# Patient Record
Sex: FEMALE | Race: WHITE | NOT HISPANIC OR LATINO | Employment: OTHER | URBAN - NONMETROPOLITAN AREA
[De-identification: names, ages, dates, MRNs, and addresses within clinical notes are randomized per-mention and may not be internally consistent; named-entity substitution may affect disease eponyms.]

---

## 2017-01-12 ENCOUNTER — OFFICE VISIT (OUTPATIENT)
Dept: FAMILY MEDICINE CLINIC | Facility: CLINIC | Age: 67
End: 2017-01-12

## 2017-01-12 VITALS
HEIGHT: 64 IN | WEIGHT: 184 LBS | BODY MASS INDEX: 31.41 KG/M2 | SYSTOLIC BLOOD PRESSURE: 110 MMHG | DIASTOLIC BLOOD PRESSURE: 80 MMHG

## 2017-01-12 DIAGNOSIS — R53.83 MALAISE AND FATIGUE: ICD-10-CM

## 2017-01-12 DIAGNOSIS — R53.81 MALAISE AND FATIGUE: ICD-10-CM

## 2017-01-12 DIAGNOSIS — R74.8 ELEVATED LIVER ENZYMES: Primary | ICD-10-CM

## 2017-01-12 PROCEDURE — 99213 OFFICE O/P EST LOW 20 MIN: CPT | Performed by: NURSE PRACTITIONER

## 2017-01-12 NOTE — MR AVS SNAPSHOT
Kelly Cardona   1/12/2017 1:45 PM   Office Visit    Dept Phone:  [phone]   Encounter #:  [id number]    Provider:  PABLO Bernstein   Department:  [facility]IC[facility]                Your Full Care Plan              Your Updated Medication List          This list is accurate as of: 1/12/17  2:26 PM.  Always use your most recent med list.                acetaminophen 325 MG suppository   Commonly known as:  TYLENOL   Insert 1 suppository into the rectum Every 4 (Four) Hours As Needed for mild pain (1-3).       CALCIUM 600+D3 600-400 MG-UNIT tablet   Generic drug:  Calcium Carb-Cholecalciferol       CENTRUM SILVER PO       cetirizine 10 MG tablet   Commonly known as:  zyrTEC       CRANBERRY EXTRACT PO       EPIPEN 2-ALBERT 0.3 MG/0.3ML solution auto-injector injection   Generic drug:  EPINEPHrine       estradiol 2 MG vaginal ring   Commonly known as:  ESTRING   Insert 2 mg into the vagina every 3 (three) months. follow package directions       Flax Seed Oil 1000 MG capsule       fluticasone 50 MCG/ACT nasal spray   Commonly known as:  FLONASE   2 sprays into each nostril Daily. Administer 2 sprays in each nostril for each dose.       gabapentin 300 MG capsule   Commonly known as:  NEURONTIN       lubiprostone 24 MCG capsule   Commonly known as:  AMITIZA   Take 1 capsule by mouth 2 (Two) Times a Day.       meclizine 25 MG tablet   Commonly known as:  ANTIVERT       metroNIDAZOLE 1 % gel   Commonly known as:  METROGEL       MUCUS RELIEF 400 MG tablet   Generic drug:  guaiFENesin       PROBIOTIC FORMULA PO       * promethazine 25 MG suppository   Commonly known as:  PHENERGAN   Insert 1 suppository into the rectum Every 6 (Six) Hours As Needed for nausea or vomiting.       * promethazine 25 MG tablet   Commonly known as:  PHENERGAN   Take 1 tablet by mouth Every 6 (Six) Hours As Needed for nausea or vomiting (try pill before suppository).       temazepam 30 MG  capsule   Commonly known as:  RESTORIL   Take 1 capsule by mouth At Night As Needed for sleep.       triamterene-hydrochlorothiazide 37.5-25 MG per tablet   Commonly known as:  MAXZIDE-25       vitamin C 500 MG tablet   Commonly known as:  ASCORBIC ACID       * Notice:  This list has 2 medication(s) that are the same as other medications prescribed for you. Read the directions carefully, and ask your doctor or other care provider to review them with you.            We Performed the Following     CBC Auto Differential     Comprehensive Metabolic Panel       You Were Diagnosed With        Codes Comments    Elevated liver enzymes    -  Primary ICD-10-CM: R74.8  ICD-9-CM: 790.5     Malaise and fatigue     ICD-10-CM: R53.81, R53.83  ICD-9-CM: 780.79       Instructions     None    Patient Instructions History      Upcoming Appointments     Visit Type Date Time Department    OFFICE VISIT 1/12/2017  1:45 PM [facility] MED [facility] 4TH    OFFICE VISIT 6/21/2017  8:15 AM [facility]OLOG[facility]      MyChart Signup     Our records indicate that you have [facility]. If you would like to sign up for [email or web address], please email [facility] Center[email or web address] or call [phone] to obtain an activation code.             Other Info from Your Visit           Your Appointments     Jun 21, 2017  8:15 AM CDT   Office Visit with Kirit Murillo MD   [facility] (--)    [address] [place] [address] Dr  [facility] [address] [place] [id number] [phone]           Arrive 15 minutes prior to appointment.              Allergies     Augmentin [Amoxicillin-pot Clavulanate]  Anaphylaxis    Ceclor [Cefaclor]  Anaphylaxis    Ibuprofen  Anaphylaxis    Nsaids  Anaphylaxis    Other  Anaphylaxis    Cats  E.E.S. 200  Lead   Mold  [place] Pastrani - Anaphylaxis    Penicillins  Anaphylaxis    Aspirin      Biaxin [Clarithromycin]      Ciprofloxacin      *cipro    Demerol [Meperidine]      Iodine      And iodide  "containing products    Macrobid [Nitrofurantoin Monohyd Macro]      Nickel      Requip [Ropinirole Hcl]      Septra [Sulfamethoxazole-trimethoprim]      Statins  Other (See Comments)    Statins-Hmg-Coa Reductase inhibitors  *muscle enzyme increase with myalgias    Niaspan [Niacin Er]  Rash      Reason for Visit     Follow-up E/R  from Dec for eleva functions      Vital Signs     Blood Pressure Height Weight Body Mass Index Smoking Status       110/80 (BP Location: Left arm, Patient Position: Sitting, Cuff Size: Adult) 64\" (162.6 cm) 184 lb (83.5 kg) 31.58 kg/m2 Former Smoker       Problems and Diagnoses Noted     Elevated liver enzymes    Malaise and fatigue        "

## 2017-01-12 NOTE — PROGRESS NOTES
"  Chief Complaint   Patient presents with   • Follow-up     E/R  from Dec for eleva functions     Subjective   Kelly Cardona is a 66 y.o. female.     Fatigue   This is a recurrent problem. The current episode started more than 1 month ago. The problem occurs constantly. The problem has been gradually worsening. Associated symptoms include arthralgias, congestion, coughing, fatigue, joint swelling, myalgias and neck pain. Pertinent negatives include no abdominal pain, anorexia, change in bowel habit, chest pain, chills, diaphoresis, fever, headaches, numbness, rash, sore throat, swollen glands, urinary symptoms, vertigo, visual change, vomiting or weakness. Nothing aggravates the symptoms. The treatment provided mild relief.        The following portions of the patient's history were reviewed and updated as appropriate: allergies, current medications, past social history and problem list.    Review of Systems   Constitutional: Positive for fatigue. Negative for chills, diaphoresis and fever.   HENT: Positive for congestion. Negative for sore throat.    Eyes: Negative.    Respiratory: Positive for cough. Negative for shortness of breath, wheezing and stridor.    Cardiovascular: Negative.  Negative for chest pain and palpitations.   Gastrointestinal: Negative.  Negative for abdominal distention, abdominal pain, anal bleeding, anorexia, change in bowel habit and vomiting.   Endocrine: Negative.    Genitourinary: Negative.    Musculoskeletal: Positive for arthralgias, back pain, gait problem, joint swelling, myalgias, neck pain and neck stiffness.   Skin: Negative.  Negative for rash.   Allergic/Immunologic: Negative.    Neurological: Negative for vertigo, weakness, numbness and headaches.   Hematological: Negative.    Psychiatric/Behavioral: Negative.        Objective   Visit Vitals   • /80 (BP Location: Left arm, Patient Position: Sitting, Cuff Size: Adult)   • Ht 64\" (162.6 cm)   • Wt 184 lb (83.5 kg)   • " BMI 31.58 kg/m2     Physical Exam   Constitutional: She is oriented to person, place, and time. She appears well-developed and well-nourished. No distress.   HENT:   Head: Normocephalic and atraumatic.   Eyes: EOM are normal. Pupils are equal, round, and reactive to light.   Neck: Normal range of motion. Neck supple.   Cardiovascular: Normal rate, regular rhythm and normal heart sounds.  Exam reveals no gallop and no friction rub.    No murmur heard.  Pulmonary/Chest: Effort normal and breath sounds normal. No accessory muscle usage. No apnea. No respiratory distress. She has no wheezes. She has no rales.   Abdominal: Soft. There is tenderness.   Musculoskeletal: She exhibits tenderness. Edema: trace bilaterally.        Right hand: She exhibits decreased range of motion and tenderness.        Left hand: She exhibits decreased range of motion and tenderness.   Neurological: She is alert and oriented to person, place, and time. She has normal reflexes.   Skin: Skin is warm and dry. She is not diaphoretic.   Psychiatric: She has a normal mood and affect. Her speech is normal and behavior is normal. Judgment and thought content normal.   Nursing note and vitals reviewed.      Assessment/Plan   Problem List Items Addressed This Visit        Other    Elevated liver enzymes - Primary    Relevant Orders    CBC Auto Differential    Comprehensive Metabolic Panel    CK    Malaise and fatigue    Relevant Orders    CBC Auto Differential    Comprehensive Metabolic Panel    CK         No orders of the defined types were placed in this encounter.     has appointment with rheumatology tomorrow follow up as directed for other complaints

## 2017-02-02 DIAGNOSIS — Z12.31 SCREENING MAMMOGRAM, ENCOUNTER FOR: Primary | ICD-10-CM

## 2017-02-27 RX ORDER — TEMAZEPAM 30 MG/1
30 CAPSULE ORAL NIGHTLY PRN
Qty: 30 CAPSULE | Refills: 0 | Status: SHIPPED | OUTPATIENT
Start: 2017-02-27 | End: 2017-03-27 | Stop reason: SDUPTHER

## 2017-03-16 ENCOUNTER — OFFICE VISIT (OUTPATIENT)
Dept: FAMILY MEDICINE CLINIC | Facility: CLINIC | Age: 67
End: 2017-03-16

## 2017-03-16 VITALS
HEART RATE: 66 BPM | DIASTOLIC BLOOD PRESSURE: 60 MMHG | WEIGHT: 188.2 LBS | OXYGEN SATURATION: 99 % | BODY MASS INDEX: 32.3 KG/M2 | SYSTOLIC BLOOD PRESSURE: 100 MMHG

## 2017-03-16 DIAGNOSIS — K21.9 GASTROESOPHAGEAL REFLUX DISEASE WITHOUT ESOPHAGITIS: ICD-10-CM

## 2017-03-16 DIAGNOSIS — J01.01 ACUTE RECURRENT MAXILLARY SINUSITIS: ICD-10-CM

## 2017-03-16 DIAGNOSIS — N18.2 CHRONIC KIDNEY DISEASE (CKD), STAGE II (MILD): Primary | ICD-10-CM

## 2017-03-16 DIAGNOSIS — K31.7 GASTRIC POLYP: ICD-10-CM

## 2017-03-16 DIAGNOSIS — M19.042 PRIMARY OSTEOARTHRITIS OF BOTH HANDS: ICD-10-CM

## 2017-03-16 DIAGNOSIS — M19.041 PRIMARY OSTEOARTHRITIS OF BOTH HANDS: ICD-10-CM

## 2017-03-16 DIAGNOSIS — G56.03 BILATERAL CARPAL TUNNEL SYNDROME: ICD-10-CM

## 2017-03-16 DIAGNOSIS — M54.2 NECK PAIN: ICD-10-CM

## 2017-03-16 PROCEDURE — 99214 OFFICE O/P EST MOD 30 MIN: CPT | Performed by: FAMILY MEDICINE

## 2017-03-16 RX ORDER — ERGOCALCIFEROL 1.25 MG/1
50000 CAPSULE ORAL WEEKLY
Refills: 5 | COMMUNITY
Start: 2017-02-17 | End: 2017-07-21

## 2017-03-16 RX ORDER — TRAZODONE HYDROCHLORIDE 50 MG/1
50 TABLET ORAL DAILY
Refills: 3 | COMMUNITY
Start: 2017-02-09 | End: 2017-05-15 | Stop reason: SDUPTHER

## 2017-03-16 RX ORDER — DOXYCYCLINE 100 MG/1
100 CAPSULE ORAL 2 TIMES DAILY
Qty: 20 CAPSULE | Refills: 0 | Status: SHIPPED | OUTPATIENT
Start: 2017-03-16 | End: 2017-03-26

## 2017-03-16 RX ORDER — VALACYCLOVIR HYDROCHLORIDE 1 G/1
1 TABLET, FILM COATED ORAL DAILY PRN
Refills: 3 | COMMUNITY
Start: 2017-03-09 | End: 2018-01-25

## 2017-03-16 NOTE — PROGRESS NOTES
Subjective   Kelly Cardona is a 67 y.o. female.     Chronic Kidney Disease   This is a chronic (double renal pelvis) problem. The current episode started more than 1 year ago. The problem occurs constantly. Associated symptoms include congestion and coughing.   Arthritis   Presents for follow-up (MRI c-spine done DJD) visit. She complains of pain, stiffness and joint warmth. (Ribs aching also) The symptoms have been worsening. Affected locations include the right PIP, left PIP, right DIP, left DIP and neck.   Neck Pain    This is a chronic problem. The current episode started more than 1 year ago. The problem occurs constantly. The problem has been rapidly worsening. The pain is associated with a fall. The pain is present in the occipital region. The quality of the pain is described as aching, burning and shooting. The pain is at a severity of 7/10. The pain is severe. The symptoms are aggravated by bending, twisting and position. The pain is same all the time. Stiffness is present all day. Associated symptoms include tingling.   Heartburn   She complains of belching, choking, coughing, dysphagia (2 timed last 3 yeard), heartburn, water brash and wheezing. ribs aching also.   URI    This is a recurrent problem. The current episode started 1 to 4 weeks ago. The problem has been gradually worsening. There has been no fever. Associated symptoms include congestion, coughing, rhinorrhea, sinus pain and wheezing.        The following portions of the patient's history were reviewed and updated as appropriate: allergies, current medications, past family history, past medical history, past social history, past surgical history and problem list.    Review of Systems   HENT: Positive for congestion and rhinorrhea.    Respiratory: Positive for cough, choking and wheezing. Negative for shortness of breath.    Cardiovascular: Positive for leg swelling. Negative for palpitations.   Gastrointestinal: Positive for dysphagia (2  timed last 3 yeard) and heartburn.   Musculoskeletal: Positive for arthritis and stiffness.   Neurological: Positive for tingling.   Psychiatric/Behavioral: Positive for sleep disturbance.       Objective   Physical Exam   Constitutional: She is oriented to person, place, and time. She appears well-developed and well-nourished. No distress.   HENT:   Head: Normocephalic and atraumatic.   Nose: Mucosal edema and rhinorrhea present. Right sinus exhibits maxillary sinus tenderness. Left sinus exhibits maxillary sinus tenderness.   Mouth/Throat: Uvula is midline, oropharynx is clear and moist and mucous membranes are normal.   Cardiovascular: Normal rate, regular rhythm and normal heart sounds.  Exam reveals no gallop and no friction rub.    No murmur heard.  Pulmonary/Chest: Effort normal and breath sounds normal. No respiratory distress. She has no decreased breath sounds. She has no wheezes. She has no rhonchi. She has no rales.   Abdominal: Soft. There is tenderness in the epigastric area.   Musculoskeletal: Edema: trace bilaterally.        Cervical back: She exhibits decreased range of motion, tenderness, pain and spasm. She exhibits no bony tenderness, no swelling, no edema, no deformity and no laceration.        Right hand: She exhibits decreased range of motion and tenderness.        Left hand: She exhibits decreased range of motion and tenderness.        Hands:  Neurological: She is alert and oriented to person, place, and time. She has normal strength. A sensory deficit is present.   Reflex Scores:       Patellar reflexes are 2+ on the right side and 2+ on the left side.  Skin: Skin is warm and dry. She is not diaphoretic.   Psychiatric: She has a normal mood and affect. Her behavior is normal. Judgment and thought content normal.   Nursing note and vitals reviewed.      Assessment/Plan   Problems Addressed this Visit        Digestive    Gastroesophageal reflux disease    Gastric polyp       Musculoskeletal  and Integument    Primary osteoarthritis of both hands       Genitourinary    Chronic kidney disease (CKD), stage II (mild) - Primary      Other Visit Diagnoses     Neck pain        Relevant Orders    MRI Cervical Spine Without Contrast    Acute recurrent maxillary sinusitis        Bilateral carpal tunnel syndrome                   q

## 2017-03-22 ENCOUNTER — HOSPITAL ENCOUNTER (OUTPATIENT)
Dept: MRI IMAGING | Facility: HOSPITAL | Age: 67
Discharge: HOME | End: 2017-03-22
Attending: FAMILY MEDICINE | Admitting: FAMILY MEDICINE

## 2017-03-22 DIAGNOSIS — M54.2 NECK PAIN: ICD-10-CM

## 2017-03-22 PROCEDURE — 72141 MRI NECK SPINE W/O DYE: CPT

## 2017-03-24 NOTE — PROGRESS NOTES
There is a lot of arthritis and some mild to moderate stenosis at multiple levels.  Recommend she get the disc and see her neurosurgeon for second opinion.

## 2017-03-28 ENCOUNTER — TELEPHONE (OUTPATIENT)
Dept: ENDOCRINOLOGY | Facility: CLINIC | Age: 67
End: 2017-03-28

## 2017-03-28 RX ORDER — TEMAZEPAM 30 MG/1
CAPSULE ORAL
Qty: 30 CAPSULE | Refills: 2 | Status: SHIPPED | OUTPATIENT
Start: 2017-03-28 | End: 2017-06-22 | Stop reason: SDUPTHER

## 2017-03-28 NOTE — TELEPHONE ENCOUNTER
Pr Dr. Forrest's instruction, Kelly has been called with her MRI Results & Recommendations  She will obtain a copy of the Disc to take to her Neurosurgeon       ----- Message from Naresh Forrest MD sent at 3/24/2017 12:31 PM CDT -----  There is a lot of arthritis and some mild to moderate stenosis at multiple levels.  Recommend she get the disc and see her neurosurgeon for second opinion.

## 2017-03-28 NOTE — PROGRESS NOTES
Pr Dr. Forrest's instruction, Kelly has been called with her MRI Results & Recommendations  She will obtain a copy of the Disc to take to her Neurosurgeon

## 2017-04-19 ENCOUNTER — OFFICE VISIT (OUTPATIENT)
Dept: FAMILY MEDICINE CLINIC | Facility: CLINIC | Age: 67
End: 2017-04-19

## 2017-04-19 VITALS
HEIGHT: 64 IN | HEART RATE: 65 BPM | SYSTOLIC BLOOD PRESSURE: 124 MMHG | DIASTOLIC BLOOD PRESSURE: 74 MMHG | OXYGEN SATURATION: 99 % | WEIGHT: 183.5 LBS | BODY MASS INDEX: 31.33 KG/M2

## 2017-04-19 DIAGNOSIS — Z00.00 INITIAL MEDICARE ANNUAL WELLNESS VISIT: Primary | ICD-10-CM

## 2017-04-19 PROCEDURE — G0438 PPPS, INITIAL VISIT: HCPCS | Performed by: FAMILY MEDICINE

## 2017-04-19 RX ORDER — EPINEPHRINE 0.3 MG/.3ML
0.3 INJECTION SUBCUTANEOUS ONCE
Qty: 1 EACH | Refills: 2 | Status: SHIPPED | OUTPATIENT
Start: 2017-04-19 | End: 2017-04-19

## 2017-04-19 RX ORDER — METOCLOPRAMIDE 5 MG/1
5 TABLET ORAL
COMMUNITY
End: 2017-07-21 | Stop reason: SINTOL

## 2017-04-19 NOTE — PROGRESS NOTES
QUICK REFERENCE INFORMATION:  The ABCs of the Annual Wellness Visit    Initial Medicare Wellness Visit    HEALTH RISK ASSESSMENT    1950    Recent Hospitalizations:  Recently treated at the following:  [facility].        Current Medical Providers:  Patient Care Team:  Naresh Forrest MD as PCP - General  Wesley Fox MD as Consulting Physician (Rheumatology)  PABLO Carter (Obstetrics and Gynecology)  Kirit Murillo MD as Consulting Physician (Ophthalmology)  Doug Hogue MD as Surgeon (Neurosurgery)        Smoking Status:  History   Smoking Status   • Former Smoker   Smokeless Tobacco   • Never Used       Alcohol Consumption:  History   Alcohol Use No       Depression Screen:   PHQ-9 Depression Screening 4/19/2017   Little interest or pleasure in doing things 0   Feeling down, depressed, or hopeless 0   Trouble falling or staying asleep, or sleeping too much 1   Feeling tired or having little energy 1   Poor appetite or overeating 1   Feeling bad about yourself - or that you are a failure or have let yourself or your family down 0   Trouble concentrating on things, such as reading the newspaper or watching television 0   Moving or speaking so slowly that other people could have noticed. Or the opposite - being so fidgety or restless that you have been moving around a lot more than usual 0   Thoughts that you would be better off dead, or of hurting yourself in some way 0   PHQ-9 Total Score 3   If you checked off any problems, how difficult have these problems made it for you to do your work, take care of things at home, or get along with other people? Not difficult at all       Health Habits and Functional and Cognitive Screening:  Functional & Cognitive Status 4/19/2017   Do you have difficulty preparing food and eating? No   Do you have difficulty bathing yourself? No   Do you have difficulty getting dressed? No   Do you have difficulty using the toilet? No   Do you have  difficulty moving around from place to place? No   In the past year have you fallen or experienced a near fall? No   Do you need help using the phone?  No   Are you deaf or do you have serious difficulty hearing?  No   Do you need help with transportation? No   Do you need help shopping? No   Do you need help preparing meals?  No   Do you need help with housework?  No   Do you need help with laundry? No   Do you need help taking your medications? No   Do you need help managing money? No   Do you have difficulty concentrating, remembering or making decisions? No       Health Habits  Current Diet: Well Balanced Diet  Dental Exam: Up to date  Eye Exam: Up to date  Exercise (times per week): 5 times per week  Current Exercise Activities Include: Housecleaning          Does the patient have evidence of cognitive impairment? No    Asiprin use counseling: Does not need ASA (and currently is not on it)      Recent Lab Results:    Visual Acuity:  No exam data present    Age-appropriate Screening Schedule:  Refer to the list below for future screening recommendations based on patient's age, sex and/or medical conditions. Orders for these recommended tests are listed in the plan section. The patient has been provided with a written plan.    Health Maintenance   Topic Date Due   • MAMMOGRAM  05/10/2017   • LIPID PANEL  10/13/2017   • COLONOSCOPY  07/23/2025   • TDAP/TD VACCINES (2 - Td) 09/16/2026   • INFLUENZA VACCINE  Completed   • ZOSTER VACCINE  Addressed   • PNEUMOCOCCAL VACCINES (65+ LOW/MEDIUM RISK)  Excluded        Subjective   History of Present Illness    Kelly Cardona is a 67 y.o. female who presents for an Annual Wellness Visit.    The following portions of the patient's history were reviewed and updated as appropriate: allergies, current medications, past family history, past medical history, past social history, past surgical history and problem list.    Outpatient Medications Prior to Visit   Medication Sig  Dispense Refill   • acetaminophen (TYLENOL) 325 MG suppository Insert 1 suppository into the rectum Every 4 (Four) Hours As Needed for mild pain (1-3). 30 suppository 11   • Calcium Carb-Cholecalciferol (CALCIUM 600+D3) 600-400 MG-UNIT tablet Take 1 tablet by mouth daily.     • cetirizine (ZyrTEC) 10 MG tablet Take 10 mg by mouth daily.     • CRANBERRY EXTRACT PO Take  by mouth.     • estradiol (ESTRING) 2 MG vaginal ring Insert 2 mg into the vagina every 3 (three) months. follow package directions 1 each 3   • Flaxseed, Linseed, (FLAX SEED OIL) 1000 MG capsule Take 1 capsule by mouth 2 (two) times a day.     • fluticasone (FLONASE) 50 MCG/ACT nasal spray 2 sprays into each nostril Daily. Administer 2 sprays in each nostril for each dose. 16 g 5   • gabapentin (NEURONTIN) 300 MG capsule Take 300 mg by mouth 3 (three) times a day.     • guaiFENesin (MUCUS RELIEF) 400 MG tablet Take 400 mg by mouth every 4 (four) hours.     • lubiprostone (AMITIZA) 24 MCG capsule Take 1 capsule by mouth 2 (Two) Times a Day. (Patient taking differently: Take 24 mcg by mouth As Needed.) 60 capsule 11   • meclizine (ANTIVERT) 25 MG tablet Take 25 mg by mouth 2 (two) times a day.     • metroNIDAZOLE (METROGEL) 1 % gel Apply 1 application topically Daily.     • Multiple Vitamins-Minerals (CENTRUM SILVER PO) Take 1 tablet by mouth daily.     • Probiotic Product (PROBIOTIC FORMULA PO) Take  by mouth 2 (two) times a day. 10 billion cell (2 billion ea) capsule     • promethazine (PHENERGAN) 25 MG suppository Insert 1 suppository into the rectum Every 6 (Six) Hours As Needed for nausea or vomiting. 30 suppository 11   • promethazine (PHENERGAN) 25 MG tablet Take 1 tablet by mouth Every 6 (Six) Hours As Needed for nausea or vomiting (try pill before suppository). 30 tablet 11   • temazepam (RESTORIL) 30 MG capsule TAKE 1 CAPSULE BY MOUTH AT NIGHT AS NEEDED FOR SLEEP 30 capsule 2   • traZODone (DESYREL) 50 MG tablet Take 50 mg by mouth Daily.  3  "  • triamterene-hydrochlorothiazide (MAXZIDE-25) 37.5-25 MG per tablet Take 1 tablet by mouth daily as needed (for edema).     • valACYclovir (VALTREX) 1000 MG tablet Take 1 g by mouth Daily.  3   • vitamin C (ASCORBIC ACID) 500 MG tablet Take 500 mg by mouth daily.     • vitamin D (ERGOCALCIFEROL) 75232 UNITS capsule capsule Take 50,000 Units by mouth 1 (One) Time Per Week.  5   • EPINEPHrine (EPIPEN 2-ALBERT) 0.3 MG/0.3ML solution auto-injector injection Use as directed       No facility-administered medications prior to visit.        Patient Active Problem List   Diagnosis   • Need for hepatitis C screening test   • Vitamin D deficiency   • Spasm of bladder   • Restless legs   • Peripheral edema   • Myoclonic disorder   • Meniere's disease   • Insomnia   • IBS (irritable bowel syndrome)   • Hypercholesterolemia   • History of colon polyps   • Gout   • Gastroesophageal reflux disease   • Gastric polyp   • Fundic gland polyposis of stomach   • Elevated levels of transaminase & lactic acid dehydrogenase   • Chronic kidney disease (CKD), stage II (mild)   • Blastomycosis   • Allergic rhinitis   • Primary osteoarthritis of both hands   • Primary open angle glaucoma of both eyes, mild stage   • Cataract   • Elevated liver enzymes   • Malaise and fatigue       Advance Care Planning:  has an advance directive - a copy has been provided and is in file    Identification of Risk Factors:  Risk factors include: weight .    Review of Systems    Compared to one year ago, the patient feels her physical health is worse.  Compared to one year ago, the patient feels her mental health is worse.    Objective     Physical Exam    Vitals:    04/19/17 0910   BP: 124/74   Pulse: 65   SpO2: 99%   Weight: 183 lb 8 oz (83.2 kg)   Height: 64\" (162.6 cm)   PainSc: 0-No pain       Body mass index is 31.5 kg/(m^2).  Discussed the patient's BMI with her. The BMI is above average; BMI management plan is completed.    Assessment/Plan   Patient " Self-Management and Personalized Health Advice  The patient has been provided with information about: diet and preventive services including:   · Advance directive.    Visit Diagnoses:  No diagnosis found.    No orders of the defined types were placed in this encounter.      Outpatient Encounter Prescriptions as of 4/19/2017   Medication Sig Dispense Refill   • acetaminophen (TYLENOL) 325 MG suppository Insert 1 suppository into the rectum Every 4 (Four) Hours As Needed for mild pain (1-3). 30 suppository 11   • Calcium Carb-Cholecalciferol (CALCIUM 600+D3) 600-400 MG-UNIT tablet Take 1 tablet by mouth daily.     • cetirizine (ZyrTEC) 10 MG tablet Take 10 mg by mouth daily.     • CRANBERRY EXTRACT PO Take  by mouth.     • estradiol (ESTRING) 2 MG vaginal ring Insert 2 mg into the vagina every 3 (three) months. follow package directions 1 each 3   • Flaxseed, Linseed, (FLAX SEED OIL) 1000 MG capsule Take 1 capsule by mouth 2 (two) times a day.     • fluticasone (FLONASE) 50 MCG/ACT nasal spray 2 sprays into each nostril Daily. Administer 2 sprays in each nostril for each dose. 16 g 5   • gabapentin (NEURONTIN) 300 MG capsule Take 300 mg by mouth 3 (three) times a day.     • guaiFENesin (MUCUS RELIEF) 400 MG tablet Take 400 mg by mouth every 4 (four) hours.     • lubiprostone (AMITIZA) 24 MCG capsule Take 1 capsule by mouth 2 (Two) Times a Day. (Patient taking differently: Take 24 mcg by mouth As Needed.) 60 capsule 11   • meclizine (ANTIVERT) 25 MG tablet Take 25 mg by mouth 2 (two) times a day.     • metroNIDAZOLE (METROGEL) 1 % gel Apply 1 application topically Daily.     • Multiple Vitamins-Minerals (CENTRUM SILVER PO) Take 1 tablet by mouth daily.     • Probiotic Product (PROBIOTIC FORMULA PO) Take  by mouth 2 (two) times a day. 10 billion cell (2 billion ea) capsule     • promethazine (PHENERGAN) 25 MG suppository Insert 1 suppository into the rectum Every 6 (Six) Hours As Needed for nausea or vomiting. 30  suppository 11   • promethazine (PHENERGAN) 25 MG tablet Take 1 tablet by mouth Every 6 (Six) Hours As Needed for nausea or vomiting (try pill before suppository). 30 tablet 11   • temazepam (RESTORIL) 30 MG capsule TAKE 1 CAPSULE BY MOUTH AT NIGHT AS NEEDED FOR SLEEP 30 capsule 2   • traZODone (DESYREL) 50 MG tablet Take 50 mg by mouth Daily.  3   • triamterene-hydrochlorothiazide (MAXZIDE-25) 37.5-25 MG per tablet Take 1 tablet by mouth daily as needed (for edema).     • valACYclovir (VALTREX) 1000 MG tablet Take 1 g by mouth Daily.  3   • vitamin C (ASCORBIC ACID) 500 MG tablet Take 500 mg by mouth daily.     • vitamin D (ERGOCALCIFEROL) 69194 UNITS capsule capsule Take 50,000 Units by mouth 1 (One) Time Per Week.  5   • [DISCONTINUED] EPINEPHrine (EPIPEN 2-ALBERT) 0.3 MG/0.3ML solution auto-injector injection Use as directed       No facility-administered encounter medications on file as of 4/19/2017.        Reviewed use of high risk medication in the elderly: yes  Reviewed for potential of harmful drug interactions in the elderly: yes    Follow Up:  No Follow-up on file.     An After Visit Summary and PPPS with all of these plans were given to the patient.

## 2017-04-19 NOTE — PROGRESS NOTES
"QUICK REFERENCE INFORMATION:  The ABCs of the Annual Wellness Visit    Initial Medicare Wellness Visit    HEALTH RISK ASSESSMENT    1950    Recent Hospitalizations:  {Hospitalization history:3650310258::\"No recent hospitalization(s).\"}.        Current Medical Providers:  Patient Care Team:  Naresh Forrest MD as PCP - General  Wesley Fox MD as Consulting Physician (Rheumatology)  PABLO Carter (Obstetrics and Gynecology)  Kirit Murillo MD as Consulting Physician (Ophthalmology)  Doug Hogue MD as Surgeon (Neurosurgery)        Smoking Status:  History   Smoking Status   • Former Smoker   Smokeless Tobacco   • Never Used       Alcohol Consumption:  History   Alcohol Use No       Depression Screen:   PHQ-9 Depression Screening 4/19/2017   Little interest or pleasure in doing things 0   Feeling down, depressed, or hopeless 0   Trouble falling or staying asleep, or sleeping too much 1   Feeling tired or having little energy 1   Poor appetite or overeating 1   Feeling bad about yourself - or that you are a failure or have let yourself or your family down 0   Trouble concentrating on things, such as reading the newspaper or watching television 0   Moving or speaking so slowly that other people could have noticed. Or the opposite - being so fidgety or restless that you have been moving around a lot more than usual 0   Thoughts that you would be better off dead, or of hurting yourself in some way 0   PHQ-9 Total Score 3   If you checked off any problems, how difficult have these problems made it for you to do your work, take care of things at home, or get along with other people? Not difficult at all       Health Habits and Functional and Cognitive Screening:  Functional & Cognitive Status 4/19/2017   Do you have difficulty preparing food and eating? No   Do you have difficulty bathing yourself? No   Do you have difficulty getting dressed? No   Do you have difficulty using the toilet? No   Do you have " "difficulty moving around from place to place? No   In the past year have you fallen or experienced a near fall? No   Do you need help using the phone?  No   Are you deaf or do you have serious difficulty hearing?  No   Do you need help with transportation? No   Do you need help shopping? No   Do you need help preparing meals?  No   Do you need help with housework?  No   Do you need help with laundry? No   Do you need help taking your medications? No   Do you need help managing money? No   Do you have difficulty concentrating, remembering or making decisions? No       Health Habits  Current Diet: Well Balanced Diet  Dental Exam: Up to date  Eye Exam: Up to date  Exercise (times per week): 5 times per week  Current Exercise Activities Include: Housecleaning          Does the patient have evidence of cognitive impairment? {Yes/No w/ pre-defaulted No:72407::\"No\"}    Asiprin use counseling: {Aspirin :35277}      Recent Lab Results:    Visual Acuity:  No exam data present    Age-appropriate Screening Schedule:  Refer to the list below for future screening recommendations based on patient's age, sex and/or medical conditions. Orders for these recommended tests are listed in the plan section. The patient has been provided with a written plan.    Health Maintenance   Topic Date Due   • MAMMOGRAM  05/10/2017   • LIPID PANEL  10/13/2017   • COLONOSCOPY  07/23/2025   • TDAP/TD VACCINES (2 - Td) 09/16/2026   • INFLUENZA VACCINE  Completed   • ZOSTER VACCINE  Addressed   • PNEUMOCOCCAL VACCINES (65+ LOW/MEDIUM RISK)  Excluded        Subjective   History of Present Illness    Kelly Cardona is a 67 y.o. female who presents for an Annual Wellness Visit.    The following portions of the patient's history were reviewed and updated as appropriate: {history reviewed:20406::\"allergies\",\"current medications\",\"past family history\",\"past medical history\",\"past social history\",\"past surgical history\",\"problem " "list\"}.    Outpatient Medications Prior to Visit   Medication Sig Dispense Refill   • acetaminophen (TYLENOL) 325 MG suppository Insert 1 suppository into the rectum Every 4 (Four) Hours As Needed for mild pain (1-3). 30 suppository 11   • Calcium Carb-Cholecalciferol (CALCIUM 600+D3) 600-400 MG-UNIT tablet Take 1 tablet by mouth daily.     • cetirizine (ZyrTEC) 10 MG tablet Take 10 mg by mouth daily.     • CRANBERRY EXTRACT PO Take  by mouth.     • EPINEPHrine (EPIPEN 2-ALBERT) 0.3 MG/0.3ML solution auto-injector injection Use as directed     • estradiol (ESTRING) 2 MG vaginal ring Insert 2 mg into the vagina every 3 (three) months. follow package directions 1 each 3   • Flaxseed, Linseed, (FLAX SEED OIL) 1000 MG capsule Take 1 capsule by mouth 2 (two) times a day.     • fluticasone (FLONASE) 50 MCG/ACT nasal spray 2 sprays into each nostril Daily. Administer 2 sprays in each nostril for each dose. 16 g 5   • gabapentin (NEURONTIN) 300 MG capsule Take 300 mg by mouth 3 (three) times a day.     • guaiFENesin (MUCUS RELIEF) 400 MG tablet Take 400 mg by mouth every 4 (four) hours.     • lubiprostone (AMITIZA) 24 MCG capsule Take 1 capsule by mouth 2 (Two) Times a Day. (Patient taking differently: Take 24 mcg by mouth As Needed.) 60 capsule 11   • meclizine (ANTIVERT) 25 MG tablet Take 25 mg by mouth 2 (two) times a day.     • metroNIDAZOLE (METROGEL) 1 % gel Apply 1 application topically Daily.     • Multiple Vitamins-Minerals (CENTRUM SILVER PO) Take 1 tablet by mouth daily.     • Probiotic Product (PROBIOTIC FORMULA PO) Take  by mouth 2 (two) times a day. 10 billion cell (2 billion ea) capsule     • promethazine (PHENERGAN) 25 MG suppository Insert 1 suppository into the rectum Every 6 (Six) Hours As Needed for nausea or vomiting. 30 suppository 11   • promethazine (PHENERGAN) 25 MG tablet Take 1 tablet by mouth Every 6 (Six) Hours As Needed for nausea or vomiting (try pill before suppository). 30 tablet 11   • " "temazepam (RESTORIL) 30 MG capsule TAKE 1 CAPSULE BY MOUTH AT NIGHT AS NEEDED FOR SLEEP 30 capsule 2   • traZODone (DESYREL) 50 MG tablet Take 50 mg by mouth Daily.  3   • triamterene-hydrochlorothiazide (MAXZIDE-25) 37.5-25 MG per tablet Take 1 tablet by mouth daily as needed (for edema).     • valACYclovir (VALTREX) 1000 MG tablet Take 1 g by mouth Daily.  3   • vitamin C (ASCORBIC ACID) 500 MG tablet Take 500 mg by mouth daily.     • vitamin D (ERGOCALCIFEROL) 67910 UNITS capsule capsule Take 50,000 Units by mouth 1 (One) Time Per Week.  5     No facility-administered medications prior to visit.        Patient Active Problem List   Diagnosis   • Need for hepatitis C screening test   • Vitamin D deficiency   • Spasm of bladder   • Restless legs   • Peripheral edema   • Myoclonic disorder   • Meniere's disease   • Insomnia   • IBS (irritable bowel syndrome)   • Hypercholesterolemia   • History of colon polyps   • Gout   • Gastroesophageal reflux disease   • Gastric polyp   • Fundic gland polyposis of stomach   • Elevated levels of transaminase & lactic acid dehydrogenase   • Chronic kidney disease (CKD), stage II (mild)   • Blastomycosis   • Allergic rhinitis   • Primary osteoarthritis of both hands   • Primary open angle glaucoma of both eyes, mild stage   • Cataract   • Elevated liver enzymes   • Malaise and fatigue       Advance Care Planning:  {Advance Directive Status:34373}    Identification of Risk Factors:  Risk factors include: {MC; WELLNESS RISK FACTORS:69831}.    Review of Systems    Compared to one year ago, the patient feels her physical health is {better worse same:53021}.  Compared to one year ago, the patient feels her mental health is {better worse same:86897}.    Objective     Physical Exam    Vitals:    04/19/17 0910   BP: 124/74   Pulse: 65   SpO2: 99%   Weight: 183 lb 8 oz (83.2 kg)   Height: 64\" (162.6 cm)   PainSc: 0-No pain       Body mass index is 31.5 kg/(m^2).  Discussed the patient's BMI " with her. The BMI {BMI plan (MU F measure 421):03419}.    Assessment/Plan   Patient Self-Management and Personalized Health Advice  The patient has been provided with information about: {; PERSONALIZED HEALTH ADVICE:07931} and preventive services including:   · {plan:90177}.    Visit Diagnoses:  No diagnosis found.    No orders of the defined types were placed in this encounter.      Outpatient Encounter Prescriptions as of 4/19/2017   Medication Sig Dispense Refill   • acetaminophen (TYLENOL) 325 MG suppository Insert 1 suppository into the rectum Every 4 (Four) Hours As Needed for mild pain (1-3). 30 suppository 11   • Calcium Carb-Cholecalciferol (CALCIUM 600+D3) 600-400 MG-UNIT tablet Take 1 tablet by mouth daily.     • cetirizine (ZyrTEC) 10 MG tablet Take 10 mg by mouth daily.     • CRANBERRY EXTRACT PO Take  by mouth.     • EPINEPHrine (EPIPEN 2-ALBERT) 0.3 MG/0.3ML solution auto-injector injection Use as directed     • estradiol (ESTRING) 2 MG vaginal ring Insert 2 mg into the vagina every 3 (three) months. follow package directions 1 each 3   • Flaxseed, Linseed, (FLAX SEED OIL) 1000 MG capsule Take 1 capsule by mouth 2 (two) times a day.     • fluticasone (FLONASE) 50 MCG/ACT nasal spray 2 sprays into each nostril Daily. Administer 2 sprays in each nostril for each dose. 16 g 5   • gabapentin (NEURONTIN) 300 MG capsule Take 300 mg by mouth 3 (three) times a day.     • guaiFENesin (MUCUS RELIEF) 400 MG tablet Take 400 mg by mouth every 4 (four) hours.     • lubiprostone (AMITIZA) 24 MCG capsule Take 1 capsule by mouth 2 (Two) Times a Day. (Patient taking differently: Take 24 mcg by mouth As Needed.) 60 capsule 11   • meclizine (ANTIVERT) 25 MG tablet Take 25 mg by mouth 2 (two) times a day.     • metroNIDAZOLE (METROGEL) 1 % gel Apply 1 application topically Daily.     • Multiple Vitamins-Minerals (CENTRUM SILVER PO) Take 1 tablet by mouth daily.     • Probiotic Product (PROBIOTIC FORMULA PO) Take  by  mouth 2 (two) times a day. 10 billion cell (2 billion ea) capsule     • promethazine (PHENERGAN) 25 MG suppository Insert 1 suppository into the rectum Every 6 (Six) Hours As Needed for nausea or vomiting. 30 suppository 11   • promethazine (PHENERGAN) 25 MG tablet Take 1 tablet by mouth Every 6 (Six) Hours As Needed for nausea or vomiting (try pill before suppository). 30 tablet 11   • temazepam (RESTORIL) 30 MG capsule TAKE 1 CAPSULE BY MOUTH AT NIGHT AS NEEDED FOR SLEEP 30 capsule 2   • traZODone (DESYREL) 50 MG tablet Take 50 mg by mouth Daily.  3   • triamterene-hydrochlorothiazide (MAXZIDE-25) 37.5-25 MG per tablet Take 1 tablet by mouth daily as needed (for edema).     • valACYclovir (VALTREX) 1000 MG tablet Take 1 g by mouth Daily.  3   • vitamin C (ASCORBIC ACID) 500 MG tablet Take 500 mg by mouth daily.     • vitamin D (ERGOCALCIFEROL) 60839 UNITS capsule capsule Take 50,000 Units by mouth 1 (One) Time Per Week.  5     No facility-administered encounter medications on file as of 4/19/2017.        Reviewed use of high risk medication in the elderly: {Response; yes/no/na:63}  Reviewed for potential of harmful drug interactions in the elderly: {Response; yes/no/na:63}    Follow Up:  No Follow-up on file.     An After Visit Summary and PPPS with all of these plans were given to the patient.

## 2017-05-12 ENCOUNTER — PROCEDURE VISIT (OUTPATIENT)
Dept: OBSTETRICS AND GYNECOLOGY | Facility: CLINIC | Age: 67
End: 2017-05-12

## 2017-05-12 VITALS
DIASTOLIC BLOOD PRESSURE: 60 MMHG | SYSTOLIC BLOOD PRESSURE: 114 MMHG | WEIGHT: 190 LBS | HEIGHT: 65 IN | BODY MASS INDEX: 31.65 KG/M2

## 2017-05-12 DIAGNOSIS — Z01.419 WELL WOMAN EXAM WITH ROUTINE GYNECOLOGICAL EXAM: ICD-10-CM

## 2017-05-12 DIAGNOSIS — Z12.31 SCREENING MAMMOGRAM, ENCOUNTER FOR: Primary | ICD-10-CM

## 2017-05-12 RX ORDER — DOXYCYCLINE HYCLATE 100 MG/1
CAPSULE ORAL
Refills: 0 | COMMUNITY
Start: 2017-05-08 | End: 2017-07-21

## 2017-05-15 RX ORDER — TRAZODONE HYDROCHLORIDE 50 MG/1
TABLET ORAL
Qty: 90 TABLET | Refills: 3 | Status: SHIPPED | OUTPATIENT
Start: 2017-05-15 | End: 2017-10-24

## 2017-05-18 ENCOUNTER — TRANSCRIBE ORDERS (OUTPATIENT)
Dept: LAB | Facility: HOSPITAL | Age: 67
End: 2017-05-18

## 2017-05-18 ENCOUNTER — LAB (OUTPATIENT)
Dept: LAB | Facility: HOSPITAL | Age: 67
End: 2017-05-18

## 2017-05-18 DIAGNOSIS — N18.30 CHRONIC KIDNEY DISEASE, STAGE III (MODERATE) (HCC): Primary | ICD-10-CM

## 2017-05-18 DIAGNOSIS — N18.30 CHRONIC KIDNEY DISEASE, STAGE III (MODERATE) (HCC): ICD-10-CM

## 2017-05-18 LAB
25(OH)D3 SERPL-MCNC: 36.9 NG/ML (ref 30–100)
ALBUMIN SERPL-MCNC: 4.1 G/DL (ref 3.4–4.8)
ANION GAP SERPL CALCULATED.3IONS-SCNC: 12 MMOL/L (ref 5–15)
BUN BLD-MCNC: 13 MG/DL (ref 7–21)
BUN/CREAT SERPL: 13.7 (ref 7–25)
CALCIUM SPEC-SCNC: 9.2 MG/DL (ref 8.4–10.2)
CHLORIDE SERPL-SCNC: 102 MMOL/L (ref 95–110)
CO2 SERPL-SCNC: 24 MMOL/L (ref 22–31)
CREAT BLD-MCNC: 0.95 MG/DL (ref 0.5–1)
CREAT UR-MCNC: 95 MG/DL
GFR SERPL CREATININE-BSD FRML MDRD: 59 ML/MIN/1.73 (ref 45–104)
GLUCOSE BLD-MCNC: 86 MG/DL (ref 60–100)
PHOSPHATE SERPL-MCNC: 4.1 MG/DL (ref 2.4–4.4)
POTASSIUM BLD-SCNC: 4.1 MMOL/L (ref 3.5–5.1)
PROT UR-MCNC: 8.8 MG/DL
PROT/CREAT UR: 92.6 MG/G CREA (ref 0–200)
SODIUM BLD-SCNC: 138 MMOL/L (ref 137–145)

## 2017-05-18 PROCEDURE — 82306 VITAMIN D 25 HYDROXY: CPT | Performed by: INTERNAL MEDICINE

## 2017-05-18 PROCEDURE — 36415 COLL VENOUS BLD VENIPUNCTURE: CPT

## 2017-05-18 PROCEDURE — 84156 ASSAY OF PROTEIN URINE: CPT | Performed by: INTERNAL MEDICINE

## 2017-05-18 PROCEDURE — 80069 RENAL FUNCTION PANEL: CPT | Performed by: INTERNAL MEDICINE

## 2017-05-18 PROCEDURE — 82570 ASSAY OF URINE CREATININE: CPT | Performed by: INTERNAL MEDICINE

## 2017-05-24 ENCOUNTER — TRANSCRIBE ORDERS (OUTPATIENT)
Dept: LAB | Facility: HOSPITAL | Age: 67
End: 2017-05-24

## 2017-05-24 DIAGNOSIS — N18.30 CHRONIC KIDNEY DISEASE, STAGE III (MODERATE) (HCC): Primary | ICD-10-CM

## 2017-06-22 ENCOUNTER — OFFICE VISIT (OUTPATIENT)
Dept: FAMILY MEDICINE CLINIC | Facility: CLINIC | Age: 67
End: 2017-06-22

## 2017-06-22 VITALS
SYSTOLIC BLOOD PRESSURE: 110 MMHG | HEIGHT: 65 IN | OXYGEN SATURATION: 99 % | DIASTOLIC BLOOD PRESSURE: 64 MMHG | BODY MASS INDEX: 31.07 KG/M2 | HEART RATE: 68 BPM | WEIGHT: 186.5 LBS

## 2017-06-22 DIAGNOSIS — M19.041 PRIMARY OSTEOARTHRITIS OF BOTH HANDS: ICD-10-CM

## 2017-06-22 DIAGNOSIS — F51.01 PRIMARY INSOMNIA: ICD-10-CM

## 2017-06-22 DIAGNOSIS — K21.9 GASTROESOPHAGEAL REFLUX DISEASE, ESOPHAGITIS PRESENCE NOT SPECIFIED: Primary | ICD-10-CM

## 2017-06-22 DIAGNOSIS — N18.2 CHRONIC KIDNEY DISEASE (CKD), STAGE II (MILD): ICD-10-CM

## 2017-06-22 DIAGNOSIS — M19.042 PRIMARY OSTEOARTHRITIS OF BOTH HANDS: ICD-10-CM

## 2017-06-22 PROCEDURE — 99214 OFFICE O/P EST MOD 30 MIN: CPT | Performed by: FAMILY MEDICINE

## 2017-06-22 RX ORDER — TEMAZEPAM 30 MG/1
30 CAPSULE ORAL NIGHTLY PRN
Qty: 30 CAPSULE | Refills: 2 | Status: SHIPPED | OUTPATIENT
Start: 2017-06-22 | End: 2017-07-21 | Stop reason: SDUPTHER

## 2017-06-22 NOTE — PROGRESS NOTES
Subjective   Kelly Cardona is a 67 y.o. female.     Chronic Kidney Disease   This is a chronic (double renal pelvis) problem. The current episode started more than 1 year ago. The problem occurs constantly. Associated symptoms include congestion, coughing, joint swelling and neck pain.   Arthritis   Presents for follow-up (MRI c-spine done DJD) visit. She complains of pain, stiffness, joint swelling and joint warmth. (Ribs aching also) The symptoms have been worsening. Affected locations include the right PIP, left PIP, right DIP, left DIP and neck.   Neck Pain    This is a chronic problem. The current episode started more than 1 year ago. The problem occurs constantly. The problem has been rapidly worsening. The pain is associated with a fall. The pain is present in the occipital region. The quality of the pain is described as aching, burning and shooting. The pain is at a severity of 7/10. The pain is severe. The symptoms are aggravated by bending, twisting and position. The pain is same all the time. Stiffness is present all day. Associated symptoms include tingling.   Heartburn   She complains of belching, choking, coughing, heartburn, water brash and wheezing. ribs aching also.   URI    This is a recurrent problem. The current episode started 1 to 4 weeks ago. The problem has been gradually worsening. There has been no fever. Associated symptoms include congestion, coughing, neck pain, rhinorrhea, sinus pain and wheezing.   Leg Swelling   This is a chronic problem. The current episode started more than 1 year ago. The problem has been waxing and waning. Associated symptoms include congestion, coughing, joint swelling and neck pain.        The following portions of the patient's history were reviewed and updated as appropriate: allergies, current medications, past family history, past medical history, past social history, past surgical history and problem list.    Review of Systems   HENT: Positive for  congestion and rhinorrhea.    Respiratory: Positive for cough, choking and wheezing. Negative for shortness of breath.    Cardiovascular: Positive for leg swelling. Negative for palpitations.   Gastrointestinal: Positive for heartburn.   Musculoskeletal: Positive for arthritis, joint swelling, neck pain and stiffness.   Neurological: Positive for tingling.   Psychiatric/Behavioral: Positive for sleep disturbance.       Objective   Physical Exam   Constitutional: She is oriented to person, place, and time. She appears well-developed and well-nourished. No distress.   HENT:   Head: Normocephalic and atraumatic.   Nose: Mucosal edema and rhinorrhea present. Right sinus exhibits maxillary sinus tenderness. Left sinus exhibits maxillary sinus tenderness.   Mouth/Throat: Uvula is midline, oropharynx is clear and moist and mucous membranes are normal.   Cardiovascular: Normal rate, regular rhythm and normal heart sounds.  Exam reveals no gallop and no friction rub.    No murmur heard.  Pulmonary/Chest: Effort normal and breath sounds normal. No respiratory distress. She has no decreased breath sounds. She has no wheezes. She has no rhonchi. She has no rales.   Abdominal: Soft. There is tenderness in the epigastric area.   Musculoskeletal: Edema: trace bilaterally.        Cervical back: She exhibits edema.        Right hand: She exhibits decreased range of motion and tenderness.        Left hand: She exhibits decreased range of motion and tenderness.        Hands:  Neurological: She is alert and oriented to person, place, and time. She has normal strength. A sensory deficit is present.   Reflex Scores:       Patellar reflexes are 2+ on the right side and 2+ on the left side.  Skin: Skin is warm and dry. She is not diaphoretic.   Psychiatric: She has a normal mood and affect. Her behavior is normal. Judgment and thought content normal.   Nursing note and vitals reviewed.      Assessment/Plan   Problems Addressed this Visit         Digestive    Gastroesophageal reflux disease - Primary    Relevant Orders    Ambulatory Referral to Gastroenterology       Musculoskeletal and Integument    Primary osteoarthritis of both hands       Genitourinary    Chronic kidney disease (CKD), stage II (mild)       Other    Insomnia               q

## 2017-06-28 ENCOUNTER — OFFICE VISIT (OUTPATIENT)
Dept: OPHTHALMOLOGY | Facility: CLINIC | Age: 67
End: 2017-06-28

## 2017-06-28 DIAGNOSIS — H52.13 MYOPIA OF BOTH EYES: ICD-10-CM

## 2017-06-28 DIAGNOSIS — H26.9 CATARACT: ICD-10-CM

## 2017-06-28 DIAGNOSIS — H40.003 BORDERLINE GLAUCOMA (GLAUCOMA SUSPECT), BILATERAL: Primary | ICD-10-CM

## 2017-06-28 DIAGNOSIS — H52.4 PRESBYOPIA: ICD-10-CM

## 2017-06-28 PROCEDURE — 99214 OFFICE O/P EST MOD 30 MIN: CPT | Performed by: OPHTHALMOLOGY

## 2017-06-28 PROCEDURE — 92133 CPTRZD OPH DX IMG PST SGM ON: CPT | Performed by: OPHTHALMOLOGY

## 2017-06-28 NOTE — PROGRESS NOTES
Subjective   Kelly Cardona is a 67 y.o. female.   Chief Complaint   Patient presents with   • Glaucoma Suspect     HVF 12/2016 OD unreliable with few scattered paracentral defects and OS unreliable and full Not on drops.Denies a FH of glaucoma but protective is her thick CCTs     • Cataract     Left eye greater than the right eye   Patient thinks that she may need a change in glasses     HPI     Glaucoma Suspect   In both eyes. Additional comments: HVF 12/2016 OD unreliable with few scattered paracentral defects and OS unreliable and full Not on drops.Denies a FH of glaucoma but protective is her thick CCTs             Cataract   In both eyes.  Associated symptoms include blurred vision. Additional comments: Left eye greater than the right eye   Patient thinks that she may need a change in glasses           Comments   Does not use eye drops.        Last edited by Alvino Stephen MD on 6/28/2017  8:56 AM. (History)          Review of Systems   Constitutional: Negative for chills and fever.   Respiratory: Negative for shortness of breath.    Cardiovascular: Negative for chest pain.   Gastrointestinal: Negative for abdominal pain.   Genitourinary: Negative for dysuria.   Musculoskeletal: Positive for myalgias.   Psychiatric/Behavioral: Negative for confusion.     The following portions of the patient’s history were reviewed and updated as appropriate: current medications, allergies, past medical and surgical history and social history.       Objective   Base Eye Exam     Visual Acuity (Snellen - Linear)      Right Left   Dist cc 20/25 -1 20/30 -2   Near cc J2 J1       Correction:  Glasses      Tonometry (Applanation, 9:06 AM)      Right Left   Pressure 17 16         Pachymetry      Right Left   Thickness 635 615         Pupils      Pupils   Right PERRL   Left PERRL         Visual Fields      Left Right   Result Full Full         Extraocular Movement      Right Left   Result Full, Ortho Full, Ortho          Neuro/Psych     Oriented x3:  Yes    Mood/Affect:  Normal      Dilation     Both eyes:  1.0% Mydriacyl, 2.5% phenyl @ 9:08 AM            Slit Lamp and Fundus Exam     External Exam      Right Left    External Normal Normal      Slit Lamp Exam      Right Left    Lids/Lashes Normal Normal    Conjunctiva/Sclera Conjunctivochalasis Conjunctivochalasis    Cornea Clear Clear    Anterior Chamber Deep and quiet Deep and quiet    Iris Round and reactive Round and reactive    Lens 1+ Nuclear sclerosis 1+ Nuclear sclerosis    Vitreous Normal Normal      Fundus Exam      Right Left    Disc healthy, small and tilted healthy, small and tilted    C/D Ratio 0.3 0.3    Macula Normal Normal    Vessels Normal Normal            Refraction     Wearing Rx      Sphere Cylinder Axis Add   Right -6.50 +1.25 164 +2.25   Left -6.50 +0.75 009 +2.25         Manifest Refraction      Sphere Cylinder Axis Dist Add   Right -6.25 +1.25 165 20/20 +1.75   Left -6.00 +0.75 008 20/20 +1.75         Final Rx      Sphere Cylinder Axis Add   Right -6.25 +1.25 165 +1.75   Left -6.00 +0.75 008 +1.75                   OCT Disc:   OD- inferior>nasal thinning  OS- inferior thinning>superior and nasal thinning      **physical exam findings does not correlate with OCT -- nerves are small but tilted and healthy**    Assessment/Plan   Diagnoses and all orders for this visit:    Borderline glaucoma (glaucoma suspect), bilateral  Comments:  OCT of the disc today shows thinning but on examination her optic nerves are tilted and looks healthy. Recommend q12 month follow up.    Cataract  Comments:  Not visually significant. Continue to observe.     Myopia of both eyes  Comments:  New Rx given.     Presbyopia  Comments:  New Rx given.     Plan:       Return for Dilated exam.                            This document has been signed by Alvino Stephen MD on June 28, 2017 9:47 AM

## 2017-06-30 ENCOUNTER — TELEPHONE (OUTPATIENT)
Dept: FAMILY MEDICINE CLINIC | Facility: CLINIC | Age: 67
End: 2017-06-30

## 2017-06-30 RX ORDER — TRIAMTERENE AND HYDROCHLOROTHIAZIDE 37.5; 25 MG/1; MG/1
1 TABLET ORAL DAILY PRN
Qty: 90 TABLET | Refills: 1 | Status: SHIPPED | OUTPATIENT
Start: 2017-06-30 | End: 2018-04-30

## 2017-06-30 NOTE — TELEPHONE ENCOUNTER
CRYSTAL MANNING FORGOT TO ASK FOR REFILL ON THE TRIAMTERENE/HCTZ AND THE ESTRING-VAG.RING..;LAST TIME IN OFFICE  PLEASE SEND TO [facility]

## 2017-07-21 ENCOUNTER — OFFICE VISIT (OUTPATIENT)
Dept: FAMILY MEDICINE CLINIC | Facility: CLINIC | Age: 67
End: 2017-07-21

## 2017-07-21 VITALS
OXYGEN SATURATION: 99 % | WEIGHT: 181.3 LBS | DIASTOLIC BLOOD PRESSURE: 68 MMHG | BODY MASS INDEX: 30.21 KG/M2 | HEIGHT: 65 IN | SYSTOLIC BLOOD PRESSURE: 100 MMHG | HEART RATE: 59 BPM

## 2017-07-21 DIAGNOSIS — G56.01 CARPAL TUNNEL SYNDROME OF RIGHT WRIST: Primary | ICD-10-CM

## 2017-07-21 DIAGNOSIS — F51.01 PRIMARY INSOMNIA: ICD-10-CM

## 2017-07-21 DIAGNOSIS — J01.01 ACUTE RECURRENT MAXILLARY SINUSITIS: ICD-10-CM

## 2017-07-21 PROCEDURE — 96372 THER/PROPH/DIAG INJ SC/IM: CPT | Performed by: FAMILY MEDICINE

## 2017-07-21 PROCEDURE — 99214 OFFICE O/P EST MOD 30 MIN: CPT | Performed by: FAMILY MEDICINE

## 2017-07-21 RX ORDER — GABAPENTIN 600 MG/1
600 TABLET ORAL 2 TIMES DAILY
Qty: 60 TABLET | Refills: 2 | Status: SHIPPED | OUTPATIENT
Start: 2017-07-21 | End: 2017-10-24 | Stop reason: SDUPTHER

## 2017-07-21 RX ORDER — BETAMETHASONE SODIUM PHOSPHATE AND BETAMETHASONE ACETATE 3; 3 MG/ML; MG/ML
12 INJECTION, SUSPENSION INTRA-ARTICULAR; INTRALESIONAL; INTRAMUSCULAR; SOFT TISSUE EVERY 24 HOURS
Status: DISCONTINUED | OUTPATIENT
Start: 2017-07-21 | End: 2017-07-21

## 2017-07-21 RX ORDER — BETAMETHASONE SODIUM PHOSPHATE AND BETAMETHASONE ACETATE 3; 3 MG/ML; MG/ML
12 INJECTION, SUSPENSION INTRA-ARTICULAR; INTRALESIONAL; INTRAMUSCULAR; SOFT TISSUE ONCE
Status: COMPLETED | OUTPATIENT
Start: 2017-07-21 | End: 2017-07-21

## 2017-07-21 RX ORDER — TEMAZEPAM 30 MG/1
30 CAPSULE ORAL NIGHTLY PRN
Qty: 30 CAPSULE | Refills: 2 | Status: SHIPPED | OUTPATIENT
Start: 2017-07-21 | End: 2017-10-24 | Stop reason: SDUPTHER

## 2017-07-21 RX ORDER — DOXYCYCLINE HYCLATE 100 MG
100 TABLET ORAL 2 TIMES DAILY
Qty: 28 TABLET | Refills: 0 | Status: SHIPPED | OUTPATIENT
Start: 2017-07-21 | End: 2017-08-04

## 2017-07-21 RX ADMIN — BETAMETHASONE SODIUM PHOSPHATE AND BETAMETHASONE ACETATE 12 MG: 3; 3 INJECTION, SUSPENSION INTRA-ARTICULAR; INTRALESIONAL; INTRAMUSCULAR; SOFT TISSUE at 11:48

## 2017-07-21 NOTE — PROGRESS NOTES
Subjective   Kelly Cardona is a 67 y.o. female.     Chronic Kidney Disease   Associated symptoms include congestion, headaches, numbness (and tingeling right arm/CTS) and a sore throat.   Insomnia   This is a chronic problem. The current episode started more than 1 year ago. The problem has been waxing and waning. Associated symptoms include congestion, headaches, numbness (and tingeling right arm/CTS) and a sore throat.   Upper Extremity Issue   This is a chronic problem. The current episode started more than 1 year ago. The problem has been waxing and waning. Associated symptoms include congestion, headaches, numbness (and tingeling right arm/CTS) and a sore throat.   URI    This is a recurrent (just finished doxy and kenalog) problem. The current episode started more than 1 month ago. The problem has been waxing and waning. There has been no fever. Associated symptoms include congestion, ear pain, headaches, a plugged ear sensation, rhinorrhea, sinus pain, sneezing and a sore throat.        The following portions of the patient's history were reviewed and updated as appropriate: allergies, current medications, past family history, past medical history, past social history, past surgical history and problem list.    Review of Systems   HENT: Positive for congestion, ear pain, rhinorrhea, sneezing and sore throat.    Neurological: Positive for numbness (and tingeling right arm/CTS) and headaches.   Psychiatric/Behavioral: The patient has insomnia.        Objective   Physical Exam   Constitutional: She is oriented to person, place, and time. She appears well-developed and well-nourished. No distress.   HENT:   Head: Normocephalic and atraumatic.   Right Ear: Hearing and tympanic membrane normal.   Left Ear: Hearing and tympanic membrane normal.   Nose: Mucosal edema and rhinorrhea present. Right sinus exhibits maxillary sinus tenderness and frontal sinus tenderness. Left sinus exhibits maxillary sinus  tenderness and frontal sinus tenderness.   Mouth/Throat: Uvula is midline and mucous membranes are normal.   Musculoskeletal:        Right wrist: She exhibits decreased range of motion and tenderness.        Arms:  Neurological: She is alert and oriented to person, place, and time.   Skin: Skin is warm and dry. She is not diaphoretic.   Psychiatric: She has a normal mood and affect. Her behavior is normal. Judgment and thought content normal.   Nursing note and vitals reviewed.      Assessment/Plan   Problems Addressed this Visit        Respiratory    Acute recurrent maxillary sinusitis       Nervous and Auditory    Carpal tunnel syndrome of right wrist - Primary       Other    Insomnia

## 2017-08-23 ENCOUNTER — OFFICE VISIT (OUTPATIENT)
Dept: GASTROENTEROLOGY | Facility: CLINIC | Age: 67
End: 2017-08-23

## 2017-08-23 VITALS
SYSTOLIC BLOOD PRESSURE: 109 MMHG | BODY MASS INDEX: 31.79 KG/M2 | WEIGHT: 186.2 LBS | DIASTOLIC BLOOD PRESSURE: 74 MMHG | HEART RATE: 78 BPM | HEIGHT: 64 IN

## 2017-08-23 DIAGNOSIS — K21.9 GASTROESOPHAGEAL REFLUX DISEASE, ESOPHAGITIS PRESENCE NOT SPECIFIED: Primary | ICD-10-CM

## 2017-08-23 PROCEDURE — 99213 OFFICE O/P EST LOW 20 MIN: CPT | Performed by: INTERNAL MEDICINE

## 2017-08-23 RX ORDER — EPINEPHRINE 0.3 MG/.3ML
INJECTION SUBCUTANEOUS
COMMUNITY
End: 2017-11-09 | Stop reason: SDUPTHER

## 2017-08-26 NOTE — PROGRESS NOTES
Chief Complaint   Patient presents with   • New Patient   • Gastroesophageal Reflux Disease        Kelly Cardona is a  67 y.o. female here for a follow up visit for GERD, unresponsive to medical management    HPI :  The patient was seen today because of complaints of gastroesophageal reflux that is been uncontrolled by various medications.  She is been tried virtually on all PPIs with no effect.  She had a brief trial of Reglan which gave significant side effects.    The patient has been under the evaluation of a gastroenterologist in [place].  A rather extensive evaluation was done including referral to the motility unit at the [facility].  No real additional therapy was suggested following this evaluation and follow-up.    It should be noted that I discussed the patient that I am retiring within the next 2 weeks and that I do not think it would be appropriate for me to enter into a extended workup.  Rather I feel that she should be seen at a tertiary center, such as [facility].  She is agreeable to this.    Her symptoms seem to be worse in the evening.  They are markedly increased over the past 4 years.  She complains of severe heartburn.  She complains of a great deal of fluid coming up into the throat.  It sounds as if she has had motility studies and pH studies done at [facility].    In addition she complains of severe constipation.  She has been tried on Linzess which cause bad diarrhea even at low dose and Amitiza.    Past Medical History:   Diagnosis Date   • Acute upper respiratory infection    • Allergic rhinitis    • Asthma     blastomycosis   • Blastomycosis    • Borderline glaucoma    • Chronic kidney disease (CKD), stage II (mild)    • Colon polyp    • Dehiscence of surgical wound    • Diverticulitis of colon    • Elevated levels of transaminase & lactic acid dehydrogenase    • Encounter for gynecological examination without abnormal finding    •  Episcleritis    • Fundic gland polyposis of stomach    • Gastric polyp    • Gastritis    • Gastroesophageal reflux disease    • Gastroparesis    • Gout    • H/O mammogram    • History of colon polyps    • Hypercholesterolemia    • IBS (irritable bowel syndrome)    • Insomnia    • Mass of ovary     fixed cyst, post removal, no sign of cancer   • Meniere's disease    • Myoclonic disorder    • Nuclear senile cataract    • Osteoarthritis of multiple joints    • Peripheral edema    • Pruritus of vagina    • Restless legs    • Seasonal allergic rhinitis    • Sleep apnea    • Spasm of bladder    • Vaginal irritation    • Vitamin D deficiency        Current Outpatient Prescriptions   Medication Sig Dispense Refill   • acetaminophen (TYLENOL) 325 MG suppository Insert 1 suppository into the rectum Every 4 (Four) Hours As Needed for mild pain (1-3). 30 suppository 11   • Alum Hydroxide-Mag Carbonate (GAVISCON PO) Take  by mouth. Tablets Or Liquid, As Needed     • Biotin 5000 MCG capsule Take 1 capsule by mouth Daily.     • Calcium Carb-Cholecalciferol (CALCIUM 600+D3) 600-400 MG-UNIT tablet Take 1 tablet by mouth daily.     • cetirizine (ZyrTEC) 10 MG tablet Take 10 mg by mouth daily.     • CRANBERRY EXTRACT PO Take  by mouth.     • EPINEPHrine (EPIPEN) 0.3 MG/0.3ML solution auto-injector injection Utilize As Needed     • estradiol (ESTRING) 2 MG vaginal ring Insert 2 mg into the vagina Every 3 (Three) Months. follow package directions 1 each 3   • Flaxseed, Linseed, (FLAX SEED OIL) 1000 MG capsule Take 1 capsule by mouth 2 (two) times a day.     • fluticasone (FLONASE) 50 MCG/ACT nasal spray 2 sprays into each nostril Daily. Administer 2 sprays in each nostril for each dose. 16 g 5   • gabapentin (NEURONTIN) 600 MG tablet Take 1 tablet by mouth 2 (Two) Times a Day. 60 tablet 2   • guaiFENesin (MUCUS RELIEF) 400 MG tablet Take 400 mg by mouth Every 4 (Four) Hours As Needed.     • meclizine (ANTIVERT) 25 MG tablet Take 25 mg  by mouth 2 (two) times a day.     • Multiple Vitamins-Minerals (CENTRUM SILVER PO) Take 1 tablet by mouth daily.     • Probiotic Product (PROBIOTIC FORMULA PO) Take  by mouth 2 (two) times a day. 10 billion cell (2 billion ea) capsule     • temazepam (RESTORIL) 30 MG capsule Take 1 capsule by mouth At Night As Needed for Sleep. 30 capsule 2   • traZODone (DESYREL) 50 MG tablet TAKE 1 TABLET BY MOUTH 1 HOUR BEFORE BEDTIME 90 tablet 3   • triamterene-hydrochlorothiazide (MAXZIDE-25) 37.5-25 MG per tablet Take 1 tablet by mouth Daily As Needed (for edema). 90 tablet 1   • TURMERIC PO Take 1 capsule by mouth Daily.     • valACYclovir (VALTREX) 1000 MG tablet Take 1 g by mouth Daily As Needed.  3     No current facility-administered medications for this visit.        PRN Meds:.    Allergies   Allergen Reactions   • Augmentin [Amoxicillin-Pot Clavulanate] Anaphylaxis   • Ceclor [Cefaclor] Anaphylaxis   • Ibuprofen Anaphylaxis   • Nsaids Anaphylaxis   • Other Anaphylaxis     Cats  E.E.S[address]  Lead   Mold  Maurice Pastrani - Anaphylaxis   • Penicillins Anaphylaxis   • Aspirin    • Biaxin [Clarithromycin]    • Ciprofloxacin      *cipro   • Demerol [Meperidine]    • Iodine      And iodide containing products   • Macrobid [Nitrofurantoin Monohyd Macro]    • Nickel    • Requip [Ropinirole Hcl]    • Septra [Sulfamethoxazole-Trimethoprim]    • Statins Other (See Comments)     Statins-Hmg-Coa Reductase inhibitors  *muscle enzyme increase with myalgias   • Niaspan [Niacin Er] Rash       Social History     Social History   • Marital status:      Spouse name: N/A   • Number of children: N/A   • Years of education: N/A     Occupational History   • Not on file.     Social History Main Topics   • Smoking status: Former Smoker     Quit date: 08/2000   • Smokeless tobacco: Never Used      Comment: Patient states she ceased smoking August 2000.   • Alcohol use No   • Drug use: No   • Sexual activity: Defer      Comment:       Other Topics Concern   • Not on file     Social History Narrative       Family History   Problem Relation Age of Onset   • Diabetes Mother    • Other Mother      cortical basal ganglionic degeneration    • Seizures Mother      epilepsy   • Arthritis Mother    • Hyperlipidemia Mother    • Mental illness Mother    • Breast cancer Mother    • Tuberculosis Father    • Cancer Father 60     colon   • Hypertension Father    • Colon cancer Father    • Colon cancer Other    • Hypertension Other    • Heart disease Other    • Diabetes Other    • Colon cancer Other    • Breast cancer Paternal Grandmother        Review of Systems   Constitutional: Positive for fatigue. Negative for activity change, appetite change, chills, diaphoresis, fever and unexpected weight change.   Gastrointestinal: Positive for abdominal distention, abdominal pain and constipation. Negative for anal bleeding, blood in stool, diarrhea, nausea, rectal pain and vomiting.   Psychiatric/Behavioral: Negative for confusion.       Vitals:    08/23/17 1259   BP: 109/74   Pulse: 78       Physical Exam   Constitutional: Vital signs are normal. She appears well-developed and well-nourished.   Abdominal: Soft. Normal appearance and bowel sounds are normal. There is no hepatosplenomegaly. There is no tenderness.   Neurological: She is alert.   Nursing note and vitals reviewed.      ASSESSMENT AND PLAN      ICD-10-CM ICD-9-CM   1. Gastroesophageal reflux disease, esophagitis presence not specified K21.9 530.81      Plan:  Trial with Truance 3 mg daily.  Samples given, if effective she will contact us refill prescription.  Referral to the Department of Gastroenterology, [facility].  She will follow up with Dr. Forrest.        This document has been electronically signed by Wilian Valenzuela MD on August 26, 2017 1:19 PM                                   “EMR Dragon/Transcription disclaimer:   Much of this encounter note is an electronic  transcription/translation of spoken language to printed text. The electronic translation of spoken language may permit erroneous, or at times, nonsensical words or phrases to be inadvertently transcribed; Although I have reviewed the note for such errors, some may still exist.”

## 2017-08-28 RX ORDER — ESTRADIOL 2 MG/1
RING VAGINAL
Qty: 1 EACH | Refills: 3 | Status: SHIPPED | OUTPATIENT
Start: 2017-08-28 | End: 2017-11-09 | Stop reason: SDUPTHER

## 2017-09-05 ENCOUNTER — TELEPHONE (OUTPATIENT)
Dept: CARDIAC SURGERY | Facility: CLINIC | Age: 67
End: 2017-09-05

## 2017-09-05 NOTE — TELEPHONE ENCOUNTER
Contacted patient Per Dr. Valenzuela request notified of appt at [facility], [place], operated by [facility] with Dr. Greg Parsons on 11-9-2017 at 9:40am.  Answered all patient's questions to her satisfaction.

## 2017-09-13 ENCOUNTER — TELEPHONE (OUTPATIENT)
Dept: FAMILY MEDICINE CLINIC | Facility: CLINIC | Age: 67
End: 2017-09-13

## 2017-09-13 RX ORDER — FLUTICASONE PROPIONATE 50 MCG
2 SPRAY, SUSPENSION (ML) NASAL DAILY
Qty: 16 ML | Refills: 3 | Status: SHIPPED | OUTPATIENT
Start: 2017-09-13 | End: 2017-11-09 | Stop reason: SDUPTHER

## 2017-09-13 RX ORDER — FLUTICASONE PROPIONATE 50 MCG
SPRAY, SUSPENSION (ML) NASAL
Qty: 16 ML | Refills: 3 | Status: SHIPPED | OUTPATIENT
Start: 2017-09-13 | End: 2017-09-13 | Stop reason: SDUPTHER

## 2017-09-13 NOTE — TELEPHONE ENCOUNTER
-Requested Refills Sent    ---- Message from Yola Guthrie sent at 2017  1:16 PM CDT -----  Contact: PT CALLED  Pt needs refills called in for her Flonase Nasal [place] to [facility] in [place]. She had refills left but they had . Adriane pt

## 2017-10-24 ENCOUNTER — OFFICE VISIT (OUTPATIENT)
Dept: FAMILY MEDICINE CLINIC | Facility: CLINIC | Age: 67
End: 2017-10-24

## 2017-10-24 VITALS
HEART RATE: 60 BPM | BODY MASS INDEX: 31.92 KG/M2 | DIASTOLIC BLOOD PRESSURE: 82 MMHG | OXYGEN SATURATION: 97 % | HEIGHT: 64 IN | SYSTOLIC BLOOD PRESSURE: 124 MMHG | WEIGHT: 187 LBS

## 2017-10-24 DIAGNOSIS — G89.29 CHRONIC RIGHT SHOULDER PAIN: Primary | ICD-10-CM

## 2017-10-24 DIAGNOSIS — M25.511 CHRONIC RIGHT SHOULDER PAIN: Primary | ICD-10-CM

## 2017-10-24 PROCEDURE — 99214 OFFICE O/P EST MOD 30 MIN: CPT | Performed by: FAMILY MEDICINE

## 2017-10-24 PROCEDURE — 96372 THER/PROPH/DIAG INJ SC/IM: CPT | Performed by: FAMILY MEDICINE

## 2017-10-24 RX ORDER — TEMAZEPAM 30 MG/1
30 CAPSULE ORAL NIGHTLY PRN
Qty: 30 CAPSULE | Refills: 2 | Status: SHIPPED | OUTPATIENT
Start: 2017-10-24 | End: 2018-02-01 | Stop reason: SDUPTHER

## 2017-10-24 RX ORDER — TEMAZEPAM 30 MG/1
30 CAPSULE ORAL NIGHTLY PRN
Qty: 30 CAPSULE | Refills: 2 | Status: SHIPPED | OUTPATIENT
Start: 2017-10-24 | End: 2017-10-24 | Stop reason: SDUPTHER

## 2017-10-24 RX ORDER — PROMETHAZINE HYDROCHLORIDE 25 MG/1
25 TABLET ORAL EVERY 6 HOURS PRN
Qty: 30 TABLET | Refills: 2 | Status: SHIPPED | OUTPATIENT
Start: 2017-10-24 | End: 2018-01-05 | Stop reason: SDUPTHER

## 2017-10-24 RX ORDER — TRAZODONE HYDROCHLORIDE 100 MG/1
100 TABLET ORAL NIGHTLY
Qty: 90 TABLET | Refills: 3 | Status: SHIPPED | OUTPATIENT
Start: 2017-10-24 | End: 2018-04-30 | Stop reason: SDUPTHER

## 2017-10-24 RX ORDER — TRIAMCINOLONE ACETONIDE 40 MG/ML
80 INJECTION, SUSPENSION INTRA-ARTICULAR; INTRAMUSCULAR ONCE
Status: COMPLETED | OUTPATIENT
Start: 2017-10-24 | End: 2017-10-24

## 2017-10-24 RX ORDER — GABAPENTIN 600 MG/1
600 TABLET ORAL SEE ADMIN INSTRUCTIONS
Qty: 60 TABLET | Refills: 2 | Status: SHIPPED | OUTPATIENT
Start: 2017-10-24 | End: 2018-02-28 | Stop reason: DRUGHIGH

## 2017-10-24 RX ADMIN — TRIAMCINOLONE ACETONIDE 80 MG: 40 INJECTION, SUSPENSION INTRA-ARTICULAR; INTRAMUSCULAR at 09:29

## 2017-10-24 NOTE — PROGRESS NOTES
Subjective   Kelly Cardona is a 67 y.o. female.     Chronic Kidney Disease     Insomnia   This is a chronic problem. The current episode started more than 1 year ago. The problem has been waxing and waning.   Upper Extremity Issue   This is a chronic problem. The current episode started more than 1 year ago. The problem has been waxing and waning.   Arthritis   Presents for follow-up visit. She complains of pain and stiffness. The symptoms have been worsening. Affected locations include the left shoulder.        The following portions of the patient's history were reviewed and updated as appropriate: allergies, current medications, past family history, past medical history, past social history, past surgical history and problem list.    Review of Systems   Musculoskeletal: Positive for arthritis and stiffness.   Psychiatric/Behavioral: The patient has insomnia.        Objective   Physical Exam   Constitutional: She is oriented to person, place, and time. She appears well-developed and well-nourished. No distress.   HENT:   Head: Normocephalic and atraumatic.   Right Ear: Hearing and tympanic membrane normal.   Left Ear: Hearing and tympanic membrane normal.   Nose: Mucosal edema and rhinorrhea present. Right sinus exhibits maxillary sinus tenderness and frontal sinus tenderness. Left sinus exhibits maxillary sinus tenderness and frontal sinus tenderness.   Mouth/Throat: Uvula is midline and mucous membranes are normal.   Musculoskeletal:        Right shoulder: She exhibits decreased range of motion, tenderness and pain. She exhibits no bony tenderness, no swelling, no effusion, no crepitus, no deformity, no laceration, no spasm and normal strength.        Right wrist: She exhibits decreased range of motion and tenderness.        Arms:  Neurological: She is alert and oriented to person, place, and time.   Skin: Skin is warm and dry. She is not diaphoretic.   Psychiatric: She has a normal mood and affect. Her  behavior is normal. Judgment and thought content normal.   Nursing note and vitals reviewed.      Assessment/Plan   Problems Addressed this Visit     None      Visit Diagnoses     Chronic right shoulder pain    -  Primary

## 2017-11-06 ENCOUNTER — APPOINTMENT (OUTPATIENT)
Dept: GENERAL RADIOLOGY | Facility: HOSPITAL | Age: 67
End: 2017-11-06

## 2017-11-06 ENCOUNTER — HOSPITAL ENCOUNTER (EMERGENCY)
Facility: HOSPITAL | Age: 67
Discharge: HOME | End: 2017-11-06
Attending: EMERGENCY MEDICINE | Admitting: EMERGENCY MEDICINE

## 2017-11-06 VITALS
HEIGHT: 64 IN | TEMPERATURE: 97.8 F | SYSTOLIC BLOOD PRESSURE: 117 MMHG | RESPIRATION RATE: 20 BRPM | DIASTOLIC BLOOD PRESSURE: 65 MMHG | BODY MASS INDEX: 31.58 KG/M2 | OXYGEN SATURATION: 98 % | HEART RATE: 58 BPM | WEIGHT: 185 LBS

## 2017-11-06 DIAGNOSIS — R05.9 COUGH: ICD-10-CM

## 2017-11-06 DIAGNOSIS — R06.00 DYSPNEA, UNSPECIFIED TYPE: ICD-10-CM

## 2017-11-06 DIAGNOSIS — R13.10 DYSPHAGIA, UNSPECIFIED TYPE: Primary | ICD-10-CM

## 2017-11-06 LAB
ALBUMIN SERPL-MCNC: 4.2 G/DL (ref 3.4–4.8)
ALBUMIN/GLOB SERPL: 1.4 G/DL (ref 1.1–1.8)
ALP SERPL-CCNC: 69 U/L (ref 38–126)
ALT SERPL W P-5'-P-CCNC: 40 U/L (ref 9–52)
ANION GAP SERPL CALCULATED.3IONS-SCNC: 11 MMOL/L (ref 5–15)
AST SERPL-CCNC: 34 U/L (ref 14–36)
BASOPHILS # BLD AUTO: 0.05 10*3/MM3 (ref 0–0.2)
BASOPHILS NFR BLD AUTO: 0.5 % (ref 0–2)
BILIRUB SERPL-MCNC: 1 MG/DL (ref 0.2–1.3)
BILIRUB UR QL STRIP: NEGATIVE
BUN BLD-MCNC: 12 MG/DL (ref 7–21)
BUN/CREAT SERPL: 10.8 (ref 7–25)
CALCIUM SPEC-SCNC: 10.5 MG/DL (ref 8.4–10.2)
CHLORIDE SERPL-SCNC: 105 MMOL/L (ref 95–110)
CLARITY UR: CLEAR
CO2 SERPL-SCNC: 27 MMOL/L (ref 22–31)
COLOR UR: YELLOW
CREAT BLD-MCNC: 1.11 MG/DL (ref 0.5–1)
DEPRECATED RDW RBC AUTO: 39 FL (ref 36.4–46.3)
EOSINOPHIL # BLD AUTO: 0.11 10*3/MM3 (ref 0–0.7)
EOSINOPHIL NFR BLD AUTO: 1.2 % (ref 0–7)
ERYTHROCYTE [DISTWIDTH] IN BLOOD BY AUTOMATED COUNT: 12.1 % (ref 11.5–14.5)
GFR SERPL CREATININE-BSD FRML MDRD: 49 ML/MIN/1.73 (ref 45–104)
GLOBULIN UR ELPH-MCNC: 3.1 GM/DL (ref 2.3–3.5)
GLUCOSE BLD-MCNC: 93 MG/DL (ref 60–100)
GLUCOSE UR STRIP-MCNC: NEGATIVE MG/DL
HCT VFR BLD AUTO: 44.2 % (ref 35–45)
HGB BLD-MCNC: 15.3 G/DL (ref 12–15.5)
HGB UR QL STRIP.AUTO: NEGATIVE
HOLD SPECIMEN: NORMAL
HOLD SPECIMEN: NORMAL
IMM GRANULOCYTES # BLD: 0.02 10*3/MM3 (ref 0–0.02)
IMM GRANULOCYTES NFR BLD: 0.2 % (ref 0–0.5)
KETONES UR QL STRIP: NEGATIVE
LEUKOCYTE ESTERASE UR QL STRIP.AUTO: NEGATIVE
LYMPHOCYTES # BLD AUTO: 2.38 10*3/MM3 (ref 0.6–4.2)
LYMPHOCYTES NFR BLD AUTO: 25.6 % (ref 10–50)
MCH RBC QN AUTO: 30.8 PG (ref 26.5–34)
MCHC RBC AUTO-ENTMCNC: 34.6 G/DL (ref 31.4–36)
MCV RBC AUTO: 89.1 FL (ref 80–98)
MONOCYTES # BLD AUTO: 0.82 10*3/MM3 (ref 0–0.9)
MONOCYTES NFR BLD AUTO: 8.8 % (ref 0–12)
NEUTROPHILS # BLD AUTO: 5.93 10*3/MM3 (ref 2–8.6)
NEUTROPHILS NFR BLD AUTO: 63.7 % (ref 37–80)
NITRITE UR QL STRIP: NEGATIVE
NT-PROBNP SERPL-MCNC: 144 PG/ML (ref 0–900)
PH UR STRIP.AUTO: 8 [PH] (ref 5–9)
PLATELET # BLD AUTO: 219 10*3/MM3 (ref 150–450)
PMV BLD AUTO: 10.2 FL (ref 8–12)
POTASSIUM BLD-SCNC: 3.9 MMOL/L (ref 3.5–5.1)
PROT SERPL-MCNC: 7.3 G/DL (ref 6.3–8.6)
PROT UR QL STRIP: NEGATIVE
RBC # BLD AUTO: 4.96 10*6/MM3 (ref 3.77–5.16)
SODIUM BLD-SCNC: 143 MMOL/L (ref 137–145)
SP GR UR STRIP: 1.01 (ref 1–1.03)
TROPONIN I SERPL-MCNC: <0.012 NG/ML
UROBILINOGEN UR QL STRIP: NORMAL
WBC NRBC COR # BLD: 9.31 10*3/MM3 (ref 3.2–9.8)
WHOLE BLOOD HOLD SPECIMEN: NORMAL
WHOLE BLOOD HOLD SPECIMEN: NORMAL

## 2017-11-06 PROCEDURE — 80053 COMPREHEN METABOLIC PANEL: CPT | Performed by: PHYSICIAN ASSISTANT

## 2017-11-06 PROCEDURE — 99284 EMERGENCY DEPT VISIT MOD MDM: CPT

## 2017-11-06 PROCEDURE — 70360 X-RAY EXAM OF NECK: CPT

## 2017-11-06 PROCEDURE — 84484 ASSAY OF TROPONIN QUANT: CPT | Performed by: PHYSICIAN ASSISTANT

## 2017-11-06 PROCEDURE — 81003 URINALYSIS AUTO W/O SCOPE: CPT | Performed by: EMERGENCY MEDICINE

## 2017-11-06 PROCEDURE — 93010 ELECTROCARDIOGRAM REPORT: CPT | Performed by: INTERNAL MEDICINE

## 2017-11-06 PROCEDURE — 85025 COMPLETE CBC W/AUTO DIFF WBC: CPT | Performed by: PHYSICIAN ASSISTANT

## 2017-11-06 PROCEDURE — 93005 ELECTROCARDIOGRAM TRACING: CPT | Performed by: PHYSICIAN ASSISTANT

## 2017-11-06 PROCEDURE — 83880 ASSAY OF NATRIURETIC PEPTIDE: CPT | Performed by: PHYSICIAN ASSISTANT

## 2017-11-06 PROCEDURE — 71020 HC CHEST PA AND LATERAL: CPT

## 2017-11-06 RX ORDER — FLUTICASONE PROPIONATE 50 MCG
2 SPRAY, SUSPENSION (ML) NASAL DAILY
COMMUNITY
End: 2017-11-13 | Stop reason: SDUPTHER

## 2017-11-06 RX ORDER — AMOXICILLIN 250 MG
1 CAPSULE ORAL DAILY
COMMUNITY
End: 2018-01-25

## 2017-11-06 RX ORDER — EPINEPHRINE 0.3 MG/.3ML
INJECTION SUBCUTANEOUS
COMMUNITY
End: 2018-01-05 | Stop reason: SDUPTHER

## 2017-11-06 RX ORDER — DIPHENHYDRAMINE HYDROCHLORIDE 25 MG/1
1 TABLET ORAL DAILY
COMMUNITY
End: 2020-04-03

## 2017-11-06 RX ORDER — PROMETHAZINE HYDROCHLORIDE 25 MG/1
25 TABLET ORAL EVERY 6 HOURS PRN
COMMUNITY
End: 2017-11-09 | Stop reason: SDUPTHER

## 2017-11-06 RX ORDER — GABAPENTIN 300 MG/1
300 CAPSULE ORAL
COMMUNITY
End: 2017-11-09

## 2017-11-06 RX ORDER — TRAZODONE HYDROCHLORIDE 100 MG/1
100 TABLET ORAL NIGHTLY
COMMUNITY
End: 2017-11-09 | Stop reason: SDUPTHER

## 2017-11-06 RX ORDER — CETIRIZINE HYDROCHLORIDE 10 MG/1
10 TABLET ORAL DAILY
COMMUNITY
End: 2017-11-09 | Stop reason: SDUPTHER

## 2017-11-06 RX ORDER — ALBUTEROL SULFATE 90 UG/1
2 AEROSOL, METERED RESPIRATORY (INHALATION) EVERY 4 HOURS PRN
Qty: 1 INHALER | Refills: 0 | Status: SHIPPED | OUTPATIENT
Start: 2017-11-06 | End: 2017-11-10

## 2017-11-06 RX ORDER — MULTIPLE VITAMINS W/ MINERALS TAB 9MG-400MCG
1 TAB ORAL DAILY
COMMUNITY
End: 2017-11-09 | Stop reason: SDUPTHER

## 2017-11-06 RX ORDER — MECLIZINE HYDROCHLORIDE 25 MG/1
25 TABLET ORAL 3 TIMES DAILY PRN
COMMUNITY
End: 2020-04-03

## 2017-11-06 RX ORDER — TRIAMTERENE AND HYDROCHLOROTHIAZIDE 37.5; 25 MG/1; MG/1
1 CAPSULE ORAL EVERY MORNING
COMMUNITY
End: 2017-11-09 | Stop reason: SDUPTHER

## 2017-11-06 RX ORDER — TEMAZEPAM 30 MG/1
30 CAPSULE ORAL NIGHTLY PRN
COMMUNITY
End: 2017-11-09 | Stop reason: SDUPTHER

## 2017-11-06 NOTE — ED NOTES
Pt no longer wheezing, states she feels better after O2 administration.     José Bermudez, RN  11/06/17 7857

## 2017-11-06 NOTE — ED NOTES
Pt presents to ED via EMS from walk-in clinic with complaints of chills, tracheal pain, and difficulty breathing. Pt states this started this morning. Pt has audible wheezing and is shaking due to chills. PA notified.     José Bermudez RN  11/06/17 1036       José Bermudez RN  11/06/17 1038

## 2017-11-06 NOTE — ED PROVIDER NOTES
"Subjective   HPI Comments: Patient presents to the emergency room directly from past paced urgent care.  States she has had sneezing, runny, nose, cough, and sinus pain for about 1.5 weeks.  States it has gotten much worse this morning. States \"my trachea hurts when I cough and it feels hard to swallow\".  Endorses symptoms happening right after a recent trip to [place].      Patient is a 67 y.o. female presenting with shortness of breath.   History provided by:  Patient   used: No    Shortness of Breath   Severity:  Moderate  Onset quality:  Gradual  Duration:  2 weeks  Timing:  Constant  Progression:  Worsening  Chronicity:  New  Context: URI    Context: not activity, not animal exposure, not emotional upset, not known allergens, not occupational exposure and not strong odors    Relieved by:  Nothing  Worsened by:  Exertion  Ineffective treatments:  None tried  Associated symptoms: cough, sore throat, sputum production and wheezing    Associated symptoms: no abdominal pain, no chest pain, no ear pain, no fever, no headaches, no hemoptysis, no neck pain, no PND, no syncope, no swollen glands and no vomiting        Review of Systems   Constitutional: Negative for fever.   HENT: Positive for rhinorrhea, sinus pain, sneezing, sore throat and trouble swallowing. Negative for ear pain.    Eyes: Negative for visual disturbance.   Respiratory: Positive for cough, sputum production, chest tightness, shortness of breath and wheezing. Negative for hemoptysis.    Cardiovascular: Negative for chest pain, leg swelling, syncope and PND.   Gastrointestinal: Positive for constipation. Negative for abdominal distention, abdominal pain, diarrhea, nausea and vomiting.   Genitourinary: Negative for flank pain and frequency.   Musculoskeletal: Negative for neck pain.   Skin: Negative for color change.   Allergic/Immunologic: Negative for immunocompromised state.   Neurological: Positive for light-headedness (with " "coughing fits). Negative for headaches.   Hematological: Does not bruise/bleed easily.   Psychiatric/Behavioral: Negative for self-injury and suicidal ideas. The patient is nervous/anxious.        History reviewed. No pertinent past medical history.    Allergies   Allergen Reactions   • Augmentin [Amoxicillin-Pot Clavulanate] Anaphylaxis   • Ceclor [Cefaclor] Anaphylaxis   • Ibuprofen Anaphylaxis   • Aspirin    • Biaxin [Clarithromycin]    • Contrast Dye    • Demerol [Meperidine]    • Nickel    • Penicillium Notatum    • Requip [Ropinirole Hcl]    • Septra [Sulfamethoxazole-Trimethoprim]    • Statins        History reviewed. No pertinent surgical history.    No family history on file.    Social History     Social History   • Marital status:      Spouse name: N/A   • Number of children: N/A   • Years of education: N/A     Social History Main Topics   • Smoking status: None   • Smokeless tobacco: None   • Alcohol use None   • Drug use: None   • Sexual activity: Not Asked     Other Topics Concern   • None     Social History Narrative   • None         Objective      /69  Pulse 56  Temp 97.8 °F (36.6 °C) (Oral)   Resp 22  Ht 64\" (162.6 cm)  Wt 185 lb (83.9 kg)  SpO2 100%  BMI 31.76 kg/m2    Physical Exam   Constitutional: She is oriented to person, place, and time. She appears well-developed and well-nourished.   HENT:   Head: Normocephalic and atraumatic.   Eyes: EOM are normal. Pupils are equal, round, and reactive to light.   Cardiovascular: Normal rate, regular rhythm, normal heart sounds and intact distal pulses.    Pulmonary/Chest: Effort normal and breath sounds normal. No respiratory distress. She has no wheezes.   Some coarse upper airway breath sounds   Abdominal: Soft. Bowel sounds are normal. She exhibits no distension. There is no tenderness.   Neurological: She is alert and oriented to person, place, and time.   Skin: Skin is warm and dry.   Psychiatric: She has a normal mood and affect. " Her behavior is normal. Thought content normal.   Nursing note and vitals reviewed.      ECG 12 Lead    Date/Time: 11/6/2017 1:07 PM  Performed by: PIEDAD SANTOS  Authorized by: PIEDAD SANTOS   Interpreted by physician  Comparison: not compared with previous ECG   Previous ECG: no previous ECG available  Rhythm: sinus bradycardia  Rate: normal  BPM: 57  ST Segments: ST segments normal  Clinical impression: normal ECG             ED Course  ED Course   Comment By Time   Patient endorses sudden chest pain. Piedad Santos PA-C 11/06 1150   Patient endorses significant improvement in symptoms. Piedad Santos PA-C 11/06 1258      Results for orders placed or performed during the hospital encounter of 11/06/17   Comprehensive Metabolic Panel   Result Value Ref Range    Glucose 93 60 - 100 mg/dL    BUN 12 7 - 21 mg/dL    Creatinine 1.11 (H) 0.50 - 1.00 mg/dL    Sodium 143 137 - 145 mmol/L    Potassium 3.9 3.5 - 5.1 mmol/L    Chloride 105 95 - 110 mmol/L    CO2 27.0 22.0 - 31.0 mmol/L    Calcium 10.5 (H) 8.4 - 10.2 mg/dL    Total Protein 7.3 6.3 - 8.6 g/dL    Albumin 4.20 3.40 - 4.80 g/dL    ALT (SGPT) 40 9 - 52 U/L    AST (SGOT) 34 14 - 36 U/L    Alkaline Phosphatase 69 38 - 126 U/L    Total Bilirubin 1.0 0.2 - 1.3 mg/dL    eGFR Non  Amer 49 45 - 104 mL/min/1.73    Globulin 3.1 2.3 - 3.5 gm/dL    A/G Ratio 1.4 1.1 - 1.8 g/dL    BUN/Creatinine Ratio 10.8 7.0 - 25.0    Anion Gap 11.0 5.0 - 15.0 mmol/L   CBC Auto Differential   Result Value Ref Range    WBC 9.31 3.20 - 9.80 10*3/mm3    RBC 4.96 3.77 - 5.16 10*6/mm3    Hemoglobin 15.3 12.0 - 15.5 g/dL    Hematocrit 44.2 35.0 - 45.0 %    MCV 89.1 80.0 - 98.0 fL    MCH 30.8 26.5 - 34.0 pg    MCHC 34.6 31.4 - 36.0 g/dL    RDW 12.1 11.5 - 14.5 %    RDW-SD 39.0 36.4 - 46.3 fl    MPV 10.2 8.0 - 12.0 fL    Platelets 219 150 - 450 10*3/mm3    Neutrophil % 63.7 37.0 - 80.0 %    Lymphocyte % 25.6 10.0 - 50.0 %    Monocyte % 8.8 0.0 - 12.0 %    Eosinophil  % 1.2 0.0 - 7.0 %    Basophil % 0.5 0.0 - 2.0 %    Immature Grans % 0.2 0.0 - 0.5 %    Neutrophils, Absolute 5.93 2.00 - 8.60 10*3/mm3    Lymphocytes, Absolute 2.38 0.60 - 4.20 10*3/mm3    Monocytes, Absolute 0.82 0.00 - 0.90 10*3/mm3    Eosinophils, Absolute 0.11 0.00 - 0.70 10*3/mm3    Basophils, Absolute 0.05 0.00 - 0.20 10*3/mm3    Immature Grans, Absolute 0.02 0.00 - 0.02 10*3/mm3   Troponin   Result Value Ref Range    Troponin I <0.012 <=0.034 ng/mL   BNP   Result Value Ref Range    proBNP 144.0 0.0 - 900.0 pg/mL   Light Blue Top   Result Value Ref Range    Extra Tube hold for add-on    Green Top (Gel)   Result Value Ref Range    Extra Tube Hold for add-ons.    Lavender Top   Result Value Ref Range    Extra Tube hold for add-on    Gold Top - SST   Result Value Ref Range    Extra Tube Hold for add-ons.      Xr Neck Soft Tissue    Result Date: 11/6/2017  Narrative: Procedure: Soft tissue neck 2 views Reason for exam: Difficulty swallowing. stridor. FINDINGS: Soft tissue structures of the neck are normal. Degenerative changes of the cervical spine are seen with loss of height of the C3-C4, C5-C6 and C6-C7 intervertebral disc space height and associated anterior osteophytosis. Mild curvature of the lower cervical spine convex right most likely positional. No evidence of fracture or dislocation. Lung apices are normal.     Impression: 1.  Degenerative changes of the cervical spine. 2.  No acute soft tissue neck abnormality. Electronically signed by:  Darien Muhammad MD  11/6/2017 11:31 AM CST Workstation: [id number]    Xr Chest 2 View    Result Date: 11/6/2017  Narrative: PROCEDURE: Chest PA and lateral REASON FOR EXAM: shortness of breath FINDINGS: No comparison. . Cardiac and pulmonary vasculature are normal . Lungs are clear. Pleural spaces are normal . No acute osseous abnormality.     Impression: Negative chest Electronically signed by:  Darien Muhammad MD  11/6/2017 11:27 AM CST Workstation: [id number]                 [facility]    Final diagnoses:   Dysphagia, unspecified type   Dyspnea, unspecified type   Cough            Prieto Garcia PA-C  11/06/17 7903

## 2017-11-09 ENCOUNTER — OFFICE VISIT (OUTPATIENT)
Dept: FAMILY MEDICINE CLINIC | Facility: CLINIC | Age: 67
End: 2017-11-09

## 2017-11-09 VITALS
WEIGHT: 184 LBS | SYSTOLIC BLOOD PRESSURE: 100 MMHG | HEART RATE: 59 BPM | DIASTOLIC BLOOD PRESSURE: 60 MMHG | HEIGHT: 64 IN | BODY MASS INDEX: 31.41 KG/M2 | OXYGEN SATURATION: 95 %

## 2017-11-09 DIAGNOSIS — J40 BRONCHITIS: Primary | ICD-10-CM

## 2017-11-09 PROCEDURE — 96372 THER/PROPH/DIAG INJ SC/IM: CPT | Performed by: FAMILY MEDICINE

## 2017-11-09 PROCEDURE — 99213 OFFICE O/P EST LOW 20 MIN: CPT | Performed by: FAMILY MEDICINE

## 2017-11-09 RX ORDER — BETAMETHASONE SODIUM PHOSPHATE AND BETAMETHASONE ACETATE 3; 3 MG/ML; MG/ML
12 INJECTION, SUSPENSION INTRA-ARTICULAR; INTRALESIONAL; INTRAMUSCULAR; SOFT TISSUE ONCE
Status: COMPLETED | OUTPATIENT
Start: 2017-11-09 | End: 2017-11-09

## 2017-11-09 RX ORDER — DOXYCYCLINE 100 MG/1
100 CAPSULE ORAL 2 TIMES DAILY
Qty: 20 CAPSULE | Refills: 0 | Status: SHIPPED | OUTPATIENT
Start: 2017-11-09 | End: 2017-11-10

## 2017-11-09 RX ADMIN — BETAMETHASONE SODIUM PHOSPHATE AND BETAMETHASONE ACETATE 12 MG: 3; 3 INJECTION, SUSPENSION INTRA-ARTICULAR; INTRALESIONAL; INTRAMUSCULAR; SOFT TISSUE at 11:28

## 2017-11-09 NOTE — PROGRESS NOTES
Subjective   Lexie Cardona is a 67 y.o. female.     Shortness of Breath   This is a new problem. The current episode started in the past 7 days. Associated symptoms include a sore throat. Pertinent negatives include no chest pain, fever or neck pain.   Cough   This is a new problem. The current episode started in the past 7 days. The problem has been unchanged. The problem occurs constantly. The cough is productive of purulent sputum. Associated symptoms include a sore throat and shortness of breath. Pertinent negatives include no chest pain or fever. The symptoms are aggravated by cold air. Risk factors for lung disease include travel. The treatment provided no relief.        The following portions of the patient's history were reviewed and updated as appropriate: allergies, current medications, past family history, past medical history, past social history, past surgical history and problem list.    Review of Systems   Constitutional: Negative for fever.   HENT: Positive for sore throat.    Respiratory: Positive for cough and shortness of breath.    Cardiovascular: Negative for chest pain.   Musculoskeletal: Negative for neck pain.       Objective   Physical Exam   Constitutional: She is oriented to person, place, and time. She appears well-developed and well-nourished. No distress.   HENT:   Head: Normocephalic and atraumatic.   Neck: No tracheal tenderness present. No tracheal deviation present.   Cardiovascular: Normal rate and regular rhythm.  Exam reveals distant heart sounds.    Pulmonary/Chest: No stridor. She has decreased breath sounds. She has no wheezes. She has rhonchi. She has no rales.   Neurological: She is alert and oriented to person, place, and time.   Skin: Skin is warm and dry. She is not diaphoretic.   Psychiatric: She has a normal mood and affect. Her behavior is normal. Judgment and thought content normal.   Nursing note and vitals reviewed.      Assessment/Plan   Problems Addressed  this Visit     None      Visit Diagnoses     Bronchitis    -  Primary    Relevant Medications    betamethasone acetate-betamethasone sodium phosphate (CELESTONE SOLUSPAN) injection 12 mg (Completed)                Current outpatient and discharge medications have been reconciled for the patient.  Naresh Forrest MD

## 2017-11-13 ENCOUNTER — OFFICE VISIT (OUTPATIENT)
Dept: FAMILY MEDICINE CLINIC | Facility: CLINIC | Age: 67
End: 2017-11-13

## 2017-11-13 VITALS
HEART RATE: 61 BPM | WEIGHT: 181 LBS | SYSTOLIC BLOOD PRESSURE: 122 MMHG | DIASTOLIC BLOOD PRESSURE: 70 MMHG | OXYGEN SATURATION: 97 % | HEIGHT: 64 IN | BODY MASS INDEX: 30.9 KG/M2

## 2017-11-13 DIAGNOSIS — J40 BRONCHITIS: Primary | ICD-10-CM

## 2017-11-13 PROCEDURE — 96372 THER/PROPH/DIAG INJ SC/IM: CPT | Performed by: FAMILY MEDICINE

## 2017-11-13 PROCEDURE — 99213 OFFICE O/P EST LOW 20 MIN: CPT | Performed by: FAMILY MEDICINE

## 2017-11-13 RX ORDER — BETAMETHASONE SODIUM PHOSPHATE AND BETAMETHASONE ACETATE 3; 3 MG/ML; MG/ML
12 INJECTION, SUSPENSION INTRA-ARTICULAR; INTRALESIONAL; INTRAMUSCULAR; SOFT TISSUE ONCE
Status: COMPLETED | OUTPATIENT
Start: 2017-11-13 | End: 2017-11-13

## 2017-11-13 RX ORDER — DOXYCYCLINE HYCLATE 100 MG/1
100 TABLET, DELAYED RELEASE ORAL 2 TIMES DAILY
Qty: 14 TABLET | Refills: 0 | Status: SHIPPED | OUTPATIENT
Start: 2017-11-13 | End: 2017-11-20

## 2017-11-13 RX ADMIN — BETAMETHASONE SODIUM PHOSPHATE AND BETAMETHASONE ACETATE 12 MG: 3; 3 INJECTION, SUSPENSION INTRA-ARTICULAR; INTRALESIONAL; INTRAMUSCULAR; SOFT TISSUE at 11:32

## 2017-11-13 NOTE — PROGRESS NOTES
Subjective   Lexie Cardona is a 67 y.o. female.     Shortness of Breath   This is a new problem. The current episode started 1 to 4 weeks ago. The problem occurs constantly. The problem has been rapidly worsening. Associated symptoms include neck pain, rhinorrhea, a sore throat and sputum production. Pertinent negatives include no chest pain, fever or wheezing.   Sinusitis   This is a new problem. The current episode started 1 to 4 weeks ago. The problem has been rapidly worsening since onset. There has been no fever. Associated symptoms include congestion, coughing, neck pain, shortness of breath, sinus pressure, sneezing and a sore throat. Pertinent negatives include no chills or hoarse voice.   Cough   This is a new problem. The current episode started in the past 7 days. The problem has been unchanged. The problem occurs constantly. The cough is productive of purulent sputum. Associated symptoms include rhinorrhea, a sore throat and shortness of breath. Pertinent negatives include no chest pain, chills, fever or wheezing. The symptoms are aggravated by cold air. Risk factors for lung disease include travel. The treatment provided no relief.        The following portions of the patient's history were reviewed and updated as appropriate: allergies, current medications, past family history, past medical history, past social history, past surgical history and problem list.    Review of Systems   Constitutional: Negative for chills and fever.   HENT: Positive for congestion, rhinorrhea, sinus pressure, sneezing and sore throat. Negative for hoarse voice.    Respiratory: Positive for cough, sputum production and shortness of breath. Negative for wheezing.    Cardiovascular: Negative for chest pain.   Musculoskeletal: Positive for neck pain.       Objective   Physical Exam   Constitutional: She is oriented to person, place, and time. She appears well-developed and well-nourished. No distress.   HENT:   Head:  Normocephalic and atraumatic.   Neck: No tracheal tenderness present. No tracheal deviation present.   Cardiovascular: Normal rate and regular rhythm.  Exam reveals distant heart sounds.    Pulmonary/Chest: No stridor. She has decreased breath sounds. She has no wheezes. She has rhonchi. She has no rales.   Neurological: She is alert and oriented to person, place, and time.   Skin: Skin is warm and dry. She is not diaphoretic.   Psychiatric: She has a normal mood and affect. Her behavior is normal. Judgment and thought content normal.   Nursing note and vitals reviewed.      Assessment/Plan   Problems Addressed this Visit     None      Visit Diagnoses     Bronchitis    -  Primary    Relevant Medications    ipratropium-albuterol (COMBIVENT)  MCG/ACT inhaler                Current outpatient and discharge medications have been reconciled for the patient.  Naresh Forrest MD

## 2017-11-16 ENCOUNTER — APPOINTMENT (OUTPATIENT)
Dept: LAB | Facility: HOSPITAL | Age: 67
End: 2017-11-16

## 2017-11-16 LAB
25(OH)D3 SERPL-MCNC: 21.9 NG/ML (ref 30–100)
ALBUMIN SERPL-MCNC: 4.2 G/DL (ref 3.4–4.8)
ANION GAP SERPL CALCULATED.3IONS-SCNC: 11 MMOL/L (ref 5–15)
BUN BLD-MCNC: 14 MG/DL (ref 7–21)
BUN/CREAT SERPL: 13.2 (ref 7–25)
CALCIUM SPEC-SCNC: 9.7 MG/DL (ref 8.4–10.2)
CHLORIDE SERPL-SCNC: 101 MMOL/L (ref 95–110)
CO2 SERPL-SCNC: 29 MMOL/L (ref 22–31)
CREAT BLD-MCNC: 1.06 MG/DL (ref 0.5–1)
CREAT UR-MCNC: 34.2 MG/DL
GFR SERPL CREATININE-BSD FRML MDRD: 52 ML/MIN/1.73 (ref 45–104)
GLUCOSE BLD-MCNC: 85 MG/DL (ref 60–100)
PHOSPHATE SERPL-MCNC: 3.7 MG/DL (ref 2.4–4.4)
POTASSIUM BLD-SCNC: 4 MMOL/L (ref 3.5–5.1)
PROT UR-MCNC: 15.1 MG/DL
PROT/CREAT UR: 441.5 MG/G CREA (ref 0–200)
SODIUM BLD-SCNC: 141 MMOL/L (ref 137–145)

## 2017-11-16 PROCEDURE — 82306 VITAMIN D 25 HYDROXY: CPT | Performed by: INTERNAL MEDICINE

## 2017-11-16 PROCEDURE — 84156 ASSAY OF PROTEIN URINE: CPT | Performed by: INTERNAL MEDICINE

## 2017-11-16 PROCEDURE — 36415 COLL VENOUS BLD VENIPUNCTURE: CPT | Performed by: INTERNAL MEDICINE

## 2017-11-16 PROCEDURE — 80069 RENAL FUNCTION PANEL: CPT | Performed by: INTERNAL MEDICINE

## 2017-11-16 PROCEDURE — 82570 ASSAY OF URINE CREATININE: CPT | Performed by: INTERNAL MEDICINE

## 2017-11-17 ENCOUNTER — OFFICE VISIT (OUTPATIENT)
Dept: FAMILY MEDICINE CLINIC | Facility: CLINIC | Age: 67
End: 2017-11-17

## 2017-11-17 VITALS
SYSTOLIC BLOOD PRESSURE: 126 MMHG | DIASTOLIC BLOOD PRESSURE: 78 MMHG | WEIGHT: 180 LBS | BODY MASS INDEX: 30.73 KG/M2 | HEART RATE: 69 BPM | HEIGHT: 64 IN | OXYGEN SATURATION: 98 %

## 2017-11-17 DIAGNOSIS — J40 BRONCHITIS: Primary | ICD-10-CM

## 2017-11-17 PROCEDURE — 99213 OFFICE O/P EST LOW 20 MIN: CPT | Performed by: FAMILY MEDICINE

## 2017-11-17 RX ORDER — FLUCONAZOLE 150 MG/1
150 TABLET ORAL ONCE
Qty: 1 TABLET | Refills: 0 | Status: SHIPPED | OUTPATIENT
Start: 2017-11-17 | End: 2017-11-17

## 2017-11-17 NOTE — PROGRESS NOTES
Subjective   Lexie Cardona is a 67 y.o. female.     Bronchitis   This is a new problem. The current episode started 1 to 4 weeks ago. The problem occurs constantly. The problem has been gradually improving. Associated symptoms include coughing. Pertinent negatives include no chest pain or fever.   Shortness of Breath   This is a new problem. The current episode started 1 to 4 weeks ago. The problem occurs constantly. The problem has been rapidly worsening. Associated symptoms include rhinorrhea and sputum production. Pertinent negatives include no chest pain, fever or wheezing.   Cough   This is a new problem. The current episode started in the past 7 days. The problem has been unchanged. The problem occurs constantly. The cough is productive of purulent sputum and productive of sputum (sputum clear). Associated symptoms include rhinorrhea. Pertinent negatives include no chest pain, fever or wheezing. The symptoms are aggravated by cold air. Risk factors for lung disease include travel. The treatment provided no relief. Her past medical history is significant for bronchitis.        The following portions of the patient's history were reviewed and updated as appropriate: allergies, current medications, past family history, past medical history, past social history, past surgical history and problem list.    Review of Systems   Constitutional: Negative for fever.   HENT: Positive for rhinorrhea.    Respiratory: Positive for cough and sputum production. Negative for wheezing.    Cardiovascular: Negative for chest pain.       Objective   Physical Exam   Constitutional: She is oriented to person, place, and time. She appears well-developed and well-nourished. No distress.   HENT:   Head: Normocephalic and atraumatic.   Nose: No mucosal edema or rhinorrhea.   Mouth/Throat: Uvula is midline, oropharynx is clear and moist and mucous membranes are normal.   Neck: No tracheal tenderness present. No tracheal deviation  present.   Cardiovascular: Normal rate and regular rhythm.  Exam reveals distant heart sounds.    Pulmonary/Chest: No stridor. She has decreased breath sounds. She has no wheezes. She has no rhonchi. She has no rales.   Lymphadenopathy:        Head (right side): No submental and no submandibular adenopathy present.        Head (left side): No submental and no submandibular adenopathy present.     She has no cervical adenopathy (but tender right ant cervical area).   Neurological: She is alert and oriented to person, place, and time.   Skin: Skin is warm and dry. She is not diaphoretic.   Psychiatric: She has a normal mood and affect. Her behavior is normal. Judgment and thought content normal.   Nursing note and vitals reviewed.      Assessment/Plan   Problems Addressed this Visit        Respiratory    Bronchitis - Primary

## 2017-11-20 ENCOUNTER — TRANSCRIBE ORDERS (OUTPATIENT)
Dept: LAB | Facility: HOSPITAL | Age: 67
End: 2017-11-20

## 2017-11-20 DIAGNOSIS — N18.30 CHRONIC KIDNEY DISEASE, STAGE III (MODERATE) (HCC): Primary | ICD-10-CM

## 2017-12-21 ENCOUNTER — OFFICE VISIT (OUTPATIENT)
Dept: FAMILY MEDICINE CLINIC | Facility: CLINIC | Age: 67
End: 2017-12-21

## 2017-12-21 VITALS
SYSTOLIC BLOOD PRESSURE: 128 MMHG | DIASTOLIC BLOOD PRESSURE: 70 MMHG | HEIGHT: 64 IN | TEMPERATURE: 98.1 F | BODY MASS INDEX: 29.88 KG/M2 | WEIGHT: 175 LBS

## 2017-12-21 DIAGNOSIS — J06.9 ACUTE URI: Primary | ICD-10-CM

## 2017-12-21 DIAGNOSIS — J32.9 CHRONIC SINUSITIS, UNSPECIFIED LOCATION: ICD-10-CM

## 2017-12-21 DIAGNOSIS — J30.89 CHRONIC ALLERGIC RHINITIS DUE TO OTHER ALLERGIC TRIGGER, UNSPECIFIED SEASONALITY: ICD-10-CM

## 2017-12-21 PROBLEM — R74.8 ELEVATED LIVER ENZYMES: Status: RESOLVED | Noted: 2017-01-12 | Resolved: 2017-12-21

## 2017-12-21 PROBLEM — J40 BRONCHITIS: Status: RESOLVED | Noted: 2017-11-17 | Resolved: 2017-12-21

## 2017-12-21 PROCEDURE — 99213 OFFICE O/P EST LOW 20 MIN: CPT | Performed by: FAMILY MEDICINE

## 2017-12-21 NOTE — PROGRESS NOTES
Subjective   Lexie Cardona is a 67 y.o. female.     History of Present Illness  saline steroid injections for the past 2 months.  States diagnosed chronic sinusitis in the past.  Have  need for further studies and no more antibiotics treatment.  Symptoms are vague best.  History noted.    The following portions of the patient's history were reviewed and updated as appropriate: allergies, current medications, past family history, past medical history, past social history, past surgical history and problem list.    Review of Systems   Constitutional: Negative for activity change, appetite change, fatigue and unexpected weight change.   HENT: Positive for congestion, rhinorrhea and sinus pressure. Negative for trouble swallowing and voice change.    Eyes: Negative for redness and visual disturbance.   Respiratory: Negative for cough and wheezing.    Cardiovascular: Negative for chest pain and palpitations.   Gastrointestinal: Positive for abdominal distention and abdominal pain. Negative for constipation, diarrhea, nausea and vomiting.   Genitourinary: Negative for urgency.   Musculoskeletal: Negative for joint swelling.   Neurological: Positive for headaches. Negative for syncope.   Hematological: Negative for adenopathy.   Psychiatric/Behavioral: Negative for sleep disturbance.       Objective   Physical Exam   Constitutional: She appears well-developed.   HENT:   Head: Normocephalic.   Eyes: Pupils are equal, round, and reactive to light.   Neck: Normal range of motion.   Cardiovascular: Normal rate.    Pulmonary/Chest: Effort normal.   Psychiatric: She has a normal mood and affect. Her behavior is normal. Thought content normal.       Assessment/Plan   Lexie was seen today for uri.    Diagnoses and all orders for this visit:    Acute URI  -     CT Sinus Without Contrast; Future  -     Ambulatory Referral to ENT (Otolaryngology)    Chronic sinusitis, unspecified location  -     CT Sinus Without  Contrast; Future  -     Ambulatory Referral to ENT (Otolaryngology)    Chronic allergic rhinitis due to other allergic trigger, unspecified seasonality  -     CT Sinus Without Contrast; Future  -     Ambulatory Referral to ENT (Otolaryngology)       Plan as above otherwise defer back to regular provider

## 2017-12-27 ENCOUNTER — HOSPITAL ENCOUNTER (OUTPATIENT)
Dept: CT IMAGING | Facility: HOSPITAL | Age: 67
Discharge: HOME | End: 2017-12-27
Admitting: FAMILY MEDICINE

## 2017-12-27 DIAGNOSIS — J32.9 CHRONIC SINUSITIS, UNSPECIFIED LOCATION: ICD-10-CM

## 2017-12-27 DIAGNOSIS — J30.89 CHRONIC ALLERGIC RHINITIS DUE TO OTHER ALLERGIC TRIGGER, UNSPECIFIED SEASONALITY: ICD-10-CM

## 2017-12-27 DIAGNOSIS — J06.9 ACUTE URI: ICD-10-CM

## 2017-12-27 PROCEDURE — 70486 CT MAXILLOFACIAL W/O DYE: CPT

## 2018-01-04 ENCOUNTER — OFFICE VISIT (OUTPATIENT)
Dept: FAMILY MEDICINE CLINIC | Facility: CLINIC | Age: 68
End: 2018-01-04

## 2018-01-04 VITALS
SYSTOLIC BLOOD PRESSURE: 120 MMHG | BODY MASS INDEX: 30.97 KG/M2 | DIASTOLIC BLOOD PRESSURE: 70 MMHG | WEIGHT: 181.4 LBS | HEIGHT: 64 IN | TEMPERATURE: 97.8 F

## 2018-01-04 DIAGNOSIS — G89.29 CHRONIC NONINTRACTABLE HEADACHE, UNSPECIFIED HEADACHE TYPE: ICD-10-CM

## 2018-01-04 DIAGNOSIS — J30.89 CHRONIC ALLERGIC RHINITIS DUE TO OTHER ALLERGIC TRIGGER, UNSPECIFIED SEASONALITY: ICD-10-CM

## 2018-01-04 DIAGNOSIS — R51.9 CHRONIC NONINTRACTABLE HEADACHE, UNSPECIFIED HEADACHE TYPE: ICD-10-CM

## 2018-01-04 DIAGNOSIS — J31.0 CHRONIC RHINITIS, UNSPECIFIED TYPE: Primary | ICD-10-CM

## 2018-01-04 PROBLEM — R53.83 MALAISE AND FATIGUE: Status: RESOLVED | Noted: 2017-01-12 | Resolved: 2018-01-04

## 2018-01-04 PROBLEM — R53.81 MALAISE AND FATIGUE: Status: RESOLVED | Noted: 2017-01-12 | Resolved: 2018-01-04

## 2018-01-04 PROBLEM — J01.01 ACUTE RECURRENT MAXILLARY SINUSITIS: Status: RESOLVED | Noted: 2017-07-21 | Resolved: 2018-01-04

## 2018-01-04 PROCEDURE — 99214 OFFICE O/P EST MOD 30 MIN: CPT | Performed by: FAMILY MEDICINE

## 2018-01-04 RX ORDER — DEXAMETHASONE 2 MG/1
TABLET ORAL
Qty: 15 TABLET | Refills: 0 | Status: SHIPPED | OUTPATIENT
Start: 2018-01-04 | End: 2018-01-25

## 2018-01-04 NOTE — PROGRESS NOTES
Subjective   Lexie Cardona is a 67 y.o. female.     History of Present Illness   requesting evaluation sinus pressure headache.  Acute on chronic.  No fever.  Has had a recent CT scan which showed only deviated septum and a small air-fluid level maxillary sinus that is minimal.  Have again counseled on study showing she does not have chronic sinusitis.  Have also counseled keeping appointment with ENT for evaluation of her sinus tract.  Continues to state can't take several medicines.  Not using [facility] need be as well.  Has stopped Flonase due to dryness of bleeding which is a good idea.    The following portions of the patient's history were reviewed and updated as appropriate: allergies, current medications, past family history, past medical history, past social history, past surgical history and problem list.    Review of Systems   Constitutional: Negative for activity change, appetite change, fatigue and unexpected weight change.   HENT: Positive for sinus pain and sinus pressure. Negative for trouble swallowing and voice change.    Eyes: Negative for redness and visual disturbance.   Respiratory: Negative for cough and wheezing.    Cardiovascular: Negative for chest pain and palpitations.   Gastrointestinal: Negative for abdominal pain, constipation, diarrhea, nausea and vomiting.   Genitourinary: Negative for urgency.   Musculoskeletal: Negative for joint swelling.   Neurological: Positive for headaches. Negative for syncope.   Hematological: Negative for adenopathy.   Psychiatric/Behavioral: Negative for sleep disturbance.       Objective   Physical Exam   Constitutional: She is oriented to person, place, and time. She appears well-developed.   HENT:   Head: Normocephalic.   Right Ear: External ear normal.   Left Ear: External ear normal.   Nose: Mucosal edema present.   Mouth/Throat: Oropharynx is clear and moist.   Eyes: Pupils are equal, round, and reactive to light.   Neck: Normal  range of motion. No thyromegaly present.   Cardiovascular: Normal rate, regular rhythm, normal heart sounds and intact distal pulses.  Exam reveals no gallop and no friction rub.    No murmur heard.  Pulmonary/Chest: Breath sounds normal.   Abdominal: Soft. She exhibits no distension and no mass. There is no tenderness.   Musculoskeletal: Normal range of motion.   Neurological: She is alert and oriented to person, place, and time. She has normal reflexes.   Skin: Skin is warm and dry.   Psychiatric: She has a normal mood and affect.       Assessment/Plan   Problems Addressed this Visit        Respiratory    Allergic rhinitis (Chronic)    Relevant Medications    dexamethasone (DECADRON) 2 MG tablet      Other Visit Diagnoses     Chronic rhinitis, unspecified type    -  Primary    Relevant Medications    dexamethasone (DECADRON) 2 MG tablet    Chronic nonintractable headache, unspecified headache type        Relevant Medications    dexamethasone (DECADRON) 2 MG tablet        Again counseled on pathology or lack of pathology.   on need for no chronic antibiotics.  Short course of Decadron otherwise defer back to regular doctor and ENT

## 2018-01-05 ENCOUNTER — TELEPHONE (OUTPATIENT)
Dept: FAMILY MEDICINE CLINIC | Facility: CLINIC | Age: 68
End: 2018-01-05

## 2018-01-05 ENCOUNTER — OFFICE VISIT (OUTPATIENT)
Dept: FAMILY MEDICINE CLINIC | Facility: CLINIC | Age: 68
End: 2018-01-05

## 2018-01-05 VITALS
DIASTOLIC BLOOD PRESSURE: 78 MMHG | HEIGHT: 64 IN | WEIGHT: 183.6 LBS | BODY MASS INDEX: 31.34 KG/M2 | OXYGEN SATURATION: 98 % | SYSTOLIC BLOOD PRESSURE: 124 MMHG | HEART RATE: 71 BPM

## 2018-01-05 DIAGNOSIS — J45.909 MODERATE ASTHMA, UNSPECIFIED WHETHER COMPLICATED, UNSPECIFIED WHETHER PERSISTENT: ICD-10-CM

## 2018-01-05 DIAGNOSIS — J32.0 CHRONIC MAXILLARY SINUSITIS: Primary | ICD-10-CM

## 2018-01-05 PROCEDURE — 99213 OFFICE O/P EST LOW 20 MIN: CPT | Performed by: FAMILY MEDICINE

## 2018-01-05 RX ORDER — PROMETHAZINE HYDROCHLORIDE 25 MG/1
TABLET ORAL
Qty: 30 TABLET | Refills: 0 | OUTPATIENT
Start: 2018-01-05

## 2018-01-05 RX ORDER — EPINEPHRINE 0.3 MG/.3ML
INJECTION SUBCUTANEOUS
Qty: 1 EACH | Refills: 5 | Status: SHIPPED | OUTPATIENT
Start: 2018-01-05 | End: 2018-10-24 | Stop reason: SDUPTHER

## 2018-01-05 RX ORDER — MONTELUKAST SODIUM 10 MG/1
10 TABLET ORAL NIGHTLY
Qty: 30 TABLET | Refills: 11 | Status: SHIPPED | OUTPATIENT
Start: 2018-01-05 | End: 2019-01-15 | Stop reason: SDUPTHER

## 2018-01-05 RX ORDER — PROMETHAZINE HYDROCHLORIDE 25 MG/1
25 TABLET ORAL EVERY 6 HOURS PRN
Qty: 30 TABLET | Refills: 2 | Status: SHIPPED | OUTPATIENT
Start: 2018-01-05 | End: 2020-01-03 | Stop reason: SDUPTHER

## 2018-01-05 NOTE — PROGRESS NOTES
Subjective   Lexie Cardona is a 67 y.o. female.     Shortness of Breath   This is a chronic problem. The current episode started more than 1 year ago. The problem has been gradually worsening. Pertinent negatives include no sore throat.   Sinusitis   This is a chronic problem. The current episode started 1 to 4 weeks ago. The problem is unchanged. There has been no fever. Her pain is at a severity of 6/10. The pain is moderate. Associated symptoms include congestion, coughing, shortness of breath, sinus pressure and sneezing. Pertinent negatives include no chills or sore throat.        The following portions of the patient's history were reviewed and updated as appropriate: allergies, current medications, past family history, past medical history, past social history, past surgical history and problem list.    Review of Systems   Constitutional: Negative for chills.   HENT: Positive for congestion, sinus pressure and sneezing. Negative for sore throat.    Respiratory: Positive for cough and shortness of breath.        Objective   Physical Exam   Constitutional: She is oriented to person, place, and time. She appears well-developed and well-nourished. No distress.   HENT:   Head: Normocephalic and atraumatic.   Nose: Mucosal edema and rhinorrhea present. Right sinus exhibits maxillary sinus tenderness and frontal sinus tenderness. Left sinus exhibits frontal sinus tenderness. Left sinus exhibits no maxillary sinus tenderness.   Cardiovascular: Normal rate and regular rhythm.  Exam reveals distant heart sounds.    Pulmonary/Chest: Effort normal. She has decreased breath sounds. She has wheezes. She has no rhonchi. She has no rales.   Neurological: She is alert and oriented to person, place, and time.   Skin: Skin is warm and dry. She is not diaphoretic.   Psychiatric: She has a normal mood and affect. Her behavior is normal. Judgment and thought content normal.   Nursing note and vitals  reviewed.      Assessment/Plan   Problems Addressed this Visit     None      Visit Diagnoses     Chronic maxillary sinusitis    -  Primary    Moderate asthma, unspecified whether complicated, unspecified whether persistent        Relevant Medications    montelukast (SINGULAIR) 10 MG tablet    Fluticasone-Umeclidin-Vilant 100-62.5-25 MCG/INH aerosol powder                samples of trelegy given

## 2018-01-05 NOTE — TELEPHONE ENCOUNTER
DARIUS MANNING IS NEEDING REFILL PRESPS FOR THE MYLAN EPPI-PEN 0.3MG  AND PROMETHAZINE 25MG  PLEASE SEND TO [facility]

## 2018-01-25 ENCOUNTER — OFFICE VISIT (OUTPATIENT)
Dept: OTOLARYNGOLOGY | Facility: CLINIC | Age: 68
End: 2018-01-25

## 2018-01-25 VITALS — TEMPERATURE: 96.7 F | WEIGHT: 184 LBS | HEIGHT: 65 IN | BODY MASS INDEX: 30.66 KG/M2

## 2018-01-25 DIAGNOSIS — J30.1 ACUTE NONSEASONAL ALLERGIC RHINITIS DUE TO POLLEN: Primary | ICD-10-CM

## 2018-01-25 DIAGNOSIS — J34.2 DEVIATED SEPTUM: ICD-10-CM

## 2018-01-25 DIAGNOSIS — J34.3 NASAL TURBINATE HYPERTROPHY: ICD-10-CM

## 2018-01-25 DIAGNOSIS — J32.0 MAXILLARY SINUSITIS, CHRONIC: ICD-10-CM

## 2018-01-25 PROCEDURE — 99204 OFFICE O/P NEW MOD 45 MIN: CPT | Performed by: OTOLARYNGOLOGY

## 2018-01-25 RX ORDER — LEVOFLOXACIN 250 MG/1
250 TABLET ORAL DAILY
Qty: 10 TABLET | Refills: 0 | Status: SHIPPED | OUTPATIENT
Start: 2018-01-25 | End: 2018-02-19

## 2018-01-25 RX ORDER — OLOPATADINE HYDROCHLORIDE 665 UG/1
2 SPRAY NASAL 2 TIMES DAILY
Qty: 30.5 G | Refills: 3 | Status: SHIPPED | OUTPATIENT
Start: 2018-01-25 | End: 2018-03-06 | Stop reason: HOSPADM

## 2018-01-25 NOTE — PROGRESS NOTES
Subjective   Lexie Cardona is a 67 y.o. female.   CC: sinusitis  History of Present Illness     Patient comes in he's had long-time sinus allergy problems she tells me she has anaphylaxis returned that many of its GI not respiratory problem.  We had a very long discussion reviewed extensive records from other physicians from here and outside and also an old CT scan.  Her symptoms or drainage congestion and trouble breathing through her nose intermittent air-fluid she uses alcohol or ears and uses Q-tips as well she's not having ear drainage  Since been treated with tetracycline with drug says she is allergic that she has many drugs she says she's potentially allergic to is not clear exactly what that means.  She's been on multiple doses of steroids back in November.  Negative Decadron pills she said helped her in December  The following portions of the patient's history were reviewed and updated as appropriate: allergies, current medications, past family history, past medical history, past social history, past surgical history and problem list.      Lexie Cardona reports that she has quit smoking. She has never used smokeless tobacco. She reports that she does not drink alcohol or use illicit drugs.  Patient is not a tobacco user and has been counseled for use of tobacco products    Family History   Problem Relation Age of Onset   • Diabetes Mother    • Other Mother      cortical basal ganglionic degeneration    • Seizures Mother      epilepsy   • Arthritis Mother    • Hyperlipidemia Mother    • Mental illness Mother    • Breast cancer Mother    • Tuberculosis Father    • Cancer Father 60     colon   • Hypertension Father    • Colon cancer Father    • Colon cancer Other    • Hypertension Other    • Heart disease Other    • Diabetes Other    • Colon cancer Other    • Breast cancer Paternal Grandmother          Current Outpatient Prescriptions:   •  Alum Hydroxide-Mag Carbonate (GAVISCON PO), Take   by mouth. Tablets Or Liquid, As Needed, Disp: , Rfl:   •  Biotin 5 MG capsule, Take  by mouth., Disp: , Rfl:   •  Calcium Carb-Cholecalciferol (CALCIUM 600+D3) 600-400 MG-UNIT tablet, Take 1 tablet by mouth daily., Disp: , Rfl:   •  cetirizine (ZyrTEC) 10 MG tablet, Take 10 mg by mouth daily., Disp: , Rfl:   •  Cranberry-Vitamin C-Vitamin E (CRANBERRY PLUS VITAMIN C) 4200-20-3 MG-MG-UNIT capsule, Take  by mouth., Disp: , Rfl:   •  EPINEPHrine (EPIPEN 2-ALBERT) 0.3 MG/0.3ML solution auto-injector injection, Use as directed for Allergic Reaction, Disp: 1 each, Rfl: 5  •  Flaxseed, Linseed, (FLAX SEED OIL) 1000 MG capsule, Take 1 capsule by mouth 2 (two) times a day., Disp: , Rfl:   •  fluticasone (FLONASE) 50 MCG/ACT nasal spray, 2 sprays into each nostril Daily. Administer 2 sprays in each nostril for each dose., Disp: 16 g, Rfl: 5  •  gabapentin (NEURONTIN) 600 MG tablet, Take 1 tablet by mouth See Admin Instructions. 1 am and 1/2 pm, Disp: 60 tablet, Rfl: 2  •  meclizine (ANTIVERT) 25 MG tablet, Take 25 mg by mouth 3 (Three) Times a Day As Needed for dizziness., Disp: , Rfl:   •  montelukast (SINGULAIR) 10 MG tablet, Take 1 tablet by mouth Every Night., Disp: 30 tablet, Rfl: 11  •  Multiple Vitamins-Minerals (CENTRUM SILVER PO), Take 1 tablet by mouth daily., Disp: , Rfl:   •  Probiotic Product (PROBIOTIC FORMULA PO), Take  by mouth 2 (two) times a day. 10 billion cell (2 billion ea) capsule, Disp: , Rfl:   •  promethazine (PHENERGAN) 25 MG tablet, Take 1 tablet by mouth Every 6 (Six) Hours As Needed for Nausea or Vomiting., Disp: 30 tablet, Rfl: 2  •  Sennosides 25 MG tablet, Take  by mouth., Disp: , Rfl:   •  temazepam (RESTORIL) 30 MG capsule, Take 1 capsule by mouth At Night As Needed for Sleep., Disp: 30 capsule, Rfl: 2  •  traZODone (DESYREL) 100 MG tablet, Take 1 tablet by mouth Every Night., Disp: 90 tablet, Rfl: 3  •  triamterene-hydrochlorothiazide (MAXZIDE-25) 37.5-25 MG per tablet, Take 1 tablet by mouth  Daily As Needed (for edema)., Disp: 90 tablet, Rfl: 1  •  TURMERIC PO, Take 1 capsule by mouth Daily., Disp: , Rfl:   •  levoFLOXacin (LEVAQUIN) 250 MG tablet, Take 1 tablet by mouth Daily., Disp: 10 tablet, Rfl: 0  •  olopatadine (PATANASE) 0.6 % solution nasal solution, 2 sprays by Each Nare route 2 (Two) Times a Day., Disp: 30.5 g, Rfl: 3    Allergies   Allergen Reactions   • Augmentin [Amoxicillin-Pot Clavulanate] Anaphylaxis   • Ceclor [Cefaclor] Anaphylaxis   • Ibuprofen Anaphylaxis   • Nsaids Anaphylaxis   • Other Anaphylaxis     Cats  E.E.S. 200  Lead   Mold  [place] Pastrani - Anaphylaxis   • Penicillins Anaphylaxis   • Aspirin    • Biaxin [Clarithromycin]    • Ciprofloxacin      *cipro   • Contrast Dye    • Demerol [Meperidine]    • Iodine      And iodide containing products   • Macrobid [Nitrofurantoin Monohyd Macro]    • Nickel    • Penicillium Notatum    • Requip [Ropinirole Hcl]    • Septra [Sulfamethoxazole-Trimethoprim]    • Statins Other (See Comments)     Statins-Hmg-Coa Reductase inhibitors  *muscle enzyme increase with myalgias   • Statins    • Zithromax [Azithromycin]      josseline   • Niaspan [Niacin Er] Rash       Past Medical History:   Diagnosis Date   • Acute upper respiratory infection    • Allergic rhinitis    • Asthma     blastomycosis   • Blastomycosis    • Borderline glaucoma    • Chronic kidney disease (CKD), stage II (mild)    • Colon polyp    • Dehiscence of surgical wound    • Diverticulitis of colon    • Elevated levels of transaminase & lactic acid dehydrogenase    • Encounter for gynecological examination without abnormal finding    • Episcleritis    • Fundic gland polyposis of stomach    • Gastric polyp    • Gastritis    • Gastroesophageal reflux disease    • Gastroparesis    • Gout    • H/O mammogram    • History of colon polyps    • Hypercholesterolemia    • IBS (irritable bowel syndrome)    • Insomnia    • Mass of ovary     fixed cyst, post removal, no sign of cancer   • Meniere's  disease    • Myoclonic disorder    • Nuclear senile cataract    • Osteoarthritis of multiple joints    • Peripheral edema    • Pruritus of vagina    • Restless legs    • Seasonal allergic rhinitis    • Sleep apnea    • Spasm of bladder    • Vaginal irritation    • Vitamin D deficiency          Review of Systems   Constitutional: Negative for fever.   HENT: Positive for congestion, postnasal drip, rhinorrhea and sinus pain. Negative for facial swelling.    Eyes: Positive for visual disturbance.   Respiratory: Positive for shortness of breath.    Genitourinary: Positive for frequency.   Musculoskeletal: Positive for arthralgias.   Neurological: Positive for weakness.   All other systems reviewed and are negative.          Objective   Physical Exam   Constitutional: She is oriented to person, place, and time. She appears well-developed and well-nourished.   HENT:   Head: Normocephalic and atraumatic.   Right Ear: Hearing, tympanic membrane and external ear normal.   Left Ear: Hearing, tympanic membrane and external ear normal.   Ears:    Nose: Mucosal edema, rhinorrhea and septal deviation present. No nasal deformity. No epistaxis. Right sinus exhibits no maxillary sinus tenderness and no frontal sinus tenderness. Left sinus exhibits no maxillary sinus tenderness and no frontal sinus tenderness.       Mouth/Throat: Uvula is midline, oropharynx is clear and moist and mucous membranes are normal. No trismus in the jaw. Normal dentition. No oropharyngeal exudate or posterior oropharyngeal edema.   Eyes: Conjunctivae are normal.   Neck: Normal range of motion. Neck supple. No JVD present. No tracheal deviation present. No thyromegaly present.   Cardiovascular: Normal rate.    Pulmonary/Chest: Effort normal.   Musculoskeletal: Normal range of motion.   Lymphadenopathy:        Head (right side): No submental, no submandibular, no tonsillar, no preauricular, no posterior auricular and no occipital adenopathy present.         Head (left side): No submental, no submandibular, no tonsillar, no preauricular, no posterior auricular and no occipital adenopathy present.     She has no cervical adenopathy.        Right cervical: No superficial cervical, no deep cervical and no posterior cervical adenopathy present.       Left cervical: No superficial cervical, no deep cervical and no posterior cervical adenopathy present.   Neurological: She is alert and oriented to person, place, and time. No cranial nerve deficit.   Skin: Skin is warm.   Psychiatric: She has a normal mood and affect. Her speech is normal and behavior is normal. Thought content normal.   Nursing note and vitals reviewed.    Radiology Images   Study Result      EXAMINATION:  CT sinus                    Indication:  Chronic sinusitis          History:  Correlative imaging:  none        Technique:  iv contrast:  none         This exam was performed according to the departmental  dose-optimization program which includes automated exposure  control, adjustment of the mA and/or kV according to patient size  and/or use of iterative reconstruction technique.        FINDINGS:        Sinuses:                   Frontal sinuses:  negative           Ethmoidal sinuses:  negative                Maxillary sinuses:  Minimal right air-fluid level.             Sphenoidal sinuses:  negative              Nasal:    Osteomeatal units (OMU):  within normal limits               Minnie bullosa:  Present involving the left middle turbinate    Nasal septum:  Mild biconcave deviation approximately 0.2 - .3  cm.     Misc:  (as visualized)     Orbits:  unremarkable      Mastoids:  unremarkable      Brain:  unremarkable                 IMPRESSION:  CONCLUSION:         1. Minimal right maxillary sinus air-fluid level.  2. Mild biconcave nasal septum deviation.  3. Otherwise negative examination.                     Electronically signed by:  GONZALEZ Pretty MD  12/27/2017 1:02  PM [facility] Workstation: [id number]    Imaging  Actual CT was reviewed by me  CT and multiple records reviewed for this patient provided by the patient from outside providers      Assessment/Plan   Lexie was seen today for sinusitis.    Diagnoses and all orders for this visit:    Acute nonseasonal allergic rhinitis due to pollen    Nasal turbinate hypertrophy    Maxillary sinusitis, chronic    Deviated septum    Other orders  -     olopatadine (PATANASE) 0.6 % solution nasal solution; 2 sprays by Each Nare route 2 (Two) Times a Day.  -     levoFLOXacin (LEVAQUIN) 250 MG tablet; Take 1 tablet by mouth Daily.  Explain use and side effects of  Medications.    Very long discussion about her allergies and options in the difficulty in treating her.  Discussion the risk she's agreed to going take Levaquin she's not had an anaphylactic reaction to that and lordosis.  Finder she has no unusual joint or GI or breathing issues to discontinue her go to the emergency room.  If she can tolerate she'll use of the 10 days and also use Patanase which she's found helpful in the past.  Her major complaint is nasal obstruction hopeful between that and the saline was she's using to be able to help her.  She is to use Afrin for the first 3 days and discontinued after that time.

## 2018-02-01 ENCOUNTER — TELEPHONE (OUTPATIENT)
Dept: FAMILY MEDICINE CLINIC | Facility: CLINIC | Age: 68
End: 2018-02-01

## 2018-02-01 RX ORDER — TEMAZEPAM 30 MG/1
30 CAPSULE ORAL NIGHTLY PRN
Qty: 30 CAPSULE | Refills: 2 | Status: SHIPPED | OUTPATIENT
Start: 2018-02-01 | End: 2018-04-30 | Stop reason: SDUPTHER

## 2018-02-19 ENCOUNTER — PREP FOR SURGERY (OUTPATIENT)
Dept: OTHER | Facility: HOSPITAL | Age: 68
End: 2018-02-19

## 2018-02-19 ENCOUNTER — OFFICE VISIT (OUTPATIENT)
Dept: OTOLARYNGOLOGY | Facility: CLINIC | Age: 68
End: 2018-02-19

## 2018-02-19 VITALS — WEIGHT: 184 LBS | TEMPERATURE: 96.4 F | BODY MASS INDEX: 30.66 KG/M2 | HEIGHT: 65 IN

## 2018-02-19 DIAGNOSIS — J34.3 NASAL TURBINATE HYPERTROPHY: ICD-10-CM

## 2018-02-19 DIAGNOSIS — J32.0 MAXILLARY SINUSITIS, CHRONIC: ICD-10-CM

## 2018-02-19 DIAGNOSIS — J34.89 NASAL OBSTRUCTION: ICD-10-CM

## 2018-02-19 DIAGNOSIS — J34.89 NASAL VALVE COLLAPSE: ICD-10-CM

## 2018-02-19 DIAGNOSIS — D68.9 COAGULATION DEFECT (HCC): ICD-10-CM

## 2018-02-19 DIAGNOSIS — J34.2 NASAL SEPTAL DEVIATION: ICD-10-CM

## 2018-02-19 DIAGNOSIS — J34.89 CONCHA BULLOSA: ICD-10-CM

## 2018-02-19 DIAGNOSIS — Z01.818 PREOP EXAMINATION: Primary | ICD-10-CM

## 2018-02-19 DIAGNOSIS — J32.0 CHRONIC MAXILLARY SINUSITIS: Primary | ICD-10-CM

## 2018-02-19 DIAGNOSIS — J32.9 SINUSITIS, CHRONIC: Primary | ICD-10-CM

## 2018-02-19 DIAGNOSIS — J34.2 DEVIATED SEPTUM: ICD-10-CM

## 2018-02-19 PROBLEM — M95.0 NASAL VALVE COLLAPSE: Status: ACTIVE | Noted: 2018-02-19

## 2018-02-19 PROCEDURE — 99214 OFFICE O/P EST MOD 30 MIN: CPT | Performed by: OTOLARYNGOLOGY

## 2018-02-19 RX ORDER — OXYMETAZOLINE HYDROCHLORIDE 0.05 G/100ML
2 SPRAY NASAL
Status: CANCELLED | OUTPATIENT
Start: 2018-02-19

## 2018-02-19 RX ORDER — CLINDAMYCIN PHOSPHATE 900 MG/50ML
900 INJECTION INTRAVENOUS ONCE
Status: CANCELLED | OUTPATIENT
Start: 2018-02-19 | End: 2018-02-19

## 2018-02-19 RX ORDER — CLINDAMYCIN PHOSPHATE 900 MG/50ML
900 INJECTION INTRAVENOUS ONCE
Status: CANCELLED | OUTPATIENT
Start: 2018-03-06 | End: 2018-03-06

## 2018-02-19 RX ORDER — OXYMETAZOLINE HYDROCHLORIDE 0.05 G/100ML
2 SPRAY NASAL
Status: CANCELLED | OUTPATIENT
Start: 2018-03-06

## 2018-02-19 NOTE — PROGRESS NOTES
Subjective   Lexie Cardona is a 68 y.o. female.   CC: persistent nsal obstruction and sinusitis  History of Present Illness   Patient states that she took the Levaquin tolerated it well but began to have symptoms 5 days after stopping the antibiotic consistently facial pressure drainage congestion worsening nasal obstruction particularly on the right she has decreased sense of smell she had no facial swelling or fever.  Continues to have persistent nasal obstruction despite the use of the saline Patanase and has trouble using the nasal spray because of the obstruction      The following portions of the patient's history were reviewed and updated as appropriate: allergies, current medications, past family history, past medical history, past social history, past surgical history and problem list.      Lexie Cardona reports that she has quit smoking. She has never used smokeless tobacco. She reports that she does not drink alcohol or use illicit drugs.  Patient is not a tobacco user and has been counseled for use of tobacco products    Family History   Problem Relation Age of Onset   • Diabetes Mother    • Other Mother      cortical basal ganglionic degeneration    • Seizures Mother      epilepsy   • Arthritis Mother    • Hyperlipidemia Mother    • Mental illness Mother    • Breast cancer Mother    • Tuberculosis Father    • Cancer Father 60     colon   • Hypertension Father    • Colon cancer Father    • Colon cancer Other    • Hypertension Other    • Heart disease Other    • Diabetes Other    • Colon cancer Other    • Breast cancer Paternal Grandmother          Current Outpatient Prescriptions:   •  Alum Hydroxide-Mag Carbonate (GAVISCON PO), Take  by mouth. Tablets Or Liquid, As Needed, Disp: , Rfl:   •  Biotin 5 MG capsule, Take  by mouth., Disp: , Rfl:   •  Calcium Carb-Cholecalciferol (CALCIUM 600+D3) 600-400 MG-UNIT tablet, Take 1 tablet by mouth daily., Disp: , Rfl:   •  cetirizine (ZyrTEC) 10  MG tablet, Take 10 mg by mouth daily., Disp: , Rfl:   •  Cranberry-Vitamin C-Vitamin E (CRANBERRY PLUS VITAMIN C) 4200-20-3 MG-MG-UNIT capsule, Take  by mouth., Disp: , Rfl:   •  EPINEPHrine (EPIPEN 2-ALBERT) 0.3 MG/0.3ML solution auto-injector injection, Use as directed for Allergic Reaction, Disp: 1 each, Rfl: 5  •  Flaxseed, Linseed, (FLAX SEED OIL) 1000 MG capsule, Take 1 capsule by mouth 2 (two) times a day., Disp: , Rfl:   •  fluticasone (FLONASE) 50 MCG/ACT nasal spray, 2 sprays into each nostril Daily. Administer 2 sprays in each nostril for each dose., Disp: 16 g, Rfl: 5  •  gabapentin (NEURONTIN) 600 MG tablet, Take 1 tablet by mouth See Admin Instructions. 1 am and 1/2 pm, Disp: 60 tablet, Rfl: 2  •  meclizine (ANTIVERT) 25 MG tablet, Take 25 mg by mouth 3 (Three) Times a Day As Needed for dizziness., Disp: , Rfl:   •  montelukast (SINGULAIR) 10 MG tablet, Take 1 tablet by mouth Every Night., Disp: 30 tablet, Rfl: 11  •  Multiple Vitamins-Minerals (CENTRUM SILVER PO), Take 1 tablet by mouth daily., Disp: , Rfl:   •  olopatadine (PATANASE) 0.6 % solution nasal solution, 2 sprays by Each Nare route 2 (Two) Times a Day., Disp: 30.5 g, Rfl: 3  •  Probiotic Product (PROBIOTIC FORMULA PO), Take  by mouth 2 (two) times a day. 10 billion cell (2 billion ea) capsule, Disp: , Rfl:   •  promethazine (PHENERGAN) 25 MG tablet, Take 1 tablet by mouth Every 6 (Six) Hours As Needed for Nausea or Vomiting., Disp: 30 tablet, Rfl: 2  •  Sennosides 25 MG tablet, Take  by mouth., Disp: , Rfl:   •  temazepam (RESTORIL) 30 MG capsule, Take 1 capsule by mouth At Night As Needed for Sleep., Disp: 30 capsule, Rfl: 2  •  traZODone (DESYREL) 100 MG tablet, Take 1 tablet by mouth Every Night., Disp: 90 tablet, Rfl: 3  •  triamterene-hydrochlorothiazide (MAXZIDE-25) 37.5-25 MG per tablet, Take 1 tablet by mouth Daily As Needed (for edema)., Disp: 90 tablet, Rfl: 1  •  TURMERIC PO, Take 1 capsule by mouth Daily., Disp: , Rfl:      Allergies   Allergen Reactions   • Augmentin [Amoxicillin-Pot Clavulanate] Anaphylaxis   • Ceclor [Cefaclor] Anaphylaxis   • Ibuprofen Anaphylaxis   • Nsaids Anaphylaxis   • Other Anaphylaxis     Cats  E.E.S. 200  Lead   Mold  [place] Pastrani - Anaphylaxis   • Penicillins Anaphylaxis   • Aspirin    • Biaxin [Clarithromycin]    • Ciprofloxacin      *cipro   • Contrast Dye    • Demerol [Meperidine]    • Iodine      And iodide containing products   • Macrobid [Nitrofurantoin Monohyd Macro]    • Nickel    • Penicillium Notatum    • Requip [Ropinirole Hcl]    • Septra [Sulfamethoxazole-Trimethoprim]    • Statins Other (See Comments)     Statins-Hmg-Coa Reductase inhibitors  *muscle enzyme increase with myalgias   • Statins    • Zithromax [Azithromycin]      josseline   • Niaspan [Niacin Er] Rash       Past Medical History:   Diagnosis Date   • Acute upper respiratory infection    • Allergic rhinitis    • Asthma     blastomycosis   • Blastomycosis    • Borderline glaucoma    • Chronic kidney disease (CKD), stage II (mild)    • Colon polyp    • Dehiscence of surgical wound    • Diverticulitis of colon    • Elevated levels of transaminase & lactic acid dehydrogenase    • Encounter for gynecological examination without abnormal finding    • Episcleritis    • Fundic gland polyposis of stomach    • Gastric polyp    • Gastritis    • Gastroesophageal reflux disease    • Gastroparesis    • Gout    • H/O mammogram    • History of colon polyps    • Hypercholesterolemia    • IBS (irritable bowel syndrome)    • Insomnia    • Mass of ovary     fixed cyst, post removal, no sign of cancer   • Meniere's disease    • Myoclonic disorder    • Nuclear senile cataract    • Osteoarthritis of multiple joints    • Peripheral edema    • Pruritus of vagina    • Restless legs    • Seasonal allergic rhinitis    • Sleep apnea    • Spasm of bladder    • Vaginal irritation    • Vitamin D deficiency          Review of Systems   Constitutional: Negative  for fever.   HENT: Positive for congestion, sinus pain and sinus pressure. Negative for dental problem, facial swelling and sore throat.    Hematological: Negative for adenopathy. Does not bruise/bleed easily.   All other systems reviewed and are negative.    Patient said that she had more bleeding than expected with some procedures but normally doesn't bleed with scratches or cuts      Objective   Physical Exam   Constitutional: She is oriented to person, place, and time. She appears well-developed and well-nourished.   HENT:   Head: Normocephalic and atraumatic.   Right Ear: Hearing, tympanic membrane, external ear and ear canal normal.   Left Ear: Hearing, tympanic membrane, external ear and ear canal normal.   Nose: Mucosal edema, rhinorrhea, nasal deformity and septal deviation present. No epistaxis. Right sinus exhibits maxillary sinus tenderness. Right sinus exhibits no frontal sinus tenderness. Left sinus exhibits no maxillary sinus tenderness and no frontal sinus tenderness.       Mouth/Throat: Uvula is midline and oropharynx is clear and moist. No trismus in the jaw. Normal dentition. No oropharyngeal exudate or posterior oropharyngeal edema.   Eyes: Conjunctivae and EOM are normal. Pupils are equal, round, and reactive to light.   Neck: Normal range of motion. Neck supple. No JVD present. No tracheal deviation present. No thyromegaly present.   Cardiovascular: Normal rate and regular rhythm.    Pulmonary/Chest: Effort normal and breath sounds normal.   Musculoskeletal: Normal range of motion.   Lymphadenopathy:        Head (right side): No submental, no submandibular, no tonsillar, no preauricular, no posterior auricular and no occipital adenopathy present.        Head (left side): No submental, no submandibular, no tonsillar, no preauricular, no posterior auricular and no occipital adenopathy present.     She has no cervical adenopathy.        Right cervical: No superficial cervical, no deep cervical and  no posterior cervical adenopathy present.       Left cervical: No superficial cervical, no deep cervical and no posterior cervical adenopathy present.   Neurological: She is alert and oriented to person, place, and time. No cranial nerve deficit.   Skin: Skin is warm.   Psychiatric: She has a normal mood and affect. Her speech is normal and behavior is normal. Thought content normal.   Nursing note and vitals reviewed.      Result      EXAMINATION:  CT sinus                    Indication:  Chronic sinusitis          History:  Correlative imaging:  none        Technique:  iv contrast:  none         This exam was performed according to the departmental  dose-optimization program which includes automated exposure  control, adjustment of the mA and/or kV according to patient size  and/or use of iterative reconstruction technique.        FINDINGS:        Sinuses:                   Frontal sinuses:  negative           Ethmoidal sinuses:  negative                Maxillary sinuses:  Minimal right air-fluid level.             Sphenoidal sinuses:  negative              Nasal:    Osteomeatal units (OMU):  within normal limits               Minnie bullosa:  Present involving the left middle turbinate    Nasal septum:  Mild biconcave deviation approximately 0.2 - .3  cm.     Misc:  (as visualized)     Orbits:  unremarkable      Mastoids:  unremarkable      Brain:  unremarkable                 IMPRESSION:  CONCLUSION:         1. Minimal right maxillary sinus air-fluid level.  2. Mild biconcave nasal septum deviation.  3. Otherwise negative examination.                     Electronically signed by:  GONZALEZ Pretty MD  12/27/2017 1:02  PM [facility] Workstation: [id number]   Scans on Order         Assessment/Plan   Lexie was seen today for follow-up.    Diagnoses and all orders for this visit:    Preop examination  -     aPTT; Future  -     Protime-INR  -     CBC & Differential; Future    Nasal turbinate hypertrophy    Maxillary  sinusitis, chronic    Deviated septum    Nasal valve collapse    Minnie bullosa      We discussed in detail medical, observation, and surgical options.  Patient ready for something surgical be done because of some the mechanical issues in her nose contributing to her symptoms..    I explained her that there is no guarantee the surgery would solve all her symptoms particularly the chronic infections, or the nasal obstruction.  What was involved in surgery the risks, benefits, failure rate, and other complications these include, the bleeding, infection, failure to heal, need for further surgery, damaged her eyes, or face, her  nasal appearance change ,  decreased sense of smell , which she read the  Has could worsen  .    Description of the surgery recovery limitations and complications patient prefers this approach all questions were answered.  Going to try and arrange for some blood testing to evaluate a bleeding tendency.  The preoperative evaluation we'll decide whether she needs other testing such as EKGs or chest x-rays she's done well with other surgeries other than some bleeding she said when she had the surgery that quickly resolved she had no further problems are noted easy tendency for bleeding otherwise.

## 2018-02-26 ENCOUNTER — OFFICE VISIT (OUTPATIENT)
Dept: PODIATRY | Facility: CLINIC | Age: 68
End: 2018-02-26

## 2018-02-26 VITALS
HEART RATE: 58 BPM | SYSTOLIC BLOOD PRESSURE: 128 MMHG | HEIGHT: 65 IN | BODY MASS INDEX: 30.89 KG/M2 | DIASTOLIC BLOOD PRESSURE: 86 MMHG | WEIGHT: 185.4 LBS | OXYGEN SATURATION: 98 %

## 2018-02-26 DIAGNOSIS — M72.2 PLANTAR FASCIITIS: ICD-10-CM

## 2018-02-26 DIAGNOSIS — M79.672 FOOT PAIN, LEFT: Primary | ICD-10-CM

## 2018-02-26 PROCEDURE — 99203 OFFICE O/P NEW LOW 30 MIN: CPT | Performed by: PODIATRIST

## 2018-02-26 NOTE — PROGRESS NOTES
Lexie Cardona  1950  68 y.o. female    Patient presents today with a complaint of left foot pain.    02/26/2018    Chief Complaint   Patient presents with   • Left Foot - Pain           History of Present Illness    Lexie Cardona is a 68 y.o.female who presents to clinic today with chief complaint of left foot pain.  Pain is located to the bottom of her left heel.  It has been present for 5 months.  It is progressively getting worse.  She denies any injuries or trauma.  She rates the pain as a 5 out of 10.  Pain is aggravated with walking barefoot.  It is worse with first step in the morning or after periods of rest.  She has no other pedal complaints.          Past Medical History:   Diagnosis Date   • Acute upper respiratory infection    • Allergic rhinitis    • Asthma     blastomycosis   • Blastomycosis    • Borderline glaucoma    • Bunion    • Callus    • Chronic kidney disease (CKD), stage II (mild)    • Clotting disorder    • Colon polyp    • Dehiscence of surgical wound    • Diverticulitis of colon    • Elevated levels of transaminase & lactic acid dehydrogenase    • Encounter for gynecological examination without abnormal finding    • Episcleritis    • Fundic gland polyposis of stomach    • Gastric polyp    • Gastritis    • Gastroesophageal reflux disease    • Gastroparesis    • Gout    • H/O mammogram    • History of colon polyps    • Hypercholesterolemia    • IBS (irritable bowel syndrome)    • Insomnia    • Mass of ovary     fixed cyst, post removal, no sign of cancer   • Meniere's disease    • Myoclonic disorder    • Nuclear senile cataract    • Osteoarthritis of multiple joints    • Peripheral edema    • Pruritus of vagina    • Restless legs    • Seasonal allergic rhinitis    • Sleep apnea    • Spasm of bladder    • Vaginal irritation    • Vitamin D deficiency          Past Surgical History:   Procedure Laterality Date   • APPENDECTOMY     • BRONCHOSCOPY  09/10/2001    density,  upper lobe posteriorly, left. no endobronchial lesions seen   • CARPAL TUNNEL RELEASE WITH CUBITAL TUNNEL RELEASE  05/03/2013    Right Shoulder And Wrist I   • COLONOSCOPY  2010    2 polyps, repeat 5 yr   • COLONOSCOPY W/ POLYPECTOMY  07/23/2015    diverticulosis found in the sigmoid colon. a single polyp found in the cecum; ascending colon, and sigmoid colon. all removed by cold biopsy polypectomy. hemorrhoids found   • CYSTOSCOPY  11/13/1974    and x-ray urethral diverticulography. urethral diverticulum   • DENTAL PROCEDURE  11/19/2004    extraction of tooth #30 with local anesthesia and IV sedation   • ENDOSCOPY AND COLONOSCOPY  2005    dininutive polyp in the rectum. diminutive polyp 30cm from the anus. diminutive polyp ascending colon. all polyps were removed by hot biopsy polypectomy. multiple diverticula in the sigmoid   • INJECTION OF MEDICATION  03/21/2014    celestone   • INJECTION OF MEDICATION  03/11/2013    dexamethasone   • INJECTION OF MEDICATION  05/09/2014    kenalog   • LAPAROSCOPIC CHOLECYSTECTOMY     • OTHER SURGICAL HISTORY  1960    Multiple Cysts To Right Ovary   • OTHER SURGICAL HISTORY  1969    Transurethral Resection   • OVARIAN CYST REMOVAL  11/14/1974    resection of left ovarian cyst & left salpingectomy. appendectomy   • TONSILECTOMY, ADENOIDECTOMY, BILATERAL MYRINGOTOMY AND TUBES  1954   • TOTAL ABDOMINAL HYSTERECTOMY WITH SALPINGO OOPHORECTOMY  08/18/2015    total abdominal hysterectomy and bilateral salpingo-oophorectomy. enbterolysis and lysis of adhesions.  Completed At [facility] [place].         Family History   Problem Relation Age of Onset   • Diabetes Mother    • Other Mother      cortical basal ganglionic degeneration    • Seizures Mother      epilepsy   • Arthritis Mother    • Hyperlipidemia Mother    • Mental illness Mother    • Breast cancer Mother    • Heart disease Mother    • Tuberculosis Father    • Cancer Father 60     colon   • Hypertension Father    •  Colon cancer Father    • Heart disease Father    • Colon cancer Other    • Hypertension Other    • Heart disease Other    • Diabetes Other    • Colon cancer Other    • Breast cancer Paternal Grandmother        Allergies   Allergen Reactions   • Augmentin [Amoxicillin-Pot Clavulanate] Anaphylaxis   • Ceclor [Cefaclor] Anaphylaxis   • Ibuprofen Anaphylaxis   • Nsaids Anaphylaxis   • Other Anaphylaxis     Cats  E.E.S. 200  Lead   Mold  [place] Pastrani - Anaphylaxis   • Penicillins Anaphylaxis   • Aspirin    • Biaxin [Clarithromycin]    • Ciprofloxacin      *cipro   • Contrast Dye    • Demerol [Meperidine]    • Iodine      And iodide containing products   • Macrobid [Nitrofurantoin Monohyd Macro]    • Nickel    • Penicillium Notatum    • Requip [Ropinirole Hcl]    • Septra [Sulfamethoxazole-Trimethoprim]    • Statins Other (See Comments)     Statins-Hmg-Coa Reductase inhibitors  *muscle enzyme increase with myalgias   • Statins    • Zithromax [Azithromycin]      josseline   • Niaspan [Niacin Er] Rash       Social History     Social History   • Marital status:      Spouse name: N/A   • Number of children: N/A   • Years of education: N/A     Occupational History   • Not on file.     Social History Main Topics   • Smoking status: Former Smoker   • Smokeless tobacco: Never Used   • Alcohol use No   • Drug use: No   • Sexual activity: Defer      Comment:      Other Topics Concern   • Not on file     Social History Narrative    ** Merged History Encounter **              Current Outpatient Prescriptions   Medication Sig Dispense Refill   • Alum Hydroxide-Mag Carbonate (GAVISCON PO) Take  by mouth. Tablets Or Liquid, As Needed     • Biotin 5 MG capsule Take  by mouth.     • Calcium Carb-Cholecalciferol (CALCIUM 600+D3) 600-400 MG-UNIT tablet Take 1 tablet by mouth daily.     • cetirizine (ZyrTEC) 10 MG tablet Take 10 mg by mouth daily.     • Cranberry-Vitamin C-Vitamin E (CRANBERRY PLUS VITAMIN C) 4200-20-3 MG-MG-UNIT  "capsule Take  by mouth.     • EPINEPHrine (EPIPEN 2-ALBERT) 0.3 MG/0.3ML solution auto-injector injection Use as directed for Allergic Reaction 1 each 5   • Flaxseed, Linseed, (FLAX SEED OIL) 1000 MG capsule Take 1 capsule by mouth 2 (two) times a day.     • fluticasone (FLONASE) 50 MCG/ACT nasal spray 2 sprays into each nostril Daily. Administer 2 sprays in each nostril for each dose. 16 g 5   • gabapentin (NEURONTIN) 600 MG tablet Take 1 tablet by mouth See Admin Instructions. 1 am and 1/2 pm 60 tablet 2   • meclizine (ANTIVERT) 25 MG tablet Take 25 mg by mouth 3 (Three) Times a Day As Needed for dizziness.     • montelukast (SINGULAIR) 10 MG tablet Take 1 tablet by mouth Every Night. 30 tablet 11   • Multiple Vitamins-Minerals (CENTRUM SILVER PO) Take 1 tablet by mouth daily.     • olopatadine (PATANASE) 0.6 % solution nasal solution 2 sprays by Each Nare route 2 (Two) Times a Day. 30.5 g 3   • Probiotic Product (PROBIOTIC FORMULA PO) Take  by mouth 2 (two) times a day. 10 billion cell (2 billion ea) capsule     • promethazine (PHENERGAN) 25 MG tablet Take 1 tablet by mouth Every 6 (Six) Hours As Needed for Nausea or Vomiting. 30 tablet 2   • Sennosides 25 MG tablet Take  by mouth.     • temazepam (RESTORIL) 30 MG capsule Take 1 capsule by mouth At Night As Needed for Sleep. 30 capsule 2   • traZODone (DESYREL) 100 MG tablet Take 1 tablet by mouth Every Night. 90 tablet 3   • triamterene-hydrochlorothiazide (MAXZIDE-25) 37.5-25 MG per tablet Take 1 tablet by mouth Daily As Needed (for edema). 90 tablet 1   • TURMERIC PO Take 1 capsule by mouth Daily.       No current facility-administered medications for this visit.          OBJECTIVE    /86  Pulse 58  Ht 163.8 cm (64.5\")  Wt 84.1 kg (185 lb 6.4 oz)  LMP 03/22/1999 (Approximate) Comment: 2000  SpO2 98%  BMI 31.33 kg/m2      Review of Systems   Constitutional: Positive for fatigue.   HENT: Positive for hearing loss.    Eyes: Positive for visual " disturbance.        Cataracts     Respiratory: Positive for shortness of breath.         Blastomycosis     Cardiovascular: Positive for leg swelling.   Gastrointestinal: Positive for diarrhea.        IBS     Endocrine: Negative.  Negative for cold intolerance.   Genitourinary: Positive for frequency.   Musculoskeletal: Positive for arthralgias and back pain.        Left foot pain     Skin: Negative.  Negative for color change.   Allergic/Immunologic: Negative.  Negative for environmental allergies.   Neurological: Positive for headaches.   Hematological: Negative.  Negative for adenopathy.   Psychiatric/Behavioral: Negative.  Negative for agitation.           Physical Exam   Constitutional: She is oriented to person, place, and time. She appears well-developed and well-nourished. No distress.   HENT:   Head: Normocephalic and atraumatic.   Nose: Nose normal.   Pulmonary/Chest: Effort normal. No respiratory distress.   Neurological: She is alert and oriented to person, place, and time. She has normal reflexes.   Psychiatric: She has a normal mood and affect. Her behavior is normal.   Vitals reviewed.      Lower Extremity    Cardiovascular:    DP/PT pulses palpable    CFT brisk  to all digits  No erythema or edema noted     Musculoskeletal:  Muscle strength is 5/5 for all muscle groups tested   ROM of the 1st MTP is full without pain or crepitus  ROM of the MTJ is full without pain or crepitus    ROM of the STJ is full without pain or crepitus    ROM of the ankle joint is full without pain or crepitus    Pain on palpation to the medial tubercle on the left calcaneus.  Negative lateral squeeze test.    Dermatological:   Skin is warm, dry and intact    Webspaces 1-4 bilateral are clean, dry and intact.   No subcutaneous nodules or masses noted    No open wounds noted     Neurological:   Protective sensation intact    Sensation intact to light touch      Radiographs: 3 views the left foot were obtained today.  No acute  osseous abnormality is noted.        Procedures        ASSESSMENT AND PLAN    Lexie was seen today for pain.    Diagnoses and all orders for this visit:    Foot pain, left  -     XR Foot 3+ View Left    Plantar fasciitis      - Comprehensive foot and ankle exam performed  - X-rays taken and reviewed  - Patient advised to perform stretching exercises, icing and to make appropriate shoe gear changes to include wearing athletic type shoes with supportive insoles.  No bare foot walking.  Patient also given written instructions on how to correctly perform the stretching of the Achilles tendon/calf stretches, and the heel spur/plantar fasciitis regimen.  - Recommended over-the-counter insole such as power steps to properly support the arch in order to alleviate the tension in stress on the plantar fascia associated with normal daily walking. Patient was educated on the break-in period for new orthotics.  - Patient is in agreement with the current treatment plan.  All  questions were answered to their satisfaction.  - RTC in 4 weeks          This document has been electronically signed by David Spring DPM on February 26, 2018 4:35 PM     2/26/2018  4:35 PM

## 2018-02-28 ENCOUNTER — APPOINTMENT (OUTPATIENT)
Dept: PREADMISSION TESTING | Facility: HOSPITAL | Age: 68
End: 2018-02-28

## 2018-02-28 VITALS
HEART RATE: 65 BPM | SYSTOLIC BLOOD PRESSURE: 110 MMHG | DIASTOLIC BLOOD PRESSURE: 64 MMHG | WEIGHT: 183 LBS | OXYGEN SATURATION: 96 % | RESPIRATION RATE: 18 BRPM | HEIGHT: 65 IN | BODY MASS INDEX: 30.49 KG/M2

## 2018-02-28 DIAGNOSIS — J34.89 CONCHA BULLOSA: ICD-10-CM

## 2018-02-28 DIAGNOSIS — J32.0 MAXILLARY SINUSITIS, CHRONIC: ICD-10-CM

## 2018-02-28 DIAGNOSIS — J34.3 NASAL TURBINATE HYPERTROPHY: ICD-10-CM

## 2018-02-28 DIAGNOSIS — J34.2 DEVIATED SEPTUM: ICD-10-CM

## 2018-02-28 DIAGNOSIS — J34.89 NASAL VALVE COLLAPSE: ICD-10-CM

## 2018-02-28 DIAGNOSIS — Z01.818 PREOP EXAMINATION: ICD-10-CM

## 2018-02-28 LAB
ANION GAP SERPL CALCULATED.3IONS-SCNC: 13 MMOL/L (ref 5–15)
APTT PPP: 31.4 SECONDS (ref 20–40.3)
BASOPHILS # BLD AUTO: 0.06 10*3/MM3 (ref 0–0.2)
BASOPHILS NFR BLD AUTO: 0.8 % (ref 0–2)
BUN BLD-MCNC: 10 MG/DL (ref 7–21)
BUN/CREAT SERPL: 10 (ref 7–25)
CALCIUM SPEC-SCNC: 9.8 MG/DL (ref 8.4–10.2)
CHLORIDE SERPL-SCNC: 105 MMOL/L (ref 95–110)
CO2 SERPL-SCNC: 26 MMOL/L (ref 22–31)
CREAT BLD-MCNC: 1 MG/DL (ref 0.5–1)
DEPRECATED RDW RBC AUTO: 40.6 FL (ref 36.4–46.3)
EOSINOPHIL # BLD AUTO: 0.17 10*3/MM3 (ref 0–0.7)
EOSINOPHIL NFR BLD AUTO: 2.4 % (ref 0–7)
ERYTHROCYTE [DISTWIDTH] IN BLOOD BY AUTOMATED COUNT: 12.4 % (ref 11.5–14.5)
GFR SERPL CREATININE-BSD FRML MDRD: 55 ML/MIN/1.73 (ref 45–104)
GLUCOSE BLD-MCNC: 82 MG/DL (ref 60–100)
HCT VFR BLD AUTO: 42.2 % (ref 35–45)
HGB BLD-MCNC: 14.7 G/DL (ref 12–15.5)
IMM GRANULOCYTES # BLD: 0.01 10*3/MM3 (ref 0–0.02)
IMM GRANULOCYTES NFR BLD: 0.1 % (ref 0–0.5)
INR PPP: 0.93 (ref 0.8–1.2)
LYMPHOCYTES # BLD AUTO: 2.46 10*3/MM3 (ref 0.6–4.2)
LYMPHOCYTES NFR BLD AUTO: 34.8 % (ref 10–50)
MCH RBC QN AUTO: 31.3 PG (ref 26.5–34)
MCHC RBC AUTO-ENTMCNC: 34.8 G/DL (ref 31.4–36)
MCV RBC AUTO: 90 FL (ref 80–98)
MONOCYTES # BLD AUTO: 0.58 10*3/MM3 (ref 0–0.9)
MONOCYTES NFR BLD AUTO: 8.2 % (ref 0–12)
NEUTROPHILS # BLD AUTO: 3.78 10*3/MM3 (ref 2–8.6)
NEUTROPHILS NFR BLD AUTO: 53.7 % (ref 37–80)
PLATELET # BLD AUTO: 253 10*3/MM3 (ref 150–450)
PMV BLD AUTO: 10.3 FL (ref 8–12)
POTASSIUM BLD-SCNC: 4 MMOL/L (ref 3.5–5.1)
PROTHROMBIN TIME: 12.4 SECONDS (ref 11.1–15.3)
RBC # BLD AUTO: 4.69 10*6/MM3 (ref 3.77–5.16)
SODIUM BLD-SCNC: 144 MMOL/L (ref 137–145)
WBC NRBC COR # BLD: 7.06 10*3/MM3 (ref 3.2–9.8)

## 2018-02-28 PROCEDURE — 85025 COMPLETE CBC W/AUTO DIFF WBC: CPT | Performed by: OTOLARYNGOLOGY

## 2018-02-28 PROCEDURE — 85730 THROMBOPLASTIN TIME PARTIAL: CPT | Performed by: OTOLARYNGOLOGY

## 2018-02-28 PROCEDURE — 80048 BASIC METABOLIC PNL TOTAL CA: CPT | Performed by: ANESTHESIOLOGY

## 2018-02-28 PROCEDURE — 85610 PROTHROMBIN TIME: CPT | Performed by: OTOLARYNGOLOGY

## 2018-02-28 PROCEDURE — 36415 COLL VENOUS BLD VENIPUNCTURE: CPT | Performed by: OTOLARYNGOLOGY

## 2018-02-28 RX ORDER — SODIUM CHLORIDE 9 MG/ML
1000 INJECTION, SOLUTION INTRAVENOUS CONTINUOUS PRN
Status: CANCELLED | OUTPATIENT
Start: 2018-03-06

## 2018-02-28 RX ORDER — GABAPENTIN 300 MG/1
300 CAPSULE ORAL 2 TIMES DAILY
COMMUNITY
End: 2018-04-03 | Stop reason: SDUPTHER

## 2018-02-28 RX ORDER — OMEGA-3S/DHA/EPA/FISH OIL/D3 300MG-1000
400 CAPSULE ORAL DAILY
COMMUNITY
End: 2020-04-03

## 2018-03-05 NOTE — H&P
Subjective           Lexie Cardona is a 68 y.o. female.     CC: persistent nsal obstruction and sinusitis    History of Present Illness     Patient states that she took the Levaquin tolerated it well but began to have symptoms 5 days after stopping the antibiotic consistently facial pressure drainage congestion worsening nasal obstruction particularly on the right she has decreased sense of smell she had no facial swelling or fever.    Continues to have persistent nasal obstruction despite the use of the saline Patanase and has trouble using the nasal spray because of the obstruction              The following portions of the patient's history were reviewed and updated as appropriate: allergies, current medications, past family history, past medical history, past social history, past surgical history and problem list.              Lexie Cardona reports that she has quit smoking. She has never used smokeless tobacco. She reports that she does not drink alcohol or use illicit drugs.    Patient is not a tobacco user and has been counseled for use of tobacco products                  Family History       Problem     Relation     Age of Onset       •     Diabetes     Mother             •     Other     Mother                         cortical basal ganglionic degeneration        •     Seizures     Mother                         epilepsy       •     Arthritis     Mother             •     Hyperlipidemia     Mother             •     Mental illness     Mother             •     Breast cancer     Mother             •     Tuberculosis     Father             •     Cancer     Father     60                   colon       •     Hypertension     Father             •     Colon cancer     Father             •     Colon cancer     Other             •     Hypertension     Other             •     Heart disease     Other             •     Diabetes     Other             •     Colon cancer     Other             •      Breast cancer     Paternal Grandmother                            Current Outpatient Prescriptions:     •  Alum Hydroxide-Mag Carbonate (GAVISCON PO), Take  by mouth. Tablets Or Liquid, As Needed, Disp: , Rfl:     •  Biotin 5 MG capsule, Take  by mouth., Disp: , Rfl:     •  Calcium Carb-Cholecalciferol (CALCIUM 600+D3) 600-400 MG-UNIT tablet, Take 1 tablet by mouth daily., Disp: , Rfl:     •  cetirizine (ZyrTEC) 10 MG tablet, Take 10 mg by mouth daily., Disp: , Rfl:     •  Cranberry-Vitamin C-Vitamin E (CRANBERRY PLUS VITAMIN C) 4200-20-3 MG-MG-UNIT capsule, Take  by mouth., Disp: , Rfl:     •  EPINEPHrine (EPIPEN 2-ALBERT) 0.3 MG/0.3ML solution auto-injector injection, Use as directed for Allergic Reaction, Disp: 1 each, Rfl: 5    •  Flaxseed, Linseed, (FLAX SEED OIL) 1000 MG capsule, Take 1 capsule by mouth 2 (two) times a day., Disp: , Rfl:     •  fluticasone (FLONASE) 50 MCG/ACT nasal spray, 2 sprays into each nostril Daily. Administer 2 sprays in each nostril for each dose., Disp: 16 g, Rfl: 5    •  gabapentin (NEURONTIN) 600 MG tablet, Take 1 tablet by mouth See Admin Instructions. 1 am and 1/2 pm, Disp: 60 tablet, Rfl: 2    •  meclizine (ANTIVERT) 25 MG tablet, Take 25 mg by mouth 3 (Three) Times a Day As Needed for dizziness., Disp: , Rfl:     •  montelukast (SINGULAIR) 10 MG tablet, Take 1 tablet by mouth Every Night., Disp: 30 tablet, Rfl: 11    •  Multiple Vitamins-Minerals (CENTRUM SILVER PO), Take 1 tablet by mouth daily., Disp: , Rfl:     •  olopatadine (PATANASE) 0.6 % solution nasal solution, 2 sprays by Each Nare route 2 (Two) Times a Day., Disp: 30.5 g, Rfl: 3    •  Probiotic Product (PROBIOTIC FORMULA PO), Take  by mouth 2 (two) times a day. 10 billion cell (2 billion ea) capsule, Disp: , Rfl:     •  promethazine (PHENERGAN) 25 MG tablet, Take 1 tablet by mouth Every 6 (Six) Hours As Needed for Nausea or Vomiting., Disp: 30 tablet, Rfl: 2    •  Sennosides 25 MG tablet, Take  by mouth., Disp: ,  Rfl:     •  temazepam (RESTORIL) 30 MG capsule, Take 1 capsule by mouth At Night As Needed for Sleep., Disp: 30 capsule, Rfl: 2    •  traZODone (DESYREL) 100 MG tablet, Take 1 tablet by mouth Every Night., Disp: 90 tablet, Rfl: 3    •  triamterene-hydrochlorothiazide (MAXZIDE-25) 37.5-25 MG per tablet, Take 1 tablet by mouth Daily As Needed (for edema)., Disp: 90 tablet, Rfl: 1    •  TURMERIC PO, Take 1 capsule by mouth Daily., Disp: , Rfl:                  Allergies       Allergen     Reactions       •     Augmentin [Amoxicillin-Pot Clavulanate]     Anaphylaxis       •     Ceclor [Cefaclor]     Anaphylaxis       •     Ibuprofen     Anaphylaxis       •     Nsaids     Anaphylaxis       •     Other     Anaphylaxis                   Cats    E.E.S. 200    Lead     Mold    [place] Pastrani - Anaphylaxis       •     Penicillins     Anaphylaxis       •     Aspirin             •     Biaxin [Clarithromycin]             •     Ciprofloxacin                         *cipro       •     Contrast Dye             •     Demerol [Meperidine]             •     Iodine                         And iodide containing products       •     Macrobid [Nitrofurantoin Monohyd Macro]             •     Nickel             •     Penicillium Notatum             •     Requip [Ropinirole Hcl]             •     Septra [Sulfamethoxazole-Trimethoprim]             •     Statins     Other (See Comments)                   Statins-Hmg-Coa Reductase inhibitors    *muscle enzyme increase with myalgias       •     Statins             •     Zithromax [Azithromycin]                         josseline       •     Niaspan [Niacin Er]     Rash                      Medical History              Past Medical History:       Diagnosis     Date       •     Acute upper respiratory infection             •     Allergic rhinitis             •     Asthma                   blastomycosis       •     Blastomycosis             •     Borderline glaucoma             •     Chronic kidney  disease (CKD), stage II (mild)             •     Colon polyp             •     Dehiscence of surgical wound             •     Diverticulitis of colon             •     Elevated levels of transaminase & lactic acid dehydrogenase             •     Encounter for gynecological examination without abnormal finding             •     Episcleritis             •     Fundic gland polyposis of stomach             •     Gastric polyp             •     Gastritis             •     Gastroesophageal reflux disease             •     Gastroparesis             •     Gout             •     H/O mammogram             •     History of colon polyps             •     Hypercholesterolemia             •     IBS (irritable bowel syndrome)             •     Insomnia             •     Mass of ovary                   fixed cyst, post removal, no sign of cancer       •     Meniere's disease             •     Myoclonic disorder             •     Nuclear senile cataract             •     Osteoarthritis of multiple joints             •     Peripheral edema             •     Pruritus of vagina             •     Restless legs             •     Seasonal allergic rhinitis             •     Sleep apnea             •     Spasm of bladder             •     Vaginal irritation             •     Vitamin D deficiency                               Review of Systems     Constitutional: Negative for fever.     HENT: Positive for congestion, sinus pain and sinus pressure. Negative for dental problem, facial swelling and sore throat.      Hematological: Negative for adenopathy. Does not bruise/bleed easily.     All other systems reviewed and are negative.         Patient said that she had more bleeding than expected with some procedures but normally doesn't bleed with scratches or cuts                       Objective           Physical Exam     Constitutional: She is oriented to person, place, and time. She appears well-developed and well-nourished.     HENT:      Head: Normocephalic and atraumatic.   Right Ear: Hearing, tympanic membrane, external ear and ear canal normal.   Left Ear: Hearing, tympanic membrane, external ear and ear canal normal.     Nose: Mucosal edema, rhinorrhea, nasal deformity and septal deviation present. No epistaxis. Right sinus exhibits maxillary sinus tenderness. Right sinus exhibits no frontal sinus tenderness. Left sinus exhibits no maxillary sinus tenderness and no frontal sinus tenderness.         Mouth/Throat: Uvula is midline and oropharynx is clear and moist. No trismus in the jaw. Normal dentition. No oropharyngeal exudate or posterior oropharyngeal edema.     Eyes: Conjunctivae and EOM are normal. Pupils are equal, round, and reactive to light.     Neck: Normal range of motion. Neck supple. No JVD present. No tracheal deviation present. No thyromegaly present.     Cardiovascular: Normal rate and regular rhythm.      Pulmonary/Chest: Effort normal and breath sounds normal.   Musculoskeletal: Normal range of motion.   Lymphadenopathy:        Head (right side): No submental, no submandibular, no tonsillar, no preauricular, no posterior auricular and no occipital adenopathy present.        Head (left side): No submental, no submandibular, no tonsillar, no preauricular, no posterior auricular and no occipital adenopathy present.     She has no cervical adenopathy.        Right cervical: No superficial cervical, no deep cervical and no posterior cervical adenopathy present.       Left cervical: No superficial cervical, no deep cervical and no posterior cervical adenopathy present.   Neurological: She is alert and oriented to person, place, and time. No cranial nerve deficit.     Skin: Skin is warm.   Psychiatric: She has a normal mood and affect. Her speech is normal and behavior is normal. Thought content normal.     Nursing note and vitals reviewed.              Result             EXAMINATION:  CT sinus                      Indication:   Chronic sinusitis            History:    Correlative imaging:  none          Technique:  iv contrast:  none             This exam was performed according to the departmental    dose-optimization program which includes automated exposure    control, adjustment of the mA and/or kV according to patient size    and/or use of iterative reconstruction technique.            FINDINGS:            Sinuses:                     Frontal sinuses:  negative             Ethmoidal sinuses:  negative                  Maxillary sinuses:  Minimal right air-fluid level.               Sphenoidal sinuses:  negative                   Nasal:      Osteomeatal units (OMU):  within normal limits                 Minnie bullosa:  Present involving the left middle turbinate      Nasal septum:  Mild biconcave deviation approximately 0.2 - .3    cm.          Misc:  (as visualized)       Orbits:  unremarkable        Mastoids:  unremarkable        Brain:  unremarkable                     IMPRESSION:    CONCLUSION:              1. Minimal right maxillary sinus air-fluid level.    2. Mild biconcave nasal septum deviation.    3. Otherwise negative examination.                                  Electronically signed by:  GONZALEZ Pretty MD  12/27/2017 1:02    PM CST Workstation: [id number]       Scans on Order                             Assessment/Plan           Lexie was seen today for follow-up.         Diagnoses and all orders for this visit:         Preop examination    -     aPTT; Future    -     Protime-INR    -     CBC & Differential; Future         Nasal turbinate hypertrophy         Maxillary sinusitis, chronic         Deviated septum         Nasal valve collapse         Minnie bullosa              We discussed in detail medical, observation, and surgical options.  Patient ready for something surgical be done because of some the mechanical issues in her nose contributing to her symptoms..         I explained her that there is no guarantee the  surgery would solve all her symptoms particularly the chronic infections, or the nasal obstruction.    What was involved in surgery the risks, benefits, failure rate, and other complications these include, the bleeding, infection, failure to heal, need for further surgery, damaged her eyes, or face, her  nasal appearance change ,  decreased sense of smell , which she read the  Has could worsen  .         Description of the surgery recovery limitations and complications patient prefers this approach all questions were answered.  Going to try and arrange for some blood testing to evaluate a bleeding tendency.  The preoperative evaluation we'll decide whether she needs other testing such as EKGs or chest x-rays she's done well with other surgeries other than some bleeding she said when she had the surgery that quickly resolved she had no further problems are noted easy tendency for bleeding otherwise.

## 2018-03-06 ENCOUNTER — ANESTHESIA EVENT (OUTPATIENT)
Dept: PERIOP | Facility: HOSPITAL | Age: 68
End: 2018-03-06

## 2018-03-06 ENCOUNTER — ANESTHESIA (OUTPATIENT)
Dept: PERIOP | Facility: HOSPITAL | Age: 68
End: 2018-03-06

## 2018-03-06 ENCOUNTER — HOSPITAL ENCOUNTER (OUTPATIENT)
Facility: HOSPITAL | Age: 68
Setting detail: HOSPITAL OUTPATIENT SURGERY
Discharge: HOME | End: 2018-03-06
Attending: OTOLARYNGOLOGY | Admitting: OTOLARYNGOLOGY

## 2018-03-06 VITALS
HEART RATE: 80 BPM | TEMPERATURE: 97 F | WEIGHT: 181 LBS | DIASTOLIC BLOOD PRESSURE: 79 MMHG | OXYGEN SATURATION: 95 % | HEIGHT: 65 IN | RESPIRATION RATE: 18 BRPM | BODY MASS INDEX: 30.16 KG/M2 | SYSTOLIC BLOOD PRESSURE: 168 MMHG

## 2018-03-06 DIAGNOSIS — J34.89 CONCHA BULLOSA: ICD-10-CM

## 2018-03-06 DIAGNOSIS — J34.2 NASAL SEPTAL DEVIATION: ICD-10-CM

## 2018-03-06 DIAGNOSIS — J34.3 NASAL TURBINATE HYPERTROPHY: ICD-10-CM

## 2018-03-06 DIAGNOSIS — J32.0 CHRONIC MAXILLARY SINUSITIS: ICD-10-CM

## 2018-03-06 DIAGNOSIS — J34.89 NASAL VALVE COLLAPSE: ICD-10-CM

## 2018-03-06 PROCEDURE — 31240 NSL/SNS NDSC CNCH BULL RESCJ: CPT | Performed by: OTOLARYNGOLOGY

## 2018-03-06 PROCEDURE — 25010000002 FENTANYL CITRATE (PF) 100 MCG/2ML SOLUTION: Performed by: NURSE ANESTHETIST, CERTIFIED REGISTERED

## 2018-03-06 PROCEDURE — 25010000002 PHENYLEPHRINE PER 1 ML: Performed by: NURSE ANESTHETIST, CERTIFIED REGISTERED

## 2018-03-06 PROCEDURE — 25010000002 MIDAZOLAM PER 1 MG: Performed by: NURSE ANESTHETIST, CERTIFIED REGISTERED

## 2018-03-06 PROCEDURE — 31256 EXPLORATION MAXILLARY SINUS: CPT | Performed by: OTOLARYNGOLOGY

## 2018-03-06 PROCEDURE — 25010000002 DEXAMETHASONE PER 1 MG: Performed by: NURSE ANESTHETIST, CERTIFIED REGISTERED

## 2018-03-06 PROCEDURE — 88304 TISSUE EXAM BY PATHOLOGIST: CPT | Performed by: PATHOLOGY

## 2018-03-06 PROCEDURE — 88304 TISSUE EXAM BY PATHOLOGIST: CPT | Performed by: OTOLARYNGOLOGY

## 2018-03-06 PROCEDURE — 25010000002 ONDANSETRON PER 1 MG: Performed by: NURSE ANESTHETIST, CERTIFIED REGISTERED

## 2018-03-06 PROCEDURE — 25010000002 PROPOFOL 10 MG/ML EMULSION: Performed by: NURSE ANESTHETIST, CERTIFIED REGISTERED

## 2018-03-06 PROCEDURE — 25010000002 HYDROMORPHONE PER 4 MG: Performed by: NURSE ANESTHETIST, CERTIFIED REGISTERED

## 2018-03-06 PROCEDURE — 25010000002 SUCCINYLCHOLINE PER 20 MG: Performed by: NURSE ANESTHETIST, CERTIFIED REGISTERED

## 2018-03-06 PROCEDURE — 30465 REPAIR NASAL STENOSIS: CPT | Performed by: OTOLARYNGOLOGY

## 2018-03-06 PROCEDURE — 30130 EXCISE INFERIOR TURBINATE: CPT | Performed by: OTOLARYNGOLOGY

## 2018-03-06 RX ORDER — EPHEDRINE SULFATE 50 MG/ML
5 INJECTION, SOLUTION INTRAVENOUS ONCE AS NEEDED
Status: DISCONTINUED | OUTPATIENT
Start: 2018-03-06 | End: 2018-03-06 | Stop reason: HOSPADM

## 2018-03-06 RX ORDER — SODIUM CHLORIDE 9 MG/ML
1000 INJECTION, SOLUTION INTRAVENOUS CONTINUOUS PRN
Status: DISCONTINUED | OUTPATIENT
Start: 2018-03-06 | End: 2018-03-06 | Stop reason: HOSPADM

## 2018-03-06 RX ORDER — SCOLOPAMINE TRANSDERMAL SYSTEM 1 MG/1
1 PATCH, EXTENDED RELEASE TRANSDERMAL
Status: DISCONTINUED | OUTPATIENT
Start: 2018-03-06 | End: 2018-03-06 | Stop reason: HOSPADM

## 2018-03-06 RX ORDER — ONDANSETRON 2 MG/ML
4 INJECTION INTRAMUSCULAR; INTRAVENOUS ONCE AS NEEDED
Status: DISCONTINUED | OUTPATIENT
Start: 2018-03-06 | End: 2018-03-06 | Stop reason: HOSPADM

## 2018-03-06 RX ORDER — FLUMAZENIL 0.1 MG/ML
0.2 INJECTION INTRAVENOUS AS NEEDED
Status: DISCONTINUED | OUTPATIENT
Start: 2018-03-06 | End: 2018-03-06 | Stop reason: HOSPADM

## 2018-03-06 RX ORDER — EPHEDRINE SULFATE 50 MG/ML
INJECTION, SOLUTION INTRAVENOUS AS NEEDED
Status: DISCONTINUED | OUTPATIENT
Start: 2018-03-06 | End: 2018-03-06 | Stop reason: SURG

## 2018-03-06 RX ORDER — LEVOFLOXACIN 500 MG/1
500 TABLET, FILM COATED ORAL DAILY
Qty: 5 TABLET | Refills: 0 | Status: SHIPPED | OUTPATIENT
Start: 2018-03-06 | End: 2018-03-11

## 2018-03-06 RX ORDER — DEXAMETHASONE SODIUM PHOSPHATE 4 MG/ML
INJECTION, SOLUTION INTRA-ARTICULAR; INTRALESIONAL; INTRAMUSCULAR; INTRAVENOUS; SOFT TISSUE AS NEEDED
Status: DISCONTINUED | OUTPATIENT
Start: 2018-03-06 | End: 2018-03-06 | Stop reason: SURG

## 2018-03-06 RX ORDER — HYDROCODONE BITARTRATE AND ACETAMINOPHEN 7.5; 325 MG/1; MG/1
1-2 TABLET ORAL EVERY 4 HOURS PRN
Qty: 20 TABLET | Refills: 0 | Status: SHIPPED | OUTPATIENT
Start: 2018-03-06 | End: 2018-03-12

## 2018-03-06 RX ORDER — LIDOCAINE HYDROCHLORIDE 20 MG/ML
INJECTION, SOLUTION INFILTRATION; PERINEURAL AS NEEDED
Status: DISCONTINUED | OUTPATIENT
Start: 2018-03-06 | End: 2018-03-06 | Stop reason: SURG

## 2018-03-06 RX ORDER — ACETAMINOPHEN 325 MG/1
650 TABLET ORAL ONCE AS NEEDED
Status: DISCONTINUED | OUTPATIENT
Start: 2018-03-06 | End: 2018-03-06 | Stop reason: HOSPADM

## 2018-03-06 RX ORDER — LIDOCAINE HYDROCHLORIDE AND EPINEPHRINE 10; 10 MG/ML; UG/ML
INJECTION, SOLUTION INFILTRATION; PERINEURAL AS NEEDED
Status: DISCONTINUED | OUTPATIENT
Start: 2018-03-06 | End: 2018-03-06 | Stop reason: HOSPADM

## 2018-03-06 RX ORDER — LABETALOL HYDROCHLORIDE 5 MG/ML
5 INJECTION, SOLUTION INTRAVENOUS
Status: DISCONTINUED | OUTPATIENT
Start: 2018-03-06 | End: 2018-03-06 | Stop reason: HOSPADM

## 2018-03-06 RX ORDER — CLINDAMYCIN PHOSPHATE 900 MG/50ML
900 INJECTION INTRAVENOUS ONCE
Status: COMPLETED | OUTPATIENT
Start: 2018-03-06 | End: 2018-03-06

## 2018-03-06 RX ORDER — HYDROCODONE BITARTRATE AND ACETAMINOPHEN 7.5; 325 MG/1; MG/1
1 TABLET ORAL ONCE AS NEEDED
Status: DISCONTINUED | OUTPATIENT
Start: 2018-03-06 | End: 2018-03-06 | Stop reason: HOSPADM

## 2018-03-06 RX ORDER — PROPOFOL 10 MG/ML
VIAL (ML) INTRAVENOUS AS NEEDED
Status: DISCONTINUED | OUTPATIENT
Start: 2018-03-06 | End: 2018-03-06 | Stop reason: SURG

## 2018-03-06 RX ORDER — FENTANYL CITRATE 50 UG/ML
INJECTION, SOLUTION INTRAMUSCULAR; INTRAVENOUS AS NEEDED
Status: DISCONTINUED | OUTPATIENT
Start: 2018-03-06 | End: 2018-03-06 | Stop reason: SURG

## 2018-03-06 RX ORDER — DIPHENHYDRAMINE HYDROCHLORIDE 50 MG/ML
12.5 INJECTION INTRAMUSCULAR; INTRAVENOUS
Status: DISCONTINUED | OUTPATIENT
Start: 2018-03-06 | End: 2018-03-06 | Stop reason: HOSPADM

## 2018-03-06 RX ORDER — OXYMETAZOLINE HYDROCHLORIDE 0.05 G/100ML
SPRAY NASAL AS NEEDED
Status: DISCONTINUED | OUTPATIENT
Start: 2018-03-06 | End: 2018-03-06 | Stop reason: HOSPADM

## 2018-03-06 RX ORDER — LEVOFLOXACIN 500 MG/1
500 TABLET, FILM COATED ORAL DAILY
Qty: 5 TABLET | Refills: 0 | Status: SHIPPED | OUTPATIENT
Start: 2018-03-06 | End: 2018-03-06 | Stop reason: SDUPTHER

## 2018-03-06 RX ORDER — SUCCINYLCHOLINE CHLORIDE 20 MG/ML
INJECTION INTRAMUSCULAR; INTRAVENOUS AS NEEDED
Status: DISCONTINUED | OUTPATIENT
Start: 2018-03-06 | End: 2018-03-06 | Stop reason: SURG

## 2018-03-06 RX ORDER — NALOXONE HCL 0.4 MG/ML
0.2 VIAL (ML) INJECTION AS NEEDED
Status: DISCONTINUED | OUTPATIENT
Start: 2018-03-06 | End: 2018-03-06 | Stop reason: HOSPADM

## 2018-03-06 RX ORDER — ROCURONIUM BROMIDE 10 MG/ML
INJECTION, SOLUTION INTRAVENOUS AS NEEDED
Status: DISCONTINUED | OUTPATIENT
Start: 2018-03-06 | End: 2018-03-06 | Stop reason: SURG

## 2018-03-06 RX ORDER — SODIUM CHLORIDE, SODIUM GLUCONATE, SODIUM ACETATE, POTASSIUM CHLORIDE, AND MAGNESIUM CHLORIDE 526; 502; 368; 37; 30 MG/100ML; MG/100ML; MG/100ML; MG/100ML; MG/100ML
INJECTION, SOLUTION INTRAVENOUS CONTINUOUS PRN
Status: DISCONTINUED | OUTPATIENT
Start: 2018-03-06 | End: 2018-03-06 | Stop reason: SURG

## 2018-03-06 RX ORDER — ACETAMINOPHEN 650 MG/1
650 SUPPOSITORY RECTAL ONCE AS NEEDED
Status: DISCONTINUED | OUTPATIENT
Start: 2018-03-06 | End: 2018-03-06 | Stop reason: HOSPADM

## 2018-03-06 RX ORDER — ONDANSETRON 2 MG/ML
INJECTION INTRAMUSCULAR; INTRAVENOUS AS NEEDED
Status: DISCONTINUED | OUTPATIENT
Start: 2018-03-06 | End: 2018-03-06 | Stop reason: SURG

## 2018-03-06 RX ORDER — OXYMETAZOLINE HYDROCHLORIDE 0.05 G/100ML
2 SPRAY NASAL
Status: COMPLETED | OUTPATIENT
Start: 2018-03-06 | End: 2018-03-06

## 2018-03-06 RX ORDER — MIDAZOLAM HYDROCHLORIDE 1 MG/ML
INJECTION INTRAMUSCULAR; INTRAVENOUS AS NEEDED
Status: DISCONTINUED | OUTPATIENT
Start: 2018-03-06 | End: 2018-03-06 | Stop reason: SURG

## 2018-03-06 RX ADMIN — PROPOFOL 120 MG: 10 INJECTION, EMULSION INTRAVENOUS at 13:09

## 2018-03-06 RX ADMIN — ONDANSETRON 4 MG: 2 INJECTION INTRAMUSCULAR; INTRAVENOUS at 13:40

## 2018-03-06 RX ADMIN — PHENYLEPHRINE HYDROCHLORIDE 100 MCG: 10 INJECTION INTRAVENOUS at 13:20

## 2018-03-06 RX ADMIN — EPHEDRINE SULFATE 5 MG: 50 INJECTION INTRAMUSCULAR; INTRAVENOUS; SUBCUTANEOUS at 13:30

## 2018-03-06 RX ADMIN — MIDAZOLAM 2 MG: 1 INJECTION INTRAMUSCULAR; INTRAVENOUS at 13:01

## 2018-03-06 RX ADMIN — HYDROMORPHONE HYDROCHLORIDE 0.5 MG: 1 INJECTION, SOLUTION INTRAMUSCULAR; INTRAVENOUS; SUBCUTANEOUS at 14:57

## 2018-03-06 RX ADMIN — DEXAMETHASONE SODIUM PHOSPHATE 4 MG: 4 INJECTION, SOLUTION INTRAMUSCULAR; INTRAVENOUS at 13:15

## 2018-03-06 RX ADMIN — HYDROMORPHONE HYDROCHLORIDE 0.5 MG: 1 INJECTION, SOLUTION INTRAMUSCULAR; INTRAVENOUS; SUBCUTANEOUS at 14:47

## 2018-03-06 RX ADMIN — FENTANYL CITRATE 50 MCG: 50 INJECTION, SOLUTION INTRAMUSCULAR; INTRAVENOUS at 13:09

## 2018-03-06 RX ADMIN — SCOPALAMINE 1 PATCH: 1 PATCH, EXTENDED RELEASE TRANSDERMAL at 12:45

## 2018-03-06 RX ADMIN — LIDOCAINE HYDROCHLORIDE 80 MG: 20 INJECTION, SOLUTION INFILTRATION; PERINEURAL at 13:09

## 2018-03-06 RX ADMIN — EPHEDRINE SULFATE 5 MG: 50 INJECTION INTRAMUSCULAR; INTRAVENOUS; SUBCUTANEOUS at 13:35

## 2018-03-06 RX ADMIN — SODIUM CHLORIDE 1000 ML: 9 INJECTION, SOLUTION INTRAVENOUS at 10:44

## 2018-03-06 RX ADMIN — ROCURONIUM BROMIDE 5 MG: 10 INJECTION INTRAVENOUS at 13:09

## 2018-03-06 RX ADMIN — SUCCINYLCHOLINE CHLORIDE 140 MG: 20 INJECTION, SOLUTION INTRAMUSCULAR; INTRAVENOUS at 13:09

## 2018-03-06 RX ADMIN — PHENYLEPHRINE HYDROCHLORIDE 100 MCG: 10 INJECTION INTRAVENOUS at 13:26

## 2018-03-06 RX ADMIN — EPHEDRINE SULFATE 5 MG: 50 INJECTION INTRAMUSCULAR; INTRAVENOUS; SUBCUTANEOUS at 13:45

## 2018-03-06 RX ADMIN — SCOPALAMINE 1 PATCH: 1 PATCH, EXTENDED RELEASE TRANSDERMAL at 11:19

## 2018-03-06 RX ADMIN — EPHEDRINE SULFATE 5 MG: 50 INJECTION INTRAMUSCULAR; INTRAVENOUS; SUBCUTANEOUS at 13:27

## 2018-03-06 RX ADMIN — EPHEDRINE SULFATE 5 MG: 50 INJECTION INTRAMUSCULAR; INTRAVENOUS; SUBCUTANEOUS at 13:40

## 2018-03-06 RX ADMIN — CLINDAMYCIN IN 5 PERCENT DEXTROSE 900 MG: 18 INJECTION, SOLUTION INTRAVENOUS at 13:00

## 2018-03-06 RX ADMIN — SODIUM CHLORIDE, SODIUM GLUCONATE, SODIUM ACETATE, POTASSIUM CHLORIDE, AND MAGNESIUM CHLORIDE: 526; 502; 368; 37; 30 INJECTION, SOLUTION INTRAVENOUS at 14:17

## 2018-03-06 RX ADMIN — OXYMETAZOLINE HYDROCHLORIDE 2 SPRAY: 5 SPRAY NASAL at 10:44

## 2018-03-06 NOTE — ANESTHESIA POSTPROCEDURE EVALUATION
Patient: Lexie Cardona    Procedure Summary     Date Anesthesia Start Anesthesia Stop Room / Location    03/06/18 1303 1422  [facility] / [facility]       Procedure Diagnosis Surgeon Provider    RIGHT ENDOSCOPICS SINUS SURGERY OF THE MAXILLARY SINUS, BILATERAL PARTIAL INFERIOR TURBINECTOMIES, LEFT ENDOSCOPIC EJ BULLOSA RESECTION, NASAL SEPTOPLASTY, NASAL VALVE REPAIR   (Right Nose) Chronic maxillary sinusitis; Nasal turbinate hypertrophy; Nasal septal deviation; Nasal valve collapse; Ej bullosa  (Chronic maxillary sinusitis [J32.0]; Nasal turbinate hypertrophy [J34.3]; Nasal septal deviation [J34.2]; Nasal valve collapse [J34.89]; Ej bullosa [J34.89]) MD Moses Vail MD          Anesthesia Type: general  Last vitals  BP   131/62 (03/06/18 1036)   Temp   98.4 °F (36.9 °C) (03/06/18 1036)   Pulse   65 (03/06/18 1036)   Resp   18 (03/06/18 1036)     SpO2   99 % (03/06/18 1036)     Post Anesthesia Care and Evaluation    Patient location during evaluation: PACU  Patient participation: complete - patient participated  Level of consciousness: awake and alert  Pain management: adequate  Airway patency: patent  Anesthetic complications: No anesthetic complications  PONV Status: none  Cardiovascular status: acceptable  Respiratory status: acceptable  Hydration status: acceptable

## 2018-03-06 NOTE — ANESTHESIA PROCEDURE NOTES
Airway  Urgency: elective    Airway not difficult    General Information and Staff    Patient location during procedure: OR  CRNA: PRANAV POWERS    Indications and Patient Condition  Indications for airway management: airway protection    Preoxygenated: yes  Mask difficulty assessment: 1 - vent by mask    Final Airway Details  Final airway type: endotracheal airway      Successful airway: ETT and CHANG tube  Cuffed: yes   Successful intubation technique: direct laryngoscopy  Endotracheal tube insertion site: oral  Blade: Sulma  Blade size: #3  ETT size: 7.0 mm  Cormack-Lehane Classification: grade IIa - partial view of glottis  Placement verified by: chest auscultation and capnometry   Cuff volume (mL): 7  Measured from: lips  ETT to lips (cm): 21  Number of attempts at approach: 1

## 2018-03-06 NOTE — BRIEF OP NOTE
ENDOSCOPIC FUNCTIONAL SINUS SURGERY WITH SEPTOPLASTY AND RESECTION INFERIOR TURBINATES  Progress Note    Lexie Cardona  3/6/2018    Pre-op Diagnosis:   Chronic maxillary sinusitis [J32.0]  Nasal turbinate hypertrophy [J34.3]  Nasal septal deviation [J34.2]  Nasal valve collapse [J34.89]  Ej bullosa [J34.89]       Post-Op Diagnosis Codes:     * Chronic maxillary sinusitis [J32.0]     * Nasal turbinate hypertrophy [J34.3]     * Nasal septal deviation [J34.2]     * Nasal valve collapse [J34.89]     * Ej bullosa [J34.89]    Procedure/CPT® Codes:      Procedure(s):  RIGHT ENDOSCOPICS SINUS SURGERY OF THE MAXILLARY SINUS, BILATERAL PARTIAL INFERIOR TURBINECTOMIES, LEFT ENDOSCOPIC EJ BULLOSA RESECTION, NASAL SEPTOPLASTY, NASAL VALVE REPAIR      Surgeon(s):  Pablo Navas MD    Anesthesia: General    Staff:   Circulator: Fatmeeh Hung RN  Scrub Person: Linda Das  Assistant: Kenyatta Parsons    Estimated Blood Loss:  25 ml     Specimens:                  ID Type Source Tests Collected by Time Destination   A : RIGHT SINUS Tissue Nose TISSUE PATHOLOGY EXAM Pablo Navas MD 3/6/2018 1352    B : TURBINATE Tissue Nose TISSUE PATHOLOGY EXAM Pablo Navas MD 3/6/2018 1353    C : SEPTUM Tissue Nose TISSUE PATHOLOGY EXAM Pablo Navas MD 3/6/2018 1353    D : EJ BULLOSA Tissue Nose TISSUE PATHOLOGY EXAM Pablo Navas MD 3/6/2018 1353          Drains:    none       Findings:  NSD< valve stenosis, turbinate hypertrophy  Maxillary sinusitis and CB  Complications: NA      Pablo Navas MD     Date: 3/6/2018  Time: 2:10 PM

## 2018-03-06 NOTE — OP NOTE
OPERATIVE NOTE    Name:    Lexie Cardona  YOB: 1950  Date of surgery:   3/6/2018    Pre-op Diagnosis:   Chronic maxillary sinusitis [J32.0]  Nasal turbinate hypertrophy [J34.3]  Nasal septal deviation [J34.2]  Nasal valve collapse [J34.89]  Ej bullosa [J34.89]    Post-op Diagnosis:    Post-Op Diagnosis Codes:     * Chronic maxillary sinusitis [J32.0]     * Nasal turbinate hypertrophy [J34.3]     * Nasal septal deviation [J34.2]     * Nasal valve collapse [J34.89]     * Ej bullosa [J34.89]    Procedure:  Procedure(s):  RIGHT ENDOSCOPICS SINUS SURGERY OF THE MAXILLARY SINUS, BILATERAL PARTIAL INFERIOR TURBINECTOMIES, LEFT ENDOSCOPIC EJ BULLOSA RESECTION, NASAL SEPTOPLASTY, NASAL VALVE REPAIR      Surgeon:  Pablo Navas MD, AAOHNS    Anesthesia: General    Staff:   Circulator: Fatemeh Hung RN  Scrub Person: Linda Das  Assistant: Kenyatta Parsons    Estimated Blood Loss: 30 ml  Specimens:                  ID Type Source Tests Collected by Time Destination   A : RIGHT SINUS Tissue Nose TISSUE PATHOLOGY EXAM Pablo Navas MD 3/6/2018 1352    B : TURBINATE Tissue Nose TISSUE PATHOLOGY EXAM Pablo Navas MD 3/6/2018 1353    C : SEPTUM Tissue Nose TISSUE PATHOLOGY EXAM Pablo Navas MD 3/6/2018 1353    D : EJ BULLOSA Tissue Nose TISSUE PATHOLOGY EXAM Pablo Navas MD 3/6/2018 1353          Drains:    none       Findings: Deviated septum and nasal valve collapse right worse than left ej bullosa left right chronic max or sinusitis bilateral inferior turbinate hypertrophy     Complications: None    IMPLANTS:   Nothing was implanted during the procedure    INDICATIONS: Fair does respond to medical therapy patient's unwillingness consider observation or further medical therapy based on physical findings and CT scan and understanding the risks complications.  Success rate need for possible secondary surgery which was high    PROCEDURE: Patient taken operating room  placed in supine position.  Anesthesia was carried out and local anesthesia was carried out after the timeout was carried out.  The nose including septum turbinates ostomy and a unit area on the right pamela bullosa and lateral nasal wall were injected with local anesthetic approximately 20 mL 1% lidocaine with 1 1000 epinephrine.    Attention was first taken to the nasal septum incision was made regarding spur because was elevated off the bone was removed with chisel forceps and then sutured back in place.  Multiple sutures placed through and through the septum.  Cartilage was then used harvested for use of valve lateral wall repair.  Incision remained the lateral wall cartilage implanted and then the upper l and lower ateral cartilages sutured together after removing some skin this opened up the nasal valve and provided support multiple PDS sutures were utilized.    Improve the nasal septal angle significantly and had minimal effect on the external appearance of the nose.  Also relation incision was made along the floor the nose to open up the area inferior turbinates were then outfractured and then utilizing the endoscopic shaver were debrided internally and externally.  This opened up the inferior portion of the nose.    Endoscopic  the left pamela bullosa which was incised and a portion of the pamela bullosa removed opening of the ostiomeatal unit in the nose on the left.  It was little bleeding.   Attention was  taken the right side the middle turbinate was medialized and then utilizing a sickle knife the uncinate was enlarged 10 be abnormal (debrider and up-biting backbiting forceps with 3070° scope.  This opened and irrigated thoroughly inferior turbinate cauterized along the base diagnosis packed while the patient awakened up and then this was removed.   The nose and pharynx were reinspected   no s significant bleeding noted.  Please note the pupils checked and there are no eye changes noted during the  procedure.  Patient will contact the recovery room good condition without evidence of complications instructions given detail the family and prescription provided.            This document has been electronically signed by Pablo Navas MD on March 6, 2018 5:12 PM

## 2018-03-06 NOTE — ANESTHESIA PREPROCEDURE EVALUATION
Anesthesia Evaluation     history of anesthetic complications: PONV  NPO Solid Status: > 8 hours  NPO Liquid Status: > 8 hours           Airway   Mallampati: II  TM distance: <3 FB  Neck ROM: full  Possible difficult intubation  Dental    (+) poor dentition    Pulmonary     breath sounds clear to auscultation  (+) a smoker Former, asthma, recent URI, sleep apnea,   Cardiovascular   Exercise tolerance: good (4-7 METS)    ECG reviewed  Rhythm: regular  Rate: normal    (+) hypertension well controlled, hyperlipidemia,       Neuro/Psych  (+) dizziness/light headedness, numbness,     GI/Hepatic/Renal/Endo    (+) obesity,  GERD well controlled,      Musculoskeletal     (+) arthralgias,   Abdominal     Abdomen: soft.   Substance History      OB/GYN          Other   (+) arthritis   history of cancer active                    Anesthesia Plan    ASA 3     general     intravenous induction   Anesthetic plan and risks discussed with patient.

## 2018-03-06 NOTE — PLAN OF CARE
Problem: Patient Care Overview (Adult)  Goal: Plan of Care Review  Outcome: Ongoing (interventions implemented as appropriate)   03/06/18 1425   Coping/Psychosocial Response Interventions   Plan Of Care Reviewed With patient   Patient Care Overview   Progress improving   Outcome Evaluation   Outcome Summary/Follow up Plan Once all criteria met, patient ready for transport back to [facility].       Problem: Perioperative Period (Adult)  Goal: Signs and Symptoms of Listed Potential Problems Will be Absent or Manageable (Perioperative Period)  Outcome: Ongoing (interventions implemented as appropriate)

## 2018-03-08 LAB
LAB AP CASE REPORT: NORMAL
Lab: NORMAL
PATH REPORT.FINAL DX SPEC: NORMAL
PATH REPORT.GROSS SPEC: NORMAL

## 2018-03-09 ENCOUNTER — OFFICE VISIT (OUTPATIENT)
Dept: OTOLARYNGOLOGY | Facility: CLINIC | Age: 68
End: 2018-03-09

## 2018-03-09 VITALS — WEIGHT: 181 LBS | BODY MASS INDEX: 30.16 KG/M2 | HEIGHT: 65 IN | TEMPERATURE: 97.8 F

## 2018-03-09 DIAGNOSIS — J34.3 NASAL TURBINATE HYPERTROPHY: Primary | ICD-10-CM

## 2018-03-09 PROCEDURE — 99024 POSTOP FOLLOW-UP VISIT: CPT | Performed by: OTOLARYNGOLOGY

## 2018-03-09 RX ORDER — PREDNISONE 20 MG/1
TABLET ORAL
Qty: 6 TABLET | Refills: 0 | Status: SHIPPED | OUTPATIENT
Start: 2018-03-09 | End: 2018-03-12

## 2018-03-09 NOTE — PROGRESS NOTES
Patient was concerned about postoperative swelling the gotten worse just today she's not had any fever since diffusely her face was swollen and nose.  She's not have any unusual bleeding or drainage.  Emanation reveals possibly mild no swelling no fluctuance but nothing serious of the both sides were nose and face is no erythema and no particular tenderness.  Examiner nose and decongest her nose and sections all clots she states that she can be laboring better.  It is normal not to have milliliters breathing through her nose at this stage she is to continue irrigating she can read better now to clean it just to help her swelling she says she's taking prednisone okay in the past.  No unusual side effects.  Given her 20 mg a taper 2 days in the form of 2 per day and then 1 per day for 2 more days will be seeing her back for reassessment something comes up she'll let me know by see no serious untoward problem.  Sling use and side effects of prednisone which she was aware of.  ANGI Navas MD

## 2018-03-12 ENCOUNTER — OFFICE VISIT (OUTPATIENT)
Dept: OTOLARYNGOLOGY | Facility: CLINIC | Age: 68
End: 2018-03-12

## 2018-03-12 VITALS — HEIGHT: 64 IN | WEIGHT: 182.2 LBS | BODY MASS INDEX: 31.1 KG/M2 | TEMPERATURE: 97.3 F

## 2018-03-12 DIAGNOSIS — J34.2 DEVIATED SEPTUM: Primary | ICD-10-CM

## 2018-03-12 PROCEDURE — 99024 POSTOP FOLLOW-UP VISIT: CPT | Performed by: OTOLARYNGOLOGY

## 2018-03-12 RX ORDER — PREDNISONE 10 MG/1
TABLET ORAL
Qty: 30 TABLET | Refills: 0 | Status: SHIPPED | OUTPATIENT
Start: 2018-03-12 | End: 2018-04-10

## 2018-03-12 RX ORDER — PYRAZINAMIDE 500 MG/1
1-2 TABLET ORAL EVERY 4 HOURS PRN
Qty: 15 TABLET | Refills: 0 | Status: SHIPPED | OUTPATIENT
Start: 2018-03-12 | End: 2018-04-10

## 2018-03-12 NOTE — PROGRESS NOTES
The patient is 6 days postoperative nasal surgery.  She states that she's having some headache for over 10 she says that hydrocodone doesn't help her.  She can't take anti-inflammatories because of significant reactions.  Tylenol by itself is not helping.  We discussed using a different form of the narcotic Given her Tylenol with codeine warned about side effects.  I don't think she is using unreasonable amount the medication based on what she's totally.  Her facial swelling she's hasn't improved since she's been on prednisone she requested some more.  She's only had 4 days is running out now.  Explained the reasons were we don't use it long-term but I thinks 6 more days would not be unreasonable.    Patient examination reveals normalhealing well sutures are in place I suctioned some for crusting out she is breathing better as expected and the inferior mid septum were I worked on a septal spur some swelling there.  Facial swelling is minimal but since she improved with the other prednisone and given her taper 2 days of 52 days of 32 days of 10 to discontinue it after that she'll continue nasal irrigation she can gently blow her nose now we'll see her back in follow-up in about 2 weeks she's not improving or she has some other problem explained her what signs and symptoms to look for.    ANGI Navas M.D.

## 2018-03-26 ENCOUNTER — OFFICE VISIT (OUTPATIENT)
Dept: PODIATRY | Facility: CLINIC | Age: 68
End: 2018-03-26

## 2018-03-26 VITALS — BODY MASS INDEX: 31.09 KG/M2 | HEIGHT: 64 IN | WEIGHT: 182.1 LBS

## 2018-03-26 DIAGNOSIS — M72.2 PLANTAR FASCIITIS: Primary | ICD-10-CM

## 2018-03-26 PROCEDURE — 20550 NJX 1 TENDON SHEATH/LIGAMENT: CPT | Performed by: PODIATRIST

## 2018-03-26 RX ADMIN — BUPIVACAINE HYDROCHLORIDE 1 ML: 5 INJECTION, SOLUTION EPIDURAL; INTRACAUDAL at 09:01

## 2018-03-26 RX ADMIN — TRIAMCINOLONE ACETONIDE 10 MG: 40 INJECTION, SUSPENSION INTRA-ARTICULAR; INTRAMUSCULAR at 09:01

## 2018-03-26 RX ADMIN — DEXAMETHASONE SODIUM PHOSPHATE 2 MG: 4 INJECTION, SOLUTION INTRA-ARTICULAR; INTRALESIONAL; INTRAMUSCULAR; INTRAVENOUS; SOFT TISSUE at 09:01

## 2018-03-26 NOTE — PROGRESS NOTES
Lexievalentin Marquez Brendan  1950  68 y.o. female    Patient presents today for follow-up of left foot pain.    03/26/2018     Chief Complaint   Patient presents with   • Left Foot - Pain, Follow-up           History of Present Illness    Patient presents to clinic today for follow-up of her left foot plantar fasciitis.  Relates that she has been stretching, icing and wearing her arch supports.  Her pain remains a 5 out of 10.  It is worse with the first step in the morning or after periods of rest.  She denies any new pedal complaints.        Past Medical History:   Diagnosis Date   • Acute upper respiratory infection    • Allergic rhinitis    • Asthma     blastomycosis   • Blastomycosis    • Borderline glaucoma    • Bunion    • Callus    • Chronic kidney disease (CKD), stage II (mild)    • Clotting disorder    • Colon polyp    • Dehiscence of surgical wound    • Diverticulitis of colon    • Elevated levels of transaminase & lactic acid dehydrogenase    • Encounter for gynecological examination without abnormal finding    • Episcleritis    • Fundic gland polyposis of stomach    • Gastric polyp    • Gastritis    • Gastroesophageal reflux disease    • Gastroparesis    • Gout    • H/O mammogram    • History of colon polyps    • Hypercholesterolemia    • IBS (irritable bowel syndrome)    • Insomnia    • Mass of ovary     fixed cyst, post removal, no sign of cancer   • Meniere's disease    • Myoclonic disorder    • Nuclear senile cataract    • Osteoarthritis of multiple joints    • Peripheral edema    • PONV (postoperative nausea and vomiting)    • Pruritus of vagina    • Restless legs    • Seasonal allergic rhinitis    • Skin cancer     RUE   • Sleep apnea     not using c-pap   • Spasm of bladder    • Vaginal irritation    • Vitamin D deficiency          Past Surgical History:   Procedure Laterality Date   • APPENDECTOMY     • BRONCHOSCOPY  09/10/2001    density, upper lobe posteriorly, left. no endobronchial lesions  seen   • CARPAL TUNNEL RELEASE WITH CUBITAL TUNNEL RELEASE  05/03/2013    Right Shoulder And Wrist I   • COLONOSCOPY  2010    2 polyps, repeat 5 yr   • COLONOSCOPY W/ POLYPECTOMY  07/23/2015    diverticulosis found in the sigmoid colon. a single polyp found in the cecum; ascending colon, and sigmoid colon. all removed by cold biopsy polypectomy. hemorrhoids found   • CYSTOSCOPY  11/13/1974    and x-ray urethral diverticulography. urethral diverticulum   • DENTAL PROCEDURE  11/19/2004    extraction of tooth #30 with local anesthesia and IV sedation   • ENDOSCOPIC FUNCTIONAL SINUS SURGERY (FESS) Right 3/6/2018    Procedure: RIGHT ENDOSCOPICS SINUS SURGERY OF THE MAXILLARY SINUS, BILATERAL PARTIAL INFERIOR TURBINECTOMIES, LEFT ENDOSCOPIC EJ BULLOSA RESECTION, NASAL SEPTOPLASTY, NASAL VALVE REPAIR  ;  Surgeon: Pablo Navas MD;  Location: [facility];  Service:    • ENDOSCOPY AND COLONOSCOPY  2005    dininutive polyp in the rectum. diminutive polyp 30cm from the anus. diminutive polyp ascending colon. all polyps were removed by hot biopsy polypectomy. multiple diverticula in the sigmoid   • INJECTION OF MEDICATION  03/21/2014    celestone   • INJECTION OF MEDICATION  03/11/2013    dexamethasone   • INJECTION OF MEDICATION  05/09/2014    kenalog   • LAPAROSCOPIC CHOLECYSTECTOMY     • OTHER SURGICAL HISTORY  1960    Multiple Cysts To Right Ovary   • OTHER SURGICAL HISTORY  1969    Transurethral Resection   • OVARIAN CYST REMOVAL  11/14/1974    resection of left ovarian cyst & left salpingectomy. appendectomy   • TONSILECTOMY, ADENOIDECTOMY, BILATERAL MYRINGOTOMY AND TUBES  1954   • TOTAL ABDOMINAL HYSTERECTOMY WITH SALPINGO OOPHORECTOMY  08/18/2015    total abdominal hysterectomy and bilateral salpingo-oophorectomy. enbterolysis and lysis of adhesions.  Completed At [facility] [place].         Family History   Problem Relation Age of Onset   • Diabetes Mother    • Other Mother      cortical basal  ganglionic degeneration    • Seizures Mother      epilepsy   • Arthritis Mother    • Hyperlipidemia Mother    • Mental illness Mother    • Breast cancer Mother    • Heart disease Mother    • Tuberculosis Father    • Cancer Father 60     colon   • Hypertension Father    • Colon cancer Father    • Heart disease Father    • Colon cancer Other    • Hypertension Other    • Heart disease Other    • Diabetes Other    • Colon cancer Other    • Breast cancer Paternal Grandmother        Allergies   Allergen Reactions   • Augmentin [Amoxicillin-Pot Clavulanate] Anaphylaxis   • Ceclor [Cefaclor] Anaphylaxis   • Nsaids Anaphylaxis   • Other Anaphylaxis     Cats  [address]  Lead   Mold  [place] Past[place]i - Anaphylaxis   • Penicillins Anaphylaxis   • Aspirin Other (See Comments)     tinnitis    • Biaxin [Clarithromycin] Other (See Comments)     angioedema   • Ciprofloxacin Other (See Comments)     Lips burning   • Contrast Dye Hives and Itching   • Demerol [Meperidine] Nausea And Vomiting   • Iodine      And iodide containing products   • Macrobid [Nitrofurantoin Monohyd Macro] Diarrhea, Nausea And Vomiting and GI Intolerance   • Requip [Ropinirole Hcl] Diarrhea, Nausea And Vomiting and GI Intolerance   • Septra [Sulfamethoxazole-Trimethoprim] Nausea And Vomiting and GI Intolerance   • Statins Other (See Comments)       *muscle enzyme increase with myalgias   • Zithromax [Azithromycin] Other (See Comments)     States she hasn't had a reaction, but doctor said he wasn't going to give it to her   • Niaspan [Niacin Er] Rash   • Nickel Rash       Social History     Social History   • Marital status:      Spouse name: N/A   • Number of children: N/A   • Years of education: N/A     Occupational History   • Not on file.     Social History Main Topics   • Smoking status: Former Smoker     Years: 30.00     Quit date: 2001   • Smokeless tobacco: Never Used   • Alcohol use No   • Drug use: No   • Sexual activity: Defer      Comment:       Other Topics Concern   • Not on file     Social History Narrative    ** Merged History Encounter **              Current Outpatient Prescriptions   Medication Sig Dispense Refill   • acetaminophen-codeine (TYLENOL with CODEINE #3) 300-30 MG per tablet Take 1-2 tablets by mouth Every 4 (Four) Hours As Needed for Moderate Pain . 15 tablet 0   • Alum Hydroxide-Mag Carbonate (GAVISCON PO) Take  by mouth As Needed. Tablets Or Liquid, As Needed      • Biotin 5 MG capsule Take 1 capsule by mouth Daily.     • Calcium Citrate-Vitamin D (CALCIUM + D PO) Take 1,200 mg by mouth Daily.     • cetirizine (ZyrTEC) 10 MG tablet Take 10 mg by mouth daily.     • cholecalciferol (VITAMIN D3) 400 units tablet Take 400 Units by mouth Daily.     • Cranberry-Vitamin C-Vitamin E (CRANBERRY PLUS VITAMIN C) 4200-20-3 MG-MG-UNIT capsule Take 2 tablets by mouth Every Night.     • EPINEPHrine (EPIPEN 2-ALBERT) 0.3 MG/0.3ML solution auto-injector injection Use as directed for Allergic Reaction 1 each 5   • Flaxseed, Linseed, (FLAX SEED OIL) 1000 MG capsule Take 1 capsule by mouth 2 (two) times a day.     • gabapentin (NEURONTIN) 300 MG capsule Take 300 mg by mouth 2 (Two) Times a Day. Takes 1 tablet in am, 1/2 tablet at pm     • meclizine (ANTIVERT) 25 MG tablet Take 25 mg by mouth 3 (Three) Times a Day As Needed for dizziness.     • montelukast (SINGULAIR) 10 MG tablet Take 1 tablet by mouth Every Night. 30 tablet 11   • Multiple Vitamins-Minerals (CENTRUM SILVER PO) Take 1 tablet by mouth daily.     • predniSONE (DELTASONE) 10 MG tablet Daily dose: 5 tabs for 2 days,  , then 3 tabs for 2 days,   then 1 tab for 2 days 30 tablet 0   • Probiotic Product (PROBIOTIC FORMULA PO) Take 1 tablet by mouth Every Night. 10 billion cell (2 billion ea) capsule      • promethazine (PHENERGAN) 25 MG tablet Take 1 tablet by mouth Every 6 (Six) Hours As Needed for Nausea or Vomiting. 30 tablet 2   • Sennosides 25 MG tablet Take 2 tablets by mouth Every  "Night.     • temazepam (RESTORIL) 30 MG capsule Take 1 capsule by mouth At Night As Needed for Sleep. 30 capsule 2   • traZODone (DESYREL) 100 MG tablet Take 1 tablet by mouth Every Night. 90 tablet 3   • triamterene-hydrochlorothiazide (MAXZIDE-25) 37.5-25 MG per tablet Take 1 tablet by mouth Daily As Needed (for edema). 90 tablet 1   • TURMERIC PO Take 1 capsule by mouth Daily.       No current facility-administered medications for this visit.          OBJECTIVE    Ht 163.8 cm (64.49\")   Wt 82.6 kg (182 lb 1.6 oz)   LMP 03/22/1999 (Approximate) Comment: 2000  BMI 30.79 kg/m²       Review of Systems   Constitutional: Negative for chills and fever.   Respiratory: Negative for shortness of breath.    Cardiovascular: Negative for chest pain.   Gastrointestinal: Negative for diarrhea and vomiting.   Musculoskeletal:        Left foot pain           Physical Exam   Constitutional: She is oriented to person, place, and time. She appears well-developed and well-nourished.   HENT:   Head: Normocephalic and atraumatic.   Right Ear: External ear normal.   Left Ear: External ear normal.   Pulmonary/Chest: Effort normal. No respiratory distress. She has no wheezes.   Neurological: She is alert and oriented to person, place, and time.   Skin: She is not diaphoretic.   Psychiatric: She has a normal mood and affect. Her behavior is normal.       Lower Extremity    Cardiovascular:    DP/PT pulses palpable    CFT brisk  to all digits  No erythema or edema noted     Musculoskeletal:  Muscle strength is 5/5 for all muscle groups tested   ROM of the 1st MTP is full without pain or crepitus  Pain on palpation to the medial tubercle on the left calcaneus.  Negative lateral squeeze test.    Dermatological:   Skin is warm, dry and intact    Webspaces 1-4 bilateral are clean, dry and intact.     Neurological:   Protective sensation intact    Sensation intact to light touch          Procedures    Plantar Fasciits Injection  Date/Time: " 03/26/18  Performed by: THOM ROSADO  Authorized by: THOM ROSADO   Consent: Verbal consent obtained. Written consent obtained.  Risks and benefits: risks, benefits and alternatives were discussed  Consent given by: patient  Patient identity confirmed: verbally with patient  Indications: pain relief  Nerve block body site: left  heel.  Sedation:  Patient sedated: no    Patient position: sitting  Needle size: 25 G  Local anesthetic: 0.5% Marcaine plain, Kenalog 40 mg/ml , Decadron 4 mg/mL.   Outcome: pain improved  Patient tolerance: Patient tolerated the procedure well with no immediate complications           ASSESSMENT AND PLAN    Lexie was seen today for pain and follow-up.    Diagnoses and all orders for this visit:    Plantar fasciitis      - steroid injection left foot  - Continue stretching, icing and arch supports  - We will consider night splint and physical therapy if patient is no better on next appointment  - Patient is in agreement with the current treatment plan.  All  questions were answered to their satisfaction.  - RTC in 8 weeks          This document has been electronically signed by Thom Rosado DPM on March 26, 2018 12:57 PM     3/26/2018  12:57 PM

## 2018-03-29 ENCOUNTER — OFFICE VISIT (OUTPATIENT)
Dept: OTOLARYNGOLOGY | Facility: CLINIC | Age: 68
End: 2018-03-29

## 2018-03-29 VITALS — HEIGHT: 64 IN | WEIGHT: 183 LBS | BODY MASS INDEX: 31.24 KG/M2 | TEMPERATURE: 97.6 F

## 2018-03-29 DIAGNOSIS — J32.8 OTHER CHRONIC SINUSITIS: ICD-10-CM

## 2018-03-29 PROCEDURE — 99024 POSTOP FOLLOW-UP VISIT: CPT | Performed by: OTOLARYNGOLOGY

## 2018-03-29 RX ORDER — BUPIVACAINE HYDROCHLORIDE 5 MG/ML
1 INJECTION, SOLUTION EPIDURAL; INTRACAUDAL ONCE
Status: COMPLETED | OUTPATIENT
Start: 2018-03-29 | End: 2018-03-26

## 2018-03-29 RX ORDER — DEXAMETHASONE SODIUM PHOSPHATE 4 MG/ML
2 INJECTION, SOLUTION INTRA-ARTICULAR; INTRALESIONAL; INTRAMUSCULAR; INTRAVENOUS; SOFT TISSUE ONCE
Status: COMPLETED | OUTPATIENT
Start: 2018-03-29 | End: 2018-03-26

## 2018-03-29 RX ORDER — TRIAMCINOLONE ACETONIDE 40 MG/ML
10 INJECTION, SUSPENSION INTRA-ARTICULAR; INTRAMUSCULAR ONCE
Status: COMPLETED | OUTPATIENT
Start: 2018-03-29 | End: 2018-03-26

## 2018-03-29 NOTE — PROGRESS NOTES
Patient's scissors swelling is going down the soreness is a problems she is having problems with allergies condition recovered from the site sinus.  No faint fever or major headaches.    Patient reveals some crusting S milliunit this was suctioned as well as the turbinates.  The nasal valve sutures removed says she's now the returnfirst time on the right side where she hadn't been able to previously.    She's healing well she tolerated his endoscopy well the ostiomeatal unit is open and crusting cleared the turbinates were somewhat swollen is doing well there is no evidence of polypoid tissue in the nose septum is straight.  She tolerated endoscopy after use of local anesthetic without complication.  Recheck in 1 month she is to begin using her allergy sprays both antihistamines and steroid all questions were answered to call for changes or problems the meantime.  ANGI Navas MD    .

## 2018-04-03 RX ORDER — GABAPENTIN 300 MG/1
300 CAPSULE ORAL 2 TIMES DAILY
Qty: 60 CAPSULE | Refills: 0 | Status: SHIPPED | OUTPATIENT
Start: 2018-04-03 | End: 2018-04-30

## 2018-04-06 DIAGNOSIS — Z12.39 SCREENING FOR MALIGNANT NEOPLASM OF BREAST: Primary | ICD-10-CM

## 2018-04-10 ENCOUNTER — OFFICE VISIT (OUTPATIENT)
Dept: OTOLARYNGOLOGY | Facility: CLINIC | Age: 68
End: 2018-04-10

## 2018-04-10 VITALS — BODY MASS INDEX: 30.16 KG/M2 | WEIGHT: 181 LBS | HEIGHT: 65 IN | TEMPERATURE: 96.5 F

## 2018-04-10 DIAGNOSIS — J02.8 PHARYNGITIS DUE TO OTHER ORGANISM: Primary | ICD-10-CM

## 2018-04-10 PROCEDURE — 99213 OFFICE O/P EST LOW 20 MIN: CPT | Performed by: OTOLARYNGOLOGY

## 2018-04-10 RX ORDER — CLINDAMYCIN HYDROCHLORIDE 150 MG/1
300 CAPSULE ORAL EVERY 8 HOURS
Qty: 60 CAPSULE | Refills: 0 | Status: SHIPPED | OUTPATIENT
Start: 2018-04-10 | End: 2018-04-30

## 2018-04-10 NOTE — PROGRESS NOTES
Subjective   Lexie Cardona is a 68 y.o. female.     Chief complaint sore throat and sinus congestion of left ear discomfort  History of Present Illness     States she had left soreness in her ear ear popped and she had sore throat yesterday sore throats doing well better she had low-grade fever yesterday not today she also more congestion in her nose and no unusual drainage or bleeding    The following portions of the patient's history were reviewed and updated as appropriate: allergies, current medications, past family history, past medical history, past social history, past surgical history and problem list.      Current Outpatient Prescriptions:   •  Alum Hydroxide-Mag Carbonate (GAVISCON PO), Take  by mouth As Needed. Tablets Or Liquid, As Needed , Disp: , Rfl:   •  Biotin 5 MG capsule, Take 1 capsule by mouth Daily., Disp: , Rfl:   •  Calcium Citrate-Vitamin D (CALCIUM + D PO), Take 1,200 mg by mouth Daily., Disp: , Rfl:   •  cetirizine (ZyrTEC) 10 MG tablet, Take 10 mg by mouth daily., Disp: , Rfl:   •  cholecalciferol (VITAMIN D3) 400 units tablet, Take 400 Units by mouth Daily., Disp: , Rfl:   •  Cranberry-Vitamin C-Vitamin E (CRANBERRY PLUS VITAMIN C) 4200-20-3 MG-MG-UNIT capsule, Take 2 tablets by mouth Every Night., Disp: , Rfl:   •  EPINEPHrine (EPIPEN 2-ALBERT) 0.3 MG/0.3ML solution auto-injector injection, Use as directed for Allergic Reaction, Disp: 1 each, Rfl: 5  •  Flaxseed, Linseed, (FLAX SEED OIL) 1000 MG capsule, Take 1 capsule by mouth 2 (two) times a day., Disp: , Rfl:   •  gabapentin (NEURONTIN) 300 MG capsule, Take 1 capsule by mouth 2 (Two) Times a Day. Takes 1 tablet in am, 1/2 tablet at pm, Disp: 60 capsule, Rfl: 0  •  meclizine (ANTIVERT) 25 MG tablet, Take 25 mg by mouth 3 (Three) Times a Day As Needed for dizziness., Disp: , Rfl:   •  montelukast (SINGULAIR) 10 MG tablet, Take 1 tablet by mouth Every Night., Disp: 30 tablet, Rfl: 11  •  Multiple Vitamins-Minerals (CENTRUM SILVER  PO), Take 1 tablet by mouth daily., Disp: , Rfl:   •  Probiotic Product (PROBIOTIC FORMULA PO), Take 1 tablet by mouth Every Night. 10 billion cell (2 billion ea) capsule , Disp: , Rfl:   •  promethazine (PHENERGAN) 25 MG tablet, Take 1 tablet by mouth Every 6 (Six) Hours As Needed for Nausea or Vomiting., Disp: 30 tablet, Rfl: 2  •  Sennosides 25 MG tablet, Take 2 tablets by mouth Every Night., Disp: , Rfl:   •  temazepam (RESTORIL) 30 MG capsule, Take 1 capsule by mouth At Night As Needed for Sleep., Disp: 30 capsule, Rfl: 2  •  traZODone (DESYREL) 100 MG tablet, Take 1 tablet by mouth Every Night., Disp: 90 tablet, Rfl: 3  •  triamterene-hydrochlorothiazide (MAXZIDE-25) 37.5-25 MG per tablet, Take 1 tablet by mouth Daily As Needed (for edema)., Disp: 90 tablet, Rfl: 1  •  TURMERIC PO, Take 1 capsule by mouth Daily., Disp: , Rfl:     Allergies   Allergen Reactions   • Augmentin [Amoxicillin-Pot Clavulanate] Anaphylaxis   • Ceclor [Cefaclor] Anaphylaxis   • Nsaids Anaphylaxis   • Other Anaphylaxis     Cats  E.E.S. 200  Lead   Mold  [place] Pastrani - Anaphylaxis   • Penicillins Anaphylaxis   • Aspirin Other (See Comments)     tinnitis    • Biaxin [Clarithromycin] Other (See Comments)     angioedema   • Ciprofloxacin Other (See Comments)     Lips burning   • Contrast Dye Hives and Itching   • Demerol [Meperidine] Nausea And Vomiting   • Iodine      And iodide containing products   • Macrobid [Nitrofurantoin Monohyd Macro] Diarrhea, Nausea And Vomiting and GI Intolerance   • Requip [Ropinirole Hcl] Diarrhea, Nausea And Vomiting and GI Intolerance   • Septra [Sulfamethoxazole-Trimethoprim] Nausea And Vomiting and GI Intolerance   • Statins Other (See Comments)       *muscle enzyme increase with myalgias   • Zithromax [Azithromycin] Other (See Comments)     States she hasn't had a reaction, but doctor said he wasn't going to give it to her   • Niaspan [Niacin Er] Rash   • Nickel Rash             Review of Systems    Constitutional: Positive for fever.   HENT: Positive for ear pain, postnasal drip, rhinorrhea, sinus pressure and sore throat.    Respiratory: Negative for shortness of breath.    Hematological: Positive for adenopathy.           Objective   Physical Exam   Constitutional: She is oriented to person, place, and time. She appears well-nourished.   HENT:   Head: Atraumatic.   Nose: Mucosal edema and rhinorrhea present.       Mouth/Throat: Mucous membranes are normal. Posterior oropharyngeal edema and posterior oropharyngeal erythema present. No tonsillar exudate.   Eyes: Conjunctivae are normal.   Neck: Normal range of motion. Neck supple.   Pulmonary/Chest: Effort normal.   Neurological: She is oriented to person, place, and time.   Psychiatric: She has a normal mood and affect.    nasal  endoscopy was carried out there is some crusting in her nose but it was not severe        Assessment/Plan   Lexie was seen today for follow-up.    Diagnoses and all orders for this visit:    Nasal turbinate hypertrophy    Pharyngitis due to other organism      We discussed at length her antibiotic allergies and options placed on clindamycin because as severe erythema and inflammation or throat adenopathy in her neck and told is not the best sinus back antibiotic but since her main problem is her throat will treat both with this she's also begin Bactroban irrigation explains use and side effects she does any drug reaction she's to stop immediately and let us know also talked about the importance of avoiding GI problems and U taking the antibiotic with food and using probiotics several times a day she is  this all questions answered will follow up in 3 weeks if improving otherwise sooner if not improving

## 2018-04-17 ENCOUNTER — TELEPHONE (OUTPATIENT)
Dept: FAMILY MEDICINE CLINIC | Facility: CLINIC | Age: 68
End: 2018-04-17

## 2018-04-17 RX ORDER — FLUCONAZOLE 150 MG/1
150 TABLET ORAL ONCE
Qty: 1 TABLET | Refills: 0 | Status: SHIPPED | OUTPATIENT
Start: 2018-04-17 | End: 2018-04-17

## 2018-04-17 NOTE — TELEPHONE ENCOUNTER
Patient has called and said she has a yeast inf that she has been treating at home with no result. She is asking if DR Forrest will call her in a Diflucan to [facility]. Please call her at [phone]

## 2018-04-23 ENCOUNTER — TELEPHONE (OUTPATIENT)
Dept: OTOLARYNGOLOGY | Facility: CLINIC | Age: 68
End: 2018-04-23

## 2018-04-23 NOTE — TELEPHONE ENCOUNTER
Patient returned my call, stating she is doing better, still having a small problem in her eustachian tube,  She said she will keep her appt. On May 3 for a follow up.  Advised patient to call if she has any other problems.

## 2018-04-30 ENCOUNTER — OFFICE VISIT (OUTPATIENT)
Dept: FAMILY MEDICINE CLINIC | Facility: CLINIC | Age: 68
End: 2018-04-30

## 2018-04-30 VITALS
HEART RATE: 68 BPM | BODY MASS INDEX: 30.39 KG/M2 | WEIGHT: 178 LBS | HEIGHT: 64 IN | SYSTOLIC BLOOD PRESSURE: 122 MMHG | DIASTOLIC BLOOD PRESSURE: 72 MMHG | OXYGEN SATURATION: 98 %

## 2018-04-30 DIAGNOSIS — M85.852 OSTEOPENIA OF BOTH HIPS: Primary | ICD-10-CM

## 2018-04-30 DIAGNOSIS — M85.851 OSTEOPENIA OF BOTH HIPS: Primary | ICD-10-CM

## 2018-04-30 DIAGNOSIS — F51.01 PRIMARY INSOMNIA: ICD-10-CM

## 2018-04-30 DIAGNOSIS — M19.041 PRIMARY OSTEOARTHRITIS OF BOTH HANDS: ICD-10-CM

## 2018-04-30 DIAGNOSIS — M19.042 PRIMARY OSTEOARTHRITIS OF BOTH HANDS: ICD-10-CM

## 2018-04-30 DIAGNOSIS — G56.01 CARPAL TUNNEL SYNDROME OF RIGHT WRIST: ICD-10-CM

## 2018-04-30 DIAGNOSIS — N18.2 CHRONIC KIDNEY DISEASE (CKD), STAGE II (MILD): Primary | ICD-10-CM

## 2018-04-30 PROBLEM — J32.0 CHRONIC MAXILLARY SINUSITIS: Status: RESOLVED | Noted: 2018-02-19 | Resolved: 2018-04-30

## 2018-04-30 PROCEDURE — 99214 OFFICE O/P EST MOD 30 MIN: CPT | Performed by: FAMILY MEDICINE

## 2018-04-30 RX ORDER — GABAPENTIN 600 MG/1
600 TABLET ORAL SEE ADMIN INSTRUCTIONS
Qty: 45 TABLET | Refills: 2 | Status: SHIPPED | OUTPATIENT
Start: 2018-04-30 | End: 2018-07-24 | Stop reason: SDUPTHER

## 2018-04-30 RX ORDER — TRAZODONE HYDROCHLORIDE 100 MG/1
100 TABLET ORAL NIGHTLY
Qty: 90 TABLET | Refills: 3 | Status: SHIPPED | OUTPATIENT
Start: 2018-04-30 | End: 2019-05-16 | Stop reason: SDUPTHER

## 2018-04-30 RX ORDER — TEMAZEPAM 30 MG/1
30 CAPSULE ORAL NIGHTLY PRN
Qty: 30 CAPSULE | Refills: 2 | Status: SHIPPED | OUTPATIENT
Start: 2018-04-30 | End: 2018-07-24 | Stop reason: SDUPTHER

## 2018-04-30 NOTE — PROGRESS NOTES
Subjective   Lexie Cardona is a 68 y.o. female.     Chronic Kidney Disease     Insomnia   This is a chronic problem. The current episode started more than 1 year ago. The problem has been waxing and waning.   Arthritis   Presents for follow-up visit. She complains of pain and stiffness. The symptoms have been worsening. Affected locations include the left shoulder.        The following portions of the patient's history were reviewed and updated as appropriate: allergies, current medications, past family history, past medical history, past social history, past surgical history and problem list.    Review of Systems   Musculoskeletal: Positive for arthritis and stiffness.   Psychiatric/Behavioral: The patient has insomnia.        Objective   Physical Exam   Constitutional: She is oriented to person, place, and time. She appears well-developed and well-nourished. No distress.   HENT:   Head: Normocephalic and atraumatic.   Right Ear: Hearing and tympanic membrane normal.   Left Ear: Hearing and tympanic membrane normal.   Nose: Mucosal edema and rhinorrhea present. Right sinus exhibits maxillary sinus tenderness and frontal sinus tenderness. Left sinus exhibits maxillary sinus tenderness and frontal sinus tenderness.   Mouth/Throat: Uvula is midline and mucous membranes are normal.   Neurological: She is alert and oriented to person, place, and time.   Skin: Skin is warm and dry. She is not diaphoretic.   Psychiatric: She has a normal mood and affect. Her behavior is normal. Judgment and thought content normal.   Nursing note and vitals reviewed.      Assessment/Plan   Problems Addressed this Visit        Nervous and Auditory    Carpal tunnel syndrome of right wrist       Musculoskeletal and Integument    Primary osteoarthritis of both hands       Genitourinary    Chronic kidney disease (CKD), stage II (mild) - Primary (Chronic)       Other    Insomnia      Other Visit Diagnoses    None.

## 2018-04-30 NOTE — PROGRESS NOTES
Subjective   Lexie Cardona is a 68 y.o. female.     Sinusitis     Chronic Kidney Disease   Associated symptoms include numbness (and tingeling right arm/CTS).   Insomnia   This is a chronic problem. The current episode started more than 1 year ago. The problem occurs constantly. The problem has been waxing and waning. Associated symptoms include numbness (and tingeling right arm/CTS).   Upper Extremity Issue   This is a chronic problem. The current episode started more than 1 year ago. The problem has been waxing and waning. Associated symptoms include numbness (and tingeling right arm/CTS).        The following portions of the patient's history were reviewed and updated as appropriate: allergies, current medications, past family history, past medical history, past social history, past surgical history and problem list.    Review of Systems   Neurological: Positive for numbness (and tingeling right arm/CTS).   Psychiatric/Behavioral: The patient has insomnia.        Objective   Physical Exam   Constitutional: She is oriented to person, place, and time. She appears well-developed and well-nourished. No distress.   HENT:   Head: Normocephalic and atraumatic.   Right Ear: Hearing and tympanic membrane normal.   Left Ear: Hearing and tympanic membrane normal.   Nose: Mucosal edema and rhinorrhea present. Right sinus exhibits maxillary sinus tenderness and frontal sinus tenderness. Left sinus exhibits maxillary sinus tenderness and frontal sinus tenderness.   Mouth/Throat: Uvula is midline and mucous membranes are normal.   Cardiovascular: Normal rate, regular rhythm and normal heart sounds.  Exam reveals no gallop and no friction rub.    No murmur heard.  Pulmonary/Chest: Effort normal and breath sounds normal. No respiratory distress. She has no wheezes. She has no rales. She exhibits no tenderness.   Musculoskeletal:        Right wrist: She exhibits decreased range of motion and tenderness.        Left wrist:  She exhibits decreased range of motion and tenderness.   Neurological: She is alert and oriented to person, place, and time.   Skin: Skin is warm and dry. She is not diaphoretic.   Psychiatric: She has a normal mood and affect. Her behavior is normal. Judgment and thought content normal.   Nursing note and vitals reviewed.      Assessment/Plan   Problems Addressed this Visit        Nervous and Auditory    Carpal tunnel syndrome of right wrist       Musculoskeletal and Integument    Primary osteoarthritis of both hands       Genitourinary    Chronic kidney disease (CKD), stage II (mild) - Primary (Chronic)       Other    Insomnia      Other Visit Diagnoses    None.

## 2018-05-03 ENCOUNTER — OFFICE VISIT (OUTPATIENT)
Dept: OTOLARYNGOLOGY | Facility: CLINIC | Age: 68
End: 2018-05-03

## 2018-05-03 VITALS — TEMPERATURE: 97.2 F | WEIGHT: 178 LBS | HEIGHT: 64 IN | BODY MASS INDEX: 30.39 KG/M2

## 2018-05-03 DIAGNOSIS — J30.1 CHRONIC ALLERGIC RHINITIS DUE TO POLLEN, UNSPECIFIED SEASONALITY: ICD-10-CM

## 2018-05-03 PROCEDURE — 96372 THER/PROPH/DIAG INJ SC/IM: CPT | Performed by: OTOLARYNGOLOGY

## 2018-05-03 PROCEDURE — 99024 POSTOP FOLLOW-UP VISIT: CPT | Performed by: OTOLARYNGOLOGY

## 2018-05-03 RX ORDER — TRIAMCINOLONE ACETONIDE 40 MG/ML
40 INJECTION, SUSPENSION INTRA-ARTICULAR; INTRAMUSCULAR ONCE
Status: COMPLETED | OUTPATIENT
Start: 2018-05-03 | End: 2018-05-03

## 2018-05-03 RX ORDER — MOMETASONE FUROATE 50 UG/1
2 SPRAY, METERED NASAL 2 TIMES DAILY
Qty: 17 G | Refills: 5 | Status: SHIPPED | OUTPATIENT
Start: 2018-05-03 | End: 2018-12-27 | Stop reason: SDUPTHER

## 2018-05-03 RX ADMIN — TRIAMCINOLONE ACETONIDE 40 MG: 40 INJECTION, SUSPENSION INTRA-ARTICULAR; INTRAMUSCULAR at 09:31

## 2018-05-03 NOTE — PROGRESS NOTES
Subjective   Lexie Cardona is a 68 y.o. female.   f/u nasal sinus problems    History of Present Illness     Says she's having severe allergies him or congestion, trouble breathing on the right left has some soreness and not.  She's not any fever chills not having sore throat she had previously noted major drainage but has congestion both sides breathing she stop using saline irrigation    The following portions of the patient's history were reviewed and updated as appropriate: allergies, current medications, past family history, past medical history, past social history, past surgical history and problem list.      Current Outpatient Prescriptions:   •  Alum Hydroxide-Mag Carbonate (GAVISCON PO), Take  by mouth As Needed. Tablets Or Liquid, As Needed , Disp: , Rfl:   •  Biotin 5 MG capsule, Take 1 capsule by mouth Daily., Disp: , Rfl:   •  Calcium Citrate-Vitamin D (CALCIUM + D PO), Take 1,200 mg by mouth Daily., Disp: , Rfl:   •  cetirizine (ZyrTEC) 10 MG tablet, Take 10 mg by mouth daily., Disp: , Rfl:   •  cholecalciferol (VITAMIN D3) 400 units tablet, Take 400 Units by mouth Daily., Disp: , Rfl:   •  Cranberry-Vitamin C-Vitamin E (CRANBERRY PLUS VITAMIN C) 4200-20-3 MG-MG-UNIT capsule, Take 2 tablets by mouth Every Night., Disp: , Rfl:   •  EPINEPHrine (EPIPEN 2-ALBERT) 0.3 MG/0.3ML solution auto-injector injection, Use as directed for Allergic Reaction, Disp: 1 each, Rfl: 5  •  Flaxseed, Linseed, (FLAX SEED OIL) 1000 MG capsule, Take 1 capsule by mouth 2 (two) times a day., Disp: , Rfl:   •  gabapentin (NEURONTIN) 600 MG tablet, Take 1 tablet by mouth See Admin Instructions. 1 qam and 1/2 qhs, Disp: 45 tablet, Rfl: 2  •  meclizine (ANTIVERT) 25 MG tablet, Take 25 mg by mouth 3 (Three) Times a Day As Needed for dizziness., Disp: , Rfl:   •  montelukast (SINGULAIR) 10 MG tablet, Take 1 tablet by mouth Every Night., Disp: 30 tablet, Rfl: 11  •  Multiple Vitamins-Minerals (CENTRUM SILVER PO), Take 1 tablet  by mouth daily., Disp: , Rfl:   •  Probiotic Product (PROBIOTIC FORMULA PO), Take 1 tablet by mouth Every Night. 10 billion cell (2 billion ea) capsule , Disp: , Rfl:   •  promethazine (PHENERGAN) 25 MG tablet, Take 1 tablet by mouth Every 6 (Six) Hours As Needed for Nausea or Vomiting., Disp: 30 tablet, Rfl: 2  •  Sennosides 25 MG tablet, Take 2 tablets by mouth Every Night., Disp: , Rfl:   •  temazepam (RESTORIL) 30 MG capsule, Take 1 capsule by mouth At Night As Needed for Sleep., Disp: 30 capsule, Rfl: 2  •  traZODone (DESYREL) 100 MG tablet, Take 1 tablet by mouth Every Night., Disp: 90 tablet, Rfl: 3  •  mometasone (NASONEX) 50 MCG/ACT nasal spray, 2 sprays into each nostril 2 (Two) Times a Day., Disp: 17 g, Rfl: 5  No current facility-administered medications for this visit.     Allergies   Allergen Reactions   • Augmentin [Amoxicillin-Pot Clavulanate] Anaphylaxis   • Ceclor [Cefaclor] Anaphylaxis   • Nsaids Anaphylaxis   • Other Anaphylaxis     Cats  E.E.S. 200  Lead   Mold  [facility] Pastrani - Anaphylaxis   • Penicillins Anaphylaxis   • Aspirin Other (See Comments)     tinnitis    • Biaxin [Clarithromycin] Other (See Comments)     angioedema   • Ciprofloxacin Other (See Comments)     Lips burning   • Contrast Dye Hives and Itching   • Demerol [Meperidine] Nausea And Vomiting   • Iodine      And iodide containing products   • Macrobid [Nitrofurantoin Monohyd Macro] Diarrhea, Nausea And Vomiting and GI Intolerance   • Requip [Ropinirole Hcl] Diarrhea, Nausea And Vomiting and GI Intolerance   • Septra [Sulfamethoxazole-Trimethoprim] Nausea And Vomiting and GI Intolerance   • Statins Other (See Comments)       *muscle enzyme increase with myalgias   • Zithromax [Azithromycin] Other (See Comments)     States she hasn't had a reaction, but doctor said he wasn't going to give it to her   • Niaspan [Niacin Er] Rash   • Nickel Rash             Review of Systems   Constitutional: Negative for fever.   HENT: Positive  for congestion.    Hematological: Does not bruise/bleed easily.           Objective   Physical Exam        Assessment/Plan   Lexie was seen today for follow-up.    Diagnoses and all orders for this visit:    Chronic allergic rhinitis due to pollen, unspecified seasonality  -     triamcinolone acetonide (KENALOG-40) injection 40 mg; Inject 1 mL into the shoulder, thigh, or buttocks 1 (One) Time.    Other orders  -     mometasone (NASONEX) 50 MCG/ACT nasal spray; 2 sprays into each nostril 2 (Two) Times a Day.      Severe allergies see an allergist and not been tested for 10 years A Variety of Medications Including Nasal Steroid Sprays Flonase    She Is on Patanase That She Can't Take   multiple Medications to Pass Take Singulair   When She Goes outside She Has Lots of Drainage Congestion on Her Symptoms or Vertigo Think She May Need to Go Back Consider Allergist  Reevaluation   She Has Improved Airway but Still Marked Turbinate Hypertrophy Because of This Not Sensation   Offer Her Some Local Steroid Injection under the Skin Internally We Decongested Nose and Injected 0.2 ML Kenalog into the Scar Site 0.4 and Inferior Turbinate She Tolerated It Well and Had No Visual or Other Complaints Minimal Bleeding or Recheck in 3 Weeks to Reassess to Use Another Spray in the Meantime Eversion Use Her Saline Spray All Questions Were Answered

## 2018-05-03 NOTE — PATIENT INSTRUCTIONS

## 2018-05-17 ENCOUNTER — TELEPHONE (OUTPATIENT)
Dept: ONCOLOGY | Facility: HOSPITAL | Age: 68
End: 2018-05-17

## 2018-05-17 NOTE — TELEPHONE ENCOUNTER
Spoke with the patient about the need for additional images to complete the reading of her mammogram. She stated that she had spoke to someone about the appointment. I answered her questions and reviewed the appointment day and time. She verbalized understanding.

## 2018-05-24 ENCOUNTER — OFFICE VISIT (OUTPATIENT)
Dept: OTOLARYNGOLOGY | Facility: CLINIC | Age: 68
End: 2018-05-24

## 2018-05-24 VITALS — WEIGHT: 174 LBS | TEMPERATURE: 96.4 F | BODY MASS INDEX: 29.71 KG/M2 | HEIGHT: 64 IN

## 2018-05-24 DIAGNOSIS — J34.89 NASAL PAIN: ICD-10-CM

## 2018-05-24 DIAGNOSIS — J34.3 NASAL TURBINATE HYPERTROPHY: Primary | ICD-10-CM

## 2018-05-24 DIAGNOSIS — J30.1 CHRONIC ALLERGIC RHINITIS DUE TO POLLEN, UNSPECIFIED SEASONALITY: ICD-10-CM

## 2018-05-24 PROCEDURE — 99213 OFFICE O/P EST LOW 20 MIN: CPT | Performed by: OTOLARYNGOLOGY

## 2018-05-24 PROCEDURE — 87205 SMEAR GRAM STAIN: CPT | Performed by: OTOLARYNGOLOGY

## 2018-05-24 PROCEDURE — 87070 CULTURE OTHR SPECIMN AEROBIC: CPT | Performed by: OTOLARYNGOLOGY

## 2018-05-24 RX ORDER — HYDROXYZINE PAMOATE 100 MG/1
100 CAPSULE ORAL 3 TIMES DAILY PRN
Qty: 30 CAPSULE | Refills: 2 | Status: SHIPPED | OUTPATIENT
Start: 2018-05-24 | End: 2018-09-26 | Stop reason: ALTCHOICE

## 2018-05-24 RX ORDER — BUPIVACAINE HYDROCHLORIDE 5 MG/ML
5 INJECTION, SOLUTION PERINEURAL ONCE
Status: DISCONTINUED | OUTPATIENT
Start: 2018-05-24 | End: 2018-05-24

## 2018-05-24 RX ORDER — BUPIVACAINE HYDROCHLORIDE AND EPINEPHRINE 5; 5 MG/ML; UG/ML
10 INJECTION, SOLUTION EPIDURAL; INTRACAUDAL; PERINEURAL ONCE
Status: COMPLETED | OUTPATIENT
Start: 2018-05-24 | End: 2018-05-24

## 2018-05-24 RX ADMIN — BUPIVACAINE HYDROCHLORIDE AND EPINEPHRINE 10 ML: 5; 5 INJECTION, SOLUTION EPIDURAL; INTRACAUDAL; PERINEURAL at 11:55

## 2018-05-24 NOTE — PATIENT INSTRUCTIONS

## 2018-05-24 NOTE — PROGRESS NOTES
Subjective   Lexie Cardona is a 68 y.o. female.   Follow-up allergies and nasal pain    History of Present Illness   States she recently went on a trip and had all kinds of problems she thinks a viral infection including nausea drainage congestion and sore throat that's improved she stylist tenderness and soreness on the right side of her nose.  His problems started about 3 weeks ago very remotely from her surgery she's not had any bleeding infection symptoms in the nose itself says is tender and steroids didn't make much difference she expresses she is allergic to many things and been on almost all nasal antihistamines as well as oral antihistamines seen allergist her symptoms very Difficult to control with her allergies      The following portions of the patient's history were reviewed and updated as appropriate: allergies, current medications, past family history, past medical history, past social history, past surgical history and problem list.      Current Outpatient Prescriptions:   •  Alum Hydroxide-Mag Carbonate (GAVISCON PO), Take  by mouth As Needed. Tablets Or Liquid, As Needed , Disp: , Rfl:   •  Biotin 5 MG capsule, Take 1 capsule by mouth Daily., Disp: , Rfl:   •  Calcium Citrate-Vitamin D (CALCIUM + D PO), Take 1,200 mg by mouth Daily., Disp: , Rfl:   •  cetirizine (ZyrTEC) 10 MG tablet, Take 10 mg by mouth daily., Disp: , Rfl:   •  cholecalciferol (VITAMIN D3) 400 units tablet, Take 400 Units by mouth Daily., Disp: , Rfl:   •  Cranberry-Vitamin C-Vitamin E (CRANBERRY PLUS VITAMIN C) 4200-20-3 MG-MG-UNIT capsule, Take 2 tablets by mouth Every Night., Disp: , Rfl:   •  EPINEPHrine (EPIPEN 2-ALBERT) 0.3 MG/0.3ML solution auto-injector injection, Use as directed for Allergic Reaction, Disp: 1 each, Rfl: 5  •  Flaxseed, Linseed, (FLAX SEED OIL) 1000 MG capsule, Take 1 capsule by mouth 2 (two) times a day., Disp: , Rfl:   •  gabapentin (NEURONTIN) 600 MG tablet, Take 1 tablet by mouth See Admin  Instructions. 1 qam and 1/2 qhs, Disp: 45 tablet, Rfl: 2  •  meclizine (ANTIVERT) 25 MG tablet, Take 25 mg by mouth 3 (Three) Times a Day As Needed for dizziness., Disp: , Rfl:   •  mometasone (NASONEX) 50 MCG/ACT nasal spray, 2 sprays into each nostril 2 (Two) Times a Day., Disp: 17 g, Rfl: 5  •  montelukast (SINGULAIR) 10 MG tablet, Take 1 tablet by mouth Every Night., Disp: 30 tablet, Rfl: 11  •  Multiple Vitamins-Minerals (CENTRUM SILVER PO), Take 1 tablet by mouth daily., Disp: , Rfl:   •  Probiotic Product (PROBIOTIC FORMULA PO), Take 1 tablet by mouth Every Night. 10 billion cell (2 billion ea) capsule , Disp: , Rfl:   •  promethazine (PHENERGAN) 25 MG tablet, Take 1 tablet by mouth Every 6 (Six) Hours As Needed for Nausea or Vomiting., Disp: 30 tablet, Rfl: 2  •  Sennosides 25 MG tablet, Take 2 tablets by mouth Every Night., Disp: , Rfl:   •  temazepam (RESTORIL) 30 MG capsule, Take 1 capsule by mouth At Night As Needed for Sleep., Disp: 30 capsule, Rfl: 2  •  traZODone (DESYREL) 100 MG tablet, Take 1 tablet by mouth Every Night., Disp: 90 tablet, Rfl: 3    Allergies   Allergen Reactions   • Augmentin [Amoxicillin-Pot Clavulanate] Anaphylaxis   • Ceclor [Cefaclor] Anaphylaxis   • Nsaids Anaphylaxis   • Other Anaphylaxis     Cats  E.E.S. 200  Lead   Mold  [place] Pastrani - Anaphylaxis   • Penicillins Anaphylaxis   • Aspirin Other (See Comments)     tinnitis    • Biaxin [Clarithromycin] Other (See Comments)     angioedema   • Ciprofloxacin Other (See Comments)     Lips burning   • Contrast Dye Hives and Itching   • Demerol [Meperidine] Nausea And Vomiting   • Iodine      And iodide containing products   • Macrobid [Nitrofurantoin Monohyd Macro] Diarrhea, Nausea And Vomiting and GI Intolerance   • Requip [Ropinirole Hcl] Diarrhea, Nausea And Vomiting and GI Intolerance   • Septra [Sulfamethoxazole-Trimethoprim] Nausea And Vomiting and GI Intolerance   • Statins Other (See Comments)       *muscle enzyme increase  with myalgias   • Zithromax [Azithromycin] Other (See Comments)     States she hasn't had a reaction, but doctor said he wasn't going to give it to her   • Niaspan [Niacin Er] Rash   • Nickel Rash             Review of Systems   HENT: Positive for congestion, postnasal drip, rhinorrhea, sore throat, trouble swallowing and voice change. Negative for facial swelling and nosebleeds.         Nasal pain   Hematological: Negative for adenopathy.           Objective   Physical Exam   Constitutional: She appears well-developed and well-nourished.   HENT:   Head: Normocephalic.   Right Ear: Tympanic membrane, external ear and ear canal normal.   Left Ear: Tympanic membrane, external ear and ear canal normal.   Nose: Mucosal edema and rhinorrhea present.       Mouth/Throat: Uvula is midline, oropharynx is clear and moist and mucous membranes are normal. No oropharyngeal exudate.       Eyes: Conjunctivae are normal.   Neck: Normal range of motion.   Pulmonary/Chest: Effort normal.   Neurological: She is alert.   Skin: Skin is warm.   Psychiatric: She has a normal mood and affect.       Nasal endoscopy reveals no purulence on the ostomy and a unit no obvious mass or irritation residual suture other than generalized swelling    Assessment/Plan   Lexie was seen today for follow-up.    Diagnoses and all orders for this visit:    Nasal turbinate hypertrophy    Chronic allergic rhinitis due to pollen, unspecified seasonality        She has very complicated history it's unclear why she has nasal pain the steroid injection daily didn't seem to help I injected Marcaine 1 mL and she markedly had improvement in her symptoms.  I tolerated nylon that would last that she appreciated feeling better unclear what her pain is coming from she does have diffuse allergies.  She's been on many many medications without success and not sure we continue to control her allergies.  Thinks she had a viral infection when she was out of town and  exacerbated her situation what caused her diarrhea and other things going to try different oral antihistamines if that'll help as a usual nonsedating was at work we discussed its use and side effects and she was to try the Vistaril she'll call for questions she says mineral doesn't help or make her drowsy she's to get some feedback in the next week or also culture diagnosis to make sure missing anything all questions were answered

## 2018-05-27 ENCOUNTER — TELEPHONE (OUTPATIENT)
Dept: OTOLARYNGOLOGY | Facility: CLINIC | Age: 68
End: 2018-05-27

## 2018-05-27 LAB
BACTERIA SPEC AEROBE CULT: NORMAL
GRAM STN SPEC: NORMAL
GRAM STN SPEC: NORMAL

## 2018-05-27 NOTE — TELEPHONE ENCOUNTER
Call patient regarding her culture that was normal nydia tore to give us a status report on Tuesday she's feeling better but still some tenderness inside of her nose discussed using a longer acting steroid along with local anesthetic may be more helpful in the event this point she's feeling better and have either culture is negative.  She'll call in 2 daysR MD Yosef

## 2018-05-30 ENCOUNTER — OFFICE VISIT (OUTPATIENT)
Dept: PODIATRY | Facility: CLINIC | Age: 68
End: 2018-05-30

## 2018-05-30 VITALS — HEIGHT: 64 IN | BODY MASS INDEX: 29.71 KG/M2 | WEIGHT: 174 LBS | OXYGEN SATURATION: 98 % | HEART RATE: 74 BPM

## 2018-05-30 DIAGNOSIS — M72.2 PLANTAR FASCIITIS: Primary | ICD-10-CM

## 2018-05-30 PROCEDURE — 99213 OFFICE O/P EST LOW 20 MIN: CPT | Performed by: PODIATRIST

## 2018-05-30 NOTE — PROGRESS NOTES
Lexie Marquez Brendan  1950  68 y.o. female    Patient presents today for follow-up of left foot pain.    05/30/2018     Chief Complaint   Patient presents with   • Left Foot - Follow-up       History of Present Illness    Patient presents to clinic today for follow-up of her left foot Pain.  Currently she rates her pain as a 4 out of 10.  She states that it is getting better.  However, she cannot find shoes to fit her arch supports in. She denies any new pedal complaints.      Past Medical History:   Diagnosis Date   • Acute upper respiratory infection    • Allergic rhinitis    • Asthma     blastomycosis   • Blastomycosis    • Borderline glaucoma    • Bunion    • Callus    • Chronic kidney disease (CKD), stage II (mild)    • Clotting disorder    • Colon polyp    • Dehiscence of surgical wound    • Diverticulitis of colon    • Elevated levels of transaminase & lactic acid dehydrogenase    • Encounter for gynecological examination without abnormal finding    • Episcleritis    • Fundic gland polyposis of stomach    • Gastric polyp    • Gastritis    • Gastroesophageal reflux disease    • Gastroparesis    • Gout    • H/O mammogram    • History of colon polyps    • Hypercholesterolemia    • IBS (irritable bowel syndrome)    • Insomnia    • Mass of ovary     fixed cyst, post removal, no sign of cancer   • Meniere's disease    • Myoclonic disorder    • Nuclear senile cataract    • Osteoarthritis of multiple joints    • Peripheral edema    • Plantar fasciitis    • PONV (postoperative nausea and vomiting)    • Pruritus of vagina    • Restless legs    • Seasonal allergic rhinitis    • Skin cancer     RUE   • Sleep apnea     not using c-pap   • Spasm of bladder    • Vaginal irritation    • Vitamin D deficiency          Past Surgical History:   Procedure Laterality Date   • APPENDECTOMY     • BRONCHOSCOPY  09/10/2001    density, upper lobe posteriorly, left. no endobronchial lesions seen   • CARPAL TUNNEL RELEASE WITH  CUBITAL TUNNEL RELEASE  05/03/2013    Right Shoulder And Wrist I   • COLONOSCOPY  2010    2 polyps, repeat 5 yr   • COLONOSCOPY W/ POLYPECTOMY  07/23/2015    diverticulosis found in the sigmoid colon. a single polyp found in the cecum; ascending colon, and sigmoid colon. all removed by cold biopsy polypectomy. hemorrhoids found   • CYSTOSCOPY  11/13/1974    and x-ray urethral diverticulography. urethral diverticulum   • DENTAL PROCEDURE  11/19/2004    extraction of tooth #30 with local anesthesia and IV sedation   • ENDOSCOPIC FUNCTIONAL SINUS SURGERY (FESS) Right 3/6/2018    Procedure: RIGHT ENDOSCOPICS SINUS SURGERY OF THE MAXILLARY SINUS, BILATERAL PARTIAL INFERIOR TURBINECTOMIES, LEFT ENDOSCOPIC EJ BULLOSA RESECTION, NASAL SEPTOPLASTY, NASAL VALVE REPAIR  ;  Surgeon: Pablo Navas MD;  Location: [facility];  Service:    • ENDOSCOPY AND COLONOSCOPY  2005    dininutive polyp in the rectum. diminutive polyp 30cm from the anus. diminutive polyp ascending colon. all polyps were removed by hot biopsy polypectomy. multiple diverticula in the sigmoid   • INJECTION OF MEDICATION  03/21/2014    celestone   • INJECTION OF MEDICATION  03/11/2013    dexamethasone   • INJECTION OF MEDICATION  05/09/2014    kenalog   • LAPAROSCOPIC CHOLECYSTECTOMY     • OTHER SURGICAL HISTORY  1960    Multiple Cysts To Right Ovary   • OTHER SURGICAL HISTORY  1969    Transurethral Resection   • OVARIAN CYST REMOVAL  11/14/1974    resection of left ovarian cyst & left salpingectomy. appendectomy   • TONSILECTOMY, ADENOIDECTOMY, BILATERAL MYRINGOTOMY AND TUBES  1954   • TOTAL ABDOMINAL HYSTERECTOMY WITH SALPINGO OOPHORECTOMY  08/18/2015    total abdominal hysterectomy and bilateral salpingo-oophorectomy. enbterolysis and lysis of adhesions.  Completed At [facility] [facility] [place].         Family History   Problem Relation Age of Onset   • Diabetes Mother    • Other Mother         cortical basal ganglionic degeneration    • Seizures  Mother         epilepsy   • Arthritis Mother    • Hyperlipidemia Mother    • Mental illness Mother    • Breast cancer Mother    • Heart disease Mother    • Tuberculosis Father    • Cancer Father 60        colon   • Hypertension Father    • Colon cancer Father    • Heart disease Father    • Colon cancer Other    • Hypertension Other    • Heart disease Other    • Diabetes Other    • Colon cancer Other    • Breast cancer Paternal Grandmother        Allergies   Allergen Reactions   • Augmentin [Amoxicillin-Pot Clavulanate] Anaphylaxis   • Ceclor [Cefaclor] Anaphylaxis   • Nsaids Anaphylaxis   • Other Anaphylaxis     Cats  E.E.S. 200  Lead   Mold  [place] Pastrani - Anaphylaxis   • Penicillins Anaphylaxis   • Aspirin Other (See Comments)     tinnitis    • Biaxin [Clarithromycin] Other (See Comments)     angioedema   • Ciprofloxacin Other (See Comments)     Lips burning   • Contrast Dye Hives and Itching   • Demerol [Meperidine] Nausea And Vomiting   • Iodine      And iodide containing products   • Macrobid [Nitrofurantoin Monohyd Macro] Diarrhea, Nausea And Vomiting and GI Intolerance   • Requip [Ropinirole Hcl] Diarrhea, Nausea And Vomiting and GI Intolerance   • Septra [Sulfamethoxazole-Trimethoprim] Nausea And Vomiting and GI Intolerance   • Statins Other (See Comments)       *muscle enzyme increase with myalgias   • Zithromax [Azithromycin] Other (See Comments)     States she hasn't had a reaction, but doctor said he wasn't going to give it to her   • Niaspan [Niacin Er] Rash   • Nickel Rash       Social History     Social History   • Marital status:      Spouse name: N/A   • Number of children: N/A   • Years of education: N/A     Occupational History   • Not on file.     Social History Main Topics   • Smoking status: Former Smoker     Years: 30.00     Quit date: 2001   • Smokeless tobacco: Never Used   • Alcohol use No   • Drug use: No   • Sexual activity: Defer      Comment:      Other Topics Concern    • Not on file     Social History Narrative    ** Merged History Encounter **              Current Outpatient Prescriptions   Medication Sig Dispense Refill   • Alum Hydroxide-Mag Carbonate (GAVISCON PO) Take  by mouth As Needed. Tablets Or Liquid, As Needed      • Biotin 5 MG capsule Take 1 capsule by mouth Daily.     • Calcium Citrate-Vitamin D (CALCIUM + D PO) Take 1,200 mg by mouth Daily.     • cetirizine (ZyrTEC) 10 MG tablet Take 10 mg by mouth daily.     • cholecalciferol (VITAMIN D3) 400 units tablet Take 400 Units by mouth Daily.     • Cranberry-Vitamin C-Vitamin E (CRANBERRY PLUS VITAMIN C) 4200-20-3 MG-MG-UNIT capsule Take 2 tablets by mouth Every Night.     • EPINEPHrine (EPIPEN 2-ALBERT) 0.3 MG/0.3ML solution auto-injector injection Use as directed for Allergic Reaction 1 each 5   • Flaxseed, Linseed, (FLAX SEED OIL) 1000 MG capsule Take 1 capsule by mouth 2 (two) times a day.     • gabapentin (NEURONTIN) 600 MG tablet Take 1 tablet by mouth See Admin Instructions. 1 qam and 1/2 qhs 45 tablet 2   • hydrOXYzine (VISTARIL) 100 MG capsule Take 1 capsule by mouth 3 (Three) Times a Day As Needed for Allergies. 30 capsule 2   • meclizine (ANTIVERT) 25 MG tablet Take 25 mg by mouth 3 (Three) Times a Day As Needed for dizziness.     • mometasone (NASONEX) 50 MCG/ACT nasal spray 2 sprays into each nostril 2 (Two) Times a Day. 17 g 5   • montelukast (SINGULAIR) 10 MG tablet Take 1 tablet by mouth Every Night. 30 tablet 11   • Multiple Vitamins-Minerals (CENTRUM SILVER PO) Take 1 tablet by mouth daily.     • Probiotic Product (PROBIOTIC FORMULA PO) Take 1 tablet by mouth Every Night. 10 billion cell (2 billion ea) capsule      • promethazine (PHENERGAN) 25 MG tablet Take 1 tablet by mouth Every 6 (Six) Hours As Needed for Nausea or Vomiting. 30 tablet 2   • Sennosides 25 MG tablet Take 2 tablets by mouth Every Night.     • temazepam (RESTORIL) 30 MG capsule Take 1 capsule by mouth At Night As Needed for Sleep. 30  "capsule 2   • traZODone (DESYREL) 100 MG tablet Take 1 tablet by mouth Every Night. 90 tablet 3     No current facility-administered medications for this visit.          OBJECTIVE    Pulse 74   Ht 162.6 cm (64\")   Wt 78.9 kg (174 lb)   LMP 03/22/1999 (Approximate) Comment: 2000  SpO2 98%   BMI 29.87 kg/m²       Review of Systems   Constitutional: Negative.    HENT: Negative.    Eyes: Negative.    Respiratory: Negative.    Cardiovascular: Negative.    Genitourinary: Negative.    Musculoskeletal:        Left foot pain   Skin: Negative.    Neurological: Negative.    Psychiatric/Behavioral: Negative.            Physical Exam   Constitutional: She is oriented to person, place, and time. She appears well-developed and well-nourished.   Pulmonary/Chest: Effort normal. No respiratory distress. She has no wheezes.   Neurological: She is alert and oriented to person, place, and time.   Skin: She is not diaphoretic.   Psychiatric: She has a normal mood and affect. Her behavior is normal.       Lower Extremity    Cardiovascular:    DP/PT pulses palpable    CFT brisk  to all digits  No erythema or edema noted     Musculoskeletal:  Muscle strength is 5/5 for all muscle groups tested   ROM of the 1st MTP is full without pain or crepitus  Improved pain on palpation to the medial tubercle on the left calcaneus.  Negative lateral squeeze test.    Dermatological:   Skin is warm, dry and intact    Webspaces 1-4 bilateral are clean, dry and intact.     Neurological:   Protective sensation intact    Sensation intact to light touch          Procedures      ASSESSMENT AND PLAN    Lexie was seen today for follow-up.    Diagnoses and all orders for this visit:    Plantar fasciitis      - Continue stretching, icing and arch supports  - recommended night splint.  Recommended patient to obtain new shoe gear that will accommodate the over-the-counter insert.  - All  questions were answered to their satisfaction.  - RTC as needed "           This document has been electronically signed by David Spring DPM on May 30, 2018 12:15 PM     5/30/2018  12:15 PM

## 2018-06-06 DIAGNOSIS — R92.8 ABNORMAL MAMMOGRAM OF RIGHT BREAST: Primary | ICD-10-CM

## 2018-06-20 RX ORDER — ESTRADIOL 2 MG/1
RING VAGINAL
Qty: 1 EACH | Refills: 0 | Status: SHIPPED | OUTPATIENT
Start: 2018-06-20 | End: 2018-09-15 | Stop reason: SDUPTHER

## 2018-06-21 ENCOUNTER — OFFICE VISIT (OUTPATIENT)
Dept: OTOLARYNGOLOGY | Facility: CLINIC | Age: 68
End: 2018-06-21

## 2018-06-21 VITALS — BODY MASS INDEX: 29.7 KG/M2 | HEIGHT: 64 IN | TEMPERATURE: 98.2 F | WEIGHT: 173.94 LBS

## 2018-06-21 DIAGNOSIS — J32.0 MAXILLARY SINUSITIS, CHRONIC: ICD-10-CM

## 2018-06-21 DIAGNOSIS — J30.1 CHRONIC ALLERGIC RHINITIS DUE TO POLLEN, UNSPECIFIED SEASONALITY: Primary | ICD-10-CM

## 2018-06-21 PROCEDURE — 99024 POSTOP FOLLOW-UP VISIT: CPT | Performed by: OTOLARYNGOLOGY

## 2018-06-21 PROCEDURE — 31231 NASAL ENDOSCOPY DX: CPT | Performed by: OTOLARYNGOLOGY

## 2018-06-21 RX ORDER — CLINDAMYCIN HYDROCHLORIDE 150 MG/1
300 CAPSULE ORAL EVERY 8 HOURS
Qty: 42 CAPSULE | Refills: 0 | Status: SHIPPED | OUTPATIENT
Start: 2018-06-21 | End: 2018-07-12

## 2018-06-21 NOTE — PROGRESS NOTES
Subjective   Lexie Cardona is a 68 y.o. female.     Follow-up allergy symptoms  History of Present Illness   She comes back in follow-up she is to have months after sinus surgery and having some mild soreness that is improved side of her nose biggest problems she's developed congestion drainage on both sides of her nose in that she had sinus surgery on one side.  She's not any fever chills but has some drainage and cough medicine thick mucus not having any facial swelling      The following portions of the patient's history were reviewed and updated as appropriate: allergies, current medications, past family history, past medical history, past social history, past surgical history and problem list.      Current Outpatient Prescriptions:   •  Alum Hydroxide-Mag Carbonate (GAVISCON PO), Take  by mouth As Needed. Tablets Or Liquid, As Needed , Disp: , Rfl:   •  Biotin 5 MG capsule, Take 1 capsule by mouth Daily., Disp: , Rfl:   •  Calcium Citrate-Vitamin D (CALCIUM + D PO), Take 1,200 mg by mouth Daily., Disp: , Rfl:   •  cetirizine (ZyrTEC) 10 MG tablet, Take 10 mg by mouth daily., Disp: , Rfl:   •  cholecalciferol (VITAMIN D3) 400 units tablet, Take 400 Units by mouth Daily., Disp: , Rfl:   •  Cranberry-Vitamin C-Vitamin E (CRANBERRY PLUS VITAMIN C) 4200-20-3 MG-MG-UNIT capsule, Take 2 tablets by mouth Every Night., Disp: , Rfl:   •  EPINEPHrine (EPIPEN 2-ALBERT) 0.3 MG/0.3ML solution auto-injector injection, Use as directed for Allergic Reaction, Disp: 1 each, Rfl: 5  •  ESTRING 2 MG vaginal ring, INSERT 2 MG INTO THE VAGINA EVERY 3 (THREE) MONTHS. FOLLOW PACKAGE DIRECTIONS, Disp: 1 each, Rfl: 0  •  Flaxseed, Linseed, (FLAX SEED OIL) 1000 MG capsule, Take 1 capsule by mouth 2 (two) times a day., Disp: , Rfl:   •  gabapentin (NEURONTIN) 600 MG tablet, Take 1 tablet by mouth See Admin Instructions. 1 qam and 1/2 qhs, Disp: 45 tablet, Rfl: 2  •  hydrOXYzine (VISTARIL) 100 MG capsule, Take 1 capsule by mouth 3  (Three) Times a Day As Needed for Allergies., Disp: 30 capsule, Rfl: 2  •  meclizine (ANTIVERT) 25 MG tablet, Take 25 mg by mouth 3 (Three) Times a Day As Needed for dizziness., Disp: , Rfl:   •  mometasone (NASONEX) 50 MCG/ACT nasal spray, 2 sprays into each nostril 2 (Two) Times a Day., Disp: 17 g, Rfl: 5  •  montelukast (SINGULAIR) 10 MG tablet, Take 1 tablet by mouth Every Night., Disp: 30 tablet, Rfl: 11  •  Multiple Vitamins-Minerals (CENTRUM SILVER PO), Take 1 tablet by mouth daily., Disp: , Rfl:   •  Probiotic Product (PROBIOTIC FORMULA PO), Take 1 tablet by mouth Every Night. 10 billion cell (2 billion ea) capsule , Disp: , Rfl:   •  promethazine (PHENERGAN) 25 MG tablet, Take 1 tablet by mouth Every 6 (Six) Hours As Needed for Nausea or Vomiting., Disp: 30 tablet, Rfl: 2  •  Sennosides 25 MG tablet, Take 2 tablets by mouth Every Night., Disp: , Rfl:   •  temazepam (RESTORIL) 30 MG capsule, Take 1 capsule by mouth At Night As Needed for Sleep., Disp: 30 capsule, Rfl: 2  •  traZODone (DESYREL) 100 MG tablet, Take 1 tablet by mouth Every Night., Disp: 90 tablet, Rfl: 3  •  clindamycin (CLEOCIN) 150 MG capsule, Take 2 capsules by mouth Every 8 (Eight) Hours., Disp: 42 capsule, Rfl: 0  •  mupirocin (BACTROBAN) 2 % nasal ointment, into each nostril 3 (Three) Times a Day. Use as directed with saline rinse, Disp: 10 g, Rfl: 1    Allergies   Allergen Reactions   • Augmentin [Amoxicillin-Pot Clavulanate] Anaphylaxis   • Ceclor [Cefaclor] Anaphylaxis   • Nsaids Anaphylaxis   • Other Anaphylaxis     Cats  E.E.S. 200  Lead   Mold  [place] Pastrani - Anaphylaxis   • Penicillins Anaphylaxis   • Aspirin Other (See Comments)     tinnitis    • Biaxin [Clarithromycin] Other (See Comments)     angioedema   • Ciprofloxacin Other (See Comments)     Lips burning   • Contrast Dye Hives and Itching   • Demerol [Meperidine] Nausea And Vomiting   • Iodine      And iodide containing products   • Macrobid [Nitrofurantoin Monohyd Macro]  Diarrhea, Nausea And Vomiting and GI Intolerance   • Requip [Ropinirole Hcl] Diarrhea, Nausea And Vomiting and GI Intolerance   • Septra [Sulfamethoxazole-Trimethoprim] Nausea And Vomiting and GI Intolerance   • Statins Other (See Comments)       *muscle enzyme increase with myalgias   • Zithromax [Azithromycin] Other (See Comments)     States she hasn't had a reaction, but doctor said he wasn't going to give it to her   • Niaspan [Niacin Er] Rash   • Nickel Rash             Review of Systems   Constitutional: Negative for fever.   HENT: Positive for postnasal drip and rhinorrhea. Negative for ear pain, facial swelling and nosebleeds.    Hematological: Negative for adenopathy.           Objective   Physical Exam   Constitutional: She appears well-developed and well-nourished.   HENT:   Head: Normocephalic.   Right Ear: Tympanic membrane, external ear and ear canal normal.   Left Ear: Tympanic membrane, external ear and ear canal normal.   Nose: Mucosal edema and rhinorrhea present.       Mouth/Throat: Uvula is midline, oropharynx is clear and moist and mucous membranes are normal. No oropharyngeal exudate.       Eyes: Conjunctivae are normal.   Neck: Normal range of motion.   Pulmonary/Chest: Effort normal.   Neurological: She is alert.   Skin: Skin is warm.   Psychiatric: She has a normal mood and affect.         Nasal Endoscopy pills some crusting which is new around the right ostiomeatal unit her old suture was removed from the nasal valve area  Assessment/Plan   Lexie was seen today for 2 week recheck and sinus problem.    Diagnoses and all orders for this visit:    Chronic allergic rhinitis due to pollen, unspecified seasonality    Maxillary sinusitis, chronic    Other orders  -     clindamycin (CLEOCIN) 150 MG capsule; Take 2 capsules by mouth Every 8 (Eight) Hours.  -     mupirocin (BACTROBAN) 2 % nasal ointment; into each nostril 3 (Three) Times a Day. Use as directed with saline rinse    use  probiotics  Been difficult to treat allergy situation is allergic to multiple antibiotics we discussed various options because of the crusting and he thinks she needs antibiotics and given it to her.  She call for improving the next week will begin mupirocin saline irrigation at least 3 times a day and will follow up she's finds a Vistaril does help some her allergy symptoms she's alreadysee an allergist been on multiple medicines in the past

## 2018-06-28 ENCOUNTER — LAB (OUTPATIENT)
Dept: LAB | Facility: HOSPITAL | Age: 68
End: 2018-06-28

## 2018-06-28 DIAGNOSIS — N18.30 CHRONIC KIDNEY DISEASE, STAGE III (MODERATE) (HCC): ICD-10-CM

## 2018-06-28 LAB
25(OH)D3 SERPL-MCNC: 32.2 NG/ML (ref 30–100)
ALBUMIN SERPL-MCNC: 4.5 G/DL (ref 3.4–4.8)
ANION GAP SERPL CALCULATED.3IONS-SCNC: 10 MMOL/L (ref 5–15)
BUN BLD-MCNC: 10 MG/DL (ref 7–21)
BUN/CREAT SERPL: 10.1 (ref 7–25)
CALCIUM SPEC-SCNC: 9.6 MG/DL (ref 8.4–10.2)
CHLORIDE SERPL-SCNC: 104 MMOL/L (ref 95–110)
CO2 SERPL-SCNC: 29 MMOL/L (ref 22–31)
CREAT BLD-MCNC: 0.99 MG/DL (ref 0.5–1)
CREAT UR-MCNC: 246.2 MG/DL
GFR SERPL CREATININE-BSD FRML MDRD: 56 ML/MIN/1.73 (ref 45–104)
GLUCOSE BLD-MCNC: 93 MG/DL (ref 60–100)
PHOSPHATE SERPL-MCNC: 4.4 MG/DL (ref 2.4–4.4)
POTASSIUM BLD-SCNC: 3.9 MMOL/L (ref 3.5–5.1)
PROT UR-MCNC: 11.2 MG/DL
PROT/CREAT UR: 45.5 MG/G CREA (ref 0–200)
SODIUM BLD-SCNC: 143 MMOL/L (ref 137–145)

## 2018-06-28 PROCEDURE — 82306 VITAMIN D 25 HYDROXY: CPT

## 2018-06-28 PROCEDURE — 82570 ASSAY OF URINE CREATININE: CPT

## 2018-06-28 PROCEDURE — 84156 ASSAY OF PROTEIN URINE: CPT

## 2018-06-28 PROCEDURE — 36415 COLL VENOUS BLD VENIPUNCTURE: CPT

## 2018-06-28 PROCEDURE — 80069 RENAL FUNCTION PANEL: CPT

## 2018-07-12 ENCOUNTER — OFFICE VISIT (OUTPATIENT)
Dept: OTOLARYNGOLOGY | Facility: CLINIC | Age: 68
End: 2018-07-12

## 2018-07-12 VITALS — WEIGHT: 177 LBS | BODY MASS INDEX: 30.22 KG/M2 | TEMPERATURE: 97 F | HEIGHT: 64 IN

## 2018-07-12 DIAGNOSIS — J32.0 CHRONIC MAXILLARY SINUSITIS: Primary | ICD-10-CM

## 2018-07-12 PROCEDURE — 99213 OFFICE O/P EST LOW 20 MIN: CPT | Performed by: OTOLARYNGOLOGY

## 2018-07-12 NOTE — PROGRESS NOTES
Subjective   Lexie Cardona is a 68 y.o. female.   Follow-up rhinitis and sinusitis    History of Present Illness     Patient complains of pressure sensation of her face she says she's been pumping of her face she's not had any fever since she took the clindamycin and felt better while she was taking it but had some GI side effects she says she can't take guaifenesin cousin dries her out.  She's not having nosebleeds she says she's irrigating saline she's not been using her mupirocin recently    The following portions of the patient's history were reviewed and updated as appropriate: allergies, current medications, past family history, past medical history, past social history, past surgical history and problem list.      Current Outpatient Prescriptions:   •  Alum Hydroxide-Mag Carbonate (GAVISCON PO), Take  by mouth As Needed. Tablets Or Liquid, As Needed , Disp: , Rfl:   •  Biotin 5 MG capsule, Take 1 capsule by mouth Daily., Disp: , Rfl:   •  Calcium Citrate-Vitamin D (CALCIUM + D PO), Take 1,200 mg by mouth Daily., Disp: , Rfl:   •  cetirizine (ZyrTEC) 10 MG tablet, Take 10 mg by mouth daily., Disp: , Rfl:   •  cholecalciferol (VITAMIN D3) 400 units tablet, Take 400 Units by mouth Daily., Disp: , Rfl:   •  Cranberry-Vitamin C-Vitamin E (CRANBERRY PLUS VITAMIN C) 4200-20-3 MG-MG-UNIT capsule, Take 2 tablets by mouth Every Night., Disp: , Rfl:   •  EPINEPHrine (EPIPEN 2-ALBERT) 0.3 MG/0.3ML solution auto-injector injection, Use as directed for Allergic Reaction, Disp: 1 each, Rfl: 5  •  ESTRING 2 MG vaginal ring, INSERT 2 MG INTO THE VAGINA EVERY 3 (THREE) MONTHS. FOLLOW PACKAGE DIRECTIONS, Disp: 1 each, Rfl: 0  •  Flaxseed, Linseed, (FLAX SEED OIL) 1000 MG capsule, Take 1 capsule by mouth 2 (two) times a day., Disp: , Rfl:   •  gabapentin (NEURONTIN) 600 MG tablet, Take 1 tablet by mouth See Admin Instructions. 1 qam and 1/2 qhs, Disp: 45 tablet, Rfl: 2  •  hydrOXYzine (VISTARIL) 100 MG capsule, Take 1  capsule by mouth 3 (Three) Times a Day As Needed for Allergies., Disp: 30 capsule, Rfl: 2  •  meclizine (ANTIVERT) 25 MG tablet, Take 25 mg by mouth 3 (Three) Times a Day As Needed for dizziness., Disp: , Rfl:   •  mometasone (NASONEX) 50 MCG/ACT nasal spray, 2 sprays into each nostril 2 (Two) Times a Day., Disp: 17 g, Rfl: 5  •  montelukast (SINGULAIR) 10 MG tablet, Take 1 tablet by mouth Every Night., Disp: 30 tablet, Rfl: 11  •  Multiple Vitamins-Minerals (CENTRUM SILVER PO), Take 1 tablet by mouth daily., Disp: , Rfl:   •  Probiotic Product (PROBIOTIC FORMULA PO), Take 1 tablet by mouth Every Night. 10 billion cell (2 billion ea) capsule , Disp: , Rfl:   •  promethazine (PHENERGAN) 25 MG tablet, Take 1 tablet by mouth Every 6 (Six) Hours As Needed for Nausea or Vomiting., Disp: 30 tablet, Rfl: 2  •  Sennosides 25 MG tablet, Take 2 tablets by mouth Every Night., Disp: , Rfl:   •  temazepam (RESTORIL) 30 MG capsule, Take 1 capsule by mouth At Night As Needed for Sleep., Disp: 30 capsule, Rfl: 2  •  traZODone (DESYREL) 100 MG tablet, Take 1 tablet by mouth Every Night., Disp: 90 tablet, Rfl: 3    Allergies   Allergen Reactions   • Augmentin [Amoxicillin-Pot Clavulanate] Anaphylaxis   • Ceclor [Cefaclor] Anaphylaxis   • Nsaids Anaphylaxis   • Other Anaphylaxis     Cats  E.E.S. 200  Lead   Mold  [place] Pastrani - Anaphylaxis   • Penicillins Anaphylaxis   • Aspirin Other (See Comments)     tinnitis    • Biaxin [Clarithromycin] Other (See Comments)     angioedema   • Ciprofloxacin Other (See Comments)     Lips burning   • Contrast Dye Hives and Itching   • Demerol [Meperidine] Nausea And Vomiting   • Iodine      And iodide containing products   • Macrobid [Nitrofurantoin Monohyd Macro] Diarrhea, Nausea And Vomiting and GI Intolerance   • Requip [Ropinirole Hcl] Diarrhea, Nausea And Vomiting and GI Intolerance   • Septra [Sulfamethoxazole-Trimethoprim] Nausea And Vomiting and GI Intolerance   • Statins Other (See  Comments)       *muscle enzyme increase with myalgias   • Zithromax [Azithromycin] Other (See Comments)     States she hasn't had a reaction, but doctor said he wasn't going to give it to her   • Niaspan [Niacin Er] Rash   • Nickel Rash             Review of Systems   Constitutional: Negative for fever.   HENT: Positive for postnasal drip and sinus pressure.    Respiratory: Negative for cough.    Neurological: Negative for headaches.   Hematological: Negative for adenopathy.           Objective   Physical Exam   Constitutional: She appears well-developed and well-nourished.   HENT:   Head: Normocephalic.   Right Ear: Tympanic membrane, external ear and ear canal normal.   Left Ear: Tympanic membrane, external ear and ear canal normal.   Nose: No mucosal edema or rhinorrhea.       Mouth/Throat: Uvula is midline, oropharynx is clear and moist and mucous membranes are normal. No oropharyngeal exudate. No tonsillar exudate.       Eyes: Conjunctivae are normal.   Neck: Normal range of motion.   Pulmonary/Chest: Effort normal.   Neurological: She is alert.   Skin: Skin is warm.   Psychiatric: She has a normal mood and affect.           Assessment/Plan   Lexie was seen today for follow-up.    Diagnoses and all orders for this visit:    Chronic maxillary sinusitis  -     CT Sinus Without Contrast; Future        Patient's persistent symptoms particularly pressure in the maxillary discomfort she describes as a 3/10 we'll get a CT of the sinuses to better understand she's unable tolerate many medications suggested this is not significant we could consider allergy evaluation I suspect she will have any active sinusitis but due to the degree of her symptoms is persistence she was to find out she is agreement with this.   all questions answered plan follow-up pending results of CT

## 2018-07-12 NOTE — PATIENT INSTRUCTIONS

## 2018-07-13 ENCOUNTER — TELEPHONE (OUTPATIENT)
Dept: OTOLARYNGOLOGY | Facility: CLINIC | Age: 68
End: 2018-07-13

## 2018-07-13 ENCOUNTER — HOSPITAL ENCOUNTER (OUTPATIENT)
Dept: CT IMAGING | Facility: HOSPITAL | Age: 68
Discharge: HOME | End: 2018-07-13
Admitting: OTOLARYNGOLOGY

## 2018-07-13 DIAGNOSIS — J32.0 CHRONIC MAXILLARY SINUSITIS: ICD-10-CM

## 2018-07-13 PROCEDURE — 70486 CT MAXILLOFACIAL W/O DYE: CPT

## 2018-07-13 RX ORDER — OLOPATADINE HYDROCHLORIDE 665 UG/1
2 SPRAY NASAL 2 TIMES DAILY
Qty: 30 G | Refills: 3 | Status: SHIPPED | OUTPATIENT
Start: 2018-07-13 | End: 2019-12-30 | Stop reason: SDUPTHER

## 2018-07-13 NOTE — TELEPHONE ENCOUNTER
I called pt re CT Sinus -no active infection , minimal scar.  Suggested Patanase nasal spray trial .\P{t to call back with status in 2 weeks.  ANGI Navas MD

## 2018-07-23 ENCOUNTER — TELEPHONE (OUTPATIENT)
Dept: OTOLARYNGOLOGY | Facility: CLINIC | Age: 68
End: 2018-07-23

## 2018-07-23 RX ORDER — CHLORPHENIRAMINE MALEATE 12 MG/1
1 TABLET, FILM COATED, EXTENDED RELEASE ORAL
Qty: 30 EACH | Refills: 3 | Status: SHIPPED | OUTPATIENT
Start: 2018-07-23 | End: 2019-07-10

## 2018-07-23 NOTE — TELEPHONE ENCOUNTER
[facility] pharmacy called stating they were unable to get brompheniramine.  Per Dr. Navas a rx for chlorpheniramine 12 mg was sent to [facility] pharmacy.    Patient notified of the medication change.

## 2018-07-23 NOTE — TELEPHONE ENCOUNTER
Patient notified by phone that Dr. Navas would like her to continue taking all her meds at this time.  If the antihistamines make her to sleepy, she is to call and let us know.  She understood and is agreeable.              ----- Message from Tanika Brown sent at 7/23/2018  3:32 PM CDT -----  Contact: [phone]  HAS QUESTIONS ABOUT NEW MEDICATION...WANTS TO KNOW IF SHE WILL TAKE ALL ANTIHISTIMENS

## 2018-07-24 ENCOUNTER — APPOINTMENT (OUTPATIENT)
Dept: LAB | Facility: HOSPITAL | Age: 68
End: 2018-07-24

## 2018-07-24 ENCOUNTER — TELEPHONE (OUTPATIENT)
Dept: FAMILY MEDICINE CLINIC | Facility: CLINIC | Age: 68
End: 2018-07-24

## 2018-07-24 ENCOUNTER — OFFICE VISIT (OUTPATIENT)
Dept: FAMILY MEDICINE CLINIC | Facility: CLINIC | Age: 68
End: 2018-07-24

## 2018-07-24 VITALS
WEIGHT: 177 LBS | OXYGEN SATURATION: 97 % | SYSTOLIC BLOOD PRESSURE: 100 MMHG | BODY MASS INDEX: 30.22 KG/M2 | DIASTOLIC BLOOD PRESSURE: 68 MMHG | HEIGHT: 64 IN | HEART RATE: 67 BPM

## 2018-07-24 DIAGNOSIS — E78.00 HYPERCHOLESTEROLEMIA: ICD-10-CM

## 2018-07-24 DIAGNOSIS — M19.041 PRIMARY OSTEOARTHRITIS OF BOTH HANDS: ICD-10-CM

## 2018-07-24 DIAGNOSIS — F51.01 PRIMARY INSOMNIA: ICD-10-CM

## 2018-07-24 DIAGNOSIS — M19.042 PRIMARY OSTEOARTHRITIS OF BOTH HANDS: ICD-10-CM

## 2018-07-24 DIAGNOSIS — J32.9 CHRONIC SINUSITIS, UNSPECIFIED LOCATION: Primary | ICD-10-CM

## 2018-07-24 DIAGNOSIS — N18.2 CHRONIC KIDNEY DISEASE (CKD), STAGE II (MILD): ICD-10-CM

## 2018-07-24 LAB
ALBUMIN SERPL-MCNC: 4.3 G/DL (ref 3.4–4.8)
ALBUMIN/GLOB SERPL: 1.3 G/DL (ref 1.1–1.8)
ALP SERPL-CCNC: 66 U/L (ref 38–126)
ALT SERPL W P-5'-P-CCNC: 30 U/L (ref 9–52)
ANION GAP SERPL CALCULATED.3IONS-SCNC: 8 MMOL/L (ref 5–15)
ARTICHOKE IGE QN: 130 MG/DL (ref 1–129)
AST SERPL-CCNC: 43 U/L (ref 14–36)
BILIRUB SERPL-MCNC: 0.7 MG/DL (ref 0.2–1.3)
BUN BLD-MCNC: 12 MG/DL (ref 7–21)
BUN/CREAT SERPL: 12.2 (ref 7–25)
CALCIUM SPEC-SCNC: 9.7 MG/DL (ref 8.4–10.2)
CHLORIDE SERPL-SCNC: 104 MMOL/L (ref 95–110)
CHOLEST SERPL-MCNC: 249 MG/DL (ref 0–199)
CO2 SERPL-SCNC: 28 MMOL/L (ref 22–31)
CREAT BLD-MCNC: 0.98 MG/DL (ref 0.5–1)
GFR SERPL CREATININE-BSD FRML MDRD: 57 ML/MIN/1.73 (ref 45–104)
GLOBULIN UR ELPH-MCNC: 3.2 GM/DL (ref 2.3–3.5)
GLUCOSE BLD-MCNC: 86 MG/DL (ref 60–100)
HDLC SERPL-MCNC: 42 MG/DL (ref 60–200)
LDLC/HDLC SERPL: 3.51 {RATIO} (ref 0–3.22)
POTASSIUM BLD-SCNC: 4 MMOL/L (ref 3.5–5.1)
PROT SERPL-MCNC: 7.5 G/DL (ref 6.3–8.6)
SODIUM BLD-SCNC: 140 MMOL/L (ref 137–145)
TRIGL SERPL-MCNC: 297 MG/DL (ref 20–199)

## 2018-07-24 PROCEDURE — 80061 LIPID PANEL: CPT | Performed by: FAMILY MEDICINE

## 2018-07-24 PROCEDURE — 36415 COLL VENOUS BLD VENIPUNCTURE: CPT | Performed by: FAMILY MEDICINE

## 2018-07-24 PROCEDURE — 99214 OFFICE O/P EST MOD 30 MIN: CPT | Performed by: FAMILY MEDICINE

## 2018-07-24 PROCEDURE — 80053 COMPREHEN METABOLIC PANEL: CPT | Performed by: FAMILY MEDICINE

## 2018-07-24 RX ORDER — DULOXETIN HYDROCHLORIDE 60 MG/1
60 CAPSULE, DELAYED RELEASE ORAL DAILY
Qty: 30 CAPSULE | Refills: 11 | Status: SHIPPED | OUTPATIENT
Start: 2018-07-24 | End: 2018-08-15

## 2018-07-24 RX ORDER — TEMAZEPAM 30 MG/1
30 CAPSULE ORAL NIGHTLY PRN
Qty: 30 CAPSULE | Refills: 2 | Status: SHIPPED | OUTPATIENT
Start: 2018-07-24 | End: 2018-10-29

## 2018-07-24 RX ORDER — GABAPENTIN 600 MG/1
600 TABLET ORAL 2 TIMES DAILY
Qty: 60 TABLET | Refills: 2 | Status: SHIPPED | OUTPATIENT
Start: 2018-07-24 | End: 2018-10-29

## 2018-07-24 NOTE — PROGRESS NOTES
Renal and liver function stable.  Cholesterol slightly elevated.  With her allergies I just recommend diet at this point (low-cholesterol).

## 2018-07-24 NOTE — TELEPHONE ENCOUNTER
Pr Dr. Forrest, Ms. Cardona has been called with her recent lab results & recommendations.  Continue her current medications and follow-up as planned or sooner if any problems.        ----- Message from Naresh Forrest MD sent at 7/24/2018  4:25 PM CDT -----  Renal and liver function stable.  Cholesterol slightly elevated.  With her allergies I just recommend diet at this point (low-cholesterol).

## 2018-07-24 NOTE — PROGRESS NOTES
Subjective   Lexie Cardona is a 68 y.o. female.     Sinusitis   This is a chronic problem. The current episode started more than 1 month ago. The problem has been gradually worsening since onset.   Chronic Kidney Disease   This is a chronic problem. The current episode started more than 1 year ago. The problem occurs constantly. Associated symptoms include numbness (and tingeling right arm/CTS).   Insomnia   This is a chronic problem. The current episode started more than 1 year ago. The problem occurs constantly. The problem has been waxing and waning. Associated symptoms include numbness (and tingeling right arm/CTS).   Upper Extremity Issue   This is a chronic problem. The current episode started more than 1 year ago. The problem has been waxing and waning. Associated symptoms include numbness (and tingeling right arm/CTS).   Arthritis   Presents for follow-up visit. She complains of pain. The symptoms have been worsening. Affected locations include the left MCP, right MCP, left wrist, right wrist, right foot and right shoulder. Her pain is at a severity of 10/10.        The following portions of the patient's history were reviewed and updated as appropriate: allergies, current medications, past family history, past medical history, past social history, past surgical history and problem list.    Review of Systems   Musculoskeletal: Positive for arthritis.   Neurological: Positive for numbness (and tingeling right arm/CTS).   Psychiatric/Behavioral: The patient has insomnia.        Objective   Physical Exam   Constitutional: She is oriented to person, place, and time. She appears well-developed and well-nourished. No distress.   HENT:   Head: Normocephalic and atraumatic.   Right Ear: Hearing and tympanic membrane normal.   Left Ear: Hearing and tympanic membrane normal.   Nose: Mucosal edema and rhinorrhea present. Right sinus exhibits maxillary sinus tenderness and frontal sinus tenderness. Left sinus  exhibits maxillary sinus tenderness and frontal sinus tenderness.   Mouth/Throat: Uvula is midline and mucous membranes are normal.   Pulmonary/Chest: She has no rales.   Musculoskeletal:        Right shoulder: She exhibits decreased range of motion and tenderness.        Left elbow: She exhibits decreased range of motion and swelling.        Right wrist: She exhibits decreased range of motion and tenderness.        Left wrist: She exhibits decreased range of motion and tenderness.   Neurological: She is alert and oriented to person, place, and time.   Skin: Skin is warm and dry. She is not diaphoretic.   Psychiatric: She has a normal mood and affect. Her behavior is normal. Judgment and thought content normal.   Nursing note and vitals reviewed.      Assessment/Plan   Problems Addressed this Visit        Respiratory    Sinusitis, chronic - Primary       Musculoskeletal and Integument    Primary osteoarthritis of both hands       Genitourinary    Chronic kidney disease (CKD), stage II (mild) (Chronic)       Other    Insomnia

## 2018-07-24 NOTE — PROGRESS NOTES
Pr Dr. Forrest, Ms. Cardona has been called with her recent lab results & recommendations.  Continue her current medications and follow-up as planned or sooner if any problems.    PATIENT QUESTION:  She was asking about her Thyroid Studies?    I told her I did not see where any thyroid studies were ordered.  She said you all talked about and she thought you were going to order then.  I told her I would send you a message and ask.    Please Advise

## 2018-07-25 DIAGNOSIS — R53.83 FATIGUE, UNSPECIFIED TYPE: Primary | ICD-10-CM

## 2018-07-31 ENCOUNTER — OFFICE VISIT (OUTPATIENT)
Dept: PODIATRY | Facility: CLINIC | Age: 68
End: 2018-07-31

## 2018-07-31 VITALS — OXYGEN SATURATION: 94 % | HEIGHT: 64 IN | HEART RATE: 73 BPM | BODY MASS INDEX: 30.22 KG/M2 | WEIGHT: 177 LBS

## 2018-07-31 DIAGNOSIS — M72.2 PLANTAR FASCIITIS: Primary | ICD-10-CM

## 2018-07-31 DIAGNOSIS — M79.672 FOOT PAIN, LEFT: ICD-10-CM

## 2018-07-31 PROCEDURE — 20550 NJX 1 TENDON SHEATH/LIGAMENT: CPT | Performed by: PODIATRIST

## 2018-07-31 RX ORDER — BUPIVACAINE HYDROCHLORIDE 5 MG/ML
1 INJECTION, SOLUTION EPIDURAL; INTRACAUDAL ONCE
Status: COMPLETED | OUTPATIENT
Start: 2018-07-31 | End: 2018-07-31

## 2018-07-31 RX ORDER — TRIAMCINOLONE ACETONIDE 40 MG/ML
10 INJECTION, SUSPENSION INTRA-ARTICULAR; INTRAMUSCULAR ONCE
Status: COMPLETED | OUTPATIENT
Start: 2018-07-31 | End: 2018-07-31

## 2018-07-31 RX ORDER — DEXAMETHASONE SODIUM PHOSPHATE 4 MG/ML
2 INJECTION, SOLUTION INTRA-ARTICULAR; INTRALESIONAL; INTRAMUSCULAR; INTRAVENOUS; SOFT TISSUE ONCE
Status: COMPLETED | OUTPATIENT
Start: 2018-07-31 | End: 2018-07-31

## 2018-07-31 RX ADMIN — BUPIVACAINE HYDROCHLORIDE 1 ML: 5 INJECTION, SOLUTION EPIDURAL; INTRACAUDAL at 09:00

## 2018-07-31 RX ADMIN — TRIAMCINOLONE ACETONIDE 10 MG: 40 INJECTION, SUSPENSION INTRA-ARTICULAR; INTRAMUSCULAR at 09:00

## 2018-07-31 RX ADMIN — DEXAMETHASONE SODIUM PHOSPHATE 2 MG: 4 INJECTION, SOLUTION INTRA-ARTICULAR; INTRALESIONAL; INTRAMUSCULAR; INTRAVENOUS; SOFT TISSUE at 09:00

## 2018-07-31 NOTE — PROGRESS NOTES
Lexievalentin Marquez Brendan  1950  68 y.o. female    Patient presents today for recheck of her left foot pain.    07/31/2018     Chief Complaint   Patient presents with   • Left Foot - Follow-up, Pain   • Plantar Fasciitis       History of Present Illness    Patient returns to clinic with chief complaint of left heel pain.  Patient currently rates her pain as a 5 out of 10.  She describes it as sharp and worse with the first step in the morning.  States that she is not wearing her over-the-counter arch supports.  She states that they hurt her feet.  She is stretching occasionally.  She did not obtain the night splint recommended on last visit.  She denies any other pedal complaints.      Past Medical History:   Diagnosis Date   • Acute upper respiratory infection    • Allergic rhinitis    • Asthma     blastomycosis   • Blastomycosis    • Borderline glaucoma    • Bunion    • Callus    • Chronic kidney disease (CKD), stage II (mild)    • Clotting disorder (CMS/HCC)    • Colon polyp    • Dehiscence of surgical wound    • Diverticulitis of colon    • Elevated levels of transaminase & lactic acid dehydrogenase    • Encounter for gynecological examination without abnormal finding    • Episcleritis    • Fundic gland polyposis of stomach    • Gastric polyp    • Gastritis    • Gastroesophageal reflux disease    • Gastroparesis    • Gout    • H/O mammogram    • History of colon polyps    • Hypercholesterolemia    • IBS (irritable bowel syndrome)    • Insomnia    • Mass of ovary     fixed cyst, post removal, no sign of cancer   • Meniere's disease    • Myoclonic disorder    • Nuclear senile cataract    • Osteoarthritis of multiple joints    • Peripheral edema    • Plantar fasciitis    • PONV (postoperative nausea and vomiting)    • Pruritus of vagina    • Restless legs    • Seasonal allergic rhinitis    • Skin cancer     RUE   • Sleep apnea     not using c-pap   • Spasm of bladder    • Vaginal irritation    • Vitamin D  deficiency          Past Surgical History:   Procedure Laterality Date   • APPENDECTOMY     • BRONCHOSCOPY  09/10/2001    density, upper lobe posteriorly, left. no endobronchial lesions seen   • CARPAL TUNNEL RELEASE WITH CUBITAL TUNNEL RELEASE  05/03/2013    Right Shoulder And Wrist I   • COLONOSCOPY  2010    2 polyps, repeat 5 yr   • COLONOSCOPY W/ POLYPECTOMY  07/23/2015    diverticulosis found in the sigmoid colon. a single polyp found in the cecum; ascending colon, and sigmoid colon. all removed by cold biopsy polypectomy. hemorrhoids found   • CYSTOSCOPY  11/13/1974    and x-ray urethral diverticulography. urethral diverticulum   • DENTAL PROCEDURE  11/19/2004    extraction of tooth #30 with local anesthesia and IV sedation   • ENDOSCOPIC FUNCTIONAL SINUS SURGERY (FESS) Right 3/6/2018    Procedure: RIGHT ENDOSCOPICS SINUS SURGERY OF THE MAXILLARY SINUS, BILATERAL PARTIAL INFERIOR TURBINECTOMIES, LEFT ENDOSCOPIC EJ BULLOSA RESECTION, NASAL SEPTOPLASTY, NASAL VALVE REPAIR  ;  Surgeon: Pablo Navas MD;  Location: [facility];  Service:    • ENDOSCOPY AND COLONOSCOPY  2005    dininutive polyp in the rectum. diminutive polyp 30cm from the anus. diminutive polyp ascending colon. all polyps were removed by hot biopsy polypectomy. multiple diverticula in the sigmoid   • INJECTION OF MEDICATION  03/21/2014    celestone   • INJECTION OF MEDICATION  03/11/2013    dexamethasone   • INJECTION OF MEDICATION  05/09/2014    kenalog   • LAPAROSCOPIC CHOLECYSTECTOMY     • OTHER SURGICAL HISTORY  1960    Multiple Cysts To Right Ovary   • OTHER SURGICAL HISTORY  1969    Transurethral Resection   • OVARIAN CYST REMOVAL  11/14/1974    resection of left ovarian cyst & left salpingectomy. appendectomy   • TONSILECTOMY, ADENOIDECTOMY, BILATERAL MYRINGOTOMY AND TUBES  1954   • TOTAL ABDOMINAL HYSTERECTOMY WITH SALPINGO OOPHORECTOMY  08/18/2015    total abdominal hysterectomy and bilateral salpingo-oophorectomy. enbterolysis and  lysis of adhesions.  Completed At [facility] In [place].         Family History   Problem Relation Age of Onset   • Diabetes Mother    • Other Mother         cortical basal ganglionic degeneration    • Seizures Mother         epilepsy   • Arthritis Mother    • Hyperlipidemia Mother    • Mental illness Mother    • Breast cancer Mother    • Heart disease Mother    • Tuberculosis Father    • Cancer Father 60        colon   • Hypertension Father    • Colon cancer Father    • Heart disease Father    • Colon cancer Other    • Hypertension Other    • Heart disease Other    • Diabetes Other    • Colon cancer Other    • Breast cancer Paternal Grandmother        Allergies   Allergen Reactions   • Augmentin [Amoxicillin-Pot Clavulanate] Anaphylaxis   • Ceclor [Cefaclor] Anaphylaxis   • Nsaids Anaphylaxis   • Other Anaphylaxis     Cats  [address]  Lead   Mold  [place] Pastrani - Anaphylaxis   • Penicillins Anaphylaxis   • Aspirin Other (See Comments)     tinnitis    • Biaxin [Clarithromycin] Other (See Comments)     angioedema   • Ciprofloxacin Other (See Comments)     Lips burning   • Contrast Dye Hives and Itching   • Demerol [Meperidine] Nausea And Vomiting   • Iodine      And iodide containing products   • Macrobid [Nitrofurantoin Monohyd Macro] Diarrhea, Nausea And Vomiting and GI Intolerance   • Requip [Ropinirole Hcl] Diarrhea, Nausea And Vomiting and GI Intolerance   • Septra [Sulfamethoxazole-Trimethoprim] Nausea And Vomiting and GI Intolerance   • Statins Other (See Comments)       *muscle enzyme increase with myalgias   • Zithromax [Azithromycin] Other (See Comments)     States she hasn't had a reaction, but doctor said he wasn't going to give it to her   • Niaspan [Niacin Er] Rash   • Nickel Rash       Social History     Social History   • Marital status:      Spouse name: N/A   • Number of children: N/A   • Years of education: N/A     Occupational History   • Not on file.     Social History Main  Topics   • Smoking status: Former Smoker     Years: 30.00     Quit date: 2001   • Smokeless tobacco: Never Used   • Alcohol use No   • Drug use: No   • Sexual activity: Defer      Comment:      Other Topics Concern   • Not on file     Social History Narrative    ** Merged History Encounter **              Current Outpatient Prescriptions   Medication Sig Dispense Refill   • Alum Hydroxide-Mag Carbonate (GAVISCON PO) Take  by mouth As Needed. Tablets Or Liquid, As Needed      • Biotin 5 MG capsule Take 1 capsule by mouth Daily.     • Calcium Citrate-Vitamin D (CALCIUM + D PO) Take 1,200 mg by mouth Daily.     • cetirizine (ZyrTEC) 10 MG tablet Take 10 mg by mouth daily.     • Chlorpheniramine Maleate ER 12 MG tablet controlled-release Take 1 tablet by mouth every night at bedtime. 30 each 3   • cholecalciferol (VITAMIN D3) 400 units tablet Take 400 Units by mouth Daily.     • Cranberry-Vitamin C-Vitamin E (CRANBERRY PLUS VITAMIN C) 4200-20-3 MG-MG-UNIT capsule Take 2 tablets by mouth Every Night.     • DULoxetine (CYMBALTA) 60 MG capsule Take 1 capsule by mouth Daily. 30 capsule 11   • EPINEPHrine (EPIPEN 2-ALBERT) 0.3 MG/0.3ML solution auto-injector injection Use as directed for Allergic Reaction 1 each 5   • ESTRING 2 MG vaginal ring INSERT 2 MG INTO THE VAGINA EVERY 3 (THREE) MONTHS. FOLLOW PACKAGE DIRECTIONS 1 each 0   • Flaxseed, Linseed, (FLAX SEED OIL) 1000 MG capsule Take 1 capsule by mouth 2 (two) times a day.     • gabapentin (NEURONTIN) 600 MG tablet Take 1 tablet by mouth 2 (Two) Times a Day. 1 qam and 1/2 qhs 60 tablet 2   • hydrOXYzine (VISTARIL) 100 MG capsule Take 1 capsule by mouth 3 (Three) Times a Day As Needed for Allergies. 30 capsule 2   • meclizine (ANTIVERT) 25 MG tablet Take 25 mg by mouth 3 (Three) Times a Day As Needed for dizziness.     • mometasone (NASONEX) 50 MCG/ACT nasal spray 2 sprays into each nostril 2 (Two) Times a Day. 17 g 5   • montelukast (SINGULAIR) 10 MG tablet Take 1  "tablet by mouth Every Night. 30 tablet 11   • Multiple Vitamins-Minerals (CENTRUM SILVER PO) Take 1 tablet by mouth daily.     • olopatadine (PATANASE) 0.6 % solution nasal solution 2 sprays by Each Nare route 2 (Two) Times a Day. 30 g 3   • Probiotic Product (PROBIOTIC FORMULA PO) Take 1 tablet by mouth Every Night. 10 billion cell (2 billion ea) capsule      • promethazine (PHENERGAN) 25 MG tablet Take 1 tablet by mouth Every 6 (Six) Hours As Needed for Nausea or Vomiting. 30 tablet 2   • Sennosides 25 MG tablet Take 2 tablets by mouth Every Night.     • temazepam (RESTORIL) 30 MG capsule Take 1 capsule by mouth At Night As Needed for Sleep. 30 capsule 2   • traZODone (DESYREL) 100 MG tablet Take 1 tablet by mouth Every Night. 90 tablet 3     No current facility-administered medications for this visit.          OBJECTIVE    Pulse 73   Ht 162.6 cm (64\")   Wt 80.3 kg (177 lb)   LMP 03/22/1999 (Approximate) Comment: 2000  SpO2 94%   BMI 30.38 kg/m²       Review of Systems   Constitutional: Negative.    HENT: Negative.    Respiratory: Negative.    Cardiovascular: Negative.    Genitourinary: Negative.    Musculoskeletal:        Left foot pain   Skin: Negative.    Neurological: Negative.    Psychiatric/Behavioral: Negative.            Physical Exam   Constitutional: She is oriented to person, place, and time. She appears well-developed and well-nourished.   HENT:   Head: Normocephalic and atraumatic.   Nose: Nose normal.   Pulmonary/Chest: Effort normal. No respiratory distress.   Neurological: She is alert and oriented to person, place, and time. She displays normal reflexes.   Skin: She is not diaphoretic.   Psychiatric: She has a normal mood and affect. Her behavior is normal.       Left Lower Extremity    Cardiovascular:    DP/PT pulses palpable    CFT brisk  to all digits  No erythema or edema noted     Musculoskeletal:  Muscle strength is 5/5 for all muscle groups tested   ROM of the 1st MTP is full without " pain or crepitus  pain on palpation to the medial tubercle on the left calcaneus.  Negative lateral squeeze test.    Dermatological:   Skin is warm, dry and intact    Webspaces 1-4  are clean, dry and intact.     Neurological:   Protective sensation intact    Sensation intact to light touch          Procedures    Plantar Fasciits Injection  Date/Time: 07/31/18  Performed by: THOM ROSADO  Authorized by: THOM ROSADO   Consent: Verbal consent obtained. Written consent obtained.  Risks and benefits: risks, benefits and alternatives were discussed  Consent given by: patient  Patient identity confirmed: verbally with patient  Indications: pain relief  Nerve block body site: left heel.  Sedation:  Patient sedated: no    Patient position: sitting  Needle size: 25 G  Local anesthetic: 0.5% Marcaine plain, Kenalog 40 mg/ml , Decadron 4 mg/mL.   Outcome: pain improved  Patient tolerance: Patient tolerated the procedure well with no immediate complications        ASSESSMENT AND PLAN    Lexie was seen today for plantar fasciitis, follow-up and pain.    Diagnoses and all orders for this visit:    Plantar fasciitis  -     bupivacaine (PF) (MARCAINE) 0.5 % injection 1 mL; Inject 1 mL as directed 1 (One) Time.  -     dexamethasone sodium phosphate injection 2 mg; Inject 0.5 mL into the appropriate muscle as directed by prescriber 1 (One) Time.  -     triamcinolone acetonide (KENALOG-40) injection 10 mg; Inject 0.25 mL into the appropriate muscle as directed by prescriber 1 (One) Time.    Foot pain, left  -     bupivacaine (PF) (MARCAINE) 0.5 % injection 1 mL; Inject 1 mL as directed 1 (One) Time.  -     dexamethasone sodium phosphate injection 2 mg; Inject 0.5 mL into the appropriate muscle as directed by prescriber 1 (One) Time.  -     triamcinolone acetonide (KENALOG-40) injection 10 mg; Inject 0.25 mL into the appropriate muscle as directed by prescriber 1 (One) Time.      - Patient's heel pain remains the same if not  worse  - Repeat steroid injection performed  - Rx for custom orthotics  - Rx for PT  - Recommended over-the-counter plantar fascial night splint.  Patient educated on use.  - All her questions were answered  - Return to clinic in 6-8 weeks          This document has been electronically signed by David Spring DPM on July 31, 2018 2:29 PM     7/31/2018  2:29 PM

## 2018-08-01 ENCOUNTER — TRANSCRIBE ORDERS (OUTPATIENT)
Dept: PODIATRY | Facility: CLINIC | Age: 68
End: 2018-08-01

## 2018-08-01 DIAGNOSIS — M72.2 PLANTAR FASCIITIS: Primary | ICD-10-CM

## 2018-08-06 ENCOUNTER — APPOINTMENT (OUTPATIENT)
Dept: LAB | Facility: HOSPITAL | Age: 68
End: 2018-08-06

## 2018-08-06 ENCOUNTER — HOSPITAL ENCOUNTER (OUTPATIENT)
Dept: PHYSICAL THERAPY | Facility: HOSPITAL | Age: 68
Setting detail: THERAPIES SERIES
Discharge: HOME | End: 2018-08-06
Attending: PODIATRIST

## 2018-08-06 DIAGNOSIS — M72.2 PLANTAR FASCIITIS: Primary | ICD-10-CM

## 2018-08-06 LAB
T4 FREE SERPL-MCNC: 1.03 NG/DL (ref 0.78–2.19)
TSH SERPL DL<=0.05 MIU/L-ACNC: 0.71 MIU/ML (ref 0.46–4.68)

## 2018-08-06 PROCEDURE — G8979 MOBILITY GOAL STATUS: HCPCS | Performed by: PHYSICAL THERAPIST

## 2018-08-06 PROCEDURE — 36415 COLL VENOUS BLD VENIPUNCTURE: CPT | Performed by: FAMILY MEDICINE

## 2018-08-06 PROCEDURE — G8978 MOBILITY CURRENT STATUS: HCPCS | Performed by: PHYSICAL THERAPIST

## 2018-08-06 PROCEDURE — 97161 PT EVAL LOW COMPLEX 20 MIN: CPT | Performed by: PHYSICAL THERAPIST

## 2018-08-06 PROCEDURE — 84439 ASSAY OF FREE THYROXINE: CPT | Performed by: FAMILY MEDICINE

## 2018-08-06 PROCEDURE — 84443 ASSAY THYROID STIM HORMONE: CPT | Performed by: FAMILY MEDICINE

## 2018-08-06 NOTE — THERAPY EVALUATION
Outpatient Physical Therapy Ortho Initial Evaluation  [facility]     Patient Name: Lexie Cardona  : 1950  MRN: [id number]  Today's Date: 2018      Visit Date: 2018  Attendance:   Subjective % Improvement: N/A  Recert Date: 18  MD appointment: TBD    Therapy Diagnosis: Plantar fasciitis     Patient Active Problem List   Diagnosis   • Vitamin D deficiency   • Spasm of bladder   • Restless legs   • Myoclonic disorder   • Meniere's disease   • Insomnia   • IBS (irritable bowel syndrome)   • Hypercholesterolemia   • History of colon polyps   • Gout   • Gastroesophageal reflux disease   • Gastric polyp   • Fundic gland polyposis of stomach   • Elevated levels of transaminase & lactic acid dehydrogenase   • Chronic kidney disease (CKD), stage II (mild)   • Allergic rhinitis   • Primary osteoarthritis of both hands   • Primary open angle glaucoma of both eyes, mild stage   • Cataract   • Carpal tunnel syndrome of right wrist   • Nasal obstruction   • Nasal turbinate hypertrophy   • Nasal septal deformity   • Sinusitis, chronic   • Nasal valve collapse   • Minnie bullosa        Past Medical History:   Diagnosis Date   • Acute upper respiratory infection    • Allergic rhinitis    • Asthma     blastomycosis   • Blastomycosis    • Borderline glaucoma    • Bunion    • Callus    • Chronic kidney disease (CKD), stage II (mild)    • Clotting disorder (CMS/HCC)    • Colon polyp    • Dehiscence of surgical wound    • Diverticulitis of colon    • Elevated levels of transaminase & lactic acid dehydrogenase    • Encounter for gynecological examination without abnormal finding    • Episcleritis    • Fundic gland polyposis of stomach    • Gastric polyp    • Gastritis    • Gastroesophageal reflux disease    • Gastroparesis    • Gout    • H/O mammogram    • History of colon polyps    • Hypercholesterolemia    • IBS (irritable bowel syndrome)    • Insomnia    • Mass of ovary     fixed cyst, post  removal, no sign of cancer   • Meniere's disease    • Myoclonic disorder    • Nuclear senile cataract    • Osteoarthritis of multiple joints    • Peripheral edema    • Plantar fasciitis    • PONV (postoperative nausea and vomiting)    • Pruritus of vagina    • Restless legs    • Seasonal allergic rhinitis    • Skin cancer     RUE   • Sleep apnea     not using c-pap   • Spasm of bladder    • Vaginal irritation    • Vitamin D deficiency         Past Surgical History:   Procedure Laterality Date   • APPENDECTOMY     • BRONCHOSCOPY  09/10/2001    density, upper lobe posteriorly, left. no endobronchial lesions seen   • CARPAL TUNNEL RELEASE WITH CUBITAL TUNNEL RELEASE  05/03/2013    Right Shoulder And Wrist I   • COLONOSCOPY  2010    2 polyps, repeat 5 yr   • COLONOSCOPY W/ POLYPECTOMY  07/23/2015    diverticulosis found in the sigmoid colon. a single polyp found in the cecum; ascending colon, and sigmoid colon. all removed by cold biopsy polypectomy. hemorrhoids found   • CYSTOSCOPY  11/13/1974    and x-ray urethral diverticulography. urethral diverticulum   • DENTAL PROCEDURE  11/19/2004    extraction of tooth #30 with local anesthesia and IV sedation   • ENDOSCOPIC FUNCTIONAL SINUS SURGERY (FESS) Right 3/6/2018    Procedure: RIGHT ENDOSCOPICS SINUS SURGERY OF THE MAXILLARY SINUS, BILATERAL PARTIAL INFERIOR TURBINECTOMIES, LEFT ENDOSCOPIC EJ BULLOSA RESECTION, NASAL SEPTOPLASTY, NASAL VALVE REPAIR  ;  Surgeon: Pablo Navas MD;  Location: [facility];  Service:    • ENDOSCOPY AND COLONOSCOPY  2005    dininutive polyp in the rectum. diminutive polyp 30cm from the anus. diminutive polyp ascending colon. all polyps were removed by hot biopsy polypectomy. multiple diverticula in the sigmoid   • INJECTION OF MEDICATION  03/21/2014    celestone   • INJECTION OF MEDICATION  03/11/2013    dexamethasone   • INJECTION OF MEDICATION  05/09/2014    kenalog   • LAPAROSCOPIC CHOLECYSTECTOMY     • OTHER SURGICAL HISTORY  1960     "Multiple Cysts To Right Ovary   • OTHER SURGICAL HISTORY  1969    Transurethral Resection   • OVARIAN CYST REMOVAL  11/14/1974    resection of left ovarian cyst & left salpingectomy. appendectomy   • TONSILECTOMY, ADENOIDECTOMY, BILATERAL MYRINGOTOMY AND TUBES  1954   • TOTAL ABDOMINAL HYSTERECTOMY WITH SALPINGO OOPHORECTOMY  08/18/2015    total abdominal hysterectomy and bilateral salpingo-oophorectomy. enbterolysis and lysis of adhesions.  Completed At [facility] In [place].       Visit Dx:     ICD-10-CM ICD-9-CM   1. Plantar fasciitis M72.2 728.71             Patient History     Row Name 08/06/18 1020             History    Chief Complaint Pain;Difficulty Walking  -BB      Type of Pain Foot pain  -BB      Date Current Problem(s) Began --   chronic   -BB      Brief Description of Current Complaint Reports trying power steps that increased pain and pain improved once removed. Reports they felt like they didnt fit right and didnt have cushion in the heel. Notes have left foot injected last week for the second time that helped pain but notes pain feel like its coming back. Notes having a heel spur on the right but not currently bothering her with no recent injection. No history of custom orthotics in the past.  No history of PT for feet in the past. Has tried stretching and ice for HEP. Has ordered a night splint.   -BB      Previous treatment for THIS PROBLEM Injections  -BB      Patient/Caregiver Goals Relieve pain;Improve mobility  -BB      Occupation/sports/leisure activities Retired   -BB      What clinical tests have you had for this problem? X-ray  -BB         Pain     Pain Location Foot  -BB      Pain at Present 4  -BB      Pain at Best --   constant   -BB      Pain at Worst --   \"about a 12\"  -BB      Pain Frequency Constant/continuous  -BB      Pain Description Burning;Throbbing  -BB      Pain Comments burns on lateral ankle   -BB      Is your sleep disturbed? Yes  -BB         Fall Risk Assessment "    Any falls in the past year: No  -BB        User Key  (r) = Recorded By, (t) = Taken By, (c) = Cosigned By    Initials Name Provider Type    Evette Mliler PT Physical Therapist                PT Ortho     Row Name 08/06/18 1020       Subjective Comments    Subjective Comments See patient history   -BB       Precautions and Contraindications    Precautions hx of skin cancer   -BB       Subjective Pain    Able to rate subjective pain? yes  -BB    Pre-Treatment Pain Level 4  -BB    Post-Treatment Pain Level 4  -BB       Posture/Observations    Posture/Observations Comments Callus on left 3rd met head plantar surface. No skin breakdown noted.   -BB       Special Tests/Palpation    Special Tests/Palpation Ankle/Foot  -BB       Foot/Ankle Palpation    Lateral Malleolus Left:;Tender   posterior lateral   -BB    Plantar Fascia Tender;Guarded/taut;Left:  -BB    Medial Gastroc Guarded/taut  -BB    Foot/Ankle Palpation? Yes  -BB       Toes Accessory Motion    Toes Accessory Motions Tested? --   WNL   -BB       Ankle/Foot Special Tests    Inversion Stress Test Negative  -BB    Eversion Stress Test Negative  -BB       General ROM    GENERAL ROM COMMENTS Ankle: WNL for gait   -BB       MMT (Manual Muscle Testing)    Additional Documentation General Assessment (Manual Muscle Testing) (Group)  -BB       General Assessment (Manual Muscle Testing)    Comment, General Manual Muscle Testing (MMT) Assessment Grossly WNL    -BB       Sensation    Sensation WNL? WNL  -BB    Light Touch No apparent deficits  -BB       Transfers    Comment (Transfers) Independent in all planes   -BB       Gait/Stairs Assessment/Training    Comment (Gait/Stairs) Overpronation moment noted with pes planus in gait with slight ER of feet   -BB      User Key  (r) = Recorded By, (t) = Taken By, (c) = Cosigned By    Initials Name Provider Type    Evette Miller PT Physical Therapist                      Therapy Education  Education Details: Education on  supportive shoes, wear schedule and skin inspection    Given: HEP, Symptoms/condition management  Program: New  How Provided: Verbal  Provided to: Patient  Level of Understanding: Verbalized           PT OP Goals     Row Name 08/06/18 1020          PT Short Term Goals    STG Date to Achieve 08/27/18  -BB     STG 1 Independent in final HEP   -BB     STG 2 minimal to no ttp of PF of left   -BB     STG 3 Independent in wear and care of orthotics    -BB        Time Calculation    PT Goal Re-Cert Due Date 08/27/18  -BB       User Key  (r) = Recorded By, (t) = Taken By, (c) = Cosigned By    Initials Name Provider Type    BB Evette Murillo PT Physical Therapist                PT Assessment/Plan     Row Name 08/06/18 1020          PT Assessment    Functional Limitations Impaired gait;Limitations in functional capacity and performance  -BB     Impairments Gait;Pain;Poor body mechanics  -BB     Assessment Comments Patient presents with plantar fasciitis with pes planus and overpronation moment. Patient has a sore noted on left 3rd met head. Patient could benefit from custom orthotics for support and stability of the foot in gait. Orthotics to be fabricated per MD orders for blue neutral rigid shell with deep heel cup. Patient could also benefit from US, manual therapy, stretching and strengthening of the ankle and foot.   -BB     Rehab Potential Good  -BB     Patient/caregiver participated in establishment of treatment plan and goals Yes  -BB     Patient would benefit from skilled therapy intervention Yes  -BB        PT Plan    PT Frequency 2x/week  -BB     Predicted Duration of Therapy Intervention (Therapy Eval) 3-4 weeks  -BB     Planned CPT's? PT EVAL LOW COMPLEXITY: 26951;PT MANUAL THERAPY EA 15 MIN: 23556;PT GAIT TRAINING EA 15 MIN: 85623;PT THER PROC EA 15 MIN: 19374;PT THER ACT EA 15 MIN: 39283;PT ULTRASOUND EA 15 MIN: 71413;PT ORTHOTIC MGMT/TRAIN EA 15 MIN: 36274  -BB     PT Plan Comments Orthotic fitting,  stretching, ROM, US to plantar fascia, small foot intrinsic strengthening, ankle strengthening, manual therapy   -BB       User Key  (r) = Recorded By, (t) = Taken By, (c) = Cosigned By    Initials Name Provider Type    Evette Miller PT Physical Therapist                  Exercises     Row Name 08/06/18 1020             Subjective Comments    Subjective Comments See patient history   -BB         Subjective Pain    Able to rate subjective pain? yes  -BB      Pre-Treatment Pain Level 4  -BB      Post-Treatment Pain Level 4  -BB        User Key  (r) = Recorded By, (t) = Taken By, (c) = Cosigned By    Initials Name Provider Type    Evette Miller PT Physical Therapist                        Outcome Measure Options: Lower Extremity Functional Scale (LEFS)  Lower Extremity Functional Index  Any of your usual work, housework or school activities: Moderate difficulty  Your usual hobbies, recreational or sporting activities: Moderate difficulty  Getting into or out of the bath: Moderate difficulty  Walking between rooms: Moderate difficulty  Putting on your shoes or socks: A little bit of difficulty  Squatting: Quite a bit of difficulty  Lifting an object, like a bag of groceries from the floor: A little bit of difficulty  Performing light activities around your home: A little bit of difficulty  Performing heavy activities around your home: Moderate difficulty  Getting into or out of a car: A little bit of difficulty  Walking 2 blocks: Moderate difficulty  Walking a mile: A little bit of difficulty  Going up or down 10 stairs (about 1 flight of stairs): A little bit of difficulty  Standing for 1 hour: A little bit of difficulty  Sitting for 1 hour: Moderate difficulty  Running on even ground: Extreme difficulty or unable to perform activity  Running on uneven ground: Extreme difficulty or unable to perform activity  Making sharp turns while running fast: Moderate difficulty  Hopping: A little bit of  difficulty  Rolling over in bed: No difficulty  Total: 45      Time Calculation:     Therapy Suggested Charges     Code   Minutes Charges    None             Start Time: 1020  Stop Time: 1117  Time Calculation (min): 57 min     Therapy Charges for Today     Code Description Service Date Service Provider Modifiers Qty    02464472061 HC PT MOBILITY CURRENT 8/6/2018 Evette Murillo, PT GP,  1    37880052516 HC PT MOBILITY PROJECTED 8/6/2018 Evette Murillo, PT GP,  1    03490407992 HC PT-CUSTOM ORTHOTICS-LEVEL 2 8/6/2018 Evette Murillo, PT  1    53305947788 HC PT EVAL LOW COMPLEXITY 2 8/6/2018 Evette Murillo, PT GP 1          PT G-Codes  PT Professional Judgement Used?: Yes  Outcome Measure Options: Lower Extremity Functional Scale (LEFS)  Score: 45/80  Functional Limitation: Mobility: Walking and moving around  Mobility: Walking and Moving Around Current Status (): At least 20 percent but less than 40 percent impaired, limited or restricted  Mobility: Walking and Moving Around Goal Status (): 0 percent impaired, limited or restricted         Evette Murillo, PT  8/6/2018

## 2018-08-08 ENCOUNTER — TELEPHONE (OUTPATIENT)
Dept: FAMILY MEDICINE CLINIC | Facility: CLINIC | Age: 68
End: 2018-08-08

## 2018-08-09 ENCOUNTER — HOSPITAL ENCOUNTER (OUTPATIENT)
Dept: PHYSICAL THERAPY | Facility: HOSPITAL | Age: 68
Setting detail: THERAPIES SERIES
Discharge: HOME | End: 2018-08-09
Attending: PODIATRIST

## 2018-08-09 DIAGNOSIS — M72.2 PLANTAR FASCIITIS: Primary | ICD-10-CM

## 2018-08-09 PROCEDURE — 97110 THERAPEUTIC EXERCISES: CPT

## 2018-08-09 PROCEDURE — 97035 APP MDLTY 1+ULTRASOUND EA 15: CPT

## 2018-08-09 PROCEDURE — 97140 MANUAL THERAPY 1/> REGIONS: CPT

## 2018-08-09 NOTE — THERAPY TREATMENT NOTE
Outpatient Physical Therapy Ortho Treatment Note  [facility]     Patient Name: Lexie Cardona  : 1950  MRN: [id number]  Today's Date: 2018      Visit Date: 2018  Subjective Improvement: 0%  Visit Number:     Recert Date:   18  MD Visit:   TBD  Total Approved Visits:  Medicare    Therapy Diagnosis:  Plantar Fascitis  Visit Dx:    ICD-10-CM ICD-9-CM   1. Plantar fasciitis M72.2 728.71       Patient Active Problem List   Diagnosis   • Vitamin D deficiency   • Spasm of bladder   • Restless legs   • Myoclonic disorder   • Meniere's disease   • Insomnia   • IBS (irritable bowel syndrome)   • Hypercholesterolemia   • History of colon polyps   • Gout   • Gastroesophageal reflux disease   • Gastric polyp   • Fundic gland polyposis of stomach   • Elevated levels of transaminase & lactic acid dehydrogenase   • Chronic kidney disease (CKD), stage II (mild)   • Allergic rhinitis   • Primary osteoarthritis of both hands   • Primary open angle glaucoma of both eyes, mild stage   • Cataract   • Carpal tunnel syndrome of right wrist   • Nasal obstruction   • Nasal turbinate hypertrophy   • Nasal septal deformity   • Sinusitis, chronic   • Nasal valve collapse   • Minnie bullosa        Past Medical History:   Diagnosis Date   • Acute upper respiratory infection    • Allergic rhinitis    • Asthma     blastomycosis   • Blastomycosis    • Borderline glaucoma    • Bunion    • Callus    • Chronic kidney disease (CKD), stage II (mild)    • Clotting disorder (CMS/HCC)    • Colon polyp    • Dehiscence of surgical wound    • Diverticulitis of colon    • Elevated levels of transaminase & lactic acid dehydrogenase    • Encounter for gynecological examination without abnormal finding    • Episcleritis    • Fundic gland polyposis of stomach    • Gastric polyp    • Gastritis    • Gastroesophageal reflux disease    • Gastroparesis    • Gout    • H/O mammogram    • History of colon polyps    •  Hypercholesterolemia    • IBS (irritable bowel syndrome)    • Insomnia    • Mass of ovary     fixed cyst, post removal, no sign of cancer   • Meniere's disease    • Myoclonic disorder    • Nuclear senile cataract    • Osteoarthritis of multiple joints    • Peripheral edema    • Plantar fasciitis    • PONV (postoperative nausea and vomiting)    • Pruritus of vagina    • Restless legs    • Seasonal allergic rhinitis    • Skin cancer     RUE   • Sleep apnea     not using c-pap   • Spasm of bladder    • Vaginal irritation    • Vitamin D deficiency         Past Surgical History:   Procedure Laterality Date   • APPENDECTOMY     • BRONCHOSCOPY  09/10/2001    density, upper lobe posteriorly, left. no endobronchial lesions seen   • CARPAL TUNNEL RELEASE WITH CUBITAL TUNNEL RELEASE  05/03/2013    Right Shoulder And Wrist I   • COLONOSCOPY  2010    2 polyps, repeat 5 yr   • COLONOSCOPY W/ POLYPECTOMY  07/23/2015    diverticulosis found in the sigmoid colon. a single polyp found in the cecum; ascending colon, and sigmoid colon. all removed by cold biopsy polypectomy. hemorrhoids found   • CYSTOSCOPY  11/13/1974    and x-ray urethral diverticulography. urethral diverticulum   • DENTAL PROCEDURE  11/19/2004    extraction of tooth #30 with local anesthesia and IV sedation   • ENDOSCOPIC FUNCTIONAL SINUS SURGERY (FESS) Right 3/6/2018    Procedure: RIGHT ENDOSCOPICS SINUS SURGERY OF THE MAXILLARY SINUS, BILATERAL PARTIAL INFERIOR TURBINECTOMIES, LEFT ENDOSCOPIC EJ BULLOSA RESECTION, NASAL SEPTOPLASTY, NASAL VALVE REPAIR  ;  Surgeon: Pablo Navas MD;  Location: [facility];  Service:    • ENDOSCOPY AND COLONOSCOPY  2005    dininutive polyp in the rectum. diminutive polyp 30cm from the anus. diminutive polyp ascending colon. all polyps were removed by hot biopsy polypectomy. multiple diverticula in the sigmoid   • INJECTION OF MEDICATION  03/21/2014    celestone   • INJECTION OF MEDICATION  03/11/2013    dexamethasone   •  INJECTION OF MEDICATION  05/09/2014    kenalog   • LAPAROSCOPIC CHOLECYSTECTOMY     • OTHER SURGICAL HISTORY  1960    Multiple Cysts To Right Ovary   • OTHER SURGICAL HISTORY  1969    Transurethral Resection   • OVARIAN CYST REMOVAL  11/14/1974    resection of left ovarian cyst & left salpingectomy. appendectomy   • TONSILECTOMY, ADENOIDECTOMY, BILATERAL MYRINGOTOMY AND TUBES  1954   • TOTAL ABDOMINAL HYSTERECTOMY WITH SALPINGO OOPHORECTOMY  08/18/2015    total abdominal hysterectomy and bilateral salpingo-oophorectomy. enbterolysis and lysis of adhesions.  Completed At [facility] In [place].             PT Ortho     Row Name 08/09/18 0800       Posture/Observations    Posture/Observations Comments Tender L PF and medial calf.    -BB    Row Name 08/06/18 1020       Subjective Comments    Subjective Comments See patient history   -BBA       Precautions and Contraindications    Precautions hx of skin cancer   -BBA       Subjective Pain    Able to rate subjective pain? yes  -BBA    Pre-Treatment Pain Level 4  -BBA    Post-Treatment Pain Level 4  -BBA       Posture/Observations    Posture/Observations Comments Callus on left 3rd met head plantar surface. No skin breakdown noted.   -BBA       Special Tests/Palpation    Special Tests/Palpation Ankle/Foot  -BBA       Foot/Ankle Palpation    Lateral Malleolus Left:;Tender   posterior lateral   -BBA    Plantar Fascia Tender;Guarded/taut;Left:  -BBA    Medial Gastroc Guarded/taut  -BBA    Foot/Ankle Palpation? Yes  -BBA       Toes Accessory Motion    Toes Accessory Motions Tested? --   WNL   -BBA       Ankle/Foot Special Tests    Inversion Stress Test Negative  -BBA    Eversion Stress Test Negative  -BBA       General ROM    GENERAL ROM COMMENTS Ankle: WNL for gait   -BBA       MMT (Manual Muscle Testing)    Additional Documentation General Assessment (Manual Muscle Testing) (Group)  -BBA       General Assessment (Manual Muscle Testing)    Comment, General Manual  Muscle Testing (MMT) Assessment Grossly WNL    -BBA       Sensation    Sensation WNL? WNL  -BBA    Light Touch No apparent deficits  -BBA       Transfers    Comment (Transfers) Independent in all planes   -BBA       Gait/Stairs Assessment/Training    Comment (Gait/Stairs) Overpronation moment noted with pes planus in gait with slight ER of feet   -BBA      User Key  (r) = Recorded By, (t) = Taken By, (c) = Cosigned By    Initials Name Provider Type    Evette Carrera, PT Physical Therapist    Jada Moody PTA Physical Therapy Assistant                            PT Assessment/Plan     Row Name 08/09/18 0937          PT Assessment    Assessment Comments Moderate tenderness L calf and PF.  Good tolerance to treatment. Provided HEP sheets.  Given Ice to go and educated on ice.   -BB        PT Plan    PT Frequency 2x/week  -BB     Predicted Duration of Therapy Intervention (Therapy Eval) x 3-4 weeks  -BB     PT Plan Comments Continue with US, Manual to PF and calf, stretch, ROM and foot intrensics L.  -BB       User Key  (r) = Recorded By, (t) = Taken By, (c) = Cosigned By    Initials Name Provider Type    Jada Moody PTA Physical Therapy Assistant                Modalities     Row Name 08/09/18 0800             Ultrasound [id number]    Location L PF  -BB      Duty Cycle 100  -BB      Frequency 3.0 MHz  -BB      Intensity - Wts/cm 1  -BB        User Key  (r) = Recorded By, (t) = Taken By, (c) = Cosigned By    Initials Name Provider Type    Jada Moody PTA Physical Therapy Assistant                Exercises     Row Name 08/09/18 0800             Subjective Comments    Subjective Comments Pain in L never really lets up.  -BB         Subjective Pain    Able to rate subjective pain? yes  -BB      Pre-Treatment Pain Level 4  -BB      Post-Treatment Pain Level 4  -BB      Subjective Pain Comment States ROM is improved after PT today.  -BB         Exercise 1    Exercise Name 1 US L PF  -BB      Time  1 8m in  -BB      Additional Comments see modalities  -BB         Exercise 2    Exercise Name 2 Manual  -BB      Time 2 15 min  -BB      Additional Comments see flowsheet  -BB         Exercise 3    Exercise Name 3 Calf stretch with towel gastroc/soleus  -BB      Reps 3 3  -BB      Time 3 30  -BB         Exercise 4    Exercise Name 4 towel scrunches  -BB      Time 4 3 min  -BB         Exercise 5    Exercise Name 5 rocker board  -BB      Reps 5 20  -BB      Additional Comments PF/DF/IV/EV  -BB        User Key  (r) = Recorded By, (t) = Taken By, (c) = Cosigned By    Initials Name Provider Type    Jada Moody PTA Physical Therapy Assistant                        Manual Rx (last 36 hours)      Manual Treatments     Row Name 08/09/18 0900             Manual Rx 1    Manual Rx 1 Location STM and MFR L PF and calf  -BB      Manual Rx 1 Duration 15 min  -BB        User Key  (r) = Recorded By, (t) = Taken By, (c) = Cosigned By    Initials Name Provider Type    Jada Moody PTA Physical Therapy Assistant                PT OP Goals     Row Name 08/09/18 0800          PT Short Term Goals    STG Date to Achieve 08/27/18  -BB     STG 1 Independent in final HEP   -BB     STG 1 Progress Not Met  -BB     STG 2 minimal to no ttp of PF of left   -BB     STG 2 Progress Not Met  -BB     STG 3 Independent in wear and care of orthotics    -BB     STG 3 Progress Not Met  -BB        Time Calculation    PT Goal Re-Cert Due Date 08/27/18  -BB       User Key  (r) = Recorded By, (t) = Taken By, (c) = Cosigned By    Initials Name Provider Type    Jada Moody PTA Physical Therapy Assistant          Therapy Education  Given: HEP  Program: New  How Provided: Verbal, Demonstration, Written (Calf stretch, towel scrunches)              Time Calculation:   Start Time: 0850  Stop Time: 0932  Time Calculation (min): 42 min  Total Timed Code Minutes- PT: 42 minute(s)  Therapy Suggested Charges     Code   Minutes Charges    None            Therapy Charges for Today     Code Description Service Date Service Provider Modifiers Qty    80124545265 HC PT ULTRASOUND EA 15 MIN 8/9/2018 Jada Browne, PTA GP 1    38790820436 HC PT MANUAL THERAPY EA 15 MIN 8/9/2018 Jada Browne, PTA GP 1    91294099701 HC PT THER PROC EA 15 MIN 8/9/2018 Jada Browne, PTA GP 1                    Jada Browne PTA  8/9/2018

## 2018-08-14 ENCOUNTER — HOSPITAL ENCOUNTER (OUTPATIENT)
Dept: PHYSICAL THERAPY | Facility: HOSPITAL | Age: 68
Setting detail: THERAPIES SERIES
Discharge: HOME | End: 2018-08-14
Attending: PODIATRIST

## 2018-08-14 DIAGNOSIS — M72.2 PLANTAR FASCIITIS: Primary | ICD-10-CM

## 2018-08-14 PROCEDURE — 97035 APP MDLTY 1+ULTRASOUND EA 15: CPT

## 2018-08-14 PROCEDURE — 97110 THERAPEUTIC EXERCISES: CPT

## 2018-08-14 NOTE — THERAPY TREATMENT NOTE
Outpatient Physical Therapy Ortho Treatment Note  [facility]     Patient Name: Lexie Cardona  : 1950  MRN: [id number]  Today's Date: 2018      Visit Date: 2018     Subjective Improvement some  Visits 3/3  Visits approved medicare cap  RTMD PRN  Recert Date 2018    Left Plantar Fascitis    Visit Dx:    ICD-10-CM ICD-9-CM   1. Plantar fasciitis M72.2 728.71       Patient Active Problem List   Diagnosis   • Vitamin D deficiency   • Spasm of bladder   • Restless legs   • Myoclonic disorder   • Meniere's disease   • Insomnia   • IBS (irritable bowel syndrome)   • Hypercholesterolemia   • History of colon polyps   • Gout   • Gastroesophageal reflux disease   • Gastric polyp   • Fundic gland polyposis of stomach   • Elevated levels of transaminase & lactic acid dehydrogenase   • Chronic kidney disease (CKD), stage II (mild)   • Allergic rhinitis   • Primary osteoarthritis of both hands   • Primary open angle glaucoma of both eyes, mild stage   • Cataract   • Carpal tunnel syndrome of right wrist   • Nasal obstruction   • Nasal turbinate hypertrophy   • Nasal septal deformity   • Sinusitis, chronic   • Nasal valve collapse   • Minnie bullosa        Past Medical History:   Diagnosis Date   • Acute upper respiratory infection    • Allergic rhinitis    • Asthma     blastomycosis   • Blastomycosis    • Borderline glaucoma    • Bunion    • Callus    • Chronic kidney disease (CKD), stage II (mild)    • Clotting disorder (CMS/HCC)    • Colon polyp    • Dehiscence of surgical wound    • Diverticulitis of colon    • Elevated levels of transaminase & lactic acid dehydrogenase    • Encounter for gynecological examination without abnormal finding    • Episcleritis    • Fundic gland polyposis of stomach    • Gastric polyp    • Gastritis    • Gastroesophageal reflux disease    • Gastroparesis    • Gout    • H/O mammogram    • History of colon polyps    • Hypercholesterolemia    • IBS (irritable  bowel syndrome)    • Insomnia    • Mass of ovary     fixed cyst, post removal, no sign of cancer   • Meniere's disease    • Myoclonic disorder    • Nuclear senile cataract    • Osteoarthritis of multiple joints    • Peripheral edema    • Plantar fasciitis    • PONV (postoperative nausea and vomiting)    • Pruritus of vagina    • Restless legs    • Seasonal allergic rhinitis    • Skin cancer     RUE   • Sleep apnea     not using c-pap   • Spasm of bladder    • Vaginal irritation    • Vitamin D deficiency         Past Surgical History:   Procedure Laterality Date   • APPENDECTOMY     • BRONCHOSCOPY  09/10/2001    density, upper lobe posteriorly, left. no endobronchial lesions seen   • CARPAL TUNNEL RELEASE WITH CUBITAL TUNNEL RELEASE  05/03/2013    Right Shoulder And Wrist I   • COLONOSCOPY  2010    2 polyps, repeat 5 yr   • COLONOSCOPY W/ POLYPECTOMY  07/23/2015    diverticulosis found in the sigmoid colon. a single polyp found in the cecum; ascending colon, and sigmoid colon. all removed by cold biopsy polypectomy. hemorrhoids found   • CYSTOSCOPY  11/13/1974    and x-ray urethral diverticulography. urethral diverticulum   • DENTAL PROCEDURE  11/19/2004    extraction of tooth #30 with local anesthesia and IV sedation   • ENDOSCOPIC FUNCTIONAL SINUS SURGERY (FESS) Right 3/6/2018    Procedure: RIGHT ENDOSCOPICS SINUS SURGERY OF THE MAXILLARY SINUS, BILATERAL PARTIAL INFERIOR TURBINECTOMIES, LEFT ENDOSCOPIC EJ BULLOSA RESECTION, NASAL SEPTOPLASTY, NASAL VALVE REPAIR  ;  Surgeon: Pablo Navas MD;  Location: [facility];  Service:    • ENDOSCOPY AND COLONOSCOPY  2005    dininutive polyp in the rectum. diminutive polyp 30cm from the anus. diminutive polyp ascending colon. all polyps were removed by hot biopsy polypectomy. multiple diverticula in the sigmoid   • INJECTION OF MEDICATION  03/21/2014    celestone   • INJECTION OF MEDICATION  03/11/2013    dexamethasone   • INJECTION OF MEDICATION  05/09/2014    kenalog    • LAPAROSCOPIC CHOLECYSTECTOMY     • OTHER SURGICAL HISTORY  1960    Multiple Cysts To Right Ovary   • OTHER SURGICAL HISTORY  1969    Transurethral Resection   • OVARIAN CYST REMOVAL  11/14/1974    resection of left ovarian cyst & left salpingectomy. appendectomy   • TONSILECTOMY, ADENOIDECTOMY, BILATERAL MYRINGOTOMY AND TUBES  1954   • TOTAL ABDOMINAL HYSTERECTOMY WITH SALPINGO OOPHORECTOMY  08/18/2015    total abdominal hysterectomy and bilateral salpingo-oophorectomy. enbterolysis and lysis of adhesions.  Completed At [facility] In [place].                             PT Assessment/Plan     Row Name 08/14/18 0905          PT Assessment    Assessment Comments TTP  to L PF.  Patient 10 minutes late for appointment  -CP        PT Plan    PT Frequency 2x/week  -CP     Predicted Duration of Therapy Intervention (Therapy Eval) 3-4 weeks  -CP     PT Plan Comments Cont with POC.  -CP       User Key  (r) = Recorded By, (t) = Taken By, (c) = Cosigned By    Initials Name Provider Type    CP Hedy Walters, [place] Physical Therapy Assistant                Modalities     Row Name 08/14/18 0800             Subjective Pain    Able to rate subjective pain? yes  -CP      Pre-Treatment Pain Level 4  -CP      Post-Treatment Pain Level 4  -CP         Ice    Ice Applied Yes  -CP      Location L PF  -CP      Rx Minutes 15 mins  -CP      Ice S/P Rx Yes  -CP         Ultrasound [id number]    Location L PF  -CP      Duty Cycle 100  -CP      Frequency 3.0 MHz  -CP      Intensity - Wts/cm 1.5  -CP        User Key  (r) = Recorded By, (t) = Taken By, (c) = Cosigned By    Initials Name Provider Type    CP Hedy Walters, LIB[facility] Physical Therapy Assistant                Exercises     Row Name 08/14/18 0800             Subjective Comments    Subjective Comments Is having a hard time finding a good shoe to wear.  Du dont seem to fit her right.  -CP         Subjective Pain    Able to rate subjective pain? yes  -CP      Pre-Treatment  Pain Level 4  -CP      Post-Treatment Pain Level 4  -CP         Exercise 1    Exercise Name 1 incline stretch  -CP      Sets 1 3  -CP      Time 1 30  -CP         Exercise 2    Exercise Name 2 soleous stretch  -CP      Sets 2 3  -CP      Time 2 30  -CP         Exercise 3    Exercise Name 3 towel scrunching  -CP      Time 3 4  -CP         Exercise 4    Exercise Name 4 see modalities  -CP         Exercise 5    Exercise Name 5 see manual  -CP        User Key  (r) = Recorded By, (t) = Taken By, (c) = Cosigned By    Initials Name Provider Type    CP Hedy Walters, [employer] Physical Therapy Assistant                        Manual Rx (last 36 hours)      Manual Treatments     Row Name 08/14/18 0900             Manual Rx 1    Manual Rx 1 Location STM to L PF  -CP      Manual Rx 1 Duration 10  -CP        User Key  (r) = Recorded By, (t) = Taken By, (c) = Cosigned By    Initials Name Provider Type    Hedy Kennedy, LIB[employer] Physical Therapy Assistant                PT OP Goals     Row Name 08/14/18 0900          PT Short Term Goals    STG Date to Achieve 08/27/18  -CP     STG 1 Independent in final HEP   -CP     STG 1 Progress Not Met  -CP     STG 2 minimal to no ttp of PF of left   -CP     STG 2 Progress Not Met  -CP     STG 3 Independent in wear and care of orthotics    -CP     STG 3 Progress Not Met  -CP        Time Calculation    PT Goal Re-Cert Due Date 08/27/18  -CP       User Key  (r) = Recorded By, (t) = Taken By, (c) = Cosigned By    Initials Name Provider Type    Hedy Kennedy PTA Physical Therapy Assistant          Therapy Education  Education Details: ice  Given: Symptoms/condition management, Pain management              Time Calculation:   Start Time: 0812  Stop Time: 0900  Time Calculation (min): 48 min  Total Timed Code Minutes- PT: 30 minute(s)  Therapy Suggested Charges     Code   Minutes Charges    None           Therapy Charges for Today     Code Description Service Date Service Provider Modifiers  Qty    83990774813 HC PT THER PROC EA 15 MIN 8/14/2018 Hedy Walters, PTA GP 1    66752477752 HC PT ULTRASOUND EA 15 MIN 8/14/2018 Hedy Walters, PTA GP 1    08230812308 HC PT THER SUPP EA 15 MIN 8/14/2018 Hedy Walters, PTA GP 1                    Hedy Walters, PTA  8/14/2018

## 2018-08-15 ENCOUNTER — OFFICE VISIT (OUTPATIENT)
Dept: OTOLARYNGOLOGY | Facility: CLINIC | Age: 68
End: 2018-08-15

## 2018-08-15 VITALS — BODY MASS INDEX: 29.53 KG/M2 | WEIGHT: 173 LBS | TEMPERATURE: 97.1 F | HEIGHT: 64 IN

## 2018-08-15 DIAGNOSIS — IMO0002 POSITIONAL VERTIGO OF BOTH EARS: ICD-10-CM

## 2018-08-15 DIAGNOSIS — B37.9 CANDIDIASIS: Primary | ICD-10-CM

## 2018-08-15 PROCEDURE — 99213 OFFICE O/P EST LOW 20 MIN: CPT | Performed by: OTOLARYNGOLOGY

## 2018-08-15 RX ORDER — CLOTRIMAZOLE 10 MG/1
10 LOZENGE ORAL; TOPICAL
Qty: 40 TABLET | Refills: 0 | Status: SHIPPED | OUTPATIENT
Start: 2018-08-15 | End: 2018-09-26

## 2018-08-15 RX ORDER — FLUCONAZOLE 150 MG/1
150 TABLET ORAL ONCE
Qty: 1 TABLET | Refills: 0 | Status: SHIPPED | OUTPATIENT
Start: 2018-08-15 | End: 2018-08-15

## 2018-08-15 NOTE — PATIENT INSTRUCTIONS

## 2018-08-15 NOTE — PROGRESS NOTES
Subjective   Lexie Cardona is a 68 y.o. female.     Complaints of tongue problem  History of Present Illness      states she's been placed on 2 different antibiotics for problems with her tongue and throat started if she burned her tongue.  She's having discomfort some fissuring of her tongue she's able E adequately and not dehydrated does not have any neck masses her sinuses or not bothering her much at this point she said she was told she had fluid in her ear recently as well she's using Mucinex she's not been on any antifungal drugs    The following portions of the patient's history were reviewed and updated as appropriate: allergies, current medications, past family history, past medical history, past social history, past surgical history and problem list.      Current Outpatient Prescriptions:   •  Alum Hydroxide-Mag Carbonate (GAVISCON PO), Take  by mouth As Needed. Tablets Or Liquid, As Needed , Disp: , Rfl:   •  Biotin 5 MG capsule, Take 1 capsule by mouth Daily., Disp: , Rfl:   •  Calcium Citrate-Vitamin D (CALCIUM + D PO), Take 1,200 mg by mouth Daily., Disp: , Rfl:   •  cetirizine (ZyrTEC) 10 MG tablet, Take 10 mg by mouth daily., Disp: , Rfl:   •  Chlorpheniramine Maleate ER 12 MG tablet controlled-release, Take 1 tablet by mouth every night at bedtime., Disp: 30 each, Rfl: 3  •  cholecalciferol (VITAMIN D3) 400 units tablet, Take 400 Units by mouth Daily., Disp: , Rfl:   •  Cranberry-Vitamin C-Vitamin E (CRANBERRY PLUS VITAMIN C) 4200-20-3 MG-MG-UNIT capsule, Take 2 tablets by mouth Every Night., Disp: , Rfl:   •  EPINEPHrine (EPIPEN 2-ALBERT) 0.3 MG/0.3ML solution auto-injector injection, Use as directed for Allergic Reaction, Disp: 1 each, Rfl: 5  •  ESTRING 2 MG vaginal ring, INSERT 2 MG INTO THE VAGINA EVERY 3 (THREE) MONTHS. FOLLOW PACKAGE DIRECTIONS, Disp: 1 each, Rfl: 0  •  Flaxseed, Linseed, (FLAX SEED OIL) 1000 MG capsule, Take 1 capsule by mouth 2 (two) times a day., Disp: , Rfl:    •  gabapentin (NEURONTIN) 600 MG tablet, Take 1 tablet by mouth 2 (Two) Times a Day. 1 qam and 1/2 qhs, Disp: 60 tablet, Rfl: 2  •  hydrOXYzine (VISTARIL) 100 MG capsule, Take 1 capsule by mouth 3 (Three) Times a Day As Needed for Allergies., Disp: 30 capsule, Rfl: 2  •  meclizine (ANTIVERT) 25 MG tablet, Take 25 mg by mouth 3 (Three) Times a Day As Needed for dizziness., Disp: , Rfl:   •  mometasone (NASONEX) 50 MCG/ACT nasal spray, 2 sprays into each nostril 2 (Two) Times a Day., Disp: 17 g, Rfl: 5  •  montelukast (SINGULAIR) 10 MG tablet, Take 1 tablet by mouth Every Night., Disp: 30 tablet, Rfl: 11  •  Multiple Vitamins-Minerals (CENTRUM SILVER PO), Take 1 tablet by mouth daily., Disp: , Rfl:   •  olopatadine (PATANASE) 0.6 % solution nasal solution, 2 sprays by Each Nare route 2 (Two) Times a Day., Disp: 30 g, Rfl: 3  •  Probiotic Product (PROBIOTIC FORMULA PO), Take 1 tablet by mouth Every Night. 10 billion cell (2 billion ea) capsule , Disp: , Rfl:   •  promethazine (PHENERGAN) 25 MG tablet, Take 1 tablet by mouth Every 6 (Six) Hours As Needed for Nausea or Vomiting., Disp: 30 tablet, Rfl: 2  •  Sennosides 25 MG tablet, Take 2 tablets by mouth Every Night., Disp: , Rfl:   •  temazepam (RESTORIL) 30 MG capsule, Take 1 capsule by mouth At Night As Needed for Sleep., Disp: 30 capsule, Rfl: 2  •  traZODone (DESYREL) 100 MG tablet, Take 1 tablet by mouth Every Night., Disp: 90 tablet, Rfl: 3  •  clotrimazole (MYCELEX) 10 MG sara, Take 1 tablet by mouth 5 (Five) Times a Day., Disp: 40 tablet, Rfl: 0  •  fluconazole (DIFLUCAN) 150 MG tablet, Take 1 tablet by mouth 1 (One) Time for 1 dose., Disp: 1 tablet, Rfl: 0    Allergies   Allergen Reactions   • Augmentin [Amoxicillin-Pot Clavulanate] Anaphylaxis   • Ceclor [Cefaclor] Anaphylaxis   • Nsaids Anaphylaxis   • Other Anaphylaxis     Cats  E.E.S. 200  Lead   Mold  [place] Pastrani - Anaphylaxis   • Penicillins Anaphylaxis   • Aspirin Other (See Comments)      tinnitis    • Biaxin [Clarithromycin] Other (See Comments)     angioedema   • Ciprofloxacin Other (See Comments)     Lips burning   • Contrast Dye Hives and Itching   • Demerol [Meperidine] Nausea And Vomiting   • Iodine      And iodide containing products   • Macrobid [Nitrofurantoin Monohyd Macro] Diarrhea, Nausea And Vomiting and GI Intolerance   • Requip [Ropinirole Hcl] Diarrhea, Nausea And Vomiting and GI Intolerance   • Septra [Sulfamethoxazole-Trimethoprim] Nausea And Vomiting and GI Intolerance   • Statins Other (See Comments)       *muscle enzyme increase with myalgias   • Zithromax [Azithromycin] Other (See Comments)     States she hasn't had a reaction, but doctor said he wasn't going to give it to her   • Niaspan [Niacin Er] Rash   • Nickel Rash             Review of Systems   Constitutional: Negative for fever.   HENT: Positive for rhinorrhea. Negative for congestion, facial swelling, trouble swallowing and voice change.         Irritated tongue   Hematological: Negative for adenopathy.           Objective   Physical Exam   Constitutional: She appears well-developed.   HENT:   Head: Normocephalic.   Right Ear: Hearing, tympanic membrane, external ear and ear canal normal.   Left Ear: Hearing, tympanic membrane, external ear and ear canal normal.   Mouth/Throat: Oropharynx is clear and moist. No tonsillar exudate.       Eyes: Conjunctivae are normal.   Neck: Normal range of motion.   Pulmonary/Chest: Effort normal.   Neurological: She is alert.   Skin: Skin is warm.   Psychiatric: She has a normal mood and affect. Thought content normal.           Assessment/Plan   Lexie was seen today for follow-up.    Diagnoses and all orders for this visit:    Candidiasis    Positional vertigo of both ears  -     Ambulatory Referral to Physical Therapy Vestibular    Other orders  -     fluconazole (DIFLUCAN) 150 MG tablet; Take 1 tablet by mouth 1 (One) Time for 1 dose.  -     clotrimazole (MYCELEX) 10 MG sara;  Take 1 tablet by mouth 5 (Five) Times a Day.         discussed voiding antibiotics and further steroids    All if no better within 1 week     Discussed use and side effects of the above medications

## 2018-08-16 ENCOUNTER — HOSPITAL ENCOUNTER (OUTPATIENT)
Dept: PHYSICAL THERAPY | Facility: HOSPITAL | Age: 68
Setting detail: THERAPIES SERIES
Discharge: HOME | End: 2018-08-16
Attending: PODIATRIST

## 2018-08-16 DIAGNOSIS — M72.2 PLANTAR FASCIITIS: Primary | ICD-10-CM

## 2018-08-16 PROCEDURE — 97035 APP MDLTY 1+ULTRASOUND EA 15: CPT

## 2018-08-16 PROCEDURE — 97110 THERAPEUTIC EXERCISES: CPT

## 2018-08-16 NOTE — THERAPY TREATMENT NOTE
Outpatient Physical Therapy Ortho Treatment Note  [facility]     Patient Name: Lexie Cardona  : 1950  MRN: [id number]  Today's Date: 2018      Visit Date: 2018     Subjective Improvement 50%  Visits 4/4  Visits approved medicare cap  RTMD PRN  Recert Date 2018    L Plantar Fascitis    Visit Dx:    ICD-10-CM ICD-9-CM   1. Plantar fasciitis M72.2 728.71       Patient Active Problem List   Diagnosis   • Vitamin D deficiency   • Spasm of bladder   • Restless legs   • Myoclonic disorder   • Meniere's disease   • Insomnia   • IBS (irritable bowel syndrome)   • Hypercholesterolemia   • History of colon polyps   • Gout   • Gastroesophageal reflux disease   • Gastric polyp   • Fundic gland polyposis of stomach   • Elevated levels of transaminase & lactic acid dehydrogenase   • Chronic kidney disease (CKD), stage II (mild)   • Allergic rhinitis   • Primary osteoarthritis of both hands   • Primary open angle glaucoma of both eyes, mild stage   • Cataract   • Carpal tunnel syndrome of right wrist   • Nasal obstruction   • Nasal turbinate hypertrophy   • Nasal septal deformity   • Sinusitis, chronic   • Nasal valve collapse   • Minnie bullosa        Past Medical History:   Diagnosis Date   • Acute upper respiratory infection    • Allergic rhinitis    • Asthma     blastomycosis   • Blastomycosis    • Borderline glaucoma    • Bunion    • Callus    • Chronic kidney disease (CKD), stage II (mild)    • Clotting disorder (CMS/HCC)    • Colon polyp    • Dehiscence of surgical wound    • Diverticulitis of colon    • Elevated levels of transaminase & lactic acid dehydrogenase    • Encounter for gynecological examination without abnormal finding    • Episcleritis    • Fundic gland polyposis of stomach    • Gastric polyp    • Gastritis    • Gastroesophageal reflux disease    • Gastroparesis    • Gout    • H/O mammogram    • History of colon polyps    • Hypercholesterolemia    • IBS (irritable bowel  syndrome)    • Insomnia    • Mass of ovary     fixed cyst, post removal, no sign of cancer   • Meniere's disease    • Myoclonic disorder    • Nuclear senile cataract    • Osteoarthritis of multiple joints    • Peripheral edema    • Plantar fasciitis    • PONV (postoperative nausea and vomiting)    • Pruritus of vagina    • Restless legs    • Seasonal allergic rhinitis    • Skin cancer     RUE   • Sleep apnea     not using c-pap   • Spasm of bladder    • Vaginal irritation    • Vitamin D deficiency         Past Surgical History:   Procedure Laterality Date   • APPENDECTOMY     • BRONCHOSCOPY  09/10/2001    density, upper lobe posteriorly, left. no endobronchial lesions seen   • CARPAL TUNNEL RELEASE WITH CUBITAL TUNNEL RELEASE  05/03/2013    Right Shoulder And Wrist I   • COLONOSCOPY  2010    2 polyps, repeat 5 yr   • COLONOSCOPY W/ POLYPECTOMY  07/23/2015    diverticulosis found in the sigmoid colon. a single polyp found in the cecum; ascending colon, and sigmoid colon. all removed by cold biopsy polypectomy. hemorrhoids found   • CYSTOSCOPY  11/13/1974    and x-ray urethral diverticulography. urethral diverticulum   • DENTAL PROCEDURE  11/19/2004    extraction of tooth #30 with local anesthesia and IV sedation   • ENDOSCOPIC FUNCTIONAL SINUS SURGERY (FESS) Right 3/6/2018    Procedure: RIGHT ENDOSCOPICS SINUS SURGERY OF THE MAXILLARY SINUS, BILATERAL PARTIAL INFERIOR TURBINECTOMIES, LEFT ENDOSCOPIC EJ BULLOSA RESECTION, NASAL SEPTOPLASTY, NASAL VALVE REPAIR  ;  Surgeon: Pablo Navas MD;  Location: [facility];  Service:    • ENDOSCOPY AND COLONOSCOPY  2005    dininutive polyp in the rectum. diminutive polyp 30cm from the anus. diminutive polyp ascending colon. all polyps were removed by hot biopsy polypectomy. multiple diverticula in the sigmoid   • INJECTION OF MEDICATION  03/21/2014    celestone   • INJECTION OF MEDICATION  03/11/2013    dexamethasone   • INJECTION OF MEDICATION  05/09/2014    kenalog   •  LAPAROSCOPIC CHOLECYSTECTOMY     • OTHER SURGICAL HISTORY  1960    Multiple Cysts To Right Ovary   • OTHER SURGICAL HISTORY  1969    Transurethral Resection   • OVARIAN CYST REMOVAL  11/14/1974    resection of left ovarian cyst & left salpingectomy. appendectomy   • TONSILECTOMY, ADENOIDECTOMY, BILATERAL MYRINGOTOMY AND TUBES  1954   • TOTAL ABDOMINAL HYSTERECTOMY WITH SALPINGO OOPHORECTOMY  08/18/2015    total abdominal hysterectomy and bilateral salpingo-oophorectomy. enbterolysis and lysis of adhesions.  [facility] [place] [facility] [place].                             PT Assessment/Plan     Row Name 08/16/18 1048          PT Assessment    Assessment Comments Patient less T TP to L PF.  P atient was encourage to ice.  -CP        PT Plan    PT Frequency 2x/week  -CP     Predicted Duration of Therapy Intervention (Therapy Eval) 3-4 weeks  -CP     PT Plan Comments Cont with POC.  SL stance L LE next  -CP       User Key  (r) = Recorded By, (t) = Taken By, (c) = Cosigned By    Initials Name Provider Type    CP Hedy Walters PTA Physical Therapy Assistant                Modalities     Row Name 08/16/18 0700             Subjective Comments    Subjective Comments She is doing much better.  She only has pain when she steps wrong  -CP         Subjective Pain    Able to rate subjective pain? yes  -CP         Ice    Ice Applied Yes  -CP      Location L PF  -CP      Rx Minutes 15 mins  -CP      Ice S/P Rx Yes  -CP         Ultrasound [id number]    Location L PF  -CP      Duty Cycle 100  -CP      Frequency 3.0 MHz  -CP      Intensity - Wts/cm 1.5  -CP        User Key  (r) = Recorded By, (t) = Taken By, (c) = Cosigned By    Initials Name Provider Type    Hedy Kennedy PTA Physical Therapy Assistant                Exercises     Row Name 08/16/18 0700             Subjective Comments    Subjective Comments She is doing much better.  She only has pain when she steps wrong  -CP         Subjective Pain    Able to rate  subjective pain? yes  -CP      Pre-Treatment Pain Level 3  -CP      Post-Treatment Pain Level 0  -CP      Subjective Pain Comment numb from ice  -CP         Exercise 1    Exercise Name 1 Pro II level 3  -CP      Time 1 10  -CP         Exercise 2    Exercise Name 2 Incline stretch  -CP      Sets 2 3  -CP      Time 2 30  -CP         Exercise 3    Exercise Name 3 soleous stretch  -CP      Sets 3 3  -CP      Time 3 30  -CP         Exercise 4    Exercise Name 4 toe yoga  -CP      Reps 4 20  -CP         Exercise 5    Exercise Name 5 roller to PF  -CP      Time 5 4  -CP         Exercise 6    Exercise Name 6 marble   -CP      Reps 6 2  -CP         Exercise 7    Exercise Name 7 see modalities  -CP         Exercise 8    Exercise Name 8 see manual  -CP        User Key  (r) = Recorded By, (t) = Taken By, (c) = Cosigned By    Initials Name Provider Type    CP Hedy Walters, PTA Physical Therapy Assistant                        Manual Rx (last 36 hours)      Manual Treatments     Row Name 08/16/18 0900             Manual Rx 1    Manual Rx 1 Location STM to L PF  -CP      Manual Rx 1 Duration 10  -CP        User Key  (r) = Recorded By, (t) = Taken By, (c) = Cosigned By    Initials Name Provider Type    CP Hedy Walters, PTA Physical Therapy Assistant                PT OP Goals     Row Name 08/16/18 0900          PT Short Term Goals    STG Date to Achieve 08/27/18  -CP     STG 1 Independent in final HEP   -CP     STG 1 Progress Not Met  -CP     STG 2 minimal to no ttp of PF of left   -CP     STG 2 Progress Not Met  -CP     STG 3 Independent in wear and care of orthotics    -CP     STG 3 Progress Not Met  -CP        Time Calculation    PT Goal Re-Cert Due Date 08/27/18  -CP       User Key  (r) = Recorded By, (t) = Taken By, (c) = Cosigned By    Initials Name Provider Type    CP Hedy Walters, PTA Physical Therapy Assistant          Therapy Education  Education Details: toe yoga. roller to PF  Given: HEP  Program:  New  How Provided: Verbal, Demonstration  Provided to: Patient  Level of Understanding: Verbalized, Demonstrated              Time Calculation:   Start Time: 0800  Stop Time: 0900  Time Calculation (min): 60 min  Total Timed Code Minutes- PT: 45 minute(s)  Therapy Suggested Charges     Code   Minutes Charges    None           Therapy Charges for Today     Code Description Service Date Service Provider Modifiers Qty    89646371622 HC PT ULTRASOUND EA 15 MIN 8/16/2018 Hedy Walters, PTA GP 1    76901645657 HC PT THER PROC EA 15 MIN 8/16/2018 Hedy Walters, PTA GP 2    88598029691 HC PT THER SUPP EA 15 MIN 8/16/2018 Hedy Walters, PTA GP 1                    Hedy Walters PTA  8/16/2018

## 2018-08-21 ENCOUNTER — DOCUMENTATION (OUTPATIENT)
Dept: OTOLARYNGOLOGY | Facility: CLINIC | Age: 68
End: 2018-08-21

## 2018-08-21 ENCOUNTER — HOSPITAL ENCOUNTER (OUTPATIENT)
Dept: PHYSICAL THERAPY | Facility: HOSPITAL | Age: 68
Setting detail: THERAPIES SERIES
Discharge: HOME | End: 2018-08-21
Attending: PODIATRIST

## 2018-08-21 DIAGNOSIS — M72.2 PLANTAR FASCIITIS: Primary | ICD-10-CM

## 2018-08-21 PROCEDURE — 97110 THERAPEUTIC EXERCISES: CPT

## 2018-08-21 RX ORDER — FLUCONAZOLE 150 MG/1
150 TABLET ORAL ONCE
Qty: 1 TABLET | Refills: 0 | Status: SHIPPED | OUTPATIENT
Start: 2018-08-21 | End: 2018-08-21

## 2018-08-21 NOTE — THERAPY TREATMENT NOTE
Outpatient Physical Therapy Ortho Treatment Note  [facility]     Patient Name: Lexie Cardona  : 1950  MRN: [id number]  Today's Date: 2018      Visit Date: 2018     Subjective Improvement 5/5  Visits 5/5  Visits approved medicare cap  RTMD PRN  Recert Date 2018    L Plantar Pascitis    Visit Dx:    ICD-10-CM ICD-9-CM   1. Plantar fasciitis M72.2 728.71       Patient Active Problem List   Diagnosis   • Vitamin D deficiency   • Spasm of bladder   • Restless legs   • Myoclonic disorder   • Meniere's disease   • Insomnia   • IBS (irritable bowel syndrome)   • Hypercholesterolemia   • History of colon polyps   • Gout   • Gastroesophageal reflux disease   • Gastric polyp   • Fundic gland polyposis of stomach   • Elevated levels of transaminase & lactic acid dehydrogenase   • Chronic kidney disease (CKD), stage II (mild)   • Allergic rhinitis   • Primary osteoarthritis of both hands   • Primary open angle glaucoma of both eyes, mild stage   • Cataract   • Carpal tunnel syndrome of right wrist   • Nasal obstruction   • Nasal turbinate hypertrophy   • Nasal septal deformity   • Sinusitis, chronic   • Nasal valve collapse   • Minnie bullosa        Past Medical History:   Diagnosis Date   • Acute upper respiratory infection    • Allergic rhinitis    • Asthma     blastomycosis   • Blastomycosis    • Borderline glaucoma    • Bunion    • Callus    • Chronic kidney disease (CKD), stage II (mild)    • Clotting disorder (CMS/HCC)    • Colon polyp    • Dehiscence of surgical wound    • Diverticulitis of colon    • Elevated levels of transaminase & lactic acid dehydrogenase    • Encounter for gynecological examination without abnormal finding    • Episcleritis    • Fundic gland polyposis of stomach    • Gastric polyp    • Gastritis    • Gastroesophageal reflux disease    • Gastroparesis    • Gout    • H/O mammogram    • History of colon polyps    • Hypercholesterolemia    • IBS (irritable bowel  syndrome)    • Insomnia    • Mass of ovary     fixed cyst, post removal, no sign of cancer   • Meniere's disease    • Myoclonic disorder    • Nuclear senile cataract    • Osteoarthritis of multiple joints    • Peripheral edema    • Plantar fasciitis    • PONV (postoperative nausea and vomiting)    • Pruritus of vagina    • Restless legs    • Seasonal allergic rhinitis    • Skin cancer     RUE   • Sleep apnea     not using c-pap   • Spasm of bladder    • Vaginal irritation    • Vitamin D deficiency         Past Surgical History:   Procedure Laterality Date   • APPENDECTOMY     • BRONCHOSCOPY  09/10/2001    density, upper lobe posteriorly, left. no endobronchial lesions seen   • CARPAL TUNNEL RELEASE WITH CUBITAL TUNNEL RELEASE  05/03/2013    Right Shoulder And Wrist I   • COLONOSCOPY  2010    2 polyps, repeat 5 yr   • COLONOSCOPY W/ POLYPECTOMY  07/23/2015    diverticulosis found in the sigmoid colon. a single polyp found in the cecum; ascending colon, and sigmoid colon. all removed by cold biopsy polypectomy. hemorrhoids found   • CYSTOSCOPY  11/13/1974    and x-ray urethral diverticulography. urethral diverticulum   • DENTAL PROCEDURE  11/19/2004    extraction of tooth #30 with local anesthesia and IV sedation   • ENDOSCOPIC FUNCTIONAL SINUS SURGERY (FESS) Right 3/6/2018    Procedure: RIGHT ENDOSCOPICS SINUS SURGERY OF THE MAXILLARY SINUS, BILATERAL PARTIAL INFERIOR TURBINECTOMIES, LEFT ENDOSCOPIC EJ BULLOSA RESECTION, NASAL SEPTOPLASTY, NASAL VALVE REPAIR  ;  Surgeon: Pablo Navas MD;  Location: [facility];  Service:    • ENDOSCOPY AND COLONOSCOPY  2005    dininutive polyp in the rectum. diminutive polyp 30cm from the anus. diminutive polyp ascending colon. all polyps were removed by hot biopsy polypectomy. multiple diverticula in the sigmoid   • INJECTION OF MEDICATION  03/21/2014    celestone   • INJECTION OF MEDICATION  03/11/2013    dexamethasone   • INJECTION OF MEDICATION  05/09/2014    kenalog   •  LAPAROSCOPIC CHOLECYSTECTOMY     • OTHER SURGICAL HISTORY  1960    Multiple Cysts To Right Ovary   • OTHER SURGICAL HISTORY  1969    Transurethral Resection   • OVARIAN CYST REMOVAL  11/14/1974    resection of left ovarian cyst & left salpingectomy. appendectomy   • TONSILECTOMY, ADENOIDECTOMY, BILATERAL MYRINGOTOMY AND TUBES  1954   • TOTAL ABDOMINAL HYSTERECTOMY WITH SALPINGO OOPHORECTOMY  08/18/2015    total abdominal hysterectomy and bilateral salpingo-oophorectomy. enbterolysis and lysis of adhesions.  Completed At [facility], [place], operated by [facility] [place].                             PT Assessment/Plan     Row Name 08/21/18 0950          PT Assessment    Assessment Comments Demetria stoddard appears to be compliant with HEP.  TTP to L PF.    -CP        PT Plan    PT Frequency 2x/week  -CP     Predicted Duration of Therapy Intervention (Therapy Eval) 3-4 weeks  -CP     PT Plan Comments 3 more visits then recheck for dizziness and balance  -CP       User Key  (r) = Recorded By, (t) = Taken By, (c) = Cosigned By    Initials Name Provider Type    CP Hedy Walters PTA Physical Therapy Assistant                Modalities     Row Name 08/21/18 0800             Ice    Ice Applied Yes  -CP      Location L PF  -CP      Rx Minutes 15 mins  -CP      Ice S/P Rx Yes  -CP         Ultrasound 61911    Location L PF  -CP      Duty Cycle 100  -CP      Frequency 1.0 MHz  -CP      Intensity - Wts/cm 1.5  -CP        User Key  (r) = Recorded By, (t) = Taken By, (c) = Cosigned By    Initials Name Provider Type    CP Hedy Walters PTA Physical Therapy Assistant                Exercises     Row Name 08/21/18 0800             Subjective Comments    Subjective Comments Patient 25 minutes late.  She is waiting on orthrotics to come in before getting new shoes.  -CP         Subjective Pain    Able to rate subjective pain? yes  -CP      Pre-Treatment Pain Level 5  -CP      Post-Treatment Pain Level 0  -CP      Subjective Pain Comment Pain is worse in  the AM  -CP         Exercise 1    Exercise Name 1 Pro II level 3  -CP      Time 1 10  -CP         Exercise 2    Exercise Name 2 Incline Stretch  -CP      Sets 2 3  -CP      Time 2 30  -CP         Exercise 3    Exercise Name 3 soleous stretch  -CP      Sets 3 3  -CP      Time 3 30  -CP         Exercise 4    Exercise Name 4 CR/TR  -CP      Reps 4 20  -CP         Exercise 5    Exercise Name 5 SL Stance  -CP      Reps 5 3  -CP         Exercise 6    Exercise Name 6 Rocker board  -CP      Reps 6 30  -CP         Exercise 7    Exercise Name 7 towell scrunches  -CP      Reps 7 30  -CP         Exercise 8    Exercise Name 8 toe yoga  -CP      Reps 8 20  -CP         Exercise 9    Exercise Name 9 see modalities  -CP         Exercise 10    Exercise Name 10 See manual  -CP        User Key  (r) = Recorded By, (t) = Taken By, (c) = Cosigned By    Initials Name Provider Type    CP Hedy Walters, PTA Physical Therapy Assistant                        Manual Rx (last 36 hours)      Manual Treatments     Row Name 08/21/18 0900             Manual Rx 1    Manual Rx 1 Location L PF  -CP      Manual Rx 1 Type STM  -CP      Manual Rx 1 Duration 10  -CP        User Key  (r) = Recorded By, (t) = Taken By, (c) = Cosigned By    Initials Name Provider Type    CP Hedy Walters, PTA Physical Therapy Assistant                PT OP Goals     Row Name 08/21/18 0900          PT Short Term Goals    STG Date to Achieve 08/27/18  -CP     STG 1 Independent in final HEP   -CP     STG 1 Progress Not Met  -CP     STG 2 minimal to no ttp of PF of left   -CP     STG 2 Progress Not Met  -CP     STG 3 Independent in wear and care of orthotics    -CP     STG 3 Progress Not Met  -CP        Time Calculation    PT Goal Re-Cert Due Date 08/27/18  -CP       User Key  (r) = Recorded By, (t) = Taken By, (c) = Cosigned By    Initials Name Provider Type    CP Hedy Walters PTA Physical Therapy Assistant          Therapy Education  Education Details: CR/TR, SL  STance  Given: HEP  Program: New  How Provided: Verbal, Demonstration  Provided to: Patient  Level of Understanding: Verbalized, Demonstrated              Time Calculation:   Start Time: 0825  Stop Time: 0945  Time Calculation (min): 80 min  Total Timed Code Minutes- PT: 60 minute(s)  Therapy Suggested Charges     Code   Minutes Charges    None           Therapy Charges for Today     Code Description Service Date Service Provider Modifiers Qty    36349411171 HC PT THER PROC EA 15 MIN 8/21/2018 Hedy Walters, PTA GP 4    06550797983 HC PT THER SUPP EA 15 MIN 8/21/2018 Hedy Walters, LIB[place] GP 1                    Hedy Walters PTA  8/21/2018

## 2018-08-23 ENCOUNTER — HOSPITAL ENCOUNTER (OUTPATIENT)
Dept: PHYSICAL THERAPY | Facility: HOSPITAL | Age: 68
Setting detail: THERAPIES SERIES
Discharge: HOME | End: 2018-08-23
Attending: PODIATRIST

## 2018-08-23 DIAGNOSIS — M72.2 PLANTAR FASCIITIS: Primary | ICD-10-CM

## 2018-08-23 PROCEDURE — 97110 THERAPEUTIC EXERCISES: CPT

## 2018-08-23 NOTE — THERAPY TREATMENT NOTE
Outpatient Physical Therapy Ortho Treatment Note  [facility]     Patient Name: Lexie Cardona  : 1950  MRN: [id number]  Today's Date: 2018      Visit Date: 2018     Subjective Improvement 50%  Visits 6/6  Visits approved medicare cap  RTMD PRN  Recert date 2018    L Plantor Fascitis    Visit Dx:    ICD-10-CM ICD-9-CM   1. Plantar fasciitis M72.2 728.71       Patient Active Problem List   Diagnosis   • Vitamin D deficiency   • Spasm of bladder   • Restless legs   • Myoclonic disorder   • Meniere's disease   • Insomnia   • IBS (irritable bowel syndrome)   • Hypercholesterolemia   • History of colon polyps   • Gout   • Gastroesophageal reflux disease   • Gastric polyp   • Fundic gland polyposis of stomach   • Elevated levels of transaminase & lactic acid dehydrogenase   • Chronic kidney disease (CKD), stage II (mild)   • Allergic rhinitis   • Primary osteoarthritis of both hands   • Primary open angle glaucoma of both eyes, mild stage   • Cataract   • Carpal tunnel syndrome of right wrist   • Nasal obstruction   • Nasal turbinate hypertrophy   • Nasal septal deformity   • Sinusitis, chronic   • Nasal valve collapse   • Minnie bullosa        Past Medical History:   Diagnosis Date   • Acute upper respiratory infection    • Allergic rhinitis    • Asthma     blastomycosis   • Blastomycosis    • Borderline glaucoma    • Bunion    • Callus    • Chronic kidney disease (CKD), stage II (mild)    • Clotting disorder (CMS/HCC)    • Colon polyp    • Dehiscence of surgical wound    • Diverticulitis of colon    • Elevated levels of transaminase & lactic acid dehydrogenase    • Encounter for gynecological examination without abnormal finding    • Episcleritis    • Fundic gland polyposis of stomach    • Gastric polyp    • Gastritis    • Gastroesophageal reflux disease    • Gastroparesis    • Gout    • H/O mammogram    • History of colon polyps    • Hypercholesterolemia    • IBS (irritable bowel  syndrome)    • Insomnia    • Mass of ovary     fixed cyst, post removal, no sign of cancer   • Meniere's disease    • Myoclonic disorder    • Nuclear senile cataract    • Osteoarthritis of multiple joints    • Peripheral edema    • Plantar fasciitis    • PONV (postoperative nausea and vomiting)    • Pruritus of vagina    • Restless legs    • Seasonal allergic rhinitis    • Skin cancer     RUE   • Sleep apnea     not using c-pap   • Spasm of bladder    • Vaginal irritation    • Vitamin D deficiency         Past Surgical History:   Procedure Laterality Date   • APPENDECTOMY     • BRONCHOSCOPY  09/10/2001    density, upper lobe posteriorly, left. no endobronchial lesions seen   • CARPAL TUNNEL RELEASE WITH CUBITAL TUNNEL RELEASE  05/03/2013    Right Shoulder And Wrist I   • COLONOSCOPY  2010    2 polyps, repeat 5 yr   • COLONOSCOPY W/ POLYPECTOMY  07/23/2015    diverticulosis found in the sigmoid colon. a single polyp found in the cecum; ascending colon, and sigmoid colon. all removed by cold biopsy polypectomy. hemorrhoids found   • CYSTOSCOPY  11/13/1974    and x-ray urethral diverticulography. urethral diverticulum   • DENTAL PROCEDURE  11/19/2004    extraction of tooth #30 with local anesthesia and IV sedation   • ENDOSCOPIC FUNCTIONAL SINUS SURGERY (FESS) Right 3/6/2018    Procedure: RIGHT ENDOSCOPICS SINUS SURGERY OF THE MAXILLARY SINUS, BILATERAL PARTIAL INFERIOR TURBINECTOMIES, LEFT ENDOSCOPIC EJ BULLOSA RESECTION, NASAL SEPTOPLASTY, NASAL VALVE REPAIR  ;  Surgeon: Pablo Navas MD;  Location: [facility];  Service:    • ENDOSCOPY AND COLONOSCOPY  2005    dininutive polyp in the rectum. diminutive polyp 30cm from the anus. diminutive polyp ascending colon. all polyps were removed by hot biopsy polypectomy. multiple diverticula in the sigmoid   • INJECTION OF MEDICATION  03/21/2014    celestone   • INJECTION OF MEDICATION  03/11/2013    dexamethasone   • INJECTION OF MEDICATION  05/09/2014    kenalog   •  LAPAROSCOPIC CHOLECYSTECTOMY     • OTHER SURGICAL HISTORY  1960    Multiple Cysts To Right Ovary   • OTHER SURGICAL HISTORY  1969    Transurethral Resection   • OVARIAN CYST REMOVAL  11/14/1974    resection of left ovarian cyst & left salpingectomy. appendectomy   • TONSILECTOMY, ADENOIDECTOMY, BILATERAL MYRINGOTOMY AND TUBES  1954   • TOTAL ABDOMINAL HYSTERECTOMY WITH SALPINGO OOPHORECTOMY  08/18/2015    total abdominal hysterectomy and bilateral salpingo-oophorectomy. enbterolysis and lysis of adhesions.  Completed At [facility] [place].                             PT Assessment/Plan     Row Name 08/23/18 0903          PT Assessment    Assessment Comments Good fit with orthotics.   -CP        PT Plan    PT Frequency 2x/week  -CP     Predicted Duration of Therapy Intervention (Therapy Eval) 3-4 weeks  -CP     PT Plan Comments monitor response to orthrotics  -CP       User Key  (r) = Recorded By, (t) = Taken By, (c) = Cosigned By    Initials Name Provider Type    CP Hedy Walters, [place] Physical Therapy Assistant                Modalities     Row Name 08/23/18 0800             Subjective Pain    Post-Treatment Pain Level 2  -CP         Ultrasound [id number]    Location L PF  -CP      Duty Cycle 100  -CP      Frequency 1.0 MHz  -CP      Intensity - Wts/cm 1.5  -CP        User Key  (r) = Recorded By, (t) = Taken By, (c) = Cosigned By    Initials Name Provider Type    Hedy Kennedy PTA Physical Therapy Assistant                Exercises     Row Name 08/23/18 0800             Subjective Comments    Subjective Comments patient roughly 10 minutes late for appointment.  Report left leg cramps  -CP         Subjective Pain    Able to rate subjective pain? yes  -CP      Pre-Treatment Pain Level 5  -CP      Post-Treatment Pain Level 2  -CP         Exercise 1    Exercise Name 1 Pro II level 3  -CP      Time 1 10  -CP         Exercise 2    Exercise Name 2 incline stretch  -CP      Sets 2 3  -CP      Time 2 30   -CP         Exercise 3    Exercise Name 3 soleous stretch  -CP      Sets 3 3  -CP      Time 3 30  -CP         Exercise 4    Exercise Name 4 side stepping on foam  -CP      Reps 4 5  -CP         Exercise 5    Exercise Name 5 heel toe gait on foam  -CP      Reps 5 5  -CP         Exercise 6    Exercise Name 6 CR/TR on airex  -CP      Sets 6 2  -CP      Reps 6 10  -CP         Exercise 7    Exercise Name 7 SL stance on foam  -CP      Sets 7 3  -CP      Time 7 30  -CP         Exercise 8    Exercise Name 8 see modalities  -CP         Exercise 9    Exercise Name 9 orthrotic check out  -CP        User Key  (r) = Recorded By, (t) = Taken By, (c) = Cosigned By    Initials Name Provider Type    CP Hedy Walters PTA Physical Therapy Assistant                               PT OP Goals     Row Name 08/23/18 0900          PT Short Term Goals    STG Date to Achieve 08/27/18  -CP     STG 1 Independent in final HEP   -CP     STG 1 Progress Not Met  -CP     STG 2 minimal to no ttp of PF of left   -CP     STG 2 Progress Not Met  -CP     STG 3 Independent in wear and care of orthotics    -CP     STG 3 Progress Met  -CP       User Key  (r) = Recorded By, (t) = Taken By, (c) = Cosigned By    Initials Name Provider Type    CP Hedy Walters PTA Physical Therapy Assistant          Therapy Education  Education Details: orthotic check out  Given: Symptoms/condition management  Program: New  How Provided: Verbal, Demonstration  Provided to: Patient  Level of Understanding: Demonstrated, Verbalized              Time Calculation:   Start Time: 0810  Stop Time: 0905  Time Calculation (min): 55 min  Total Timed Code Minutes- PT: 55 minute(s)  Therapy Suggested Charges     Code   Minutes Charges    None           Therapy Charges for Today     Code Description Service Date Service Provider Modifiers Qty    45944584425 HC PT THER PROC EA 15 MIN 8/23/2018 Hedy Walters PTA GP 4                    Hedy Walters PTA  8/23/2018

## 2018-08-28 ENCOUNTER — HOSPITAL ENCOUNTER (OUTPATIENT)
Dept: PHYSICAL THERAPY | Facility: HOSPITAL | Age: 68
Setting detail: THERAPIES SERIES
Discharge: HOME | End: 2018-08-28
Attending: PODIATRIST

## 2018-08-28 DIAGNOSIS — M72.2 PLANTAR FASCIITIS: Primary | ICD-10-CM

## 2018-08-28 PROCEDURE — 97035 APP MDLTY 1+ULTRASOUND EA 15: CPT

## 2018-08-28 PROCEDURE — 97110 THERAPEUTIC EXERCISES: CPT

## 2018-08-28 RX ORDER — HYDROXYZINE PAMOATE 100 MG/1
100 CAPSULE ORAL 3 TIMES DAILY PRN
Qty: 30 CAPSULE | Refills: 2 | OUTPATIENT
Start: 2018-08-28

## 2018-08-28 NOTE — THERAPY DISCHARGE NOTE
Outpatient Physical Therapy Ortho Treatment Note/Discharge Summary  [facility]     Patient Name: Lexie Cardona  : 1950  MRN: [id number]  Today's Date: 2018      Visit Date: 2018     Subjective Improvement 50%  Visits   Visits approved medicare cap  RTMD 2018  Recert Date 2018    Left Plantar Fascitis    Visit Dx:    ICD-10-CM ICD-9-CM   1. Plantar fasciitis M72.2 728.71       Patient Active Problem List   Diagnosis   • Vitamin D deficiency   • Spasm of bladder   • Restless legs   • Myoclonic disorder   • Meniere's disease   • Insomnia   • IBS (irritable bowel syndrome)   • Hypercholesterolemia   • History of colon polyps   • Gout   • Gastroesophageal reflux disease   • Gastric polyp   • Fundic gland polyposis of stomach   • Elevated levels of transaminase & lactic acid dehydrogenase   • Chronic kidney disease (CKD), stage II (mild)   • Allergic rhinitis   • Primary osteoarthritis of both hands   • Primary open angle glaucoma of both eyes, mild stage   • Cataract   • Carpal tunnel syndrome of right wrist   • Nasal obstruction   • Nasal turbinate hypertrophy   • Nasal septal deformity   • Sinusitis, chronic   • Nasal valve collapse   • Minnie bullosa        Past Medical History:   Diagnosis Date   • Acute upper respiratory infection    • Allergic rhinitis    • Asthma     blastomycosis   • Blastomycosis    • Borderline glaucoma    • Bunion    • Callus    • Chronic kidney disease (CKD), stage II (mild)    • Clotting disorder (CMS/HCC)    • Colon polyp    • Dehiscence of surgical wound    • Diverticulitis of colon    • Elevated levels of transaminase & lactic acid dehydrogenase    • Encounter for gynecological examination without abnormal finding    • Episcleritis    • Fundic gland polyposis of stomach    • Gastric polyp    • Gastritis    • Gastroesophageal reflux disease    • Gastroparesis    • Gout    • H/O mammogram    • History of colon polyps    •  Hypercholesterolemia    • IBS (irritable bowel syndrome)    • Insomnia    • Mass of ovary     fixed cyst, post removal, no sign of cancer   • Meniere's disease    • Myoclonic disorder    • Nuclear senile cataract    • Osteoarthritis of multiple joints    • Peripheral edema    • Plantar fasciitis    • PONV (postoperative nausea and vomiting)    • Pruritus of vagina    • Restless legs    • Seasonal allergic rhinitis    • Skin cancer     RUE   • Sleep apnea     not using c-pap   • Spasm of bladder    • Vaginal irritation    • Vitamin D deficiency         Past Surgical History:   Procedure Laterality Date   • APPENDECTOMY     • BRONCHOSCOPY  09/10/2001    density, upper lobe posteriorly, left. no endobronchial lesions seen   • CARPAL TUNNEL RELEASE WITH CUBITAL TUNNEL RELEASE  05/03/2013    Right Shoulder And Wrist I   • COLONOSCOPY  2010    2 polyps, repeat 5 yr   • COLONOSCOPY W/ POLYPECTOMY  07/23/2015    diverticulosis found in the sigmoid colon. a single polyp found in the cecum; ascending colon, and sigmoid colon. all removed by cold biopsy polypectomy. hemorrhoids found   • CYSTOSCOPY  11/13/1974    and x-ray urethral diverticulography. urethral diverticulum   • DENTAL PROCEDURE  11/19/2004    extraction of tooth #30 with local anesthesia and IV sedation   • ENDOSCOPIC FUNCTIONAL SINUS SURGERY (FESS) Right 3/6/2018    Procedure: RIGHT ENDOSCOPICS SINUS SURGERY OF THE MAXILLARY SINUS, BILATERAL PARTIAL INFERIOR TURBINECTOMIES, LEFT ENDOSCOPIC EJ BULLOSA RESECTION, NASAL SEPTOPLASTY, NASAL VALVE REPAIR  ;  Surgeon: Pablo Navas MD;  Location: [facility];  Service:    • ENDOSCOPY AND COLONOSCOPY  2005    dininutive polyp in the rectum. diminutive polyp 30cm from the anus. diminutive polyp ascending colon. all polyps were removed by hot biopsy polypectomy. multiple diverticula in the sigmoid   • INJECTION OF MEDICATION  03/21/2014    celestone   • INJECTION OF MEDICATION  03/11/2013    dexamethasone   •  INJECTION OF MEDICATION  05/09/2014    kenalog   • LAPAROSCOPIC CHOLECYSTECTOMY     • OTHER SURGICAL HISTORY  1960    Multiple Cysts To Right Ovary   • OTHER SURGICAL HISTORY  1969    Transurethral Resection   • OVARIAN CYST REMOVAL  11/14/1974    resection of left ovarian cyst & left salpingectomy. appendectomy   • TONSILECTOMY, ADENOIDECTOMY, BILATERAL MYRINGOTOMY AND TUBES  1954   • TOTAL ABDOMINAL HYSTERECTOMY WITH SALPINGO OOPHORECTOMY  08/18/2015    total abdominal hysterectomy and bilateral salpingo-oophorectomy. enbterolysis and lysis of adhesions.  Completed At [facility] In [place].                             PT Assessment/Plan     Row Name 08/28/18 0925          PT Assessment    Assessment Comments Still TTP.  Independent with HEP  -CP        PT Plan    PT Frequency 2x/week  -CP     Predicted Duration of Therapy Intervention (Therapy Eval) 3-4 weeks  -CP     PT Plan Comments Patient D/C to home with HEP.  Patient has an order to start therapy for her dizziness.    -CP       User Key  (r) = Recorded By, (t) = Taken By, (c) = Cosigned By    Initials Name Provider Type    Hedy Kennedy PTA Physical Therapy Assistant                Modalities     Row Name 08/28/18 0800             Ice    Ice Applied Yes  -CP      Location L PF  -CP      Rx Minutes 15 mins  -CP      Ice S/P Rx Yes  -CP         Ultrasound [id number]    Location L PF  -CP      Duty Cycle 100  -CP      Frequency 1.0 MHz  -CP      Intensity - Wts/cm 1.3  -CP        User Key  (r) = Recorded By, (t) = Taken By, (c) = Cosigned By    Initials Name Provider Type    Hedy Kennedy PTA Physical Therapy Assistant                Exercises     Row Name 08/28/18 0800             Subjective Comments    Subjective Comments Patient cont to report left foot pain.  Is wearing new shoes with orthrotics.  -CP         Subjective Pain    Able to rate subjective pain? yes  -CP      Pre-Treatment Pain Level 6  -CP      Post-Treatment Pain Level 0   -CP      Subjective Pain Comment numb from ice  -CP         Exercise 1    Exercise Name 1 Pro II level 3  -CP      Time 1 10  -CP         Exercise 2    Exercise Name 2 incline stretch  -CP      Sets 2 3  -CP      Time 2 30  -CP         Exercise 3    Exercise Name 3 soleous stretch  -CP      Sets 3 3  -CP      Time 3 30  -CP         Exercise 4    Exercise Name 4 CR/TR on airext  -CP      Sets 4 2  -CP      Reps 4 10  -CP         Exercise 5    Exercise Name 5 SL stance L LE  -CP      Sets 5 3  -CP      Time 5 30  -CP         Exercise 6    Exercise Name 6 CR/TR on airex  -CP      Sets 6 3  -CP      Reps 6 10  -CP         Exercise 7    Exercise Name 7 rocker board  -CP      Reps 7 30  -CP         Exercise 8    Exercise Name 8 towel scrunches  -CP      Reps 8 30  -CP         Exercise 9    Exercise Name 9 see modalities  -CP        User Key  (r) = Recorded By, (t) = Taken By, (c) = Cosigned By    Initials Name Provider Type    CP Hedy Walters, PTA Physical Therapy Assistant                               PT OP Goals     Row Name 08/28/18 0800          PT Short Term Goals    STG Date to Achieve 08/27/18  -CP     STG 1 Independent in final HEP   -CP     STG 1 Progress Met  -CP     STG 2 minimal to no ttp of PF of left   -CP     STG 2 Progress Not Met  -CP     STG 3 Independent in wear and care of orthotics    -CP     STG 3 Progress Met  -CP        Time Calculation    PT Goal Re-Cert Due Date 08/27/18  -CP       User Key  (r) = Recorded By, (t) = Taken By, (c) = Cosigned By    Initials Name Provider Type    CP Hedy Walters PTA Physical Therapy Assistant               Outcome Measure Options: Lower Extremity Functional Scale (LEFS)  Lower Extremity Functional Index  Any of your usual work, housework or school activities: A little bit of difficulty  Your usual hobbies, recreational or sporting activities: A little bit of difficulty  Getting into or out of the bath: A little bit of difficulty  Walking between rooms: A  little bit of difficulty  Putting on your shoes or socks: A little bit of difficulty  Squatting: Moderate difficulty  Lifting an object, like a bag of groceries from the floor: A little bit of difficulty  Performing light activities around your home: A little bit of difficulty  Performing heavy activities around your home: Moderate difficulty  Getting into or out of a car: A little bit of difficulty  Walking 2 blocks: A little bit of difficulty  Walking a mile: Moderate difficulty  Going up or down 10 stairs (about 1 flight of stairs): A little bit of difficulty  Standing for 1 hour: A little bit of difficulty  Sitting for 1 hour: A little bit of difficulty  Running on even ground: Moderate difficulty  Running on uneven ground: Moderate difficulty  Making sharp turns while running fast: Moderate difficulty  Hopping: A little bit of difficulty  Rolling over in bed: A little bit of difficulty  Total: 54      Time Calculation:   Start Time: 0845  Stop Time: 0945  Time Calculation (min): 60 min  Total Timed Code Minutes- PT: 45 minute(s)  Therapy Suggested Charges     Code   Minutes Charges    None           Therapy Charges for Today     Code Description Service Date Service Provider Modifiers Qty    18732046462 HC PT ULTRASOUND EA 15 MIN 8/28/2018 Hedy Walters, PTA GP 1    74786232170 HC PT THER PROC EA 15 MIN 8/28/2018 Hedy Walters, PTA GP 2          PT G-Codes  Outcome Measure Options: Lower Extremity Functional Scale (LEFS)     OP PT Discharge Summary  Date of Discharge: 08/28/18  Reason for Discharge: Independent, other (comment) (Patient does have an order to start therapy for her dizziness. )  Outcomes Achieved: Patient able to partially acheive established goals      Hedy Walters PTA  8/28/2018

## 2018-08-30 ENCOUNTER — APPOINTMENT (OUTPATIENT)
Dept: PHYSICAL THERAPY | Facility: HOSPITAL | Age: 68
End: 2018-08-30
Attending: OTOLARYNGOLOGY

## 2018-08-30 ENCOUNTER — HOSPITAL ENCOUNTER (OUTPATIENT)
Dept: PHYSICAL THERAPY | Facility: HOSPITAL | Age: 68
Setting detail: THERAPIES SERIES
Discharge: HOME | End: 2018-08-30
Attending: PODIATRIST

## 2018-08-30 DIAGNOSIS — IMO0002 POSITIONAL VERTIGO OF BOTH EARS: Primary | ICD-10-CM

## 2018-08-30 PROCEDURE — G8981 BODY POS CURRENT STATUS: HCPCS | Performed by: PHYSICAL THERAPIST

## 2018-08-30 PROCEDURE — 95992 CANALITH REPOSITIONING PROC: CPT | Performed by: PHYSICAL THERAPIST

## 2018-08-30 PROCEDURE — 97162 PT EVAL MOD COMPLEX 30 MIN: CPT | Performed by: PHYSICAL THERAPIST

## 2018-08-30 PROCEDURE — G8982 BODY POS GOAL STATUS: HCPCS | Performed by: PHYSICAL THERAPIST

## 2018-09-05 ENCOUNTER — HOSPITAL ENCOUNTER (OUTPATIENT)
Dept: PHYSICAL THERAPY | Facility: HOSPITAL | Age: 68
Setting detail: THERAPIES SERIES
Discharge: HOME | End: 2018-09-05
Attending: PODIATRIST

## 2018-09-05 DIAGNOSIS — IMO0002 POSITIONAL VERTIGO OF BOTH EARS: Primary | ICD-10-CM

## 2018-09-05 PROCEDURE — 95992 CANALITH REPOSITIONING PROC: CPT

## 2018-09-05 NOTE — THERAPY TREATMENT NOTE
Outpatient Physical Therapy Ortho Treatment Note  [facility]     Patient Name: Lexie Cardona  : 1950  MRN: [id number]  Today's Date: 2018      Visit Date: 2018     Subjective Improvement 0  Visits 2/2  Visits approved medicare cap  RTMD PRN  Recert 2018    Vertigo    Visit Dx:    ICD-10-CM ICD-9-CM   1. Positional vertigo of both ears H81.13 386.19       Patient Active Problem List   Diagnosis   • Vitamin D deficiency   • Spasm of bladder   • Restless legs   • Myoclonic disorder   • Meniere's disease   • Insomnia   • IBS (irritable bowel syndrome)   • Hypercholesterolemia   • History of colon polyps   • Gout   • Gastroesophageal reflux disease   • Gastric polyp   • Fundic gland polyposis of stomach   • Elevated levels of transaminase & lactic acid dehydrogenase   • Chronic kidney disease (CKD), stage II (mild)   • Allergic rhinitis   • Primary osteoarthritis of both hands   • Primary open angle glaucoma of both eyes, mild stage   • Cataract   • Carpal tunnel syndrome of right wrist   • Nasal obstruction   • Nasal turbinate hypertrophy   • Nasal septal deformity   • Sinusitis, chronic   • Nasal valve collapse   • Minnie bullosa        Past Medical History:   Diagnosis Date   • Acute upper respiratory infection    • Allergic rhinitis    • Asthma     blastomycosis   • Blastomycosis    • Borderline glaucoma    • Bunion    • Callus    • Chronic kidney disease (CKD), stage II (mild)    • Clotting disorder (CMS/HCC)    • Colon polyp    • Dehiscence of surgical wound    • Diverticulitis of colon    • Elevated levels of transaminase & lactic acid dehydrogenase    • Encounter for gynecological examination without abnormal finding    • Episcleritis    • Fundic gland polyposis of stomach    • Gastric polyp    • Gastritis    • Gastroesophageal reflux disease    • Gastroparesis    • Gout    • H/O mammogram    • History of colon polyps    • Hypercholesterolemia    • IBS (irritable bowel  syndrome)    • Insomnia    • Mass of ovary     fixed cyst, post removal, no sign of cancer   • Meniere's disease    • Myoclonic disorder    • Nuclear senile cataract    • Osteoarthritis of multiple joints    • Peripheral edema    • Plantar fasciitis    • PONV (postoperative nausea and vomiting)    • Pruritus of vagina    • Restless legs    • Seasonal allergic rhinitis    • Skin cancer     RUE   • Sleep apnea     not using c-pap   • Spasm of bladder    • Vaginal irritation    • Vitamin D deficiency         Past Surgical History:   Procedure Laterality Date   • APPENDECTOMY     • BRONCHOSCOPY  09/10/2001    density, upper lobe posteriorly, left. no endobronchial lesions seen   • CARPAL TUNNEL RELEASE WITH CUBITAL TUNNEL RELEASE  05/03/2013    Right Shoulder And Wrist I   • COLONOSCOPY  2010    2 polyps, repeat 5 yr   • COLONOSCOPY W/ POLYPECTOMY  07/23/2015    diverticulosis found in the sigmoid colon. a single polyp found in the cecum; ascending colon, and sigmoid colon. all removed by cold biopsy polypectomy. hemorrhoids found   • CYSTOSCOPY  11/13/1974    and x-ray urethral diverticulography. urethral diverticulum   • DENTAL PROCEDURE  11/19/2004    extraction of tooth #30 with local anesthesia and IV sedation   • ENDOSCOPIC FUNCTIONAL SINUS SURGERY (FESS) Right 3/6/2018    Procedure: RIGHT ENDOSCOPICS SINUS SURGERY OF THE MAXILLARY SINUS, BILATERAL PARTIAL INFERIOR TURBINECTOMIES, LEFT ENDOSCOPIC EJ BULLOSA RESECTION, NASAL SEPTOPLASTY, NASAL VALVE REPAIR  ;  Surgeon: Pablo Navas MD;  Location: [facility];  Service:    • ENDOSCOPY AND COLONOSCOPY  2005    dininutive polyp in the rectum. diminutive polyp 30cm from the anus. diminutive polyp ascending colon. all polyps were removed by hot biopsy polypectomy. multiple diverticula in the sigmoid   • INJECTION OF MEDICATION  03/21/2014    celestone   • INJECTION OF MEDICATION  03/11/2013    dexamethasone   • INJECTION OF MEDICATION  05/09/2014    kenalog   •  LAPAROSCOPIC CHOLECYSTECTOMY     • OTHER SURGICAL HISTORY  1960    Multiple Cysts To Right Ovary   • OTHER SURGICAL HISTORY  1969    Transurethral Resection   • OVARIAN CYST REMOVAL  11/14/1974    resection of left ovarian cyst & left salpingectomy. appendectomy   • TONSILECTOMY, ADENOIDECTOMY, BILATERAL MYRINGOTOMY AND TUBES  1954   • TOTAL ABDOMINAL HYSTERECTOMY WITH SALPINGO OOPHORECTOMY  08/18/2015    total abdominal hysterectomy and bilateral salpingo-oophorectomy. enbterolysis and lysis of adhesions.  Completed At [facility] In [place].                             PT Assessment/Plan     Row Name 09/05/18 0857          PT Assessment    Assessment Comments Patient very dizzy after eply and unable to amb independent.  she required HHA for amb.  Her  drove her to therapy today.  Upbeat noted with eply.  -CP        PT Plan    PT Frequency 2x/week  -CP     Predicted Duration of Therapy Intervention (Therapy Eval) 4 weeks  -CP     PT Plan Comments cont with poc  -CP       User Key  (r) = Recorded By, (t) = Taken By, (c) = Cosigned By    Initials Name Provider Type    Hedy Kennedy PTA Physical Therapy Assistant                    Exercises     Row Name 09/05/18 0800             Subjective Comments    Subjective Comments Patient was very dizzy after last therapy session.  She had to have someone drive home.  She felt upset to her stomach all day.  Today, she reports a dizzy feeling and that her eyes feel weird.  -CP         Subjective Pain    Able to rate subjective pain? yes  -CP      Pre-Treatment Pain Level 4  -CP      Subjective Pain Comment frontal headache  -CP         Exercise 1    Exercise Name 1 Epley maneuver L  -CP        User Key  (r) = Recorded By, (t) = Taken By, (c) = Cosigned By    Initials Name Provider Type    Hedy Kennedy PTA Physical Therapy Assistant                               PT OP Goals     Row Name 09/05/18 0800          PT Short Term Goals    STG Date to  Achieve --   STGs deferred  -CP        Long Term Goals    LTG Date to Achieve 09/20/18  -CP     LTG 1 Independent with HEP  -CP     LTG 1 Progress Ongoing  -CP     LTG 2 Minimal symptoms of positional vertigo  -CP     LTG 2 Progress Not Met  -CP     LTG 3 Dizziness Handicap Inventory score to be </= 20  -CP     LTG 3 Progress Not Met  -CP     LTG 4 Minimal dizziness noted with horizontal VOR at 90 bpm for 1 minute  -CP     LTG 4 Progress Not Met  -CP        Time Calculation    PT Goal Re-Cert Due Date 09/20/18  -CP       User Key  (r) = Recorded By, (t) = Taken By, (c) = Cosigned By    Initials Name Provider Type    CP Hedy Walters, PTA Physical Therapy Assistant                         Time Calculation:   Start Time: 0805  Stop Time: 0845  Time Calculation (min): 40 min  Total Timed Code Minutes- PT: 30 minute(s)  Therapy Suggested Charges     Code   Minutes Charges    None           Therapy Charges for Today     Code Description Service Date Service Provider Modifiers Qty    78909229162 HC PT CANALITH REPOSITIONING PER DAY 9/5/2018 Hedy Walters, PTA GP 1                    Hedy Walters PTA  9/5/2018

## 2018-09-06 ENCOUNTER — APPOINTMENT (OUTPATIENT)
Dept: PHYSICAL THERAPY | Facility: HOSPITAL | Age: 68
End: 2018-09-06
Attending: PODIATRIST

## 2018-09-07 ENCOUNTER — HOSPITAL ENCOUNTER (OUTPATIENT)
Dept: PHYSICAL THERAPY | Facility: HOSPITAL | Age: 68
Setting detail: THERAPIES SERIES
Discharge: HOME | End: 2018-09-07
Attending: PODIATRIST

## 2018-09-07 DIAGNOSIS — IMO0002 POSITIONAL VERTIGO OF BOTH EARS: Primary | ICD-10-CM

## 2018-09-07 PROCEDURE — 95992 CANALITH REPOSITIONING PROC: CPT

## 2018-09-07 NOTE — THERAPY TREATMENT NOTE
Outpatient Physical Therapy Ortho Treatment Note  [facility]     Patient Name: Lexie Cardona  : 1950  MRN: [id number]  Today's Date: 2018      Visit Date: 2018     Subjective Improvement maybe a little  Visits 3/4  Visits approved medicare cap  RTMD PRN  Recert Date 2018    Vertigo    Visit Dx:    ICD-10-CM ICD-9-CM   1. Positional vertigo of both ears H81.13 386.19       Patient Active Problem List   Diagnosis   • Vitamin D deficiency   • Spasm of bladder   • Restless legs   • Myoclonic disorder   • Meniere's disease   • Insomnia   • IBS (irritable bowel syndrome)   • Hypercholesterolemia   • History of colon polyps   • Gout   • Gastroesophageal reflux disease   • Gastric polyp   • Fundic gland polyposis of stomach   • Elevated levels of transaminase & lactic acid dehydrogenase   • Chronic kidney disease (CKD), stage II (mild)   • Allergic rhinitis   • Primary osteoarthritis of both hands   • Primary open angle glaucoma of both eyes, mild stage   • Cataract   • Carpal tunnel syndrome of right wrist   • Nasal obstruction   • Nasal turbinate hypertrophy   • Nasal septal deformity   • Sinusitis, chronic   • Nasal valve collapse   • Minnie bullosa        Past Medical History:   Diagnosis Date   • Acute upper respiratory infection    • Allergic rhinitis    • Asthma     blastomycosis   • Blastomycosis    • Borderline glaucoma    • Bunion    • Callus    • Chronic kidney disease (CKD), stage II (mild)    • Clotting disorder (CMS/HCC)    • Colon polyp    • Dehiscence of surgical wound    • Diverticulitis of colon    • Elevated levels of transaminase & lactic acid dehydrogenase    • Encounter for gynecological examination without abnormal finding    • Episcleritis    • Fundic gland polyposis of stomach    • Gastric polyp    • Gastritis    • Gastroesophageal reflux disease    • Gastroparesis    • Gout    • H/O mammogram    • History of colon polyps    • Hypercholesterolemia    • IBS  (irritable bowel syndrome)    • Insomnia    • Mass of ovary     fixed cyst, post removal, no sign of cancer   • Meniere's disease    • Myoclonic disorder    • Nuclear senile cataract    • Osteoarthritis of multiple joints    • Peripheral edema    • Plantar fasciitis    • PONV (postoperative nausea and vomiting)    • Pruritus of vagina    • Restless legs    • Seasonal allergic rhinitis    • Skin cancer     RUE   • Sleep apnea     not using c-pap   • Spasm of bladder    • Vaginal irritation    • Vitamin D deficiency         Past Surgical History:   Procedure Laterality Date   • APPENDECTOMY     • BRONCHOSCOPY  09/10/2001    density, upper lobe posteriorly, left. no endobronchial lesions seen   • CARPAL TUNNEL RELEASE WITH CUBITAL TUNNEL RELEASE  05/03/2013    Right Shoulder And Wrist I   • COLONOSCOPY  2010    2 polyps, repeat 5 yr   • COLONOSCOPY W/ POLYPECTOMY  07/23/2015    diverticulosis found in the sigmoid colon. a single polyp found in the cecum; ascending colon, and sigmoid colon. all removed by cold biopsy polypectomy. hemorrhoids found   • CYSTOSCOPY  11/13/1974    and x-ray urethral diverticulography. urethral diverticulum   • DENTAL PROCEDURE  11/19/2004    extraction of tooth #30 with local anesthesia and IV sedation   • ENDOSCOPIC FUNCTIONAL SINUS SURGERY (FESS) Right 3/6/2018    Procedure: RIGHT ENDOSCOPICS SINUS SURGERY OF THE MAXILLARY SINUS, BILATERAL PARTIAL INFERIOR TURBINECTOMIES, LEFT ENDOSCOPIC EJ BULLOSA RESECTION, NASAL SEPTOPLASTY, NASAL VALVE REPAIR  ;  Surgeon: Pablo Navas MD;  Location: [facility];  Service:    • ENDOSCOPY AND COLONOSCOPY  2005    dininutive polyp in the rectum. diminutive polyp 30cm from the anus. diminutive polyp ascending colon. all polyps were removed by hot biopsy polypectomy. multiple diverticula in the sigmoid   • INJECTION OF MEDICATION  03/21/2014    celestone   • INJECTION OF MEDICATION  03/11/2013    dexamethasone   • INJECTION OF MEDICATION  05/09/2014     kenalog   • LAPAROSCOPIC CHOLECYSTECTOMY     • OTHER SURGICAL HISTORY  1960    Multiple Cysts To Right Ovary   • OTHER SURGICAL HISTORY  1969    Transurethral Resection   • OVARIAN CYST REMOVAL  11/14/1974    resection of left ovarian cyst & left salpingectomy. appendectomy   • TONSILECTOMY, ADENOIDECTOMY, BILATERAL MYRINGOTOMY AND TUBES  1954   • TOTAL ABDOMINAL HYSTERECTOMY WITH SALPINGO OOPHORECTOMY  08/18/2015    total abdominal hysterectomy and bilateral salpingo-oophorectomy. enbterolysis and lysis of adhesions.  Completed At [facility] In [place].                             PT Assessment/Plan     Row Name 09/07/18 0838          PT Assessment    Assessment Comments Patient felt nausea after each maneuver.    Nstagmus noted with epley  -CP        PT Plan    PT Frequency 2x/week  -CP     Predicted Duration of Therapy Intervention (Therapy Eval) 4 weeks  -CP     PT Plan Comments Cont with POC>  -CP       User Key  (r) = Recorded By, (t) = Taken By, (c) = Cosigned By    Initials Name Provider Type    Hedy Kennedy, PTA Physical Therapy Assistant                    Exercises     Row Name 09/07/18 0800             Subjective Comments    Subjective Comments States that her left ear fieels full.  Believes that her allergies is making things worse.  Reprots still feels dizzy when going from laying down to a sitting position.  -CP         Subjective Pain    Able to rate subjective pain? yes  -CP      Pre-Treatment Pain Level 3  -CP      Post-Treatment Pain Level 0  -CP      Subjective Pain Comment frontal headache at start of treatment post treatment no frontal headache but sinus pressure left side  -CP         Exercise 1    Exercise Name 1 epley maneuver  -CP      Reps 1 2  -CP         Exercise 2    Exercise Name 2 semont maneuver  -CP      Reps 2 1  -CP         Exercise 3    Exercise Name 3 quiet rest reclined position  -CP      Time 3 10  -CP        User Key  (r) = Recorded By, (t) = Taken By, (c)  = Cosigned By    Initials Name Provider Type    Hedy Kennedy PTA Physical Therapy Assistant                               PT OP Goals     Row Name 09/07/18 0800          PT Short Term Goals    STG Date to Achieve --   STGs deferred  -CP        Long Term Goals    LTG Date to Achieve 09/20/18  -CP     LTG 1 Independent with HEP  -CP     LTG 1 Progress Ongoing  -CP     LTG 2 Minimal symptoms of positional vertigo  -CP     LTG 2 Progress Not Met  -CP     LTG 3 Dizziness Handicap Inventory score to be </= 20  -CP     LTG 3 Progress Not Met  -CP     LTG 4 Minimal dizziness noted with horizontal VOR at 90 bpm for 1 minute  -CP     LTG 4 Progress Not Met  -CP        Time Calculation    PT Goal Re-Cert Due Date 09/20/18  -CP       User Key  (r) = Recorded By, (t) = Taken By, (c) = Cosigned By    Initials Name Provider Type    CP Hedy Walters, LIB[place] Physical Therapy Assistant                         Time Calculation:   Start Time: 0808  Stop Time: 0900  Time Calculation (min): 52 min  Total Timed Code Minutes- PT: 29 minute(s)  Therapy Suggested Charges     Code   Minutes Charges    None           Therapy Charges for Today     Code Description Service Date Service Provider Modifiers Qty    55729416378 HC PT CANALITH REPOSITIONING PER DAY 9/7/2018 Hedy Walters PTA GP 1                    Hedy Walters PTA  9/7/2018

## 2018-09-10 ENCOUNTER — HOSPITAL ENCOUNTER (OUTPATIENT)
Dept: PHYSICAL THERAPY | Facility: HOSPITAL | Age: 68
Setting detail: THERAPIES SERIES
Discharge: HOME | End: 2018-09-10
Attending: PODIATRIST

## 2018-09-10 DIAGNOSIS — IMO0002 POSITIONAL VERTIGO OF BOTH EARS: Primary | ICD-10-CM

## 2018-09-10 PROCEDURE — 95992 CANALITH REPOSITIONING PROC: CPT | Performed by: PHYSICAL THERAPIST

## 2018-09-10 NOTE — THERAPY TREATMENT NOTE
"    Outpatient Physical Therapy Vestibular Treatment Note  [facility]     Patient Name: Lexie Cardona  : 1950  MRN: [id number]  Today's Date: 9/10/2018      Visit Date: 09/10/2018  Attendance: 4/5 (Medicare)  Subjective Improvement: 25%  Next MD Appt: 18  Recert Date: 18    Therapy Diagnosis: Vertigo     Visit Dx:     ICD-10-CM ICD-9-CM   1. Positional vertigo of both ears H81.13 386.19       Patient Active Problem List   Diagnosis   • Vitamin D deficiency   • Spasm of bladder   • Restless legs   • Myoclonic disorder   • Meniere's disease   • Insomnia   • IBS (irritable bowel syndrome)   • Hypercholesterolemia   • History of colon polyps   • Gout   • Gastroesophageal reflux disease   • Gastric polyp   • Fundic gland polyposis of stomach   • Elevated levels of transaminase & lactic acid dehydrogenase   • Chronic kidney disease (CKD), stage II (mild)   • Allergic rhinitis   • Primary osteoarthritis of both hands   • Primary open angle glaucoma of both eyes, mild stage   • Cataract   • Carpal tunnel syndrome of right wrist   • Nasal obstruction   • Nasal turbinate hypertrophy   • Nasal septal deformity   • Sinusitis, chronic   • Nasal valve collapse   • Minnie bullosa             Vestibular Eval     Row Name 09/10/18 0900          Subjective Comments May be subtlely improved but not dramatic.\" Sensation in tension in her eyes is slightly better. Dizzy \"usually when I lay down at night\" and when rolls to the left. 25% subjective improvement.  -SS          Sidelying Test for Posterior Canal Right Upbeat, right rotatory nystagmus   -SS    Sidelying Test for Posterior Canal Left No nystagmus     -SS    Horizontal Roll Test Right No nystagmus  -SS    Sidelying Test for Horizontal Canal Right  positive for dizziness and geotropic nystagmus -SS    Horizontal Roll Test Left No nystagmus  -SS      User Key  (r) = Recorded By, (t) = Taken By, (c) = Cosigned By    Initials Name Provider Type    SS " "Pranav Ly, PT DPT Physical Therapist                            PT Assessment/Plan     Row Name 09/10/18 0900          Functional Limitations Decreased safety during functional activities;Impaired gait;Limitation in home management;Limitations in community activities;Limitations in functional capacity and performance  -    Impairments Balance   Dizziness  -    Assessment Comments Patient dizzy with all canalith repositioning maneuvers, hybrid horizontal maneuver worst.   -    Rehab Potential Good   barrier: underlying vestibulopathy  -    Patient/caregiver participated in establishment of treatment plan and goals Yes  -SS    Patient would benefit from skilled therapy intervention Yes  -SS          PT Frequency 2x/week  -SS    Predicted Duration of Therapy Intervention (Therapy Eval) 4 weeks  -    PT Plan Comments Continue canalith repositioning. Next: start VOR horizontal and vertical at 30 bpm for adaptation/habituation  -      User Key  (r) = Recorded By, (t) = Taken By, (c) = Cosigned By    Initials Name Provider Type     Pranav Ly, PT DPT Physical Therapist                     Exercises     Row Name 09/10/18 0900          Subjective Comments May be subtlely improved but not dramatic.\" Sensation in tension in her eyes is slightly better. Dizzy \"usually when I lay down at night\" and when rolls to the left. 25% subjective improvement.  -SS          Pre-Treatment Pain Level 2   dizziness  -SS    Post-Treatment Pain Level 3   dizziness  -SS          Exercise Name 1 Semont Liberatory Maneuver - R  -SS    Cueing 1 Verbal;Tactile  -SS    Reps 1 2  -SS          Exercise Name 2 Cortney repositioning technique - R  -SS    Cueing 2 Verbal;Tactile  -SS    Reps 2 1  -SS          Exercise Name 3 Hybrid horizontal maneuver  -SS    Cueing 3 Verbal;Tactile  -SS    Reps 3 1  -SS      User Key  (r) = Recorded By, (t) = Taken By, (c) = Cosigned By    Initials Name Provider Type    SS Malachi" Pranav Medina, PT DPT Physical Therapist                            PT OP Goals     Row Name 09/10/18 0900          PT Short Term Goals    STG Date to Achieve --   STGs deferred  -SS        Long Term Goals    LTG Date to Achieve 09/20/18  -     LTG 1 Independent with HEP  -SS     LTG 1 Progress Ongoing  -SS     LTG 2 Minimal symptoms of positional vertigo  -SS     LTG 2 Progress Not Met  -SS     LTG 3 Dizziness Handicap Inventory score to be </= 20  -SS     LTG 3 Progress Not Met  -SS     LTG 4 Minimal dizziness noted with horizontal VOR at 90 bpm for 1 minute  -SS     LTG 4 Progress Not Met  -SS        Time Calculation    PT Goal Re-Cert Due Date 09/20/18  -       User Key  (r) = Recorded By, (t) = Taken By, (c) = Cosigned By    Initials Name Provider Type    SS Pranav Ly, PT DPT Physical Therapist          Therapy Education  Given: Symptoms/condition management  Program: Reinforced  How Provided: Verbal  Provided to: Patient  Level of Understanding: Verbalized              Time Calculation:   Start Time: 0933  Stop Time: 1013  Time Calculation (min): 40 min     Therapy Charges for Today     Code Description Service Date Service Provider Modifiers Qty    83237631147 HC PT CANALITH REPOSITIONING PER DAY 9/10/2018 Pranav Ly, PT DPT GP 1    99479661408 HC PT THER SUPP EA 15 MIN 9/10/2018 Pranav Ly, PT DPT GP 1                   Pranav Ly, PT, DPT, CHT  9/10/2018

## 2018-09-13 ENCOUNTER — HOSPITAL ENCOUNTER (OUTPATIENT)
Dept: PHYSICAL THERAPY | Facility: HOSPITAL | Age: 68
Setting detail: THERAPIES SERIES
Discharge: HOME | End: 2018-09-13
Attending: PODIATRIST

## 2018-09-13 DIAGNOSIS — IMO0002 POSITIONAL VERTIGO OF BOTH EARS: Primary | ICD-10-CM

## 2018-09-13 PROCEDURE — G8981 BODY POS CURRENT STATUS: HCPCS | Performed by: PHYSICAL THERAPIST

## 2018-09-13 PROCEDURE — G8982 BODY POS GOAL STATUS: HCPCS | Performed by: PHYSICAL THERAPIST

## 2018-09-13 PROCEDURE — 97530 THERAPEUTIC ACTIVITIES: CPT | Performed by: PHYSICAL THERAPIST

## 2018-09-13 NOTE — THERAPY PROGRESS REPORT/RE-CERT
Outpatient Physical Therapy Vestibular Progress Note  [facility]     Patient Name: Lexie Cardona  : 1950  MRN: [id number]  Today's Date: 2018      Visit Date: 2018  Attendance:  (Medicare)  Subjective Improvement: 0%  Next MD Appt: 18  Recert Date: 10/4/18     Therapy Diagnosis: Vertigo     Changes in Medications: none noted  Changes in MD Orders: none noted  Number of Work Days Lost: n/a         Past Medical History:   Diagnosis Date   • Acute upper respiratory infection    • Allergic rhinitis    • Asthma     blastomycosis   • Blastomycosis    • Borderline glaucoma    • Bunion    • Callus    • Chronic kidney disease (CKD), stage II (mild)    • Clotting disorder (CMS/HCC)    • Colon polyp    • Dehiscence of surgical wound    • Diverticulitis of colon    • Elevated levels of transaminase & lactic acid dehydrogenase    • Encounter for gynecological examination without abnormal finding    • Episcleritis    • Fundic gland polyposis of stomach    • Gastric polyp    • Gastritis    • Gastroesophageal reflux disease    • Gastroparesis    • Gout    • H/O mammogram    • History of colon polyps    • Hypercholesterolemia    • IBS (irritable bowel syndrome)    • Insomnia    • Mass of ovary     fixed cyst, post removal, no sign of cancer   • Meniere's disease    • Myoclonic disorder    • Nuclear senile cataract    • Osteoarthritis of multiple joints    • Peripheral edema    • Plantar fasciitis    • PONV (postoperative nausea and vomiting)    • Pruritus of vagina    • Restless legs    • Seasonal allergic rhinitis    • Skin cancer     RUE   • Sleep apnea     not using c-pap   • Spasm of bladder    • Vaginal irritation    • Vitamin D deficiency         Past Surgical History:   Procedure Laterality Date   • APPENDECTOMY     • BRONCHOSCOPY  09/10/2001    density, upper lobe posteriorly, left. no endobronchial lesions seen   • CARPAL TUNNEL RELEASE WITH CUBITAL TUNNEL RELEASE  2013     Right Shoulder And Wrist I   • COLONOSCOPY  2010    2 polyps, repeat 5 yr   • COLONOSCOPY W/ POLYPECTOMY  07/23/2015    diverticulosis found in the sigmoid colon. a single polyp found in the cecum; ascending colon, and sigmoid colon. all removed by cold biopsy polypectomy. hemorrhoids found   • CYSTOSCOPY  11/13/1974    and x-ray urethral diverticulography. urethral diverticulum   • DENTAL PROCEDURE  11/19/2004    extraction of tooth #30 with local anesthesia and IV sedation   • ENDOSCOPIC FUNCTIONAL SINUS SURGERY (FESS) Right 3/6/2018    Procedure: RIGHT ENDOSCOPICS SINUS SURGERY OF THE MAXILLARY SINUS, BILATERAL PARTIAL INFERIOR TURBINECTOMIES, LEFT ENDOSCOPIC EJ BULLOSA RESECTION, NASAL SEPTOPLASTY, NASAL VALVE REPAIR  ;  Surgeon: Pablo Navas MD;  Location: [facility];  Service:    • ENDOSCOPY AND COLONOSCOPY  2005    dininutive polyp in the rectum. diminutive polyp 30cm from the anus. diminutive polyp ascending colon. all polyps were removed by hot biopsy polypectomy. multiple diverticula in the sigmoid   • INJECTION OF MEDICATION  03/21/2014    celestone   • INJECTION OF MEDICATION  03/11/2013    dexamethasone   • INJECTION OF MEDICATION  05/09/2014    kenalog   • LAPAROSCOPIC CHOLECYSTECTOMY     • OTHER SURGICAL HISTORY  1960    Multiple Cysts To Right Ovary   • OTHER SURGICAL HISTORY  1969    Transurethral Resection   • OVARIAN CYST REMOVAL  11/14/1974    resection of left ovarian cyst & left salpingectomy. appendectomy   • TONSILECTOMY, ADENOIDECTOMY, BILATERAL MYRINGOTOMY AND TUBES  1954   • TOTAL ABDOMINAL HYSTERECTOMY WITH SALPINGO OOPHORECTOMY  08/18/2015    total abdominal hysterectomy and bilateral salpingo-oophorectomy. enbterolysis and lysis of adhesions.  Completed At [facility] [place].         Visit Dx:     ICD-10-CM ICD-9-CM   1. Positional vertigo of both ears H81.13 386.19                 Vestibular Eval     Row Name 09/13/18 0900          Subjective Comments Patient reports  "that she has been feeling worse since treatment on 9/10/18. \"It feels like I'm not rebounding back.\" \"It feels like my eyes are off.\" Trouble writing bills yesterday due to inability to look down without nausea and dizziness. No subjective improvement. No medication changes.  -          Pain Assessment 0-10  -    Pain Score 1  -SS    Pain Location Head   \"frontal headache\"  -SS          Fall History no  -SS    Use of Assistive Devices no  -SS          Orientation Level Oriented to place;Oriented to time;Oriented to situation;Oriented to person  -SS          Occular ROM Normal   \"swirling sensation\" looking up and to left, nausea looking down  -    Spontaneous Nystagmus Absent  -SS    Gaze-induced Nystagmus Absent  -SS    Head Shaking Horizontal --   no nystagmus; c/o decreased ability to focus eyes and nausea  -SS    Head Shaking Vertical --   no nystagmus; nausea sensation  -    Smooth Pursuit Normal  -SS          VOR 1 Head Only guards cervical spine; dizziness with horizontal at 30 bpm, nausea with vertical at 60 bpm  -          Positional Testing --   deferred this date  -      User Key  (r) = Recorded By, (t) = Taken By, (c) = Cosigned By    Initials Name Provider Type     Pranav Ly, NICOLE DPT Physical Therapist                      Therapy Education  Education Details: VOR horizontal at 30 bpm and vertical at 60 bpm with progressions  Given: HEP  Program: New  How Provided: Verbal, Demonstration, Written  Provided to: Patient  Level of Understanding: Verbalized, Demonstrated            Exercises     Row Name 09/13/18 0900          Subjective Comments Patient reports that she has been feeling worse since treatment on 9/10/18. \"It feels like I'm not rebounding back.\" \"It feels like my eyes are off.\" Trouble writing bills yesterday due to inability to look down without nausea and dizziness. No subjective improvement. No medication changes.  -          Pre-Treatment Pain Level 1 "   headache  -SS    Post-Treatment Pain Level 1   headache  -SS          Exercise Name 1 Instruction in HEP of VOR horizontal and vertical  -SS      User Key  (r) = Recorded By, (t) = Taken By, (c) = Cosigned By    Initials Name Provider Type    Pranav Nava, PT DPT Physical Therapist                            PT OP Goals     Row Name 09/13/18 0900          PT Short Term Goals    STG Date to Achieve --   STGs deferred  -SS        Long Term Goals    LTG Date to Achieve 10/04/18  -SS     LTG 1 Independent with HEP  -SS     LTG 1 Progress Ongoing  -SS     LTG 2 Minimal symptoms of positional vertigo  -SS     LTG 2 Progress Not Met  -SS     LTG 3 Dizziness Handicap Inventory score to be </= 20  -     LTG 3 Progress Not Met  -SS     LTG 4 Minimal dizziness noted with horizontal VOR at 90 bpm for 1 minute  -     LTG 4 Progress Not Met  -SS        Time Calculation    PT Goal Re-Cert Due Date 10/04/18  -SS       User Key  (r) = Recorded By, (t) = Taken By, (c) = Cosigned By    Initials Name Provider Type    Pranav Nava, PT DPT Physical Therapist                PT Assessment/Plan     Row Name 09/13/18 0900          Functional Limitations Decreased safety during functional activities;Impaired gait;Limitation in home management;Limitations in community activities;Limitations in functional capacity and performance  -    Impairments Balance   Dizziness  -    Assessment Comments Patient is not progressing with therapy at this time and requests HEP. Patient does not want to continue P.T. She states that she will work on the HEP.  -    Rehab Potential Good   barrier: underlying vestibulopathy  -    Patient/caregiver participated in establishment of treatment plan and goals Yes  -SS    Patient would benefit from skilled therapy intervention Yes  -SS          PT Frequency Other (comment)   therapy on hold at this time  -SS    PT Plan Comments Therapy is on hold at this time. She is to work on  HEP and return as needed. She was instructed to contact the P.T. clinic if questions or problems arose.  -SS      User Key  (r) = Recorded By, (t) = Taken By, (c) = Cosigned By    Initials Name Provider Type    SS Pranav Ly, PT DPT Physical Therapist           Outcome Measure Options: Dizziness Handicap Inventory (Functional 26, Emotional 16, Physical 12, Total 54)         Time Calculation:   Start Time: 0938  Stop Time: 1005  Time Calculation (min): 27 min  Total Timed Code Minutes- PT: 27 minute(s)     Therapy Charges for Today     Code Description Service Date Service Provider Modifiers Qty    43915907821  PT THERAPEUTIC ACT EA 15 MIN 9/13/2018 Pranav Ly, PT DPT GP 2    [id number]  PT NG MAIN POS CURRENT 9/13/2018 Pranav Ly, PT DPT GP, CK 1    [id number]  PT [facility] MAIN POS PROJECTED 9/13/2018 Pranav Ly, PT DPT GP, CJ 1          PT G-Codes  PT Professional Judgement Used?: Yes  Outcome Measure Options: Dizziness Handicap Inventory (Functional 26, Emotional 16, Physical 12, Total 54)  Functional Limitation: Changing and maintaining body position  Changing and Maintaining Body Position Current Status (): At least 40 percent but less than 60 percent impaired, limited or restricted  Changing and Maintaining Body Position Goal Status (): At least 20 percent but less than 40 percent impaired, limited or restricted         Pranav Ly, PT, DPT, CHT  9/13/2018

## 2018-09-17 ENCOUNTER — APPOINTMENT (OUTPATIENT)
Dept: PHYSICAL THERAPY | Facility: HOSPITAL | Age: 68
End: 2018-09-17
Attending: PODIATRIST

## 2018-09-17 ENCOUNTER — OFFICE VISIT (OUTPATIENT)
Dept: PODIATRY | Facility: CLINIC | Age: 68
End: 2018-09-17

## 2018-09-17 VITALS — WEIGHT: 178 LBS | HEART RATE: 69 BPM | BODY MASS INDEX: 30.39 KG/M2 | HEIGHT: 64 IN | OXYGEN SATURATION: 98 %

## 2018-09-17 DIAGNOSIS — M72.2 PLANTAR FASCIITIS: Primary | ICD-10-CM

## 2018-09-17 PROCEDURE — 99213 OFFICE O/P EST LOW 20 MIN: CPT | Performed by: PODIATRIST

## 2018-09-17 RX ORDER — ACETAMINOPHEN 500 MG
500 TABLET ORAL
COMMUNITY
End: 2020-04-03

## 2018-09-17 RX ORDER — CETIRIZINE HYDROCHLORIDE 10 MG/1
10 TABLET ORAL
COMMUNITY
End: 2018-09-26 | Stop reason: SDUPTHER

## 2018-09-17 RX ORDER — EPINEPHRINE 0.3 MG/.3ML
INJECTION SUBCUTANEOUS
COMMUNITY
End: 2018-09-26 | Stop reason: SDUPTHER

## 2018-09-17 RX ORDER — GABAPENTIN 300 MG/1
900 CAPSULE ORAL
COMMUNITY
End: 2018-10-29

## 2018-09-17 RX ORDER — TRAZODONE HYDROCHLORIDE 50 MG/1
50 TABLET ORAL
COMMUNITY
Start: 2017-02-09 | End: 2018-09-26 | Stop reason: SDUPTHER

## 2018-09-17 RX ORDER — ESTRADIOL 2 MG/1
RING VAGINAL
Qty: 1 EACH | Refills: 2 | Status: SHIPPED | OUTPATIENT
Start: 2018-09-17 | End: 2018-09-20 | Stop reason: SDUPTHER

## 2018-09-17 RX ORDER — TRIAMTERENE AND HYDROCHLOROTHIAZIDE 37.5; 25 MG/1; MG/1
1 TABLET ORAL
COMMUNITY
End: 2018-09-26 | Stop reason: HOSPADM

## 2018-09-17 RX ORDER — MECLIZINE HYDROCHLORIDE 25 MG/1
25 TABLET ORAL
COMMUNITY
End: 2018-09-26 | Stop reason: SDUPTHER

## 2018-09-17 RX ORDER — BIOTIN 5 MG
TABLET ORAL
COMMUNITY
End: 2018-09-26 | Stop reason: SDUPTHER

## 2018-09-17 RX ORDER — TEMAZEPAM 30 MG/1
30 CAPSULE ORAL
COMMUNITY
End: 2018-09-26 | Stop reason: SDUPTHER

## 2018-09-17 RX ORDER — METOCLOPRAMIDE 5 MG/1
5 TABLET ORAL
COMMUNITY
End: 2018-09-26

## 2018-09-17 RX ORDER — VALACYCLOVIR HYDROCHLORIDE 1 G/1
1 TABLET, FILM COATED ORAL
COMMUNITY
Start: 2017-03-09 | End: 2020-04-03

## 2018-09-17 RX ORDER — FLUTICASONE PROPIONATE 50 MCG
1 SPRAY, SUSPENSION (ML) NASAL
COMMUNITY
End: 2018-12-27

## 2018-09-17 NOTE — PROGRESS NOTES
Lexievalentin Marquez Brendan  1950  68 y.o. female    Patient presents today for recheck of her left foot pain.    09/17/2018     Chief Complaint   Patient presents with   • Left Foot - Pain, Follow-up   • Plantar Fasciitis       History of Present Illness    Patient presents to clinic today for follow-up of her left foot plantar fasciitis.  She is doing well today.  Complains of minimal pain.  She has finished physical therapy which helped.  She denies any new pedal complaints.      Past Medical History:   Diagnosis Date   • Acute upper respiratory infection    • Allergic rhinitis    • Asthma     blastomycosis   • Blastomycosis    • Borderline glaucoma    • Bunion    • Callus    • Chronic kidney disease (CKD), stage II (mild)    • Clotting disorder (CMS/HCC)    • Colon polyp    • Dehiscence of surgical wound    • Diverticulitis of colon    • Elevated levels of transaminase & lactic acid dehydrogenase    • Encounter for gynecological examination without abnormal finding    • Episcleritis    • Fundic gland polyposis of stomach    • Gastric polyp    • Gastritis    • Gastroesophageal reflux disease    • Gastroparesis    • Gout    • H/O mammogram    • History of colon polyps    • Hypercholesterolemia    • IBS (irritable bowel syndrome)    • Insomnia    • Mass of ovary     fixed cyst, post removal, no sign of cancer   • Meniere's disease    • Myoclonic disorder    • Nuclear senile cataract    • Osteoarthritis of multiple joints    • Peripheral edema    • Plantar fasciitis    • PONV (postoperative nausea and vomiting)    • Pruritus of vagina    • Restless legs    • Seasonal allergic rhinitis    • Skin cancer     RUE   • Sleep apnea     not using c-pap   • Spasm of bladder    • Vaginal irritation    • Vitamin D deficiency          Past Surgical History:   Procedure Laterality Date   • APPENDECTOMY     • BRONCHOSCOPY  09/10/2001    density, upper lobe posteriorly, left. no endobronchial lesions seen   • CARPAL TUNNEL RELEASE  WITH CUBITAL TUNNEL RELEASE  05/03/2013    Right Shoulder And Wrist I   • COLONOSCOPY  2010    2 polyps, repeat 5 yr   • COLONOSCOPY W/ POLYPECTOMY  07/23/2015    diverticulosis found in the sigmoid colon. a single polyp found in the cecum; ascending colon, and sigmoid colon. all removed by cold biopsy polypectomy. hemorrhoids found   • CYSTOSCOPY  11/13/1974    and x-ray urethral diverticulography. urethral diverticulum   • DENTAL PROCEDURE  11/19/2004    extraction of tooth #30 with local anesthesia and IV sedation   • ENDOSCOPIC FUNCTIONAL SINUS SURGERY (FESS) Right 3/6/2018    Procedure: RIGHT ENDOSCOPICS SINUS SURGERY OF THE MAXILLARY SINUS, BILATERAL PARTIAL INFERIOR TURBINECTOMIES, LEFT ENDOSCOPIC EJ BULLOSA RESECTION, NASAL SEPTOPLASTY, NASAL VALVE REPAIR  ;  Surgeon: Pablo Navas MD;  Location: [facility];  Service:    • ENDOSCOPY AND COLONOSCOPY  2005    dininutive polyp in the rectum. diminutive polyp 30cm from the anus. diminutive polyp ascending colon. all polyps were removed by hot biopsy polypectomy. multiple diverticula in the sigmoid   • INJECTION OF MEDICATION  03/21/2014    celestone   • INJECTION OF MEDICATION  03/11/2013    dexamethasone   • INJECTION OF MEDICATION  05/09/2014    kenalog   • LAPAROSCOPIC CHOLECYSTECTOMY     • OTHER SURGICAL HISTORY  1960    Multiple Cysts To Right Ovary   • OTHER SURGICAL HISTORY  1969    Transurethral Resection   • OVARIAN CYST REMOVAL  11/14/1974    resection of left ovarian cyst & left salpingectomy. appendectomy   • TONSILECTOMY, ADENOIDECTOMY, BILATERAL MYRINGOTOMY AND TUBES  1954   • TOTAL ABDOMINAL HYSTERECTOMY WITH SALPINGO OOPHORECTOMY  08/18/2015    total abdominal hysterectomy and bilateral salpingo-oophorectomy. enbterolysis and lysis of adhesions.  Completed At [facility] In [place].         Family History   Problem Relation Age of Onset   • Diabetes Mother    • Other Mother         cortical basal ganglionic degeneration    •  Seizures Mother         epilepsy   • Arthritis Mother    • Hyperlipidemia Mother    • Mental illness Mother    • Breast cancer Mother    • Heart disease Mother    • Tuberculosis Father    • Cancer Father 60        colon   • Hypertension Father    • Colon cancer Father    • Heart disease Father    • Colon cancer Other    • Hypertension Other    • Heart disease Other    • Diabetes Other    • Colon cancer Other    • Breast cancer Paternal Grandmother        Allergies   Allergen Reactions   • Augmentin [Amoxicillin-Pot Clavulanate] Anaphylaxis   • Ceclor [Cefaclor] Anaphylaxis   • Nsaids Anaphylaxis   • Other Anaphylaxis     Cats  E.E.S. 200  Lead   Mold  [employer] Pastrani - Anaphylaxis   • Penicillins Anaphylaxis   • Aspirin Other (See Comments)     tinnitis    • Biaxin [Clarithromycin] Other (See Comments)     angioedema   • Ciprofloxacin Other (See Comments)     Lips burning   • Contrast Dye Hives and Itching   • Demerol [Meperidine] Nausea And Vomiting   • Iodine      And iodide containing products   • Macrobid [Nitrofurantoin Monohyd Macro] Diarrhea, Nausea And Vomiting and GI Intolerance   • Requip [Ropinirole Hcl] Diarrhea, Nausea And Vomiting and GI Intolerance   • Septra [Sulfamethoxazole-Trimethoprim] Nausea And Vomiting and GI Intolerance   • Statins Other (See Comments)       *muscle enzyme increase with myalgias   • Zithromax [Azithromycin] Other (See Comments)     States she hasn't had a reaction, but doctor said he wasn't going to give it to her   • Niaspan [Niacin Er] Rash   • Nickel Rash       Social History     Social History   • Marital status:      Spouse name: N/A   • Number of children: N/A   • Years of education: N/A     Occupational History   • Not on file.     Social History Main Topics   • Smoking status: Former Smoker     Years: 30.00     Quit date: 2001   • Smokeless tobacco: Never Used   • Alcohol use No   • Drug use: No   • Sexual activity: Defer      Comment:      Other Topics  Concern   • Not on file     Social History Narrative    ** Merged History Encounter **              Current Outpatient Prescriptions   Medication Sig Dispense Refill   • Alum Hydroxide-Mag Carbonate (GAVISCON PO) Take  by mouth As Needed. Tablets Or Liquid, As Needed      • Biotin 5 MG capsule Take 1 capsule by mouth Daily.     • Calcium Citrate-Vitamin D (CALCIUM + D PO) Take 1,200 mg by mouth Daily.     • cetirizine (ZyrTEC) 10 MG tablet Take 10 mg by mouth daily.     • Chlorpheniramine Maleate ER 12 MG tablet controlled-release Take 1 tablet by mouth every night at bedtime. 30 each 3   • cholecalciferol (VITAMIN D3) 400 units tablet Take 400 Units by mouth Daily.     • clotrimazole (MYCELEX) 10 MG sara Take 1 tablet by mouth 5 (Five) Times a Day. 40 tablet 0   • Cranberry-Vitamin C-Vitamin E (CRANBERRY PLUS VITAMIN C) 4200-20-3 MG-MG-UNIT capsule Take 2 tablets by mouth Every Night.     • EPINEPHrine (EPIPEN 2-ALBERT) 0.3 MG/0.3ML solution auto-injector injection Use as directed for Allergic Reaction 1 each 5   • Flaxseed, Linseed, (FLAX SEED OIL) 1000 MG capsule Take 1 capsule by mouth 2 (two) times a day.     • gabapentin (NEURONTIN) 600 MG tablet Take 1 tablet by mouth 2 (Two) Times a Day. 1 qam and 1/2 qhs 60 tablet 2   • hydrOXYzine (VISTARIL) 100 MG capsule Take 1 capsule by mouth 3 (Three) Times a Day As Needed for Allergies. 30 capsule 2   • meclizine (ANTIVERT) 25 MG tablet Take 25 mg by mouth 3 (Three) Times a Day As Needed for dizziness.     • mometasone (NASONEX) 50 MCG/ACT nasal spray 2 sprays into each nostril 2 (Two) Times a Day. 17 g 5   • montelukast (SINGULAIR) 10 MG tablet Take 1 tablet by mouth Every Night. 30 tablet 11   • Multiple Vitamins-Minerals (CENTRUM SILVER PO) Take 1 tablet by mouth daily.     • olopatadine (PATANASE) 0.6 % solution nasal solution 2 sprays by Each Nare route 2 (Two) Times a Day. 30 g 3   • Probiotic Product (PROBIOTIC FORMULA PO) Take 1 tablet by mouth Every Night. 10  "billion cell (2 billion ea) capsule      • promethazine (PHENERGAN) 25 MG tablet Take 1 tablet by mouth Every 6 (Six) Hours As Needed for Nausea or Vomiting. 30 tablet 2   • Sennosides 25 MG tablet Take 2 tablets by mouth Every Night.     • temazepam (RESTORIL) 30 MG capsule Take 1 capsule by mouth At Night As Needed for Sleep. 30 capsule 2   • traZODone (DESYREL) 100 MG tablet Take 1 tablet by mouth Every Night. 90 tablet 3   • traZODone (DESYREL) 50 MG tablet Take 50 mg by mouth.     • valACYclovir (VALTREX) 1000 MG tablet Take 1 g by mouth.     • acetaminophen (TYLENOL) 500 MG tablet Take 500 mg by mouth.     • Biotin 5 MG tablet Take  by mouth.     • cetirizine (zyrTEC) 10 MG tablet Take 10 mg by mouth.     • Cranberry-Vitamin C-Vitamin E 4200-20-3 MG-MG-UNIT capsule Take 2 capsules by mouth.     • EPINEPHrine (EPIPEN 2-ALBERT) 0.3 MG/0.3ML solution auto-injector injection Use as directed     • ESTRING 2 MG vaginal ring INSERT 2 MG INTO THE VAGINA EVERY 3 (THREE) MONTHS. FOLLOW PACKAGE DIRECTIONS 1 each 2   • fluticasone (FLONASE) 50 MCG/ACT nasal spray 1 spray into the nostril(s) as directed by provider.     • gabapentin (NEURONTIN) 300 MG capsule Take 900 mg by mouth.     • LACTOBACILLUS RHAMNOSUS, GG, PO Take 1 capsule by mouth.     • meclizine (ANTIVERT) 25 MG tablet Take 25 mg by mouth.     • metoclopramide (REGLAN) 5 MG tablet Take 5 mg by mouth.     • temazepam (RESTORIL) 30 MG capsule Take 30 mg by mouth.     • triamterene-hydrochlorothiazide (MAXZIDE-25) 37.5-25 MG per tablet Take 1 tablet by mouth.       No current facility-administered medications for this visit.          OBJECTIVE    Pulse 69   Ht 162.6 cm (64\")   Wt 80.7 kg (178 lb)   LMP 03/22/1999 (Approximate) Comment: 2000  SpO2 98%   BMI 30.55 kg/m²       Review of Systems   Constitutional: Negative.    HENT: Negative.    Eyes: Negative.    Respiratory: Negative.    Cardiovascular: Negative.    Gastrointestinal: Negative.    Genitourinary: " Negative.    Musculoskeletal:        Left foot pain   Skin: Negative.    Neurological: Negative.    Psychiatric/Behavioral: Negative.            Physical Exam   Constitutional: She is oriented to person, place, and time. She appears well-developed and well-nourished. No distress.   HENT:   Head: Normocephalic and atraumatic.   Nose: Nose normal.   Eyes: Pupils are equal, round, and reactive to light. Conjunctivae and EOM are normal.   Pulmonary/Chest: Effort normal. No respiratory distress.   Neurological: She is alert and oriented to person, place, and time. She displays normal reflexes.   Psychiatric: She has a normal mood and affect. Her behavior is normal.       Left Lower Extremity    Cardiovascular:    DP/PT pulses palpable    CFT brisk  to all digits  No erythema or edema noted     Musculoskeletal:  Muscle strength is 5/5 for all muscle groups tested   ROM of the 1st MTP is full without pain or crepitus  Improved pain on palpation to the medial tubercle on the left calcaneus.  Negative lateral squeeze test.    Dermatological:   Skin is warm, dry and intact    Webspaces 1-4  are clean, dry and intact.     Neurological:   Protective sensation intact    Sensation intact to light touch          Procedures    ASSESSMENT AND PLAN    Lexie was seen today for plantar fasciitis, pain and follow-up.    Diagnoses and all orders for this visit:    Plantar fasciitis      - Patient's pain is improved.  - Continue stretching and custom orthotics   - discussed treatment options going forward should pain return.  Options discussed included stem cell injection versus plantar fasciotomy.  - All her questions were answered  - Return to clinic as needed           This document has been electronically signed by David Spring DPM on September 17, 2018 2:25 PM     9/17/2018  2:25 PM

## 2018-09-20 ENCOUNTER — APPOINTMENT (OUTPATIENT)
Dept: PHYSICAL THERAPY | Facility: HOSPITAL | Age: 68
End: 2018-09-20
Attending: PODIATRIST

## 2018-09-26 ENCOUNTER — OFFICE VISIT (OUTPATIENT)
Dept: OBSTETRICS AND GYNECOLOGY | Facility: CLINIC | Age: 68
End: 2018-09-26

## 2018-09-26 VITALS
WEIGHT: 178.4 LBS | HEIGHT: 64 IN | DIASTOLIC BLOOD PRESSURE: 82 MMHG | BODY MASS INDEX: 30.46 KG/M2 | SYSTOLIC BLOOD PRESSURE: 140 MMHG

## 2018-09-26 DIAGNOSIS — R23.4 BREAST SKIN CHANGES: ICD-10-CM

## 2018-09-26 DIAGNOSIS — R92.8 ABNORMAL FINDING ON RADIOLOGICAL EXAMINATION OF BREAST: Primary | ICD-10-CM

## 2018-09-26 DIAGNOSIS — N64.4 PAIN OF RIGHT BREAST: Primary | ICD-10-CM

## 2018-09-26 PROCEDURE — 99214 OFFICE O/P EST MOD 30 MIN: CPT | Performed by: NURSE PRACTITIONER

## 2018-09-26 NOTE — PROGRESS NOTES
"Subjective   Chief Complaint   Patient presents with   • dimpling in breast     dimpling in right breast     Lexie Cardona is a 68 y.o. year old  presenting to be seen because of right breast tenderness at the 7 o'clock position.  The tenderness and small area of dimpling were first noticed a few weeks ago. She has to stand and look in a mirror to be able to see it. It is not painful but is a little tender to the touch..    · Last mammogram: was done on approximately 18 (Diagnostic for abnormal screening in May) and the result was: Birads II (Benign findings).  · Last breast MRI: she has never had a MRI    No Additional Complaints Reported     The following portions of the patient's history were reviewed and updated as appropriate:problem list, current medications, allergies, past family history, past medical history, past social history and past surgical history    Review of Systems   Constitutional: Negative for chills, diaphoresis, fatigue and unexpected weight change.   Respiratory: Negative for chest tightness and shortness of breath.    Gastrointestinal: Negative for abdominal distention and abdominal pain.   Skin: Negative for color change, pallor and rash.   Neurological: Negative for weakness, light-headedness and headaches.   Hematological: Negative for adenopathy. Does not bruise/bleed easily.         Objective   /82   Ht 162.6 cm (64\")   Wt 80.9 kg (178 lb 6.4 oz)   LMP 1999 (Approximate) Comment:   BMI 30.62 kg/m²     General:  well developed; well nourished  no acute distress  appears stated age  obese - Body mass index is 30.62 kg/m².  not distressed   Breasts:  Examined in upright and supine position  Nipples normal without inversion, lesions or discharge  There are no palpable axillary nodes  In right breast at 7-00, 5cm from the nipple is a subcentimeter dimple and prominent vein. No palpable masses noted.      Lab Review   No data reviewed    Imaging "   Breast ultrasound report  Mammogram report       Assessment   1. Breast tenderness  2. Dimpling in right breast  3. Hormone replacement therapy     Plan   1. Breast u/s today. Will call with results. Appears benign on exam but will confirm.  2. Continue estring at this time. Refills sent to pharmacy.    New Medications Ordered This Visit   Medications   • estradiol (ESTRING) 2 MG vaginal ring     Sig: Insert 2 mg into the vagina Every 3 (Three) Months. follow package directions     Dispense:  1 each     Refill:  4     NEEDS REFILLS          This note was electronically signed.    Isabel Dumont, PABLO  September 26, 2018

## 2018-09-27 ENCOUNTER — OFFICE VISIT (OUTPATIENT)
Dept: OTOLARYNGOLOGY | Facility: CLINIC | Age: 68
End: 2018-09-27

## 2018-09-27 VITALS — TEMPERATURE: 98.5 F | HEIGHT: 64 IN | BODY MASS INDEX: 30.39 KG/M2 | WEIGHT: 178 LBS

## 2018-09-27 DIAGNOSIS — IMO0002 POSITIONAL VERTIGO OF BOTH EARS: ICD-10-CM

## 2018-09-27 DIAGNOSIS — J30.1 CHRONIC ALLERGIC RHINITIS DUE TO POLLEN, UNSPECIFIED SEASONALITY: Primary | ICD-10-CM

## 2018-09-27 PROCEDURE — 99213 OFFICE O/P EST LOW 20 MIN: CPT | Performed by: OTOLARYNGOLOGY

## 2018-09-27 RX ORDER — HYDROXYZINE PAMOATE 100 MG/1
100 CAPSULE ORAL 3 TIMES DAILY PRN
Qty: 90 CAPSULE | Refills: 6 | Status: SHIPPED | OUTPATIENT
Start: 2018-09-27 | End: 2020-04-03

## 2018-09-27 RX ORDER — HYDROXYZINE PAMOATE 100 MG/1
100 CAPSULE ORAL 3 TIMES DAILY PRN
COMMUNITY
End: 2018-09-27

## 2018-09-27 NOTE — PROGRESS NOTES
Subjective   Lexie Cardona is a 68 y.o. female.   Follow-up dizziness    History of Present Illness     States her allergies are doing better she's having less congestion no purulence has some drainage or posterior nose but none in her oral in her anterior nose and she's not having fever chills or facial swelling said the dizziness was made worse by her therapy   Discussed her allergies reviewed this detail, sure medication last  The following portions of the patient's history were reviewed and updated as appropriate: allergies, current medications, past family history, past medical history, past social history, past surgical history and problem list.  To dizziness is almost back to normal but had a bad day a few days ago    Current Outpatient Prescriptions:   •  acetaminophen (TYLENOL) 500 MG tablet, Take 500 mg by mouth., Disp: , Rfl:   •  Alum Hydroxide-Mag Carbonate (GAVISCON PO), Take  by mouth As Needed. Tablets Or Liquid, As Needed , Disp: , Rfl:   •  Biotin 5 MG capsule, Take 1 capsule by mouth Daily., Disp: , Rfl:   •  Calcium Citrate-Vitamin D (CALCIUM + D PO), Take 1,200 mg by mouth Daily., Disp: , Rfl:   •  cetirizine (ZyrTEC) 10 MG tablet, Take 10 mg by mouth daily., Disp: , Rfl:   •  Chlorpheniramine Maleate ER 12 MG tablet controlled-release, Take 1 tablet by mouth every night at bedtime., Disp: 30 each, Rfl: 3  •  cholecalciferol (VITAMIN D3) 400 units tablet, Take 400 Units by mouth Daily., Disp: , Rfl:   •  Cranberry-Vitamin C-Vitamin E (CRANBERRY PLUS VITAMIN C) 4200-20-3 MG-MG-UNIT capsule, Take 2 tablets by mouth Every Night., Disp: , Rfl:   •  EPINEPHrine (EPIPEN 2-ALBERT) 0.3 MG/0.3ML solution auto-injector injection, Use as directed for Allergic Reaction, Disp: 1 each, Rfl: 5  •  estradiol (ESTRING) 2 MG vaginal ring, Insert 2 mg into the vagina Every 3 (Three) Months. follow package directions, Disp: 1 each, Rfl: 4  •  Flaxseed, Linseed, (FLAX SEED OIL) 1000 MG capsule, Take 1 capsule  by mouth 2 (two) times a day., Disp: , Rfl:   •  fluticasone (FLONASE) 50 MCG/ACT nasal spray, 1 spray into the nostril(s) as directed by provider., Disp: , Rfl:   •  gabapentin (NEURONTIN) 300 MG capsule, Take 900 mg by mouth., Disp: , Rfl:   •  gabapentin (NEURONTIN) 600 MG tablet, Take 1 tablet by mouth 2 (Two) Times a Day. 1 qam and 1/2 qhs, Disp: 60 tablet, Rfl: 2  •  LACTOBACILLUS RHAMNOSUS, GG, PO, Take 1 capsule by mouth., Disp: , Rfl:   •  meclizine (ANTIVERT) 25 MG tablet, Take 25 mg by mouth 3 (Three) Times a Day As Needed for dizziness., Disp: , Rfl:   •  mometasone (NASONEX) 50 MCG/ACT nasal spray, 2 sprays into each nostril 2 (Two) Times a Day., Disp: 17 g, Rfl: 5  •  montelukast (SINGULAIR) 10 MG tablet, Take 1 tablet by mouth Every Night., Disp: 30 tablet, Rfl: 11  •  Multiple Vitamins-Minerals (CENTRUM SILVER PO), Take 1 tablet by mouth daily., Disp: , Rfl:   •  olopatadine (PATANASE) 0.6 % solution nasal solution, 2 sprays by Each Nare route 2 (Two) Times a Day., Disp: 30 g, Rfl: 3  •  Probiotic Product (PROBIOTIC FORMULA PO), Take 1 tablet by mouth Every Night. 10 billion cell (2 billion ea) capsule , Disp: , Rfl:   •  promethazine (PHENERGAN) 25 MG tablet, Take 1 tablet by mouth Every 6 (Six) Hours As Needed for Nausea or Vomiting., Disp: 30 tablet, Rfl: 2  •  Sennosides 25 MG tablet, Take 2 tablets by mouth Every Night., Disp: , Rfl:   •  temazepam (RESTORIL) 30 MG capsule, Take 1 capsule by mouth At Night As Needed for Sleep., Disp: 30 capsule, Rfl: 2  •  traZODone (DESYREL) 100 MG tablet, Take 1 tablet by mouth Every Night., Disp: 90 tablet, Rfl: 3  •  valACYclovir (VALTREX) 1000 MG tablet, Take 1 g by mouth., Disp: , Rfl:   •  hydrOXYzine (VISTARIL) 100 MG capsule, Take 1 capsule by mouth 3 (Three) Times a Day As Needed for Itching., Disp: 90 capsule, Rfl: 6    Allergies   Allergen Reactions   • Augmentin [Amoxicillin-Pot Clavulanate] Anaphylaxis   • Ceclor [Cefaclor] Anaphylaxis   • Nsaids  Anaphylaxis   • Other Anaphylaxis     Cats  E.E.S. 200  Lead   Mold  [place] Pastrani - Anaphylaxis   • Penicillins Anaphylaxis   • Aspirin Other (See Comments)     tinnitis    • Biaxin [Clarithromycin] Other (See Comments)     angioedema   • Ciprofloxacin Other (See Comments)     Lips burning   • Contrast Dye Hives and Itching   • Cymbalta [Duloxetine Hcl] Unknown (See Comments)     Tongue turned red and was swollen   • Demerol [Meperidine] Nausea And Vomiting   • Iodine      And iodide containing products   • Macrobid [Nitrofurantoin Monohyd Macro] Diarrhea, Nausea And Vomiting and GI Intolerance   • Requip [Ropinirole Hcl] Diarrhea, Nausea And Vomiting and GI Intolerance   • Septra [Sulfamethoxazole-Trimethoprim] Nausea And Vomiting and GI Intolerance   • Statins Other (See Comments)       *muscle enzyme increase with myalgias   • Zithromax [Azithromycin] Other (See Comments)     States she hasn't had a reaction, but doctor said he wasn't going to give it to her   • Niaspan [Niacin Er] Rash   • Nickel Rash             Review of Systems   Constitutional: Negative for fever.   HENT: Negative for facial swelling and hearing loss.    Neurological: Positive for dizziness.   Hematological: Negative for adenopathy.     Some postnasal drip no facial pain no fever swelling      Objective   Physical Exam   Constitutional: She is oriented to person, place, and time. She appears well-developed and well-nourished.   HENT:   Head: Normocephalic and atraumatic.   Right Ear: Hearing, tympanic membrane, external ear and ear canal normal.   Left Ear: Hearing, tympanic membrane, external ear and ear canal normal.   Nose: Nose normal. No mucosal edema, rhinorrhea, nasal deformity or septal deviation. No epistaxis. Right sinus exhibits no maxillary sinus tenderness and no frontal sinus tenderness. Left sinus exhibits no maxillary sinus tenderness and no frontal sinus tenderness.   Mouth/Throat: Uvula is midline, oropharynx is clear  and moist and mucous membranes are normal. No trismus in the jaw. Normal dentition. No oropharyngeal exudate or posterior oropharyngeal edema.   Eyes: Pupils are equal, round, and reactive to light. Conjunctivae and EOM are normal.   Neck: Normal range of motion. Neck supple. No JVD present. No tracheal deviation present. No thyromegaly present.   Pulmonary/Chest: Effort normal and breath sounds normal.   Musculoskeletal: Normal range of motion.   Lymphadenopathy:        Head (right side): No submental, no submandibular, no tonsillar, no preauricular, no posterior auricular and no occipital adenopathy present.        Head (left side): No submental, no submandibular, no tonsillar, no preauricular, no posterior auricular and no occipital adenopathy present.     She has no cervical adenopathy.        Right cervical: No superficial cervical, no deep cervical and no posterior cervical adenopathy present.       Left cervical: No superficial cervical, no deep cervical and no posterior cervical adenopathy present.   Neurological: She is alert and oriented to person, place, and time. No cranial nerve deficit.   Skin: Skin is warm.   Psychiatric: She has a normal mood and affect. Her speech is normal and behavior is normal. Thought content normal.   Nursing note and vitals reviewed.  Rocker motions are normal without nystagmus normal gait and stance and cerebellar function Medication list and notes from physical therapist    Assessment/Plan   Lexie was seen today for follow-up.    Diagnoses and all orders for this visit:    Chronic allergic rhinitis due to pollen, unspecified seasonality    Positional vertigo of both ears    Other orders  -     hydrOXYzine (VISTARIL) 100 MG capsule; Take 1 capsule by mouth 3 (Three) Times a Day As Needed for Itching.         I asked her to stop the meclizine resume the Vistaril since she sneezing a lot more use her Nasonex she's really congested her has ear pressure 3 times a day for short  periods of time she is to  Call usback in 2 weeks now she do with her allergies    F/u 3 months     overall her physical exam was unremarkable she has no nystagmus normal gait and stance

## 2018-09-27 NOTE — PATIENT INSTRUCTIONS

## 2018-10-01 ENCOUNTER — TELEPHONE (OUTPATIENT)
Dept: FAMILY MEDICINE CLINIC | Facility: CLINIC | Age: 68
End: 2018-10-01

## 2018-10-01 NOTE — TELEPHONE ENCOUNTER
DARIUS MANNING IS NEEDING ANOTHER REF TO THE [facility] FOR HER BREAST...SHE HAD ULTRA SOUND DONE ON DENT OF HER BREAST BUT SHE IS WANTING A SECOND OPINON. THIS IS A DIFFERENT PROBLEM THAN BEFORE WHEN SHE WAS REF...CALL HER  [phone]  LEAVE MESSAGE IF NO ANSWER

## 2018-10-01 NOTE — TELEPHONE ENCOUNTER
I have called Ms. Cardona and [facility] in [place].     Ms. Cardona has the CD and will mail it to [facility].  [facility] has faxed a referral request for the 2nd opinion for Dr. Forrest to sign.    Dr. Forrest has signed the order and it has been faxed back to [phone]    Order/Referral has been sent to Medical Records to be scanned into the patient's record in case there is a question as to follow-up or they did not receive the order.

## 2018-10-18 ENCOUNTER — TELEPHONE (OUTPATIENT)
Dept: FAMILY MEDICINE CLINIC | Facility: CLINIC | Age: 68
End: 2018-10-18

## 2018-10-19 ENCOUNTER — OFFICE VISIT (OUTPATIENT)
Dept: OTOLARYNGOLOGY | Facility: CLINIC | Age: 68
End: 2018-10-19

## 2018-10-19 ENCOUNTER — CLINICAL SUPPORT (OUTPATIENT)
Dept: AUDIOLOGY | Facility: CLINIC | Age: 68
End: 2018-10-19

## 2018-10-19 VITALS — HEIGHT: 64 IN | BODY MASS INDEX: 30.39 KG/M2 | WEIGHT: 178 LBS | TEMPERATURE: 97.7 F

## 2018-10-19 DIAGNOSIS — H92.03 OTALGIA OF BOTH EARS: Primary | ICD-10-CM

## 2018-10-19 DIAGNOSIS — H90.3 SENSORINEURAL HEARING LOSS, BILATERAL: Primary | ICD-10-CM

## 2018-10-19 DIAGNOSIS — M47.812 NECK ARTHRITIS: ICD-10-CM

## 2018-10-19 DIAGNOSIS — M26.629 ARTHRALGIA OF TEMPOROMANDIBULAR JOINT, UNSPECIFIED LATERALITY: ICD-10-CM

## 2018-10-19 PROCEDURE — 99213 OFFICE O/P EST LOW 20 MIN: CPT | Performed by: OTOLARYNGOLOGY

## 2018-10-19 RX ORDER — DOXYCYCLINE 100 MG/1
CAPSULE ORAL
COMMUNITY
Start: 2018-10-13 | End: 2018-12-27

## 2018-10-19 NOTE — PATIENT INSTRUCTIONS

## 2018-10-19 NOTE — PROGRESS NOTES
Subjective   Lexie Cardona is a 68 y.o. female.   CC: Concern about possible air-fluid    History of Present Illness   States she was diagnosed as having air-fluid sometime last couple weeks and placed and steroids she's come off of those.  She still having pain she points to the area just anterior to her more in the jaw area.  She felt like there is fullness and pressure.  She has had no ear drainage no major change in hearing denies any recent jaw problems.  She has significant neck problems and is seen neurosurgeons about this previously she's not having sore throats swallowing or voice problems or change.   The pressure and discomfort or ears comes and goes is not associated with any particular position or any ear drainage or change in hearing .          The following portions of the patient's history were reviewed and updated as appropriate: allergies, current medications, past family history, past medical history, past social history, past surgical history and problem list.      Current Outpatient Prescriptions:   •  acetaminophen (TYLENOL) 500 MG tablet, Take 500 mg by mouth., Disp: , Rfl:   •  Alum Hydroxide-Mag Carbonate (GAVISCON PO), Take  by mouth As Needed. Tablets Or Liquid, As Needed , Disp: , Rfl:   •  Biotin 5 MG capsule, Take 1 capsule by mouth Daily., Disp: , Rfl:   •  Calcium Citrate-Vitamin D (CALCIUM + D PO), Take 1,200 mg by mouth Daily., Disp: , Rfl:   •  cetirizine (ZyrTEC) 10 MG tablet, Take 10 mg by mouth daily., Disp: , Rfl:   •  Chlorpheniramine Maleate ER 12 MG tablet controlled-release, Take 1 tablet by mouth every night at bedtime., Disp: 30 each, Rfl: 3  •  cholecalciferol (VITAMIN D3) 400 units tablet, Take 400 Units by mouth Daily., Disp: , Rfl:   •  Cranberry-Vitamin C-Vitamin E (CRANBERRY PLUS VITAMIN C) 4200-20-3 MG-MG-UNIT capsule, Take 2 tablets by mouth Every Night., Disp: , Rfl:   •  doxycycline (MONODOX) 100 MG capsule, , Disp: , Rfl:   •  EPINEPHrine (EPIPEN  2-ALBERT) 0.3 MG/0.3ML solution auto-injector injection, Use as directed for Allergic Reaction, Disp: 1 each, Rfl: 5  •  estradiol (ESTRING) 2 MG vaginal ring, Insert 2 mg into the vagina Every 3 (Three) Months. follow package directions, Disp: 1 each, Rfl: 4  •  Flaxseed, Linseed, (FLAX SEED OIL) 1000 MG capsule, Take 1 capsule by mouth 2 (two) times a day., Disp: , Rfl:   •  fluticasone (FLONASE) 50 MCG/ACT nasal spray, 1 spray into the nostril(s) as directed by provider., Disp: , Rfl:   •  gabapentin (NEURONTIN) 300 MG capsule, Take 900 mg by mouth., Disp: , Rfl:   •  gabapentin (NEURONTIN) 600 MG tablet, Take 1 tablet by mouth 2 (Two) Times a Day. 1 qam and 1/2 qhs, Disp: 60 tablet, Rfl: 2  •  hydrOXYzine (VISTARIL) 100 MG capsule, Take 1 capsule by mouth 3 (Three) Times a Day As Needed for Itching., Disp: 90 capsule, Rfl: 6  •  LACTOBACILLUS RHAMNOSUS, GG, PO, Take 1 capsule by mouth., Disp: , Rfl:   •  meclizine (ANTIVERT) 25 MG tablet, Take 25 mg by mouth 3 (Three) Times a Day As Needed for dizziness., Disp: , Rfl:   •  mometasone (NASONEX) 50 MCG/ACT nasal spray, 2 sprays into each nostril 2 (Two) Times a Day., Disp: 17 g, Rfl: 5  •  montelukast (SINGULAIR) 10 MG tablet, Take 1 tablet by mouth Every Night., Disp: 30 tablet, Rfl: 11  •  Multiple Vitamins-Minerals (CENTRUM SILVER PO), Take 1 tablet by mouth daily., Disp: , Rfl:   •  olopatadine (PATANASE) 0.6 % solution nasal solution, 2 sprays by Each Nare route 2 (Two) Times a Day., Disp: 30 g, Rfl: 3  •  Probiotic Product (PROBIOTIC FORMULA PO), Take 1 tablet by mouth Every Night. 10 billion cell (2 billion ea) capsule , Disp: , Rfl:   •  promethazine (PHENERGAN) 25 MG tablet, Take 1 tablet by mouth Every 6 (Six) Hours As Needed for Nausea or Vomiting., Disp: 30 tablet, Rfl: 2  •  Sennosides 25 MG tablet, Take 2 tablets by mouth Every Night., Disp: , Rfl:   •  temazepam (RESTORIL) 30 MG capsule, Take 1 capsule by mouth At Night As Needed for Sleep., Disp: 30  capsule, Rfl: 2  •  traZODone (DESYREL) 100 MG tablet, Take 1 tablet by mouth Every Night., Disp: 90 tablet, Rfl: 3  •  valACYclovir (VALTREX) 1000 MG tablet, Take 1 g by mouth., Disp: , Rfl:     Allergies   Allergen Reactions   • Augmentin [Amoxicillin-Pot Clavulanate] Anaphylaxis   • Ceclor [Cefaclor] Anaphylaxis   • Nsaids Anaphylaxis   • Other Anaphylaxis     Cats  E.E.S. 200  Lead   Mold  [place] Pastrani - Anaphylaxis   • Penicillins Anaphylaxis   • Aspirin Other (See Comments)     tinnitis    • Biaxin [Clarithromycin] Other (See Comments)     angioedema   • Ciprofloxacin Other (See Comments)     Lips burning   • Contrast Dye Hives and Itching   • Cymbalta [Duloxetine Hcl] Unknown (See Comments)     Tongue turned red and was swollen   • Demerol [Meperidine] Nausea And Vomiting   • Iodine      And iodide containing products   • Macrobid [Nitrofurantoin Monohyd Macro] Diarrhea, Nausea And Vomiting and GI Intolerance   • Requip [Ropinirole Hcl] Diarrhea, Nausea And Vomiting and GI Intolerance   • Septra [Sulfamethoxazole-Trimethoprim] Nausea And Vomiting and GI Intolerance   • Statins Other (See Comments)       *muscle enzyme increase with myalgias   • Zithromax [Azithromycin] Other (See Comments)     States she hasn't had a reaction, but doctor said he wasn't going to give it to her   • Niaspan [Niacin Er] Rash   • Nickel Rash             Review of Systems   Constitutional: Negative for fever.   HENT: Positive for ear pain. Negative for ear discharge, facial swelling, sore throat and trouble swallowing.    Musculoskeletal: Positive for arthralgias, back pain, myalgias and neck pain.   Hematological: Negative for adenopathy.           Objective   Physical Exam   Constitutional: She appears well-developed and well-nourished.   HENT:   Head: Normocephalic.   Right Ear: Hearing, tympanic membrane, external ear and ear canal normal.   Left Ear: Hearing, tympanic membrane, external ear and ear canal normal.    Mouth/Throat: Uvula is midline, oropharynx is clear and moist and mucous membranes are normal. No tonsillar exudate.   Eyes: Conjunctivae are normal.   Pulmonary/Chest: Effort normal.     Audiogram and tympanogram were done which confirmed no evidence of fluid in either ear both eardrums are moving normally she has some mild high-frequency hearing loss tracings reviewed with the patient      Assessment/Plan   Lexie was seen today for follow-up.    Diagnoses and all orders for this visit:    Otalgia of both ears    Neck arthritis    Arthralgia of temporomandibular joint, unspecified laterality        Offered to send the patient to neurosurgeon but she doesn't want seen other neurosurgeon at this point I offered referral to local orthopedic spine surgeon she didn't want to do that either she says she can't take anti-inflammatory medicines.  I did talked her about soft diet and possibly seeing an oral maxillofacial surgeon she doesn't want to do that right now.    I did reassure there is noted to fluid or ears no need for tube placement her sinusesbe doing well her gait and stance and dizziness seems to be stable and not causing problems currently will see her as needed and she'll think about the referral options

## 2018-10-19 NOTE — PROGRESS NOTES
STANDARD AUDIOMETRIC EVALUATION      Name:  Lexie Cardona  :  1950  Age:  68 y.o.  Date of Evaluation:  10/19/2018      HISTORY    Reason for visit:  Lexie Cardona is seen today for a hearing evaluation at the request of Dr. Pablo Navas.  Patient reports she has fluid in her right ear, and she has been having fluid in her left ear.  She states she is always having problems hearing, but she does not wear hearing aids.      EVALUATION    See Audiogram    RESULTS        Otoscopy and Tympanometry 226 Hz :  Right Ear:  Otoscopy:  Clear ear canal          Tympanometry:  Middle ear function within normal limits    Left Ear:   Otoscopy:  Clear ear canal        Tympanometry:  Middle ear function within normal limits    Test technique:  Standard Audiometry     Pure Tone Audiometry:   Patient responded to pure tones at 10-45 dB for 250-8000 Hz in right ear, and at 5-45 dB for 250-8000 Hz in left ear.       Speech Audiometry:        Right Ear:  Speech Reception Threshold (SRT) was obtained at 0 dBHL                 Speech Discrimination scores were 96% in quiet when words were presented at 40 dBHL       Left Ear:  Speech Reception Threshold (SRT) was obtained at 10 dBHL                 Speech Discrimination scores were 100% in quiet when words were presented at 50 dBHL    Reliability:   good    IMPRESSIONS:  1.  Tympanometry results are consistent with Middle ear function within normal limits in both ears.  2.  Pure tone results are consistent with normal to moderate sloping sensorineural hearing loss for both ears.       RECOMMENDATIONS:  Patient is seeing the Ear Nose and Throat physician immediately following this examination.  It was a pleasure seeing Lexie Cardona in Audiology today.  We would be happy to do further testing or discuss these test as necessary.          This document has been electronically signed by Lani Ly MS CCC-LORETTA on 2018 3:25 PM        Lani Ly MS [facility]3]-A  Licensed Audiologist

## 2018-10-24 ENCOUNTER — OFFICE VISIT (OUTPATIENT)
Dept: FAMILY MEDICINE CLINIC | Facility: CLINIC | Age: 68
End: 2018-10-24

## 2018-10-24 ENCOUNTER — APPOINTMENT (OUTPATIENT)
Dept: LAB | Facility: HOSPITAL | Age: 68
End: 2018-10-24

## 2018-10-24 VITALS
SYSTOLIC BLOOD PRESSURE: 126 MMHG | WEIGHT: 177 LBS | DIASTOLIC BLOOD PRESSURE: 78 MMHG | HEART RATE: 67 BPM | BODY MASS INDEX: 30.22 KG/M2 | HEIGHT: 64 IN | OXYGEN SATURATION: 98 %

## 2018-10-24 DIAGNOSIS — N18.2 CHRONIC KIDNEY DISEASE (CKD), STAGE II (MILD): ICD-10-CM

## 2018-10-24 DIAGNOSIS — R93.89 ABNORMAL ULTRASOUND: Primary | ICD-10-CM

## 2018-10-24 DIAGNOSIS — R74.8 ELEVATED LIVER ENZYMES: ICD-10-CM

## 2018-10-24 DIAGNOSIS — R92.2 INCONCLUSIVE MAMMOGRAM: ICD-10-CM

## 2018-10-24 DIAGNOSIS — R23.4 BREAST SKIN CHANGES: ICD-10-CM

## 2018-10-24 LAB
ALBUMIN SERPL-MCNC: 4.2 G/DL (ref 3.4–4.8)
ALBUMIN/GLOB SERPL: 1.3 G/DL (ref 1.1–1.8)
ALP SERPL-CCNC: 83 U/L (ref 38–126)
ALT SERPL W P-5'-P-CCNC: 45 U/L (ref 9–52)
ANION GAP SERPL CALCULATED.3IONS-SCNC: 12 MMOL/L (ref 5–15)
AST SERPL-CCNC: 43 U/L (ref 14–36)
BILIRUB SERPL-MCNC: 0.9 MG/DL (ref 0.2–1.3)
BUN BLD-MCNC: 10 MG/DL (ref 7–21)
BUN/CREAT SERPL: 9.2 (ref 7–25)
CALCIUM SPEC-SCNC: 9.8 MG/DL (ref 8.4–10.2)
CHLORIDE SERPL-SCNC: 102 MMOL/L (ref 95–110)
CO2 SERPL-SCNC: 28 MMOL/L (ref 22–31)
CREAT BLD-MCNC: 1.09 MG/DL (ref 0.5–1)
GFR SERPL CREATININE-BSD FRML MDRD: 50 ML/MIN/1.73 (ref 45–104)
GLOBULIN UR ELPH-MCNC: 3.2 GM/DL (ref 2.3–3.5)
GLUCOSE BLD-MCNC: 97 MG/DL (ref 60–100)
POTASSIUM BLD-SCNC: 3.7 MMOL/L (ref 3.5–5.1)
PROT SERPL-MCNC: 7.4 G/DL (ref 6.3–8.6)
SODIUM BLD-SCNC: 142 MMOL/L (ref 137–145)

## 2018-10-24 PROCEDURE — G0439 PPPS, SUBSEQ VISIT: HCPCS | Performed by: FAMILY MEDICINE

## 2018-10-24 PROCEDURE — 80053 COMPREHEN METABOLIC PANEL: CPT | Performed by: FAMILY MEDICINE

## 2018-10-24 PROCEDURE — 36415 COLL VENOUS BLD VENIPUNCTURE: CPT | Performed by: FAMILY MEDICINE

## 2018-10-24 PROCEDURE — 99214 OFFICE O/P EST MOD 30 MIN: CPT | Performed by: FAMILY MEDICINE

## 2018-10-24 RX ORDER — EPINEPHRINE 0.3 MG/.3ML
INJECTION SUBCUTANEOUS
Qty: 1 EACH | Refills: 5 | Status: SHIPPED | OUTPATIENT
Start: 2018-10-24 | End: 2020-01-03 | Stop reason: SDUPTHER

## 2018-10-24 NOTE — PROGRESS NOTES
Subjective   Lexie Cardona is a 68 y.o. female.     Chronic Kidney Disease   This is a chronic problem. The current episode started more than 1 year ago. The problem occurs constantly. The problem has been unchanged. Pertinent negatives include no chills or fever.   Insomnia   This is a chronic problem. The current episode started more than 1 year ago. The problem occurs constantly. The problem has been waxing and waning. Pertinent negatives include no chills or fever.   Arthritis   Presents for follow-up visit. She complains of pain. The symptoms have been worsening. Affected locations include the left MCP, right MCP, left wrist, right wrist and right shoulder. Her pain is at a severity of 4/10. Pertinent negatives include no fever.   Breast Pain   This is a new problem. The current episode started 1 to 4 weeks ago. The problem occurs constantly. The problem has been unchanged. Pertinent negatives include no chills or fever. Nothing aggravates the symptoms. She has tried nothing for the symptoms. The treatment provided no relief.        The following portions of the patient's history were reviewed and updated as appropriate: allergies, current medications, past family history, past medical history, past social history, past surgical history and problem list.    Review of Systems   Constitutional: Negative for chills and fever.   HENT: Positive for ear pain, postnasal drip and rhinorrhea. Negative for ear discharge.    Musculoskeletal: Positive for arthritis.   Psychiatric/Behavioral: The patient has insomnia.        Objective   Physical Exam   Constitutional: She is oriented to person, place, and time. She appears well-developed and well-nourished. No distress.   HENT:   Head: Normocephalic and atraumatic.   Right Ear: Hearing and tympanic membrane normal.   Left Ear: Hearing and tympanic membrane normal.   Nose: Mucosal edema and rhinorrhea present. Right sinus exhibits maxillary sinus tenderness and  frontal sinus tenderness. Left sinus exhibits maxillary sinus tenderness and frontal sinus tenderness.   Mouth/Throat: Uvula is midline and mucous membranes are normal.   Cardiovascular: Normal rate, regular rhythm and normal heart sounds.  Exam reveals no friction rub.    No murmur heard.  Pulmonary/Chest: Effort normal and breath sounds normal. No respiratory distress. She has no wheezes. Right breast exhibits inverted nipple and skin change. Right breast exhibits no mass, no nipple discharge and no tenderness. Left breast exhibits no inverted nipple, no mass, no nipple discharge, no skin change and no tenderness. There is no breast swelling.   Bilateral fibrocystic changes   Genitourinary: No breast tenderness, discharge or bleeding.   Musculoskeletal:        Right shoulder: She exhibits decreased range of motion and tenderness.        Left elbow: She exhibits decreased range of motion and swelling.        Right wrist: She exhibits decreased range of motion and tenderness.        Left wrist: She exhibits decreased range of motion and tenderness.   Lymphadenopathy:     She has no axillary adenopathy.   Neurological: She is alert and oriented to person, place, and time.   Skin: Skin is warm and dry. She is not diaphoretic.   Psychiatric: She has a normal mood and affect. Her behavior is normal. Judgment and thought content normal.   Nursing note and vitals reviewed.      Assessment/Plan   Problems Addressed this Visit        Genitourinary    Chronic kidney disease (CKD), stage II (mild) (Chronic)    Relevant Orders    Comprehensive Metabolic Panel      Other Visit Diagnoses     Abnormal ultrasound    -  Primary    Relevant Orders    US breast right complete    Inconclusive mammogram         Relevant Orders    US breast right complete    Elevated liver enzymes        Relevant Orders    Comprehensive Metabolic Panel    Breast skin changes

## 2018-10-24 NOTE — PATIENT INSTRUCTIONS

## 2018-10-25 ENCOUNTER — TELEPHONE (OUTPATIENT)
Dept: FAMILY MEDICINE CLINIC | Facility: CLINIC | Age: 68
End: 2018-10-25

## 2018-10-25 NOTE — TELEPHONE ENCOUNTER
-Pr Dr. Forrest, Ms. Cardona has been called with her recent lab results & recommendations.  Continue her current medications and follow-up as planned or sooner if any problems.      ---- Message from Naresh Forrest MD sent at 10/24/2018  1:05 PM CDT -----  Renal and hepatic function study

## 2018-10-25 NOTE — PROGRESS NOTES
Pr Dr. Forrest, Ms. Brendan has been called with her recent lab results & recommendations.  Continue her current medications and follow-up as planned or sooner if any problems.

## 2018-10-29 ENCOUNTER — TELEPHONE (OUTPATIENT)
Dept: FAMILY MEDICINE CLINIC | Facility: CLINIC | Age: 68
End: 2018-10-29

## 2018-10-29 RX ORDER — TEMAZEPAM 30 MG/1
30 CAPSULE ORAL NIGHTLY PRN
Qty: 30 CAPSULE | Refills: 2 | Status: CANCELLED | OUTPATIENT
Start: 2018-10-29

## 2018-10-29 RX ORDER — GABAPENTIN 300 MG/1
900 CAPSULE ORAL
Status: CANCELLED | OUTPATIENT
Start: 2018-10-29

## 2018-10-29 RX ORDER — TEMAZEPAM 30 MG/1
30 CAPSULE ORAL NIGHTLY PRN
Qty: 30 CAPSULE | Refills: 2 | OUTPATIENT
Start: 2018-10-29

## 2018-10-29 RX ORDER — GABAPENTIN 600 MG/1
600 TABLET ORAL 2 TIMES DAILY
Qty: 60 TABLET | Refills: 2 | Status: SHIPPED | OUTPATIENT
Start: 2018-10-29 | End: 2019-01-24 | Stop reason: SDUPTHER

## 2018-10-29 RX ORDER — TEMAZEPAM 30 MG/1
30 CAPSULE ORAL NIGHTLY PRN
Qty: 30 CAPSULE | Refills: 2 | Status: SHIPPED | OUTPATIENT
Start: 2018-10-29 | End: 2018-12-28 | Stop reason: SDUPTHER

## 2018-10-29 RX ORDER — GABAPENTIN 600 MG/1
600 TABLET ORAL 2 TIMES DAILY
Qty: 60 TABLET | Refills: 2 | Status: CANCELLED | OUTPATIENT
Start: 2018-10-29

## 2018-10-29 RX ORDER — GABAPENTIN 600 MG/1
TABLET ORAL
Qty: 60 TABLET | Refills: 2 | OUTPATIENT
Start: 2018-10-29

## 2018-10-29 NOTE — TELEPHONE ENCOUNTER
Dr. Forrest,     [facility] Sent Duplicate Refill Request on Ms. Lexie Cardona for 2 of her Controlled Medications.  When I went to refuse on for Duplicate Refill Request it Refused both of the requested Scripts.    Ms. Cardona is requesting refills on her     Gabapentin 600 mg Take 1 BID & Restoril 30 mg Take 1 HS    Last OV 10/24/18    Next Sandie OV 1/24/18    Last Scripts Written 7/24/18  #60 for the Gabapentin and #30 for the Restoril    Last MIKE  4/30/18    Please Advise on Refills for her Medication    Thank you

## 2018-10-30 DIAGNOSIS — R92.2 INCONCLUSIVE MAMMOGRAM: ICD-10-CM

## 2018-10-30 DIAGNOSIS — R93.89 ABNORMAL ULTRASOUND: ICD-10-CM

## 2018-11-01 ENCOUNTER — TELEPHONE (OUTPATIENT)
Dept: FAMILY MEDICINE CLINIC | Facility: CLINIC | Age: 68
End: 2018-11-01

## 2018-12-27 ENCOUNTER — OFFICE VISIT (OUTPATIENT)
Dept: OTOLARYNGOLOGY | Facility: CLINIC | Age: 68
End: 2018-12-27

## 2018-12-27 VITALS — TEMPERATURE: 96.9 F | WEIGHT: 181 LBS | BODY MASS INDEX: 30.9 KG/M2 | HEIGHT: 64 IN

## 2018-12-27 DIAGNOSIS — J30.1 NON-SEASONAL ALLERGIC RHINITIS DUE TO POLLEN: ICD-10-CM

## 2018-12-27 DIAGNOSIS — H92.03 OTALGIA OF BOTH EARS: Primary | ICD-10-CM

## 2018-12-27 PROCEDURE — 99213 OFFICE O/P EST LOW 20 MIN: CPT | Performed by: OTOLARYNGOLOGY

## 2018-12-27 RX ORDER — MOMETASONE FUROATE 50 UG/1
2 SPRAY, METERED NASAL 2 TIMES DAILY
Qty: 17 G | Refills: 11 | Status: SHIPPED | OUTPATIENT
Start: 2018-12-27 | End: 2019-12-30 | Stop reason: SDUPTHER

## 2018-12-27 RX ORDER — OLOPATADINE HYDROCHLORIDE 665 UG/1
2 SPRAY NASAL 2 TIMES DAILY
Qty: 30 G | Refills: 11 | Status: SHIPPED | OUTPATIENT
Start: 2018-12-27 | End: 2019-07-10 | Stop reason: SDUPTHER

## 2018-12-27 NOTE — PATIENT INSTRUCTIONS

## 2018-12-27 NOTE — PROGRESS NOTES
Subjective   Lexie Cardona is a 68 y.o. female.   Follow-up otalgia and rhinitis    History of Present Illness   States she's doing well until some old got into her house and they're doing some remediation for that she said she'll normally her medicines work well she needs refills on several medications including Patanase Nasonex and mupirocin  She thinks her otalgia is more related to drainage when she is exposed to the mold    The following portions of the patient's history were reviewed and updated as appropriate: allergies, current medications, past family history, past medical history, past social history, past surgical history and problem list.      Current Outpatient Medications:   •  acetaminophen (TYLENOL) 500 MG tablet, Take 500 mg by mouth., Disp: , Rfl:   •  Alum Hydroxide-Mag Carbonate (GAVISCON PO), Take  by mouth As Needed. Tablets Or Liquid, As Needed , Disp: , Rfl:   •  Biotin 5 MG capsule, Take 1 capsule by mouth Daily., Disp: , Rfl:   •  Calcium Citrate-Vitamin D (CALCIUM + D PO), Take 1,200 mg by mouth Daily., Disp: , Rfl:   •  cetirizine (ZyrTEC) 10 MG tablet, Take 10 mg by mouth daily., Disp: , Rfl:   •  Chlorpheniramine Maleate ER 12 MG tablet controlled-release, Take 1 tablet by mouth every night at bedtime., Disp: 30 each, Rfl: 3  •  cholecalciferol (VITAMIN D3) 400 units tablet, Take 400 Units by mouth Daily., Disp: , Rfl:   •  Cranberry-Vitamin C-Vitamin E (CRANBERRY PLUS VITAMIN C) 4200-20-3 MG-MG-UNIT capsule, Take 2 tablets by mouth Every Night., Disp: , Rfl:   •  EPINEPHrine (EPIPEN 2-ALBERT) 0.3 MG/0.3ML solution auto-injector injection, Use as directed for Allergic Reaction, Disp: 1 each, Rfl: 5  •  estradiol (ESTRING) 2 MG vaginal ring, Insert 2 mg into the vagina Every 3 (Three) Months. follow package directions, Disp: 1 each, Rfl: 4  •  Flaxseed, Linseed, (FLAX SEED OIL) 1000 MG capsule, Take 1 capsule by mouth 2 (two) times a day., Disp: , Rfl:   •  gabapentin (NEURONTIN)  600 MG tablet, Take 1 tablet by mouth 2 (Two) Times a Day., Disp: 60 tablet, Rfl: 2  •  hydrOXYzine (VISTARIL) 100 MG capsule, Take 1 capsule by mouth 3 (Three) Times a Day As Needed for Itching., Disp: 90 capsule, Rfl: 6  •  LACTOBACILLUS RHAMNOSUS, GG, PO, Take 1 capsule by mouth., Disp: , Rfl:   •  meclizine (ANTIVERT) 25 MG tablet, Take 25 mg by mouth 3 (Three) Times a Day As Needed for dizziness., Disp: , Rfl:   •  montelukast (SINGULAIR) 10 MG tablet, Take 1 tablet by mouth Every Night., Disp: 30 tablet, Rfl: 11  •  Multiple Vitamins-Minerals (CENTRUM SILVER PO), Take 1 tablet by mouth daily., Disp: , Rfl:   •  olopatadine (PATANASE) 0.6 % solution nasal solution, 2 sprays by Each Nare route 2 (Two) Times a Day., Disp: 30 g, Rfl: 3  •  Probiotic Product (PROBIOTIC FORMULA PO), Take 1 tablet by mouth Every Night. 10 billion cell (2 billion ea) capsule , Disp: , Rfl:   •  promethazine (PHENERGAN) 25 MG tablet, Take 1 tablet by mouth Every 6 (Six) Hours As Needed for Nausea or Vomiting., Disp: 30 tablet, Rfl: 2  •  Sennosides 25 MG tablet, Take 2 tablets by mouth Every Night., Disp: , Rfl:   •  temazepam (RESTORIL) 30 MG capsule, Take 1 capsule by mouth At Night As Needed for Sleep., Disp: 30 capsule, Rfl: 2  •  traZODone (DESYREL) 100 MG tablet, Take 1 tablet by mouth Every Night., Disp: 90 tablet, Rfl: 3  •  valACYclovir (VALTREX) 1000 MG tablet, Take 1 g by mouth., Disp: , Rfl:   •  mometasone (NASONEX) 50 MCG/ACT nasal spray, 2 sprays into the nostril(s) as directed by provider 2 (Two) Times a Day., Disp: 17 g, Rfl: 11  •  mupirocin (BACTROBAN) 2 % nasal ointment, Apply to the inside of each nostril with a cotton swab two times daily, morning and evening, for 5 days before surgery., Disp: 10 each, Rfl: 6  •  olopatadine (PATANASE) 0.6 % solution nasal solution, 2 sprays by Each Nare route 2 (Two) Times a Day., Disp: 30 g, Rfl: 11    Allergies   Allergen Reactions   • Augmentin [Amoxicillin-Pot Clavulanate]  Anaphylaxis   • Ceclor [Cefaclor] Anaphylaxis   • Nsaids Anaphylaxis   • Other Anaphylaxis     Cats  E.E.S. 200  Lead   Mold  [place] Pastrani - Anaphylaxis   • Penicillins Anaphylaxis   • Aspirin Other (See Comments)     tinnitis    • Biaxin [Clarithromycin] Other (See Comments)     angioedema   • Ciprofloxacin Other (See Comments)     Lips burning   • Contrast Dye Hives and Itching   • Cymbalta [Duloxetine Hcl] Unknown (See Comments)     Tongue turned red and was swollen   • Demerol [Meperidine] Nausea And Vomiting   • Iodine      And iodide containing products   • Macrobid [Nitrofurantoin Monohyd Macro] Diarrhea, Nausea And Vomiting and GI Intolerance   • Requip [Ropinirole Hcl] Diarrhea, Nausea And Vomiting and GI Intolerance   • Septra [Sulfamethoxazole-Trimethoprim] Nausea And Vomiting and GI Intolerance   • Statins Other (See Comments)       *muscle enzyme increase with myalgias   • Zithromax [Azithromycin] Other (See Comments)     States she hasn't had a reaction, but doctor said he wasn't going to give it to her   • Niaspan [Niacin Er] Rash   • Nickel Rash             Review of Systems   Constitutional: Negative for fever.   HENT: Positive for postnasal drip. Negative for ear discharge, facial swelling and hearing loss.    Hematological: Negative for adenopathy.           Objective   Physical Exam   Constitutional: She appears well-developed and well-nourished.   HENT:   Head: Normocephalic.   Right Ear: Hearing, tympanic membrane, external ear and ear canal normal.   Left Ear: Hearing, tympanic membrane, external ear and ear canal normal.   Nose: Nose normal.   Mouth/Throat: Uvula is midline, oropharynx is clear and moist and mucous membranes are normal. No tonsillar exudate.       Eyes: Conjunctivae are normal.   Neck: Normal range of motion.   Pulmonary/Chest: Effort normal.           Assessment/Plan   Lexie was seen today for follow-up.    Diagnoses and all orders for this visit:    Otalgia of both  ears    Non-seasonal allergic rhinitis due to pollen    Other orders  -     olopatadine (PATANASE) 0.6 % solution nasal solution; 2 sprays by Each Nare route 2 (Two) Times a Day.  -     mupirocin (BACTROBAN) 2 % nasal ointment; Apply to the inside of each nostril with a cotton swab two times daily, morning and evening, for 5 days before surgery.  -     mometasone (NASONEX) 50 MCG/ACT nasal spray; 2 sprays into the nostril(s) as directed by provider 2 (Two) Times a Day.      Refilled medications to needed since she's determined and will see her 1 year she's had a problem problems worsen she'll let me know but unfortunate she notes infection or otalgia is feeling better

## 2018-12-28 ENCOUNTER — TELEPHONE (OUTPATIENT)
Dept: FAMILY MEDICINE CLINIC | Facility: CLINIC | Age: 68
End: 2018-12-28

## 2018-12-28 RX ORDER — TEMAZEPAM 30 MG/1
30 CAPSULE ORAL NIGHTLY PRN
Qty: 3 CAPSULE | Refills: 0 | Status: SHIPPED | OUTPATIENT
Start: 2018-12-28 | End: 2019-01-24 | Stop reason: SDUPTHER

## 2018-12-28 NOTE — TELEPHONE ENCOUNTER
Dr. Forrest,     Can we Call In  a  3 tablets for Ms. Cardona's  Restoril 30 mg  Take 1 HS.  to [facility] (they do not accept fax scripts on controlled meds)     She still has 1 refill left @ [facility] for December but they are out of the medication.  From what I understand [facility] has been out of several medications.  I had to send a script to [facility] for one of Leesa's patient's earlier today.    Last OV 10/25/18    Next Sandie OV 01/24/19    Last Script Written 10/29/18 #30 with 2 refills    Last MKIE  10/24/18    Please Advise    Thank You

## 2018-12-28 NOTE — TELEPHONE ENCOUNTER
[facility]

## 2018-12-28 NOTE — TELEPHONE ENCOUNTER
Kelly said she she only has one pill left of her Restoril and when she went to refill they are out of stock until Monday.  She wanted to know if she could get enough ordered to  [facility] for her to last until Monday when they get theirs in.  She asked if we could call and let her know.    [phone]

## 2019-01-15 RX ORDER — MONTELUKAST SODIUM 10 MG/1
10 TABLET ORAL NIGHTLY
Qty: 30 TABLET | Refills: 4 | Status: SHIPPED | OUTPATIENT
Start: 2019-01-15 | End: 2019-06-21 | Stop reason: SDUPTHER

## 2019-01-24 ENCOUNTER — OFFICE VISIT (OUTPATIENT)
Dept: FAMILY MEDICINE CLINIC | Facility: CLINIC | Age: 69
End: 2019-01-24

## 2019-01-24 VITALS
BODY MASS INDEX: 31.21 KG/M2 | HEART RATE: 68 BPM | OXYGEN SATURATION: 99 % | HEIGHT: 64 IN | WEIGHT: 182.8 LBS | SYSTOLIC BLOOD PRESSURE: 128 MMHG | DIASTOLIC BLOOD PRESSURE: 74 MMHG

## 2019-01-24 DIAGNOSIS — N18.2 CHRONIC KIDNEY DISEASE (CKD), STAGE II (MILD): ICD-10-CM

## 2019-01-24 DIAGNOSIS — M19.041 PRIMARY OSTEOARTHRITIS OF BOTH HANDS: Primary | ICD-10-CM

## 2019-01-24 DIAGNOSIS — F51.01 PRIMARY INSOMNIA: ICD-10-CM

## 2019-01-24 DIAGNOSIS — M19.042 PRIMARY OSTEOARTHRITIS OF BOTH HANDS: Primary | ICD-10-CM

## 2019-01-24 PROCEDURE — 99214 OFFICE O/P EST MOD 30 MIN: CPT | Performed by: FAMILY MEDICINE

## 2019-01-24 RX ORDER — TEMAZEPAM 30 MG/1
30 CAPSULE ORAL NIGHTLY PRN
Qty: 30 CAPSULE | Refills: 2 | Status: SHIPPED | OUTPATIENT
Start: 2019-01-24 | End: 2019-04-04

## 2019-01-24 RX ORDER — GABAPENTIN 600 MG/1
600 TABLET ORAL 2 TIMES DAILY
Qty: 60 TABLET | Refills: 2 | Status: SHIPPED | OUTPATIENT
Start: 2019-01-24 | End: 2019-04-10 | Stop reason: SDUPTHER

## 2019-01-24 NOTE — PROGRESS NOTES
Subjective   Lexie Cardona is a 68 y.o. female.     Chronic Kidney Disease   This is a chronic problem. The current episode started more than 1 year ago. The problem occurs constantly. The problem has been unchanged. Associated symptoms include joint swelling.   Insomnia   This is a chronic problem. The current episode started more than 1 year ago. The problem occurs constantly. The problem has been waxing and waning. Associated symptoms include joint swelling.   Arthritis   Presents for follow-up visit. She complains of pain, stiffness and joint swelling. The symptoms have been worsening. Affected locations include the left MCP, right MCP, left wrist, right wrist and right shoulder. Her pain is at a severity of 4/10.        The following portions of the patient's history were reviewed and updated as appropriate: allergies, current medications, past family history, past medical history, past social history, past surgical history and problem list.    Review of Systems   HENT: Positive for ear pain, postnasal drip and rhinorrhea. Negative for ear discharge.    Musculoskeletal: Positive for arthritis, joint swelling and stiffness.   Psychiatric/Behavioral: The patient has insomnia.        Objective   Physical Exam   Constitutional: She is oriented to person, place, and time. She appears well-developed and well-nourished. No distress.   HENT:   Head: Normocephalic and atraumatic.   Right Ear: Hearing and tympanic membrane normal.   Left Ear: Hearing and tympanic membrane normal.   Nose: Mucosal edema and rhinorrhea present. Right sinus exhibits maxillary sinus tenderness and frontal sinus tenderness. Left sinus exhibits maxillary sinus tenderness and frontal sinus tenderness.   Mouth/Throat: Uvula is midline and mucous membranes are normal.   Cardiovascular: Normal rate, regular rhythm and normal heart sounds. Exam reveals no friction rub.   No murmur heard.  Pulmonary/Chest: Effort normal and breath sounds  normal. No respiratory distress. She has no wheezes.   Musculoskeletal:        Right shoulder: She exhibits decreased range of motion and tenderness.        Left elbow: She exhibits decreased range of motion and swelling.        Right wrist: She exhibits decreased range of motion and tenderness.        Left wrist: She exhibits decreased range of motion and tenderness.   Lymphadenopathy:     She has no axillary adenopathy.   Neurological: She is alert and oriented to person, place, and time.   Skin: Skin is warm and dry. She is not diaphoretic.        Psychiatric: She has a normal mood and affect. Her behavior is normal. Judgment and thought content normal.   Nursing note and vitals reviewed.      Assessment/Plan   Problems Addressed this Visit        Musculoskeletal and Integument    Primary osteoarthritis of both hands - Primary       Genitourinary    Chronic kidney disease (CKD), stage II (mild) (Chronic)       Other    Insomnia

## 2019-01-28 ENCOUNTER — TRANSCRIBE ORDERS (OUTPATIENT)
Dept: LAB | Facility: HOSPITAL | Age: 69
End: 2019-01-28

## 2019-01-28 DIAGNOSIS — N18.30 CHRONIC KIDNEY DISEASE, STAGE III (MODERATE) (HCC): Primary | ICD-10-CM

## 2019-01-29 ENCOUNTER — APPOINTMENT (OUTPATIENT)
Dept: LAB | Facility: HOSPITAL | Age: 69
End: 2019-01-29

## 2019-01-29 LAB
25(OH)D3 SERPL-MCNC: 31.4 NG/ML (ref 30–100)
ALBUMIN SERPL-MCNC: 4.5 G/DL (ref 3.4–4.8)
ANION GAP SERPL CALCULATED.3IONS-SCNC: 7 MMOL/L (ref 5–15)
BUN BLD-MCNC: 11 MG/DL (ref 7–21)
BUN/CREAT SERPL: 10.3 (ref 7–25)
CALCIUM SPEC-SCNC: 10.1 MG/DL (ref 8.4–10.2)
CHLORIDE SERPL-SCNC: 102 MMOL/L (ref 95–110)
CO2 SERPL-SCNC: 29 MMOL/L (ref 22–31)
CREAT BLD-MCNC: 1.07 MG/DL (ref 0.5–1)
GFR SERPL CREATININE-BSD FRML MDRD: 51 ML/MIN/1.73 (ref 45–104)
GLUCOSE BLD-MCNC: 91 MG/DL (ref 60–100)
PHOSPHATE SERPL-MCNC: 4.1 MG/DL (ref 2.4–4.4)
POTASSIUM BLD-SCNC: 3.8 MMOL/L (ref 3.5–5.1)
SODIUM BLD-SCNC: 138 MMOL/L (ref 137–145)

## 2019-01-29 PROCEDURE — 82306 VITAMIN D 25 HYDROXY: CPT | Performed by: INTERNAL MEDICINE

## 2019-01-29 PROCEDURE — 80069 RENAL FUNCTION PANEL: CPT | Performed by: INTERNAL MEDICINE

## 2019-01-29 PROCEDURE — 36415 COLL VENOUS BLD VENIPUNCTURE: CPT | Performed by: INTERNAL MEDICINE

## 2019-01-31 ENCOUNTER — APPOINTMENT (OUTPATIENT)
Dept: LAB | Facility: HOSPITAL | Age: 69
End: 2019-01-31

## 2019-01-31 LAB
CREAT UR-MCNC: 309.1 MG/DL
PROT UR-MCNC: 13.6 MG/DL
PROT/CREAT UR: 44 MG/G CREA (ref 0–200)

## 2019-01-31 PROCEDURE — 82570 ASSAY OF URINE CREATININE: CPT | Performed by: INTERNAL MEDICINE

## 2019-01-31 PROCEDURE — 84156 ASSAY OF PROTEIN URINE: CPT | Performed by: INTERNAL MEDICINE

## 2019-03-04 ENCOUNTER — TELEPHONE (OUTPATIENT)
Dept: FAMILY MEDICINE CLINIC | Facility: CLINIC | Age: 69
End: 2019-03-04

## 2019-03-04 NOTE — TELEPHONE ENCOUNTER
DARIUSOSIEL MANNING IS HAVING PROBLEMS WITH HER INSURANCE WANTING TO COVER THE TEMAZEPAM 50MG AT NIGHT BECAUSE IT IS ON BACKORDER AND THE PHARM HAS BEEN GIVING HER 15MG QUANITY OF 60..   THE INSURANCE IS WANTING A PRESP TO BE CHANGED TO THIS AM'T AND SENT TO [facility]  HER# [phone]

## 2019-03-06 NOTE — TELEPHONE ENCOUNTER
Spoke with pharmacist at [facility], does not matter the dosage, patient's insurance will not cover this medication. LM for patient to return my call.

## 2019-03-06 NOTE — TELEPHONE ENCOUNTER
Ok to change her Temazepam 50mg to 25mg #60 due to 50mg being on backorder so insurance will pay?  Please advise, thank you

## 2019-04-04 ENCOUNTER — OFFICE VISIT (OUTPATIENT)
Dept: FAMILY MEDICINE CLINIC | Facility: CLINIC | Age: 69
End: 2019-04-04

## 2019-04-04 VITALS
BODY MASS INDEX: 31.07 KG/M2 | SYSTOLIC BLOOD PRESSURE: 126 MMHG | DIASTOLIC BLOOD PRESSURE: 80 MMHG | WEIGHT: 182 LBS | HEIGHT: 64 IN

## 2019-04-04 DIAGNOSIS — F51.01 PRIMARY INSOMNIA: Primary | Chronic | ICD-10-CM

## 2019-04-04 DIAGNOSIS — F41.1 GAD (GENERALIZED ANXIETY DISORDER): ICD-10-CM

## 2019-04-04 PROBLEM — J34.89 CONCHA BULLOSA: Chronic | Status: ACTIVE | Noted: 2018-02-19

## 2019-04-04 PROBLEM — J34.89 NASAL VALVE COLLAPSE: Chronic | Status: ACTIVE | Noted: 2018-02-19

## 2019-04-04 PROBLEM — N64.4 PAIN OF RIGHT BREAST: Status: RESOLVED | Noted: 2018-09-26 | Resolved: 2019-04-04

## 2019-04-04 PROBLEM — J32.9 SINUSITIS, CHRONIC: Chronic | Status: ACTIVE | Noted: 2018-02-19

## 2019-04-04 PROBLEM — J34.2 NASAL SEPTAL DEFORMITY: Chronic | Status: ACTIVE | Noted: 2018-02-19

## 2019-04-04 PROBLEM — J34.89 NASAL OBSTRUCTION: Chronic | Status: ACTIVE | Noted: 2018-02-19

## 2019-04-04 PROBLEM — M95.0 NASAL VALVE COLLAPSE: Chronic | Status: ACTIVE | Noted: 2018-02-19

## 2019-04-04 PROBLEM — J34.3 NASAL TURBINATE HYPERTROPHY: Chronic | Status: ACTIVE | Noted: 2018-02-19

## 2019-04-04 PROCEDURE — 99213 OFFICE O/P EST LOW 20 MIN: CPT | Performed by: FAMILY MEDICINE

## 2019-04-10 ENCOUNTER — OFFICE VISIT (OUTPATIENT)
Dept: FAMILY MEDICINE CLINIC | Facility: CLINIC | Age: 69
End: 2019-04-10

## 2019-04-10 ENCOUNTER — APPOINTMENT (OUTPATIENT)
Dept: LAB | Facility: HOSPITAL | Age: 69
End: 2019-04-10

## 2019-04-10 VITALS
HEIGHT: 64 IN | BODY MASS INDEX: 30.77 KG/M2 | SYSTOLIC BLOOD PRESSURE: 128 MMHG | HEART RATE: 65 BPM | WEIGHT: 180.2 LBS | OXYGEN SATURATION: 98 % | DIASTOLIC BLOOD PRESSURE: 72 MMHG

## 2019-04-10 DIAGNOSIS — M19.041 PRIMARY OSTEOARTHRITIS OF BOTH HANDS: Primary | ICD-10-CM

## 2019-04-10 DIAGNOSIS — N18.2 CHRONIC KIDNEY DISEASE (CKD), STAGE II (MILD): ICD-10-CM

## 2019-04-10 DIAGNOSIS — F51.01 PRIMARY INSOMNIA: ICD-10-CM

## 2019-04-10 DIAGNOSIS — F41.1 GAD (GENERALIZED ANXIETY DISORDER): ICD-10-CM

## 2019-04-10 DIAGNOSIS — M19.042 PRIMARY OSTEOARTHRITIS OF BOTH HANDS: Primary | ICD-10-CM

## 2019-04-10 LAB
ALBUMIN SERPL-MCNC: 4.6 G/DL (ref 3.5–5.2)
ANION GAP SERPL CALCULATED.3IONS-SCNC: 10.9 MMOL/L
BUN BLD-MCNC: 10 MG/DL (ref 8–23)
BUN/CREAT SERPL: 9.4 (ref 7–25)
CALCIUM SPEC-SCNC: 9.8 MG/DL (ref 8.6–10.5)
CHLORIDE SERPL-SCNC: 103 MMOL/L (ref 98–107)
CO2 SERPL-SCNC: 28.1 MMOL/L (ref 22–29)
CREAT BLD-MCNC: 1.06 MG/DL (ref 0.57–1)
GFR SERPL CREATININE-BSD FRML MDRD: 52 ML/MIN/1.73
GLUCOSE BLD-MCNC: 96 MG/DL (ref 65–99)
PHOSPHATE SERPL-MCNC: 3.5 MG/DL (ref 2.5–4.5)
POTASSIUM BLD-SCNC: 3.8 MMOL/L (ref 3.5–5.2)
SODIUM BLD-SCNC: 142 MMOL/L (ref 136–145)

## 2019-04-10 PROCEDURE — 99214 OFFICE O/P EST MOD 30 MIN: CPT | Performed by: FAMILY MEDICINE

## 2019-04-10 PROCEDURE — 80069 RENAL FUNCTION PANEL: CPT | Performed by: FAMILY MEDICINE

## 2019-04-10 PROCEDURE — 36415 COLL VENOUS BLD VENIPUNCTURE: CPT | Performed by: FAMILY MEDICINE

## 2019-04-10 RX ORDER — GABAPENTIN 600 MG/1
600 TABLET ORAL 2 TIMES DAILY
Qty: 60 TABLET | Refills: 2 | Status: SHIPPED | OUTPATIENT
Start: 2019-04-10 | End: 2019-07-10

## 2019-04-10 NOTE — PROGRESS NOTES
Subjective   Lexie Cardona is a 69 y.o. female.     Arthritis   Presents for follow-up visit. She complains of pain and stiffness. The symptoms have been worsening. Affected locations include the left MCP, right MCP, left wrist, right wrist, right shoulder and right knee. Her pain is at a severity of 3/10.   Chronic Kidney Disease   This is a chronic problem. The current episode started more than 1 year ago. The problem occurs constantly. The problem has been unchanged.   Insomnia   This is a chronic (changed to halcion due to availability of restoril) problem. The current episode started more than 1 year ago. The problem occurs constantly. The problem has been waxing and waning.   Anxiety   Presents for follow-up visit. Symptoms include depressed mood, insomnia and nervous/anxious behavior.            The following portions of the patient's history were reviewed and updated as appropriate: allergies, current medications, past family history, past medical history, past social history, past surgical history and problem list.    Review of Systems   HENT: Positive for ear pain, postnasal drip and rhinorrhea. Negative for ear discharge.    Musculoskeletal: Positive for arthritis and stiffness.   Psychiatric/Behavioral: The patient is nervous/anxious and has insomnia.        Objective   Physical Exam   Constitutional: She is oriented to person, place, and time. She appears well-developed and well-nourished. No distress.   HENT:   Head: Normocephalic and atraumatic.   Right Ear: Hearing and tympanic membrane normal.   Left Ear: Hearing and tympanic membrane normal.   Nose: Mucosal edema and rhinorrhea present. Right sinus exhibits maxillary sinus tenderness and frontal sinus tenderness. Left sinus exhibits maxillary sinus tenderness and frontal sinus tenderness.   Mouth/Throat: Uvula is midline and mucous membranes are normal.   Cardiovascular: Normal rate, regular rhythm and normal heart sounds. Exam reveals no  friction rub.   No murmur heard.  Pulmonary/Chest: Effort normal and breath sounds normal. No respiratory distress. She has no wheezes.   Musculoskeletal:        Right wrist: She exhibits decreased range of motion and tenderness.        Left wrist: She exhibits decreased range of motion and tenderness.        Right knee: She exhibits swelling. She exhibits normal range of motion, no effusion, no ecchymosis, no deformity, no laceration and no erythema. Tenderness found.   Lymphadenopathy:     She has no axillary adenopathy.   Neurological: She is alert and oriented to person, place, and time.   Skin: Skin is warm and dry. She is not diaphoretic.   Psychiatric: She has a normal mood and affect. Her behavior is normal. Judgment and thought content normal.   Nursing note and vitals reviewed.      Assessment/Plan   Problems Addressed this Visit        Musculoskeletal and Integument    Primary osteoarthritis of both hands - Primary (Chronic)       Genitourinary    Chronic kidney disease (CKD), stage II (mild) (Chronic)       Other    Insomnia (Chronic)      Other Visit Diagnoses     PRITI (generalized anxiety disorder)

## 2019-04-12 ENCOUNTER — DOCUMENTATION (OUTPATIENT)
Dept: PHYSICAL THERAPY | Facility: HOSPITAL | Age: 69
End: 2019-04-12

## 2019-04-12 DIAGNOSIS — H81.393 PERIPHERAL POSITIONAL VERTIGO OF BOTH EARS: Primary | ICD-10-CM

## 2019-05-14 ENCOUNTER — OFFICE VISIT (OUTPATIENT)
Dept: FAMILY MEDICINE CLINIC | Facility: CLINIC | Age: 69
End: 2019-05-14

## 2019-05-14 VITALS
WEIGHT: 176 LBS | HEART RATE: 68 BPM | SYSTOLIC BLOOD PRESSURE: 118 MMHG | BODY MASS INDEX: 30.05 KG/M2 | DIASTOLIC BLOOD PRESSURE: 60 MMHG | OXYGEN SATURATION: 98 % | HEIGHT: 64 IN

## 2019-05-14 DIAGNOSIS — J30.2 SEASONAL ALLERGIES: ICD-10-CM

## 2019-05-14 DIAGNOSIS — J40 WHEEZY BRONCHITIS: Primary | ICD-10-CM

## 2019-05-14 PROCEDURE — 99214 OFFICE O/P EST MOD 30 MIN: CPT | Performed by: FAMILY MEDICINE

## 2019-05-14 RX ORDER — FLUCONAZOLE 150 MG/1
150 TABLET ORAL ONCE
Qty: 1 TABLET | Refills: 0 | Status: SHIPPED | OUTPATIENT
Start: 2019-05-14 | End: 2019-05-14

## 2019-05-14 RX ORDER — DOXYCYCLINE 100 MG/1
100 TABLET ORAL 2 TIMES DAILY
Qty: 28 TABLET | Refills: 0 | Status: SHIPPED | OUTPATIENT
Start: 2019-05-14 | End: 2019-05-28

## 2019-05-14 RX ORDER — PREDNISONE 20 MG/1
20 TABLET ORAL SEE ADMIN INSTRUCTIONS
Qty: 30 TABLET | Refills: 0 | Status: SHIPPED | OUTPATIENT
Start: 2019-05-14 | End: 2019-10-01

## 2019-05-14 NOTE — PROGRESS NOTES
Subjective   Lexie Cardona is a 69 y.o. female.     URI    This is a new problem. The current episode started more than 1 month ago. The problem has been unchanged. Associated symptoms include congestion, coughing, rhinorrhea and sinus pain. Pertinent negatives include no wheezing. Treatments tried: doxy adn pred. The treatment provided mild relief.        The following portions of the patient's history were reviewed and updated as appropriate: allergies, current medications, past family history, past medical history, past social history, past surgical history and problem list.    Review of Systems   HENT: Positive for congestion, rhinorrhea and sinus pain.    Respiratory: Positive for cough. Negative for wheezing.        Objective   Physical Exam   Constitutional: She is oriented to person, place, and time. She appears well-developed and well-nourished. No distress.   HENT:   Head: Normocephalic and atraumatic.   Right Ear: Hearing and tympanic membrane normal.   Left Ear: Hearing and tympanic membrane normal.   Nose: Mucosal edema and rhinorrhea present.   Mouth/Throat: Oropharyngeal exudate present. No posterior oropharyngeal edema.   Pulmonary/Chest: She has decreased breath sounds. She has wheezes. She has no rhonchi. She has no rales.   Neurological: She is alert and oriented to person, place, and time.   Skin: Skin is warm and dry. She is not diaphoretic.   Psychiatric: She has a normal mood and affect. Her behavior is normal. Judgment and thought content normal.   Nursing note and vitals reviewed.      Assessment/Plan   Problems Addressed this Visit     None      Visit Diagnoses     Wheezy bronchitis    -  Primary    Seasonal allergies

## 2019-05-16 RX ORDER — TRAZODONE HYDROCHLORIDE 100 MG/1
TABLET ORAL
Qty: 90 TABLET | Refills: 3 | Status: SHIPPED | OUTPATIENT
Start: 2019-05-16 | End: 2020-01-03 | Stop reason: SDUPTHER

## 2019-06-21 RX ORDER — MONTELUKAST SODIUM 10 MG/1
10 TABLET ORAL NIGHTLY
Qty: 30 TABLET | Refills: 4 | Status: SHIPPED | OUTPATIENT
Start: 2019-06-21 | End: 2019-09-22 | Stop reason: SDUPTHER

## 2019-07-10 ENCOUNTER — OFFICE VISIT (OUTPATIENT)
Dept: FAMILY MEDICINE CLINIC | Facility: CLINIC | Age: 69
End: 2019-07-10

## 2019-07-10 VITALS
HEART RATE: 73 BPM | BODY MASS INDEX: 30.22 KG/M2 | SYSTOLIC BLOOD PRESSURE: 124 MMHG | WEIGHT: 177 LBS | OXYGEN SATURATION: 98 % | DIASTOLIC BLOOD PRESSURE: 72 MMHG | HEIGHT: 64 IN

## 2019-07-10 DIAGNOSIS — M19.041 PRIMARY OSTEOARTHRITIS OF BOTH HANDS: Primary | Chronic | ICD-10-CM

## 2019-07-10 DIAGNOSIS — M19.042 PRIMARY OSTEOARTHRITIS OF BOTH HANDS: Primary | Chronic | ICD-10-CM

## 2019-07-10 DIAGNOSIS — N18.2 CHRONIC KIDNEY DISEASE (CKD), STAGE II (MILD): Chronic | ICD-10-CM

## 2019-07-10 DIAGNOSIS — F51.01 PRIMARY INSOMNIA: Chronic | ICD-10-CM

## 2019-07-10 PROCEDURE — 99214 OFFICE O/P EST MOD 30 MIN: CPT | Performed by: FAMILY MEDICINE

## 2019-07-10 RX ORDER — TEMAZEPAM 30 MG/1
30 CAPSULE ORAL NIGHTLY PRN
Qty: 30 CAPSULE | Refills: 2 | Status: SHIPPED | OUTPATIENT
Start: 2019-07-10 | End: 2019-10-01 | Stop reason: SDUPTHER

## 2019-07-10 NOTE — PROGRESS NOTES
Subjective   Lexie Cardona is a 69 y.o. female.     Arthritis   Presents for follow-up visit. The symptoms have been worsening. Affected locations include the left MCP, right MCP, left wrist, right wrist, right shoulder, right knee and left shoulder. Her pain is at a severity of 3/10.   Chronic Kidney Disease   This is a chronic problem. The current episode started more than 1 year ago. The problem occurs constantly. The problem has been unchanged.   Insomnia   This is a chronic (changed to halcion due to availability of restoril) problem. The current episode started more than 1 year ago. The problem occurs constantly. The problem has been waxing and waning.   Anxiety   Presents for follow-up visit. Symptoms include insomnia. Patient reports no depressed mood or nervous/anxious behavior.            The following portions of the patient's history were reviewed and updated as appropriate: allergies, current medications, past family history, past medical history, past social history, past surgical history and problem list.    Review of Systems   HENT: Positive for ear pain, postnasal drip and rhinorrhea. Negative for ear discharge.    Musculoskeletal: Positive for arthritis.   Psychiatric/Behavioral: The patient has insomnia. The patient is not nervous/anxious.        Objective   Physical Exam   Constitutional: She is oriented to person, place, and time. She appears well-developed and well-nourished. No distress.   HENT:   Head: Normocephalic and atraumatic.   Right Ear: Hearing and tympanic membrane normal.   Left Ear: Hearing and tympanic membrane normal.   Nose: Mucosal edema and rhinorrhea present. Right sinus exhibits maxillary sinus tenderness and frontal sinus tenderness. Left sinus exhibits maxillary sinus tenderness and frontal sinus tenderness.   Mouth/Throat: Uvula is midline and mucous membranes are normal.   Cardiovascular: Normal rate, regular rhythm and normal heart sounds. Exam reveals no  friction rub.   No murmur heard.  Pulmonary/Chest: Effort normal and breath sounds normal. No respiratory distress. She has no wheezes.   Musculoskeletal:        Right wrist: She exhibits decreased range of motion and tenderness.        Left wrist: She exhibits decreased range of motion and tenderness.        Right knee: She exhibits swelling. She exhibits normal range of motion, no effusion, no ecchymosis, no deformity, no laceration and no erythema. Tenderness found.   Lymphadenopathy:     She has no axillary adenopathy.   Neurological: She is alert and oriented to person, place, and time.   Skin: Skin is warm and dry. She is not diaphoretic.   Psychiatric: She has a normal mood and affect. Her behavior is normal. Judgment and thought content normal.   Nursing note and vitals reviewed.      Assessment/Plan   Problems Addressed this Visit        Musculoskeletal and Integument    Primary osteoarthritis of both hands - Primary (Chronic)       Genitourinary    Chronic kidney disease (CKD), stage II (mild) (Chronic)       Other    Insomnia (Chronic)

## 2019-08-12 ENCOUNTER — TRANSCRIBE ORDERS (OUTPATIENT)
Dept: LAB | Facility: HOSPITAL | Age: 69
End: 2019-08-12

## 2019-08-12 DIAGNOSIS — N18.30 CHRONIC KIDNEY DISEASE, STAGE III (MODERATE) (HCC): Primary | ICD-10-CM

## 2019-08-20 ENCOUNTER — APPOINTMENT (OUTPATIENT)
Dept: LAB | Facility: HOSPITAL | Age: 69
End: 2019-08-20

## 2019-08-20 PROCEDURE — 82570 ASSAY OF URINE CREATININE: CPT | Performed by: INTERNAL MEDICINE

## 2019-08-20 PROCEDURE — 80069 RENAL FUNCTION PANEL: CPT | Performed by: INTERNAL MEDICINE

## 2019-08-20 PROCEDURE — 82306 VITAMIN D 25 HYDROXY: CPT | Performed by: INTERNAL MEDICINE

## 2019-08-20 PROCEDURE — 36415 COLL VENOUS BLD VENIPUNCTURE: CPT | Performed by: INTERNAL MEDICINE

## 2019-08-20 PROCEDURE — 84156 ASSAY OF PROTEIN URINE: CPT | Performed by: INTERNAL MEDICINE

## 2019-08-21 LAB
25(OH)D3 SERPL-MCNC: 39.2 NG/ML (ref 30–100)
ALBUMIN SERPL-MCNC: 4.5 G/DL (ref 3.5–5.2)
ANION GAP SERPL CALCULATED.3IONS-SCNC: 14.4 MMOL/L (ref 5–15)
BUN BLD-MCNC: 10 MG/DL (ref 8–23)
BUN/CREAT SERPL: 9.2 (ref 7–25)
CALCIUM SPEC-SCNC: 9.9 MG/DL (ref 8.6–10.5)
CHLORIDE SERPL-SCNC: 102 MMOL/L (ref 98–107)
CO2 SERPL-SCNC: 23.6 MMOL/L (ref 22–29)
CREAT BLD-MCNC: 1.09 MG/DL (ref 0.57–1)
CREAT UR-MCNC: 101.9 MG/DL
GFR SERPL CREATININE-BSD FRML MDRD: 50 ML/MIN/1.73
GLUCOSE BLD-MCNC: 102 MG/DL (ref 65–99)
PHOSPHATE SERPL-MCNC: 4.1 MG/DL (ref 2.5–4.5)
POTASSIUM BLD-SCNC: 3.8 MMOL/L (ref 3.5–5.2)
PROT UR-MCNC: 7 MG/DL
PROT/CREAT UR: 68.7 MG/G CREA (ref 0–200)
SODIUM BLD-SCNC: 140 MMOL/L (ref 136–145)

## 2019-09-11 ENCOUNTER — HOSPITAL ENCOUNTER (OUTPATIENT)
Dept: ULTRASOUND IMAGING | Facility: HOSPITAL | Age: 69
Discharge: HOME | End: 2019-09-11
Admitting: INTERNAL MEDICINE

## 2019-09-11 DIAGNOSIS — N13.30 HYDRONEPHROSIS, UNSPECIFIED HYDRONEPHROSIS TYPE: ICD-10-CM

## 2019-09-11 PROCEDURE — 76775 US EXAM ABDO BACK WALL LIM: CPT

## 2019-09-23 RX ORDER — MONTELUKAST SODIUM 10 MG/1
TABLET ORAL
Qty: 90 TABLET | Refills: 1 | Status: SHIPPED | OUTPATIENT
Start: 2019-09-23 | End: 2020-01-03 | Stop reason: SDUPTHER

## 2019-10-01 ENCOUNTER — OFFICE VISIT (OUTPATIENT)
Dept: FAMILY MEDICINE CLINIC | Facility: CLINIC | Age: 69
End: 2019-10-01

## 2019-10-01 VITALS
WEIGHT: 175.6 LBS | SYSTOLIC BLOOD PRESSURE: 122 MMHG | BODY MASS INDEX: 29.98 KG/M2 | DIASTOLIC BLOOD PRESSURE: 74 MMHG | HEIGHT: 64 IN | HEART RATE: 76 BPM | OXYGEN SATURATION: 98 %

## 2019-10-01 DIAGNOSIS — F51.01 PRIMARY INSOMNIA: ICD-10-CM

## 2019-10-01 DIAGNOSIS — N18.2 CHRONIC KIDNEY DISEASE (CKD), STAGE II (MILD): ICD-10-CM

## 2019-10-01 DIAGNOSIS — F41.1 GAD (GENERALIZED ANXIETY DISORDER): ICD-10-CM

## 2019-10-01 DIAGNOSIS — M19.041 PRIMARY OSTEOARTHRITIS OF BOTH HANDS: Primary | ICD-10-CM

## 2019-10-01 DIAGNOSIS — M19.042 PRIMARY OSTEOARTHRITIS OF BOTH HANDS: Primary | ICD-10-CM

## 2019-10-01 PROCEDURE — 99214 OFFICE O/P EST MOD 30 MIN: CPT | Performed by: FAMILY MEDICINE

## 2019-10-01 RX ORDER — TEMAZEPAM 30 MG/1
30 CAPSULE ORAL NIGHTLY PRN
Qty: 30 CAPSULE | Refills: 2 | Status: SHIPPED | OUTPATIENT
Start: 2019-10-01 | End: 2020-01-03 | Stop reason: SDUPTHER

## 2019-10-01 RX ORDER — COLCHICINE 0.6 MG/1
0.6 TABLET ORAL DAILY
Qty: 30 TABLET | Refills: 2 | Status: SHIPPED | OUTPATIENT
Start: 2019-10-01 | End: 2020-01-03

## 2019-10-01 NOTE — PROGRESS NOTES
Subjective   Lexie Cardona is a 69 y.o. female.     Arthritis   Presents for follow-up visit. She complains of pain, stiffness and joint swelling. The symptoms have been worsening. Affected locations include the left MCP, right MCP, left wrist, right wrist, right shoulder, right knee and left shoulder. Her pain is at a severity of 8/10.   Chronic Kidney Disease   This is a chronic problem. The current episode started more than 1 year ago. The problem occurs constantly. The problem has been unchanged. Associated symptoms include joint swelling.   Insomnia   This is a chronic (changed to halcion due to availability of restoril) problem. The current episode started more than 1 year ago. The problem occurs constantly. The problem has been waxing and waning. Associated symptoms include joint swelling.   Anxiety   Presents for follow-up visit. Symptoms include insomnia. Patient reports no depressed mood or nervous/anxious behavior.            The following portions of the patient's history were reviewed and updated as appropriate: allergies, current medications, past family history, past medical history, past social history, past surgical history and problem list.    Review of Systems   HENT: Positive for ear pain, postnasal drip and rhinorrhea. Negative for ear discharge.    Musculoskeletal: Positive for arthritis, joint swelling and stiffness.   Psychiatric/Behavioral: The patient has insomnia. The patient is not nervous/anxious.        Objective   Physical Exam   Constitutional: She is oriented to person, place, and time. She appears well-developed and well-nourished. No distress.   HENT:   Head: Normocephalic and atraumatic.   Right Ear: Hearing and tympanic membrane normal.   Left Ear: Hearing and tympanic membrane normal.   Nose: Mucosal edema and rhinorrhea present. Right sinus exhibits maxillary sinus tenderness and frontal sinus tenderness. Left sinus exhibits maxillary sinus tenderness and frontal sinus  tenderness.   Mouth/Throat: Uvula is midline and mucous membranes are normal.   Cardiovascular: Normal rate, regular rhythm and normal heart sounds. Exam reveals no friction rub.   No murmur heard.  Pulmonary/Chest: Effort normal and breath sounds normal. No respiratory distress. She has no wheezes.   Musculoskeletal:        Left shoulder: She exhibits decreased range of motion and tenderness.        Right wrist: She exhibits decreased range of motion and tenderness.        Left wrist: She exhibits decreased range of motion and tenderness.        Right knee: She exhibits swelling. She exhibits normal range of motion, no effusion, no ecchymosis, no deformity, no laceration and no erythema. Tenderness found.   Lymphadenopathy:     She has no axillary adenopathy.   Neurological: She is alert and oriented to person, place, and time.   Skin: Skin is warm and dry. She is not diaphoretic.   Psychiatric: She has a normal mood and affect. Her behavior is normal. Judgment and thought content normal.   Nursing note and vitals reviewed.      Assessment/Plan   Problems Addressed this Visit        Musculoskeletal and Integument    Primary osteoarthritis of both hands - Primary (Chronic)       Genitourinary    Chronic kidney disease (CKD), stage II (mild) (Chronic)       Other    Insomnia (Chronic)      Other Visit Diagnoses     PRITI (generalized anxiety disorder)        Relevant Medications    temazepam (RESTORIL) 30 MG capsule

## 2019-10-30 RX ORDER — ESTRADIOL 2 MG/1
RING VAGINAL
Qty: 1 EACH | Refills: 4 | Status: SHIPPED | OUTPATIENT
Start: 2019-10-30 | End: 2020-12-21

## 2019-12-26 ENCOUNTER — TELEPHONE (OUTPATIENT)
Dept: FAMILY MEDICINE CLINIC | Facility: CLINIC | Age: 69
End: 2019-12-26

## 2019-12-30 ENCOUNTER — OFFICE VISIT (OUTPATIENT)
Dept: OTOLARYNGOLOGY | Facility: CLINIC | Age: 69
End: 2019-12-30

## 2019-12-30 VITALS — WEIGHT: 177.8 LBS | BODY MASS INDEX: 30.35 KG/M2 | HEIGHT: 64 IN | TEMPERATURE: 96.3 F

## 2019-12-30 DIAGNOSIS — J34.89 NASAL VESTIBULITIS: ICD-10-CM

## 2019-12-30 DIAGNOSIS — J34.2 DEVIATED SEPTUM: ICD-10-CM

## 2019-12-30 DIAGNOSIS — J30.1 NON-SEASONAL ALLERGIC RHINITIS DUE TO POLLEN: Primary | ICD-10-CM

## 2019-12-30 DIAGNOSIS — J34.89 NASAL PAIN: ICD-10-CM

## 2019-12-30 PROCEDURE — 99213 OFFICE O/P EST LOW 20 MIN: CPT | Performed by: OTOLARYNGOLOGY

## 2019-12-30 RX ORDER — MOMETASONE FUROATE 50 UG/1
2 SPRAY, METERED NASAL 2 TIMES DAILY
Qty: 17 G | Refills: 11 | Status: SHIPPED | OUTPATIENT
Start: 2019-12-30 | End: 2020-10-29

## 2019-12-30 RX ORDER — OLOPATADINE HYDROCHLORIDE 665 UG/1
2 SPRAY NASAL 2 TIMES DAILY
Qty: 30 G | Refills: 3 | Status: SHIPPED | OUTPATIENT
Start: 2019-12-30 | End: 2020-05-11

## 2019-12-30 NOTE — PROGRESS NOTES
Subjective   Lexie Cardona is a 69 y.o. female.   Nasal problems    History of Present Illness   Mild nasal irritation  Nasal obstruction  No fever  She states she is not been using her nasal spray regularly      The following portions of the patient's history were reviewed and updated as appropriate: allergies, current medications, past family history, past medical history, past social history, past surgical history and problem list.      Current Outpatient Medications:   •  acetaminophen (TYLENOL) 500 MG tablet, Take 500 mg by mouth., Disp: , Rfl:   •  Alum Hydroxide-Mag Carbonate (GAVISCON PO), Take  by mouth As Needed. Tablets Or Liquid, As Needed , Disp: , Rfl:   •  Biotin 5 MG capsule, Take 1 capsule by mouth Daily., Disp: , Rfl:   •  Calcium Citrate-Vitamin D (CALCIUM + D PO), Take 1,200 mg by mouth Daily., Disp: , Rfl:   •  cetirizine (ZyrTEC) 10 MG tablet, Take 10 mg by mouth daily., Disp: , Rfl:   •  cholecalciferol (VITAMIN D3) 400 units tablet, Take 400 Units by mouth Daily., Disp: , Rfl:   •  colchicine 0.6 MG tablet, Take 1 tablet by mouth Daily., Disp: 30 tablet, Rfl: 2  •  Cranberry-Vitamin C-Vitamin E (CRANBERRY PLUS VITAMIN C) 4200-20-3 MG-MG-UNIT capsule, Take 2 tablets by mouth Every Night., Disp: , Rfl:   •  EPINEPHrine (EPIPEN 2-ALBERT) 0.3 MG/0.3ML solution auto-injector injection, Use as directed for Allergic Reaction, Disp: 1 each, Rfl: 5  •  ESTRING 2 MG vaginal ring, INSERT 2 MG INTO THE VAGINA EVERY 3 MONTHS, Disp: 1 each, Rfl: 4  •  Flaxseed, Linseed, (FLAX SEED OIL) 1000 MG capsule, Take 1 capsule by mouth 2 (two) times a day., Disp: , Rfl:   •  hydrOXYzine (VISTARIL) 100 MG capsule, Take 1 capsule by mouth 3 (Three) Times a Day As Needed for Itching., Disp: 90 capsule, Rfl: 6  •  LACTOBACILLUS RHAMNOSUS, GG, PO, Take 1 capsule by mouth., Disp: , Rfl:   •  meclizine (ANTIVERT) 25 MG tablet, Take 25 mg by mouth 3 (Three) Times a Day As Needed for dizziness., Disp: , Rfl:   •   montelukast (SINGULAIR) 10 MG tablet, TAKE 1 TABLET BY MOUTH EVERY DAY AT NIGHT, Disp: 90 tablet, Rfl: 1  •  Multiple Vitamins-Minerals (CENTRUM SILVER PO), Take 1 tablet by mouth daily., Disp: , Rfl:   •  mupirocin (BACTROBAN) 2 % nasal ointment, Apply to the inside of each nostril with a cotton swab two times daily, morning and evening, for 5 days before surgery., Disp: 10 each, Rfl: 6  •  olopatadine (PATANASE) 0.6 % solution nasal solution, 2 sprays by Each Nare route 2 (Two) Times a Day., Disp: 30 g, Rfl: 3  •  Probiotic Product (PROBIOTIC FORMULA PO), Take 1 tablet by mouth Every Night. 10 billion cell (2 billion ea) capsule , Disp: , Rfl:   •  promethazine (PHENERGAN) 25 MG tablet, Take 1 tablet by mouth Every 6 (Six) Hours As Needed for Nausea or Vomiting., Disp: 30 tablet, Rfl: 2  •  Sennosides 25 MG tablet, Take 2 tablets by mouth Every Night., Disp: , Rfl:   •  temazepam (RESTORIL) 30 MG capsule, Take 1 capsule by mouth At Night As Needed for Sleep., Disp: 30 capsule, Rfl: 2  •  traZODone (DESYREL) 100 MG tablet, TAKE 1 TABLET BY MOUTH EVERY DAY IN THE EVENING, Disp: 90 tablet, Rfl: 3  •  valACYclovir (VALTREX) 1000 MG tablet, Take 1 g by mouth., Disp: , Rfl:   •  mometasone (NASONEX) 50 MCG/ACT nasal spray, 2 sprays into the nostril(s) as directed by provider 2 (Two) Times a Day., Disp: 17 g, Rfl: 11    Allergies   Allergen Reactions   • Augmentin [Amoxicillin-Pot Clavulanate] Anaphylaxis   • Ceclor [Cefaclor] Anaphylaxis   • Nsaids Anaphylaxis   • Other Anaphylaxis     Cats  E.E.S. 200  Lead   Mold  [place] Pastrani - Anaphylaxis   • Penicillins Anaphylaxis   • Aspirin Other (See Comments)     tinnitis    • Biaxin [Clarithromycin] Other (See Comments)     angioedema   • Ciprofloxacin Other (See Comments)     Lips burning   • Contrast Dye Hives and Itching   • Cymbalta [Duloxetine Hcl] Unknown (See Comments)     Tongue turned red and was swollen   • Demerol [Meperidine] Nausea And Vomiting   • Iodine       And iodide containing products   • Macrobid [Nitrofurantoin Monohyd Macro] Diarrhea, Nausea And Vomiting and GI Intolerance   • Requip [Ropinirole Hcl] Diarrhea, Nausea And Vomiting and GI Intolerance   • Septra [Sulfamethoxazole-Trimethoprim] Nausea And Vomiting and GI Intolerance   • Statins Other (See Comments)       *muscle enzyme increase with myalgias   • Zithromax [Azithromycin] Other (See Comments)     States she hasn't had a reaction, but doctor said he wasn't going to give it to her   • Niaspan [Niacin Er] Rash   • Nickel Rash             Review of Systems   Constitutional: Negative for fever.   HENT: Positive for congestion and rhinorrhea. Negative for sinus pain.    Hematological: Negative for adenopathy.       Mild nasal soreness nasal congestion    Objective   Physical Exam   Constitutional: She appears well-developed and well-nourished.   HENT:   Head: Normocephalic.   Right Ear: External ear normal.   Left Ear: External ear normal.   Nose:       Mouth/Throat: Oropharynx is clear and moist.   Eyes: Conjunctivae are normal.   Neck: Neck supple.   Neurological: She is alert.   Skin: Skin is warm.   Psychiatric: She has a normal mood and affect. Thought content normal.   Nursing note and vitals reviewed.          Assessment/Plan   Lexie was seen today for follow-up.    Diagnoses and all orders for this visit:    Non-seasonal allergic rhinitis due to pollen    Nasal pain    Deviated septum    Nasal vestibulitis    Other orders  -     mometasone (NASONEX) 50 MCG/ACT nasal spray; 2 sprays into the nostril(s) as directed by provider 2 (Two) Times a Day.  -     mupirocin (BACTROBAN) 2 % nasal ointment; Apply to the inside of each nostril with a cotton swab two times daily, morning and evening, for 5 days before surgery.  -     olopatadine (PATANASE) 0.6 % solution nasal solution; 2 sprays by Each Nare route 2 (Two) Times a Day.      Cough no better in 1 week otherwise recheck in a year if medications are  working well she will resume the sprays which she is not using regularly

## 2019-12-30 NOTE — PATIENT INSTRUCTIONS

## 2020-01-02 RX ORDER — HYDROXYZINE PAMOATE 50 MG/1
CAPSULE ORAL
Qty: 180 CAPSULE | Refills: 3 | Status: SHIPPED | OUTPATIENT
Start: 2020-01-02 | End: 2020-07-06 | Stop reason: SDUPTHER

## 2020-01-03 ENCOUNTER — OFFICE VISIT (OUTPATIENT)
Dept: FAMILY MEDICINE CLINIC | Facility: CLINIC | Age: 70
End: 2020-01-03

## 2020-01-03 ENCOUNTER — TELEPHONE (OUTPATIENT)
Dept: FAMILY MEDICINE CLINIC | Facility: CLINIC | Age: 70
End: 2020-01-03

## 2020-01-03 VITALS
WEIGHT: 174 LBS | OXYGEN SATURATION: 97 % | SYSTOLIC BLOOD PRESSURE: 126 MMHG | DIASTOLIC BLOOD PRESSURE: 70 MMHG | BODY MASS INDEX: 29.71 KG/M2 | HEIGHT: 64 IN | HEART RATE: 74 BPM

## 2020-01-03 DIAGNOSIS — Z00.00 MEDICARE ANNUAL WELLNESS VISIT, SUBSEQUENT: ICD-10-CM

## 2020-01-03 DIAGNOSIS — F41.1 GAD (GENERALIZED ANXIETY DISORDER): ICD-10-CM

## 2020-01-03 DIAGNOSIS — M19.041 PRIMARY OSTEOARTHRITIS OF BOTH HANDS: Primary | ICD-10-CM

## 2020-01-03 DIAGNOSIS — N18.2 CHRONIC KIDNEY DISEASE (CKD), STAGE II (MILD): ICD-10-CM

## 2020-01-03 DIAGNOSIS — E78.00 HYPERCHOLESTEROLEMIA: Chronic | ICD-10-CM

## 2020-01-03 DIAGNOSIS — F51.01 PRIMARY INSOMNIA: ICD-10-CM

## 2020-01-03 DIAGNOSIS — M19.042 PRIMARY OSTEOARTHRITIS OF BOTH HANDS: Primary | ICD-10-CM

## 2020-01-03 PROCEDURE — G0439 PPPS, SUBSEQ VISIT: HCPCS | Performed by: FAMILY MEDICINE

## 2020-01-03 PROCEDURE — 99214 OFFICE O/P EST MOD 30 MIN: CPT | Performed by: FAMILY MEDICINE

## 2020-01-03 RX ORDER — TEMAZEPAM 30 MG/1
30 CAPSULE ORAL NIGHTLY PRN
Qty: 30 CAPSULE | Refills: 2 | Status: SHIPPED | OUTPATIENT
Start: 2020-01-03 | End: 2020-04-03 | Stop reason: SDUPTHER

## 2020-01-03 RX ORDER — MONTELUKAST SODIUM 10 MG/1
10 TABLET ORAL
Qty: 90 TABLET | Refills: 1 | Status: SHIPPED | OUTPATIENT
Start: 2020-01-03 | End: 2020-04-03 | Stop reason: SDUPTHER

## 2020-01-03 RX ORDER — TRAZODONE HYDROCHLORIDE 100 MG/1
100 TABLET ORAL EVERY EVENING
Qty: 90 TABLET | Refills: 3 | Status: SHIPPED | OUTPATIENT
Start: 2020-01-03 | End: 2021-02-17 | Stop reason: SDUPTHER

## 2020-01-03 RX ORDER — EPINEPHRINE 0.3 MG/.3ML
INJECTION SUBCUTANEOUS
Qty: 1 EACH | Refills: 5 | Status: SHIPPED | OUTPATIENT
Start: 2020-01-03 | End: 2021-03-22

## 2020-01-03 RX ORDER — PROMETHAZINE HYDROCHLORIDE 25 MG/1
25 TABLET ORAL EVERY 6 HOURS PRN
Qty: 30 TABLET | Refills: 2 | Status: SHIPPED | OUTPATIENT
Start: 2020-01-03 | End: 2022-09-30

## 2020-01-03 NOTE — TELEPHONE ENCOUNTER
Kelly called and said she needs a refill for Promethazine 25 mg filled that she takes as needed sent to [facility] in [place].

## 2020-01-03 NOTE — PATIENT INSTRUCTIONS

## 2020-01-03 NOTE — PROGRESS NOTES
Subjective   Lexie Cardona is a 69 y.o. female.     Arthritis   Presents for follow-up visit. She complains of pain and stiffness. The symptoms have been worsening. Affected locations include the left MCP, right MCP, left wrist, right wrist, right shoulder and left shoulder. Her pain is at a severity of 4/10.   Chronic Kidney Disease   This is a chronic problem. The current episode started more than 1 year ago. The problem occurs constantly. The problem has been unchanged.   Insomnia   This is a chronic (changed to halcion due to availability of restoril) problem. The current episode started more than 1 year ago. The problem occurs constantly. The problem has been waxing and waning.   Anxiety   Presents for follow-up visit. Symptoms include insomnia. Patient reports no depressed mood or nervous/anxious behavior.            The following portions of the patient's history were reviewed and updated as appropriate: allergies, current medications, past family history, past medical history, past social history, past surgical history and problem list.    Review of Systems   HENT: Positive for ear pain, postnasal drip and rhinorrhea. Negative for ear discharge.    Musculoskeletal: Positive for arthritis and stiffness.   Psychiatric/Behavioral: The patient has insomnia. The patient is not nervous/anxious.        Objective   Physical Exam   Constitutional: She is oriented to person, place, and time. She appears well-developed and well-nourished. No distress.   HENT:   Head: Normocephalic and atraumatic.   Right Ear: Hearing and tympanic membrane normal.   Left Ear: Hearing and tympanic membrane normal.   Nose: Mucosal edema and rhinorrhea present. Right sinus exhibits maxillary sinus tenderness and frontal sinus tenderness. Left sinus exhibits maxillary sinus tenderness and frontal sinus tenderness.   Mouth/Throat: Uvula is midline and mucous membranes are normal.   Cardiovascular: Normal rate, regular rhythm and  "normal heart sounds. Exam reveals no friction rub.   No murmur heard.  Pulmonary/Chest: Effort normal and breath sounds normal. No respiratory distress. She has no wheezes.   Musculoskeletal:        Left shoulder: She exhibits decreased range of motion and tenderness.        Right wrist: She exhibits decreased range of motion and tenderness.        Left wrist: She exhibits decreased range of motion and tenderness.        Right knee: She exhibits swelling. She exhibits normal range of motion, no effusion, no ecchymosis, no deformity, no laceration and no erythema. Tenderness found.   Lymphadenopathy:     She has no axillary adenopathy.   Neurological: She is alert and oriented to person, place, and time.   Skin: Skin is warm and dry. She is not diaphoretic.   Psychiatric: She has a normal mood and affect. Her behavior is normal. Judgment and thought content normal.   Nursing note and vitals reviewed.      Assessment/Plan   Problems Addressed this Visit        Cardiovascular and Mediastinum    Hypercholesterolemia (Chronic)    Relevant Orders    Lipid Panel       Musculoskeletal and Integument    Primary osteoarthritis of both hands - Primary (Chronic)       Genitourinary    Chronic kidney disease (CKD), stage II (mild) (Chronic)       Other    Insomnia (Chronic)    Relevant Medications    temazepam (RESTORIL) 30 MG capsule      Other Visit Diagnoses     PRITI (generalized anxiety disorder)        Relevant Medications    traZODone (DESYREL) 100 MG tablet    temazepam (RESTORIL) 30 MG capsule    Medicare annual wellness visit, subsequent                         Visit Vitals  /70   Pulse 74   Ht 162.6 cm (64\")   Wt 78.9 kg (174 lb)   LMP 03/22/1999 (Approximate) Comment: 2000   SpO2 97%   BMI 29.87 kg/m²       Body mass index is 29.87 kg/m².            This document has been electronically signed by Naresh Forrest MD on January 3, 2020 9:40 AM    "

## 2020-01-03 NOTE — PROGRESS NOTES
The ABCs of the Annual Wellness Visit  Subsequent Medicare Wellness Visit    Chief Complaint   Patient presents with   • Annual Exam     WMV       Subjective   History of Present Illness:  Lexie Cardona is a 69 y.o. female who presents for a Subsequent Medicare Wellness Visit.    HEALTH RISK ASSESSMENT    Recent Hospitalizations:  No hospitalization(s) within the last year.    Current Medical Providers:  Patient Care Team:  Naresh Forrest MD as PCP - General (Family Medicine)  Wesley Fox MD as Consulting Physician (Rheumatology)  Jem Acevedo APRN (Inactive) (Obstetrics and Gynecology)  Kirit Murillo MD as Consulting Physician (Ophthalmology)  Doug Hogue MD as Surgeon (Neurosurgery)  Naresh Forrest MD Estes, Walker L, DPM as Consulting Physician (Podiatry)  Jason Copeland MD as Consulting Physician (Nephrology)  Pablo Navas MD as Consulting Physician (Otolaryngology)    Smoking Status:  Social History     Tobacco Use   Smoking Status Former Smoker   • Years: 30.00   • Last attempt to quit:    • Years since quittin.0   Smokeless Tobacco Never Used       Alcohol Consumption:  Social History     Substance and Sexual Activity   Alcohol Use No       Depression Screen:   PHQ-2/PHQ-9 Depression Screening 2019   Little interest or pleasure in doing things 0   Feeling down, depressed, or hopeless 0   Trouble falling or staying asleep, or sleeping too much -   Feeling tired or having little energy -   Poor appetite or overeating -   Feeling bad about yourself - or that you are a failure or have let yourself or your family down -   Trouble concentrating on things, such as reading the newspaper or watching television -   Moving or speaking so slowly that other people could have noticed. Or the opposite - being so fidgety or restless that you have been moving around a lot more than usual -   Thoughts that you would be better off dead, or of hurting yourself in some way -    Total Score 0   If you checked off any problems, how difficult have these problems made it for you to do your work, take care of things at home, or get along with other people? -       Fall Risk Screen:  CECILIA Fall Risk Assessment has not been completed.    Health Habits and Functional and Cognitive Screening:  Functional & Cognitive Status 10/24/2018   Do you have difficulty preparing food and eating? No   Do you have difficulty bathing yourself, getting dressed or grooming yourself? No   Do you have difficulty using the toilet? No   Do you have difficulty moving around from place to place? No   Do you have trouble with steps or getting out of a bed or a chair? No   Current Diet Well Balanced Diet   Dental Exam Up to date   Eye Exam Up to date   Exercise (times per week) 5 times per week   Current Exercise Activities Include Walking   Do you need help using the phone?  No   Are you deaf or do you have serious difficulty hearing?  No   Do you need help with transportation? No   Do you need help shopping? No   Do you need help preparing meals?  No   Do you need help with housework?  No   Do you need help with laundry? No   Do you need help taking your medications? No   Do you need help managing money? No   Do you ever drive or ride in a car without wearing a seat belt? No   Have you felt unusual stress, anger or loneliness in the last month? No   Who do you live with? Spouse   If you need help, do you have trouble finding someone available to you? No   Have you been bothered in the last four weeks by sexual problems? No   Do you have difficulty concentrating, remembering or making decisions? No         Does the patient have evidence of cognitive impairment? No    Asprin use counseling:Does not need ASA (and currently is not on it)    Age-appropriate Screening Schedule:  Refer to the list below for future screening recommendations based on patient's age, sex and/or medical conditions. Orders for these recommended  tests are listed in the plan section. The patient has been provided with a written plan.    Health Maintenance   Topic Date Due   • PNEUMOCOCCAL VACCINE (65+ HIGH RISK) (2 of 2 - PPSV23) 02/26/2016   • ZOSTER VACCINE (2 of 2) 12/27/2016   • LIPID PANEL  07/24/2019   • INFLUENZA VACCINE  08/01/2019   • MAMMOGRAM  05/17/2020   • COLONOSCOPY  07/23/2025   • TDAP/TD VACCINES (2 - Td) 09/16/2026          The following portions of the patient's history were reviewed and updated as appropriate: allergies, current medications, past family history, past medical history, past social history, past surgical history and problem list.    Outpatient Medications Prior to Visit   Medication Sig Dispense Refill   • acetaminophen (TYLENOL) 500 MG tablet Take 500 mg by mouth.     • Alum Hydroxide-Mag Carbonate (GAVISCON PO) Take  by mouth As Needed. Tablets Or Liquid, As Needed      • Biotin 5 MG capsule Take 1 capsule by mouth Daily.     • Calcium Citrate-Vitamin D (CALCIUM + D PO) Take 1,200 mg by mouth Daily.     • cetirizine (ZyrTEC) 10 MG tablet Take 10 mg by mouth daily.     • cholecalciferol (VITAMIN D3) 400 units tablet Take 400 Units by mouth Daily.     • colchicine 0.6 MG tablet Take 1 tablet by mouth Daily. 30 tablet 2   • Cranberry-Vitamin C-Vitamin E (CRANBERRY PLUS VITAMIN C) 4200-20-3 MG-MG-UNIT capsule Take 2 tablets by mouth Every Night.     • EPINEPHrine (EPIPEN 2-ALBERT) 0.3 MG/0.3ML solution auto-injector injection Use as directed for Allergic Reaction 1 each 5   • ESTRING 2 MG vaginal ring INSERT 2 MG INTO THE VAGINA EVERY 3 MONTHS 1 each 4   • Flaxseed, Linseed, (FLAX SEED OIL) 1000 MG capsule Take 1 capsule by mouth 2 (two) times a day.     • hydrOXYzine (VISTARIL) 100 MG capsule Take 1 capsule by mouth 3 (Three) Times a Day As Needed for Itching. 90 capsule 6   • hydrOXYzine pamoate (VISTARIL) 50 MG capsule TAKE 2 CAPSULES BY MOUTH THREE TIMES DAILY 180 capsule 3   • LACTOBACILLUS RHAMNOSUS, GG, PO Take 1 capsule  by mouth.     • meclizine (ANTIVERT) 25 MG tablet Take 25 mg by mouth 3 (Three) Times a Day As Needed for dizziness.     • mometasone (NASONEX) 50 MCG/ACT nasal spray 2 sprays into the nostril(s) as directed by provider 2 (Two) Times a Day. 17 g 11   • montelukast (SINGULAIR) 10 MG tablet TAKE 1 TABLET BY MOUTH EVERY DAY AT NIGHT 90 tablet 1   • Multiple Vitamins-Minerals (CENTRUM SILVER PO) Take 1 tablet by mouth daily.     • mupirocin (BACTROBAN) 2 % nasal ointment Apply to the inside of each nostril with a cotton swab two times daily, morning and evening, for 5 days before surgery. 10 each 6   • olopatadine (PATANASE) 0.6 % solution nasal solution 2 sprays by Each Nare route 2 (Two) Times a Day. 30 g 3   • Probiotic Product (PROBIOTIC FORMULA PO) Take 1 tablet by mouth Every Night. 10 billion cell (2 billion ea) capsule      • promethazine (PHENERGAN) 25 MG tablet Take 1 tablet by mouth Every 6 (Six) Hours As Needed for Nausea or Vomiting. 30 tablet 2   • Sennosides 25 MG tablet Take 2 tablets by mouth Every Night.     • temazepam (RESTORIL) 30 MG capsule Take 1 capsule by mouth At Night As Needed for Sleep. 30 capsule 2   • traZODone (DESYREL) 100 MG tablet TAKE 1 TABLET BY MOUTH EVERY DAY IN THE EVENING 90 tablet 3   • valACYclovir (VALTREX) 1000 MG tablet Take 1 g by mouth.       No facility-administered medications prior to visit.        Patient Active Problem List   Diagnosis   • Vitamin D deficiency   • Restless legs   • Myoclonic disorder   • Meniere's disease   • Insomnia   • IBS (irritable bowel syndrome)   • Hypercholesterolemia   • History of colon polyps   • Gout   • Gastroesophageal reflux disease   • Gastric polyp   • Fundic gland polyposis of stomach   • Elevated levels of transaminase & lactic acid dehydrogenase   • Chronic kidney disease (CKD), stage II (mild)   • Allergic rhinitis   • Primary osteoarthritis of both hands   • Primary open angle glaucoma of both eyes, mild stage   • Cataract   •  "Carpal tunnel syndrome of right wrist   • Nasal obstruction   • Nasal turbinate hypertrophy   • Nasal septal deformity   • Sinusitis, chronic   • Nasal valve collapse   • Minnie bullosa       Advanced Care Planning:  Does not like living osei    Review of Systems    Compared to one year ago, the patient feels her physical health is worse.  Compared to one year ago, the patient feels her mental health is the same.    Reviewed chart for potential of high risk medication in the elderly: yes  Reviewed chart for potential of harmful drug interactions in the elderly:yes    Objective         Vitals:    01/03/20 0910   Height: 162.6 cm (64\")       Body mass index is 30.52 kg/m².  Discussed the patient's BMI with her. The BMI is above average; BMI management plan is completed.    Physical Exam          Assessment/Plan   Medicare Risks and Personalized Health Plan  CMS Preventative Services Quick Reference  Advance Directive Discussion    The above risks/problems have been discussed with the patient.  Pertinent information has been shared with the patient in the After Visit Summary.  Follow up plans and orders are seen below in the Assessment/Plan Section.    There are no diagnoses linked to this encounter.  Follow Up:  No follow-ups on file.     An After Visit Summary and PPPS were given to the patient.             "

## 2020-02-17 ENCOUNTER — TRANSCRIBE ORDERS (OUTPATIENT)
Dept: LAB | Facility: HOSPITAL | Age: 70
End: 2020-02-17

## 2020-02-17 ENCOUNTER — LAB (OUTPATIENT)
Dept: LAB | Facility: HOSPITAL | Age: 70
End: 2020-02-17

## 2020-02-17 DIAGNOSIS — E78.00 HYPERCHOLESTEROLEMIA: Chronic | ICD-10-CM

## 2020-02-17 DIAGNOSIS — N18.30 CHRONIC KIDNEY DISEASE, STAGE III (MODERATE) (HCC): Primary | ICD-10-CM

## 2020-02-17 LAB
25(OH)D3 SERPL-MCNC: 31.2 NG/ML (ref 30–100)
ALBUMIN SERPL-MCNC: 4.4 G/DL (ref 3.5–5.2)
ANION GAP SERPL CALCULATED.3IONS-SCNC: 12.5 MMOL/L (ref 5–15)
BUN BLD-MCNC: 11 MG/DL (ref 8–23)
BUN/CREAT SERPL: 9.6 (ref 7–25)
CALCIUM SPEC-SCNC: 9.7 MG/DL (ref 8.6–10.5)
CHLORIDE SERPL-SCNC: 101 MMOL/L (ref 98–107)
CHOLEST SERPL-MCNC: 246 MG/DL (ref 0–200)
CO2 SERPL-SCNC: 26.5 MMOL/L (ref 22–29)
CREAT BLD-MCNC: 1.14 MG/DL (ref 0.57–1)
CREAT UR-MCNC: 82.5 MG/DL
GFR SERPL CREATININE-BSD FRML MDRD: 47 ML/MIN/1.73
GLUCOSE BLD-MCNC: 103 MG/DL (ref 65–99)
HDLC SERPL-MCNC: 48 MG/DL (ref 40–60)
LDLC SERPL CALC-MCNC: 132 MG/DL (ref 0–100)
LDLC/HDLC SERPL: 2.76 {RATIO}
PHOSPHATE SERPL-MCNC: 3.9 MG/DL (ref 2.5–4.5)
POTASSIUM BLD-SCNC: 3.7 MMOL/L (ref 3.5–5.2)
PROT UR-MCNC: 6 MG/DL
PROT/CREAT UR: 72.7 MG/G CREA (ref 0–200)
SODIUM BLD-SCNC: 140 MMOL/L (ref 136–145)
TRIGL SERPL-MCNC: 328 MG/DL (ref 0–150)
VLDLC SERPL-MCNC: 65.6 MG/DL (ref 5–40)

## 2020-02-17 PROCEDURE — 82570 ASSAY OF URINE CREATININE: CPT | Performed by: INTERNAL MEDICINE

## 2020-02-17 PROCEDURE — 82306 VITAMIN D 25 HYDROXY: CPT | Performed by: INTERNAL MEDICINE

## 2020-02-17 PROCEDURE — 84156 ASSAY OF PROTEIN URINE: CPT | Performed by: INTERNAL MEDICINE

## 2020-02-17 PROCEDURE — 36415 COLL VENOUS BLD VENIPUNCTURE: CPT

## 2020-02-17 PROCEDURE — 80069 RENAL FUNCTION PANEL: CPT | Performed by: INTERNAL MEDICINE

## 2020-02-17 PROCEDURE — 80061 LIPID PANEL: CPT

## 2020-02-18 NOTE — PROGRESS NOTES
Triglycerides continue to be elevated.  Recommend Vascepa 2 g twice daily.  Wonder that this is not generic.  The other alternatives would be fish oil 2 g twice daily over-the-counter.

## 2020-02-20 ENCOUNTER — TELEPHONE (OUTPATIENT)
Dept: FAMILY MEDICINE CLINIC | Facility: CLINIC | Age: 70
End: 2020-02-20

## 2020-02-20 RX ORDER — ICOSAPENT ETHYL 1000 MG/1
2 CAPSULE ORAL 2 TIMES DAILY WITH MEALS
Qty: 120 CAPSULE | Refills: 3 | Status: SHIPPED | OUTPATIENT
Start: 2020-02-20 | End: 2020-04-03

## 2020-02-20 NOTE — PROGRESS NOTES
Per Dr. Forrest, Ms. Cardona has been called with recent lab results & recommendations.  Continue current medications and follow-up as planned or sooner if any problems.    I have sent a script for Vascepa 2 gram BID to her pharmacy.

## 2020-02-20 NOTE — TELEPHONE ENCOUNTER
Dr. Forrest    She states she can not take Fish Oil, she had problems with it in the past.   She feels the increase is due to Valentines Candy.  At first she wanted to hold off but changed her mind    I have sent a script for Vascepa 2 gram BID to her pharmacy.    She said she may try it for a month and then have her labs repeated, Can the labs be repeated that soon to see a difference?    Please Advise  Thank you  JEFERSON Alex

## 2020-02-20 NOTE — TELEPHONE ENCOUNTER
Per Dr. Forrest, Ms. Cardona has been called with recent lab results & recommendations.  Continue current medications and follow-up as planned or sooner if any problems.    Dr. Forrest  She states she can not take Fish Oil, she had problems with it in the past.   I have sent a script for Vascepa 2 gram BID to her pharmacy.     ----- Message from Naresh Forrest MD sent at 2/18/2020  2:33 PM CST -----  Triglycerides continue to be elevated.  Recommend Vascepa 2 g twice daily.  Wonder that this is not generic.  The other alternatives would be fish oil 2 g twice daily over-the-counter.

## 2020-02-26 ENCOUNTER — TRANSCRIBE ORDERS (OUTPATIENT)
Dept: LAB | Facility: HOSPITAL | Age: 70
End: 2020-02-26

## 2020-02-26 DIAGNOSIS — N18.30 CHRONIC KIDNEY DISEASE, STAGE III (MODERATE) (HCC): Primary | ICD-10-CM

## 2020-04-03 ENCOUNTER — TELEMEDICINE (OUTPATIENT)
Dept: FAMILY MEDICINE CLINIC | Facility: CLINIC | Age: 70
End: 2020-04-03

## 2020-04-03 VITALS
DIASTOLIC BLOOD PRESSURE: 78 MMHG | BODY MASS INDEX: 29.6 KG/M2 | WEIGHT: 173.4 LBS | HEIGHT: 64 IN | HEART RATE: 72 BPM | SYSTOLIC BLOOD PRESSURE: 124 MMHG

## 2020-04-03 DIAGNOSIS — M19.042 PRIMARY OSTEOARTHRITIS OF BOTH HANDS: ICD-10-CM

## 2020-04-03 DIAGNOSIS — M19.041 PRIMARY OSTEOARTHRITIS OF BOTH HANDS: ICD-10-CM

## 2020-04-03 DIAGNOSIS — F51.01 PRIMARY INSOMNIA: ICD-10-CM

## 2020-04-03 DIAGNOSIS — N18.2 CHRONIC KIDNEY DISEASE (CKD), STAGE II (MILD): Primary | ICD-10-CM

## 2020-04-03 PROCEDURE — 99213 OFFICE O/P EST LOW 20 MIN: CPT | Performed by: FAMILY MEDICINE

## 2020-04-03 RX ORDER — MONTELUKAST SODIUM 10 MG/1
10 TABLET ORAL
Qty: 90 TABLET | Refills: 1 | Status: SHIPPED | OUTPATIENT
Start: 2020-04-03 | End: 2021-01-08 | Stop reason: SDUPTHER

## 2020-04-03 RX ORDER — TEMAZEPAM 30 MG/1
30 CAPSULE ORAL NIGHTLY PRN
Qty: 30 CAPSULE | Refills: 2 | Status: SHIPPED | OUTPATIENT
Start: 2020-04-03 | End: 2020-07-01 | Stop reason: SDUPTHER

## 2020-04-03 NOTE — PROGRESS NOTES
Subjective   Lexie Cardona is a 70 y.o. female.     Arthritis   Presents for follow-up visit. She complains of pain and stiffness. The symptoms have been worsening. Affected locations include the left MCP, right MCP, left wrist, right wrist, right shoulder and left shoulder. Her pain is at a severity of 4/10. Pertinent negatives include no fever.   Chronic Kidney Disease   This is a chronic problem. The current episode started more than 1 year ago. The problem occurs constantly. The problem has been unchanged. Pertinent negatives include no fever.   Insomnia   This is a chronic (changed to halcion due to availability of restoril) problem. The current episode started more than 1 year ago. The problem occurs constantly. The problem has been waxing and waning. Pertinent negatives include no fever.        The following portions of the patient's history were reviewed and updated as appropriate: allergies, current medications, past family history, past medical history, past social history, past surgical history and problem list.    Review of Systems   Constitutional: Negative for fever.   Musculoskeletal: Positive for arthritis and stiffness.       Objective   Physical Exam   Constitutional: She is oriented to person, place, and time.   Neurological: She is alert and oriented to person, place, and time.   Psychiatric: She has a normal mood and affect. Her behavior is normal. Judgment and thought content normal.   Nursing note reviewed.      Assessment/Plan   Problems Addressed this Visit        Musculoskeletal and Integument    Primary osteoarthritis of both hands (Chronic)       Genitourinary    Chronic kidney disease (CKD), stage II (mild) - Primary (Chronic)       Other    Insomnia (Chronic)    Relevant Medications    temazepam (RESTORIL) 30 MG capsule                 Unable to complete visit using a video connection to the patient. A phone visit was used to complete this visits. Total time of discussion was  12 minutes.                This document has been electronically signed by Naresh Forrest MD on April 3, 2020 09:19

## 2020-05-11 RX ORDER — OLOPATADINE HYDROCHLORIDE 665 UG/1
SPRAY NASAL
Qty: 30.5 G | Refills: 3 | Status: SHIPPED | OUTPATIENT
Start: 2020-05-11 | End: 2021-01-07 | Stop reason: SDUPTHER

## 2020-05-11 NOTE — TELEPHONE ENCOUNTER
05/11/2020, 1534 - Patient's prior clinical appointment with Dr. Pablo Navas M.D./ENT 12/30/2019.  Prescription medication prescribed per M.D./ENT Yosef 12/30/2019 with 3 renewals.  1 year clinical appointment scheduled Thursday, December 31, 2020 at 8:15 A.M.

## 2020-06-23 ENCOUNTER — TELEPHONE (OUTPATIENT)
Dept: FAMILY MEDICINE CLINIC | Facility: CLINIC | Age: 70
End: 2020-06-23

## 2020-07-01 ENCOUNTER — TELEMEDICINE (OUTPATIENT)
Dept: FAMILY MEDICINE CLINIC | Facility: CLINIC | Age: 70
End: 2020-07-01

## 2020-07-01 DIAGNOSIS — M19.042 PRIMARY OSTEOARTHRITIS OF BOTH HANDS: ICD-10-CM

## 2020-07-01 DIAGNOSIS — F51.01 PRIMARY INSOMNIA: ICD-10-CM

## 2020-07-01 DIAGNOSIS — N18.2 CHRONIC KIDNEY DISEASE (CKD), STAGE II (MILD): Primary | ICD-10-CM

## 2020-07-01 DIAGNOSIS — M19.041 PRIMARY OSTEOARTHRITIS OF BOTH HANDS: ICD-10-CM

## 2020-07-01 PROCEDURE — 99213 OFFICE O/P EST LOW 20 MIN: CPT | Performed by: FAMILY MEDICINE

## 2020-07-01 RX ORDER — TEMAZEPAM 30 MG/1
30 CAPSULE ORAL NIGHTLY PRN
Qty: 30 CAPSULE | Refills: 2 | Status: SHIPPED | OUTPATIENT
Start: 2020-07-01 | End: 2020-10-06

## 2020-07-01 NOTE — PROGRESS NOTES
Subjective   Lexie Cardona is a 70 y.o. female.     Arthritis   Presents for follow-up visit. She complains of pain and stiffness. The symptoms have been worsening. Affected locations include the left MCP, right MCP, left wrist, right wrist, right shoulder, left shoulder, right knee and left knee. Her pain is at a severity of 7/10.   Chronic Kidney Disease   This is a chronic problem. The current episode started more than 1 year ago. The problem occurs constantly. The problem has been unchanged.   Insomnia   This is a chronic (changed to halcion due to availability of restoril) problem. The current episode started more than 1 year ago. The problem occurs constantly. The problem has been waxing and waning.        The following portions of the patient's history were reviewed and updated as appropriate: allergies, current medications, past family history, past medical history, past social history, past surgical history and problem list.    Review of Systems   HENT: Positive for ear pain, postnasal drip and rhinorrhea. Negative for ear discharge.    Musculoskeletal: Positive for arthritis and stiffness.       Objective   Physical Exam   Constitutional: She is oriented to person, place, and time. She appears well-developed and well-nourished. No distress.   HENT:   Head: Normocephalic and atraumatic.   Neurological: She is alert and oriented to person, place, and time.   Skin: She is not diaphoretic.   Psychiatric: She has a normal mood and affect. Her behavior is normal. Judgment and thought content normal.       Assessment/Plan   Problems Addressed this Visit        Musculoskeletal and Integument    Primary osteoarthritis of both hands (Chronic)       Genitourinary    Chronic kidney disease (CKD), stage II (mild) - Primary (Chronic)       Other    Insomnia (Chronic)    Relevant Medications    temazepam (RESTORIL) 30 MG capsule            You have chosen to receive care through a telehealth visit.  Do you  consent to use a video/audio connection for your medical care today? Yes      Approximately 15 minutes spent with patient today using dox           Visit Vitals  LMP 03/22/1999 (Approximate) Comment: 2000       There is no height or weight on file to calculate BMI.            This document has been electronically signed by Naresh Forrest MD on July 1, 2020 09:20

## 2020-07-05 DIAGNOSIS — F51.01 PRIMARY INSOMNIA: ICD-10-CM

## 2020-07-06 RX ORDER — TEMAZEPAM 30 MG/1
30 CAPSULE ORAL NIGHTLY PRN
Qty: 30 CAPSULE | Refills: 2 | OUTPATIENT
Start: 2020-07-06

## 2020-07-06 NOTE — TELEPHONE ENCOUNTER
No Refill needed, Refill sent to [facility] on 07/01/2020 #30 with 2 refills

## 2020-07-07 RX ORDER — HYDROXYZINE PAMOATE 50 MG/1
100 CAPSULE ORAL 3 TIMES DAILY
Qty: 180 CAPSULE | Refills: 5 | Status: SHIPPED | OUTPATIENT
Start: 2020-07-07 | End: 2021-01-07 | Stop reason: SDUPTHER

## 2020-07-07 NOTE — TELEPHONE ENCOUNTER
07/07/2020, 0757 - Patient's prior clinical appointment 12/30/2019 with 1 year clinical appointment scheduled Thursday, December 31, 2020 at 8:15 A.M., which will need to be rescheduled as you are on vacation the week of Monday, December 28, 2020 - Friday, January 1, 2020.

## 2020-07-17 ENCOUNTER — TELEPHONE (OUTPATIENT)
Dept: OTOLARYNGOLOGY | Facility: CLINIC | Age: 70
End: 2020-07-17

## 2020-07-17 NOTE — TELEPHONE ENCOUNTER
----- Message from Nancy De La Torre sent at 7/17/2020  8:32 AM CDT -----  Mari [phone] with [facility] has question about the pre-authorization for Vistaril.    Case #[id number]

## 2020-07-17 NOTE — TELEPHONE ENCOUNTER
07/17/2020, 0916 - Telephone call returned.  Spoke with Talya.  Verbal approval submitted to substitute Generic Hydroxyzine Pamoate 50 MG Capsules verses name Brand Vistaril 50 MG Capsules.  Verbal acknowledgement submitted regarding benefits outweighing risk for patient as patient is tolerating prescription medication without complications.  Prior Authorization approval obtained.  Authorization Number [id number].  Effective Dates 04/17/2020 - 07/17/2021.

## 2020-07-17 NOTE — TELEPHONE ENCOUNTER
07/17/2020, 1024 - Patient telephoned with notification Prior Authorization approval obtained per Cover My Meds regarding prescription medication Hydroxyzine Pamoate 50 MG Capsules.  Patient made aware [employer], [place], [place] has also been made aware of Prior Authorization approval.  Patient co-pay $2.39.

## 2020-08-18 ENCOUNTER — TRANSCRIBE ORDERS (OUTPATIENT)
Dept: PAIN MEDICINE | Facility: HOSPITAL | Age: 70
End: 2020-08-18

## 2020-08-18 ENCOUNTER — LAB (OUTPATIENT)
Dept: LAB | Facility: HOSPITAL | Age: 70
End: 2020-08-18

## 2020-08-18 DIAGNOSIS — N18.30 CHRONIC KIDNEY DISEASE, STAGE III (MODERATE) (HCC): ICD-10-CM

## 2020-08-18 DIAGNOSIS — N18.30 CHRONIC KIDNEY DISEASE, STAGE III (MODERATE) (HCC): Primary | ICD-10-CM

## 2020-08-18 LAB
25(OH)D3 SERPL-MCNC: 34.8 NG/ML (ref 30–100)
ALBUMIN SERPL-MCNC: 4.4 G/DL (ref 3.5–5.2)
ANION GAP SERPL CALCULATED.3IONS-SCNC: 15 MMOL/L (ref 5–15)
BUN SERPL-MCNC: 11 MG/DL (ref 8–23)
BUN/CREAT SERPL: 9.7 (ref 7–25)
CALCIUM SPEC-SCNC: 9.7 MG/DL (ref 8.6–10.5)
CHLORIDE SERPL-SCNC: 107 MMOL/L (ref 98–107)
CO2 SERPL-SCNC: 23 MMOL/L (ref 22–29)
CREAT SERPL-MCNC: 1.13 MG/DL (ref 0.57–1)
CREAT UR-MCNC: 202.4 MG/DL
GFR SERPL CREATININE-BSD FRML MDRD: 48 ML/MIN/1.73
GLUCOSE SERPL-MCNC: 96 MG/DL (ref 65–99)
PHOSPHATE SERPL-MCNC: 4.1 MG/DL (ref 2.5–4.5)
POTASSIUM SERPL-SCNC: 3.9 MMOL/L (ref 3.5–5.2)
PROT UR-MCNC: 10.2 MG/DL
PROT/CREAT UR: 50.4 MG/G CREA (ref 0–200)
PTH-INTACT SERPL-MCNC: 39.8 PG/ML (ref 15–65)
SODIUM SERPL-SCNC: 145 MMOL/L (ref 136–145)

## 2020-08-18 PROCEDURE — 83970 ASSAY OF PARATHORMONE: CPT

## 2020-08-18 PROCEDURE — 82306 VITAMIN D 25 HYDROXY: CPT

## 2020-08-18 PROCEDURE — 80069 RENAL FUNCTION PANEL: CPT

## 2020-08-18 PROCEDURE — 82570 ASSAY OF URINE CREATININE: CPT

## 2020-08-18 PROCEDURE — 36415 COLL VENOUS BLD VENIPUNCTURE: CPT

## 2020-08-18 PROCEDURE — 84156 ASSAY OF PROTEIN URINE: CPT

## 2020-08-24 ENCOUNTER — TRANSCRIBE ORDERS (OUTPATIENT)
Dept: LAB | Facility: HOSPITAL | Age: 70
End: 2020-08-24

## 2020-08-24 DIAGNOSIS — N18.30 CHRONIC KIDNEY DISEASE, STAGE III (MODERATE) (HCC): Primary | ICD-10-CM

## 2020-10-05 DIAGNOSIS — F51.01 PRIMARY INSOMNIA: ICD-10-CM

## 2020-10-05 NOTE — TELEPHONE ENCOUNTER
Dr. Forrest,    Ms. Kelly Brendan is requesting a refill on her Restoril 30 mg #30     Last OV    07/01/2020  Telemed    Next Sandie OV    1/4/2021    Last Script Written  07/01/2020  #30 with 2 refills    Last Tesfaye     04/03/2020    Please advise on refill    Thank you

## 2020-10-06 RX ORDER — TEMAZEPAM 30 MG/1
30 CAPSULE ORAL NIGHTLY PRN
Qty: 30 CAPSULE | Refills: 2 | Status: SHIPPED | OUTPATIENT
Start: 2020-10-06 | End: 2021-01-08 | Stop reason: SDUPTHER

## 2020-10-23 ENCOUNTER — TELEPHONE (OUTPATIENT)
Dept: FAMILY MEDICINE CLINIC | Facility: CLINIC | Age: 70
End: 2020-10-23

## 2020-10-23 NOTE — TELEPHONE ENCOUNTER
No Refills needed at this time for Ms. Cardona.  The refills were for her , refill request have been sent to Dr. Forrest via Mr. Cardona's record.        ----- Message from Lexie Cardona sent at 10/23/2020  3:29 PM CDT -----  Regarding: Prescription Question  Contact: [phone]  Rodolfo Cardona  needs refills on [id number] 2 tabs bid [id number] tid and esomepra magnesium 40mg qd please send refills to [facility] [phone]

## 2020-10-29 RX ORDER — MOMETASONE FUROATE 50 UG/1
SPRAY, METERED NASAL
Qty: 51 G | Refills: 3 | Status: SHIPPED | OUTPATIENT
Start: 2020-10-29 | End: 2021-01-07 | Stop reason: SDUPTHER

## 2020-10-29 NOTE — TELEPHONE ENCOUNTER
Prescription refilled in Dr. Navas absence.  Patient has an appointment with Dr. Navas in January.

## 2020-12-21 RX ORDER — ESTRADIOL 2 MG/1
RING VAGINAL
Qty: 1 EACH | Refills: 4 | Status: SHIPPED | OUTPATIENT
Start: 2020-12-21 | End: 2021-06-07 | Stop reason: SDUPTHER

## 2021-01-07 ENCOUNTER — OFFICE VISIT (OUTPATIENT)
Dept: OTOLARYNGOLOGY | Facility: CLINIC | Age: 71
End: 2021-01-07

## 2021-01-07 VITALS — TEMPERATURE: 96.8 F | WEIGHT: 170.2 LBS | BODY MASS INDEX: 28.36 KG/M2 | HEIGHT: 65 IN

## 2021-01-07 DIAGNOSIS — J30.1 NON-SEASONAL ALLERGIC RHINITIS DUE TO POLLEN: Primary | ICD-10-CM

## 2021-01-07 DIAGNOSIS — J34.2 DEVIATED SEPTUM: ICD-10-CM

## 2021-01-07 PROCEDURE — 99213 OFFICE O/P EST LOW 20 MIN: CPT | Performed by: OTOLARYNGOLOGY

## 2021-01-07 RX ORDER — MOMETASONE FUROATE 50 UG/1
2 SPRAY, METERED NASAL DAILY
Qty: 17 G | Refills: 12 | Status: SHIPPED | OUTPATIENT
Start: 2021-01-07 | End: 2021-12-03 | Stop reason: SDUPTHER

## 2021-01-07 RX ORDER — HYDROXYZINE PAMOATE 50 MG/1
100 CAPSULE ORAL 3 TIMES DAILY PRN
Qty: 180 CAPSULE | Refills: 5 | Status: SHIPPED | OUTPATIENT
Start: 2021-01-07 | End: 2021-12-03 | Stop reason: SDUPTHER

## 2021-01-07 RX ORDER — DOCUSATE SODIUM 100 MG/1
CAPSULE, LIQUID FILLED ORAL DAILY PRN
COMMUNITY
Start: 1980-01-01

## 2021-01-07 RX ORDER — OLOPATADINE HYDROCHLORIDE 665 UG/1
2 SPRAY NASAL 2 TIMES DAILY
Qty: 30 G | Refills: 11 | Status: SHIPPED | OUTPATIENT
Start: 2021-01-07 | End: 2021-06-17

## 2021-01-07 RX ORDER — VALACYCLOVIR HYDROCHLORIDE 1 G/1
TABLET, FILM COATED ORAL DAILY PRN
COMMUNITY
Start: 1981-01-01 | End: 2022-09-30

## 2021-01-07 NOTE — PATIENT INSTRUCTIONS
MyPlate from USDA    MyPlate is an outline of a general healthy diet based on the 2010 Dietary Guidelines for Americans, from the U.S. Department of Agriculture (USDA). It sets guidelines for how much food you should eat from each food group based on your age, sex, and level of physical activity.  What are tips for following MyPlate?  To follow MyPlate recommendations:  · Eat a wide variety of fruits and vegetables, grains, and protein foods.  · Serve smaller portions and eat less food throughout the day.  · Limit portion sizes to avoid overeating.  · Enjoy your food.  · Get at least 150 minutes of exercise every week. This is about 30 minutes each day, 5 or more days per week.  It can be difficult to have every meal look like MyPlate. Think about MyPlate as eating guidelines for an entire day, rather than each individual meal.  Fruits and vegetables  · Make half of your plate fruits and vegetables.  · Eat many different colors of fruits and vegetables each day.  · For a 2,000 calorie daily food plan, eat:  ? 2½ cups of vegetables every day.  ? 2 cups of fruit every day.  · 1 cup is equal to:  ? 1 cup raw or cooked vegetables.  ? 1 cup raw fruit.  ? 1 medium-sized orange, apple, or banana.  ? 1 cup 100% fruit or vegetable juice.  ? 2 cups raw leafy greens, such as lettuce, spinach, or kale.  ? ½ cup dried fruit.  Grains  · One fourth of your plate should be grains.  · Make at least half of the grains you eat each day whole grains.  · For a 2,000 calorie daily food plan, eat 6 oz of grains every day.  · 1 oz is equal to:  ? 1 slice bread.  ? 1 cup cereal.  ? ½ cup cooked rice, cereal, or pasta.  Protein  · One fourth of your plate should be protein.  · Eat a wide variety of protein foods, including meat, poultry, fish, eggs, beans, nuts, and tofu.  · For a 2,000 calorie daily food plan, eat 5½ oz of protein every day.  · 1 oz is equal to:  ? 1 oz meat, poultry, or fish.  ? ¼ cup cooked beans.  ? 1 egg.  ? ½ oz nuts  or seeds.  ? 1 Tbsp peanut butter.  Dairy  · Drink fat-free or low-fat (1%) milk.  · Eat or drink dairy as a side to meals.  · For a 2,000 calorie daily food plan, eat or drink 3 cups of dairy every day.  · 1 cup is equal to:  ? 1 cup milk, yogurt, cottage cheese, or soy milk (soy beverage).  ? 2 oz processed cheese.  ? 1½ oz natural cheese.  Fats, oils, salt, and sugars  · Only small amounts of oils are recommended.  · Avoid foods that are high in calories and low in nutritional value (empty calories), like foods high in fat or added sugars.  · Choose foods that are low in salt (sodium). Choose foods that have less than 140 milligrams (mg) of sodium per serving.  · Drink water instead of sugary drinks. Drink enough water each day to keep your urine pale yellow.  Where to find support  · Work with your health care provider or a nutrition specialist (dietitian) to develop a customized eating plan that is right for you.  · Download an toby (mobile application) to help you track your daily food intake.  Where to find more information  · Go to ChooseMyPlate.gov for more information.  Summary  · MyPlate is a general guideline for healthy eating from the USDA. It is based on the 2010 Dietary Guidelines for Americans.  · In general, fruits and vegetables should take up ½ of your plate, grains should take up ¼ of your plate, and protein should take up ¼ of your plate.  This information is not intended to replace advice given to you by your health care provider. Make sure you discuss any questions you have with your health care provider.  Document Revised: 05/21/2020 Document Reviewed: 03/19/2018  Elsevier Patient Education © 2020 [employer] Inc.

## 2021-01-07 NOTE — PROGRESS NOTES
Subjective   Lexie Cardona is a 70 y.o. female.   Follow-up nasal symptoms    History of Present Illness   Patient generally doing well with her current medications sometimes has more drainage has not been using saline regularly    Not have any fever chills  The following portions of the patient's history were reviewed and updated as appropriate: allergies, current medications, past family history, past medical history, past social history, past surgical history and problem list.      Current Outpatient Medications:   •  Alum Hydroxide-Mag Carbonate (GAVISCON PO), Take  by mouth As Needed. Tablets Or Liquid, As Needed , Disp: , Rfl:   •  Calcium Citrate-Vitamin D (CALCIUM + D PO), Take 1,200 mg by mouth Daily., Disp: , Rfl:   •  cetirizine (ZyrTEC) 10 MG tablet, Take 10 mg by mouth daily., Disp: , Rfl:   •  CRANBERRY PO, Take 4,200 mg by mouth Daily., Disp: , Rfl:   •  docusate sodium (Colace) 100 MG capsule, Take  by mouth Daily As Needed., Disp: , Rfl:   •  EPINEPHrine (EPIPEN 2-ALBERT) 0.3 MG/0.3ML solution auto-injector injection, Use as directed for Allergic Reaction, Disp: 1 each, Rfl: 5  •  Estring 2 MG vaginal ring, INSERT 2 MG INTO THE VAGINA EVERY 3 MONTHS, Disp: 1 each, Rfl: 4  •  Flaxseed, Linseed, (FLAX SEED OIL) 1000 MG capsule, Take 1 capsule by mouth 2 (two) times a day., Disp: , Rfl:   •  hydrOXYzine pamoate (VISTARIL) 50 MG capsule, Take 2 capsules by mouth 3 (Three) Times a Day As Needed for Itching., Disp: 180 capsule, Rfl: 5  •  mometasone (NASONEX) 50 MCG/ACT nasal spray, 2 sprays into the nostril(s) as directed by provider Daily., Disp: 17 g, Rfl: 12  •  montelukast (SINGULAIR) 10 MG tablet, Take 1 tablet by mouth every night at bedtime., Disp: 90 tablet, Rfl: 1  •  Multiple Vitamins-Minerals (CENTRUM SILVER PO), Take 1 tablet by mouth daily., Disp: , Rfl:   •  Probiotic Product (PROBIOTIC FORMULA PO), Take 1 tablet by mouth Every Night. 10 billion cell (2 billion ea) capsule , Disp: ,  Rfl:   •  promethazine (PHENERGAN) 25 MG tablet, Take 1 tablet by mouth Every 6 (Six) Hours As Needed for Nausea or Vomiting., Disp: 30 tablet, Rfl: 2  •  Sennosides-Docusate Sodium (HERBER-COLACE PO), Take  by mouth Daily As Needed., Disp: , Rfl:   •  temazepam (RESTORIL) 30 MG capsule, TAKE 1 CAPSULE BY MOUTH AT NIGHT AS NEEDED FOR SLEEP., Disp: 30 capsule, Rfl: 2  •  traZODone (DESYREL) 100 MG tablet, Take 1 tablet by mouth Every Evening., Disp: 90 tablet, Rfl: 3  •  valACYclovir (VALTREX) 1000 MG tablet, Take  by mouth Daily As Needed., Disp: , Rfl:   •  mupirocin (BACTROBAN) 2 % nasal ointment, Apply to the inside of each nostril with a cotton swab two times daily, morning and evening, for 5 days before surgery., Disp: 10 each, Rfl: 6  •  olopatadine (PATANASE) 0.6 % solution nasal solution, 2 sprays by Each Nare route 2 (Two) Times a Day., Disp: 30 g, Rfl: 11    Allergies   Allergen Reactions   • Augmentin [Amoxicillin-Pot Clavulanate] Anaphylaxis   • Ceclor [Cefaclor] Anaphylaxis   • Nsaids Anaphylaxis   • Other Anaphylaxis     Cats  E.E.S. 200  Lead   Mold  Dania Pastrani - Anaphylaxis   • Penicillins Anaphylaxis   • Aspirin Tinnitus     tinnitis    • Biaxin [Clarithromycin] Other (See Comments)     angioedema   • Ciprofloxacin Other (See Comments)     Lips burning   • Contrast Dye Hives and Itching   • Cymbalta [Duloxetine Hcl] Swelling     Tongue turned red and was swollen   • Demerol [Meperidine] Nausea And Vomiting   • Iodine GI Intolerance     And iodide containing products   • Macrobid [Nitrofurantoin Monohyd Macro] Diarrhea, Nausea And Vomiting and GI Intolerance   • Requip [Ropinirole Hcl] Diarrhea, Nausea And Vomiting and GI Intolerance   • Septra [Sulfamethoxazole-Trimethoprim] Nausea And Vomiting and GI Intolerance   • Statins Myalgia       *muscle enzyme increase with myalgias   • Zithromax [Azithromycin] Unknown - Low Severity     States she hasn't had a reaction, but doctor said he wasn't going  to give it to her   • Niaspan [Niacin Er] Rash   • Nickel Rash             Review of Systems   Constitutional: Negative for fever.   HENT: Positive for congestion and postnasal drip. Negative for ear pain, facial swelling and rhinorrhea.    Hematological: Negative for adenopathy.           Objective   Physical Exam  Vitals signs and nursing note reviewed.   HENT:      Head: Normocephalic.      Right Ear: Tympanic membrane and ear canal normal.      Left Ear: Tympanic membrane and ear canal normal.      Mouth/Throat:      Mouth: Mucous membranes are moist.   Eyes:      Conjunctiva/sclera: Conjunctivae normal.   Pulmonary:      Effort: Pulmonary effort is normal.   Skin:     General: Skin is warm.   Neurological:      General: No focal deficit present.      Mental Status: She is alert.   Psychiatric:         Mood and Affect: Mood normal.             Assessment/Plan   Diagnoses and all orders for this visit:    1. Non-seasonal allergic rhinitis due to pollen (Primary)    2. Deviated septum    Other orders  -     mometasone (NASONEX) 50 MCG/ACT nasal spray; 2 sprays into the nostril(s) as directed by provider Daily.  Dispense: 17 g; Refill: 12  -     olopatadine (PATANASE) 0.6 % solution nasal solution; 2 sprays by Each Nare route 2 (Two) Times a Day.  Dispense: 30 g; Refill: 11  -     hydrOXYzine pamoate (VISTARIL) 50 MG capsule; Take 2 capsules by mouth 3 (Three) Times a Day As Needed for Itching.  Dispense: 180 capsule; Refill: 5    About side effects of drowsiness and drying could consider Atrovent if the postnasal drip but not bother no evidence of active infection seen currently   follow-up 1 year understands she understands I will not be here

## 2021-01-08 ENCOUNTER — OFFICE VISIT (OUTPATIENT)
Dept: FAMILY MEDICINE CLINIC | Facility: CLINIC | Age: 71
End: 2021-01-08

## 2021-01-08 VITALS
BODY MASS INDEX: 28.71 KG/M2 | SYSTOLIC BLOOD PRESSURE: 122 MMHG | TEMPERATURE: 96.7 F | WEIGHT: 168.2 LBS | HEART RATE: 64 BPM | OXYGEN SATURATION: 98 % | DIASTOLIC BLOOD PRESSURE: 80 MMHG | HEIGHT: 64 IN

## 2021-01-08 DIAGNOSIS — Z76.89 ENCOUNTER TO ESTABLISH CARE WITH NEW DOCTOR: Primary | ICD-10-CM

## 2021-01-08 DIAGNOSIS — E78.00 HYPERCHOLESTEROLEMIA: ICD-10-CM

## 2021-01-08 DIAGNOSIS — K29.70 VIRAL GASTRITIS: ICD-10-CM

## 2021-01-08 DIAGNOSIS — J30.1 ALLERGIC RHINITIS DUE TO POLLEN, UNSPECIFIED SEASONALITY: ICD-10-CM

## 2021-01-08 DIAGNOSIS — F51.01 PRIMARY INSOMNIA: Chronic | ICD-10-CM

## 2021-01-08 DIAGNOSIS — J32.9 CHRONIC SINUSITIS, UNSPECIFIED LOCATION: ICD-10-CM

## 2021-01-08 DIAGNOSIS — Z79.899 OTHER LONG TERM (CURRENT) DRUG THERAPY: ICD-10-CM

## 2021-01-08 DIAGNOSIS — N18.30 STAGE 3 CHRONIC KIDNEY DISEASE, UNSPECIFIED WHETHER STAGE 3A OR 3B CKD (HCC): ICD-10-CM

## 2021-01-08 PROCEDURE — 99214 OFFICE O/P EST MOD 30 MIN: CPT | Performed by: FAMILY MEDICINE

## 2021-01-08 RX ORDER — MONTELUKAST SODIUM 10 MG/1
10 TABLET ORAL
Qty: 90 TABLET | Refills: 3 | Status: SHIPPED | OUTPATIENT
Start: 2021-01-08 | End: 2021-02-25 | Stop reason: SDUPTHER

## 2021-01-08 RX ORDER — TEMAZEPAM 30 MG/1
30 CAPSULE ORAL NIGHTLY PRN
Qty: 30 CAPSULE | Refills: 2 | Status: SHIPPED | OUTPATIENT
Start: 2021-01-08 | End: 2021-04-17 | Stop reason: SDUPTHER

## 2021-01-08 NOTE — PROGRESS NOTES
Chief Complaint  Establish Care and Med Refill (singulair ,restoril)    Subjective          Lexie Cardona presents to [facility] for   Patient is a pleasant 70-year-old female with an extensive past medical history who reports today to establish care.  Patient was previously treated by Dr. Forrest; however, as he recently tired she is looking for a new PCP.  She has multiple chronic conditions and reports that they are all stable at this time.  She is requesting refills on her temazepam and Singulair.    Chronic sinusitis-condition is stable.  She currently takes Zyrtec and Singulair.  Also taking Nasonex.  Denies any acute sinus issues or allergy symptoms at this time.    Hyperlipidemia-condition is chronic.  Condition has been poorly controlled.  She is allergic to statins.  She is currently not take any prescription medications for this at this time.    CKD stage III-condition is chronic.  This is stable.  Creatinine and GFR have remained steady for some time.  She follows with nephrology regularly.  Denies decreased urine output, blood in the urine, swelling, or other symptoms at this time.    Primary insomnia-patient has had insomnia for several years.  It is currently well controlled with temazepam and trazodone.  She has taken these for some time.  She is tolerating well without adverse events or side effects.  She is requesting refill of temazepam at this time.  Denies excessive sedation, daytime sleepiness or somnolence, or other symptoms or side effects at this time.    Of note, patient has had some nausea and vomiting over the last few days.  This initially started over the weekend with an episode of dizziness followed by nausea and 2 episodes of vomiting.  Patient reports she then got mildly better for a few days and vomited again earlier today.  She does have chronic GI problems including uncontrolled GERD, IBS, and several other GI issues.  She does follow with  "gastroenterology clinic in [place].  She has not contacted her about this.  She denies fevers or chills.  Denies severe abdominal pain but does have some abdominal discomfort across her upper abdomen.  Denies flank pain.  Denies any dysuria, urinary frequency, urinary urgency, or hematuria.  Denies excessive diarrhea.  Did have one episode of loose stools since symptoms started.  She has taken promethazine for nausea vomiting with some relief.          Objective   Vital Signs:   /80 (Patient Position: Sitting)   Pulse 64   Temp 96.7 °F (35.9 °C)   Ht 162.6 cm (64\")   Wt 76.3 kg (168 lb 3.2 oz)   SpO2 98%   BMI 28.87 kg/m²     Physical Exam  Vitals signs and nursing note reviewed.   Constitutional:       General: She is awake. She is not in acute distress.     Appearance: Normal appearance. She is well-developed. She is not ill-appearing, toxic-appearing or diaphoretic.      Interventions: She is not intubated.  HENT:      Head: Normocephalic and atraumatic.      Right Ear: Hearing and external ear normal.      Left Ear: Hearing and external ear normal.      Nose: Nose normal.   Eyes:      General: Lids are normal. No scleral icterus.        Right eye: No discharge.         Left eye: No discharge.      Extraocular Movements: Extraocular movements intact.      Right eye: Normal extraocular motion and no nystagmus.      Left eye: Normal extraocular motion and no nystagmus.      Conjunctiva/sclera: Conjunctivae normal.      Pupils: Pupils are equal, round, and reactive to light.   Neck:      Musculoskeletal: Full passive range of motion without pain, normal range of motion and neck supple. Normal range of motion. No edema, erythema or neck rigidity.      Thyroid: No thyroid mass or thyromegaly.      Trachea: Trachea and phonation normal. No tracheal tenderness or tracheal deviation.   Cardiovascular:      Rate and Rhythm: Normal rate and regular rhythm.  No extrasystoles are present.     Chest Wall: PMI " is not displaced. No thrill.      Heart sounds: Normal heart sounds, S1 normal and S2 normal. Heart sounds not distant. No murmur. No systolic murmur. No diastolic murmur. No friction rub. No gallop. No S3 or S4 sounds.    Pulmonary:      Effort: Pulmonary effort is normal. No tachypnea, bradypnea, accessory muscle usage, prolonged expiration, respiratory distress or retractions. She is not intubated.      Breath sounds: Normal breath sounds and air entry. No stridor, decreased air movement or transmitted upper airway sounds. No decreased breath sounds, wheezing, rhonchi or rales.   Abdominal:      General: Abdomen is flat. Bowel sounds are normal. There is no distension or abdominal bruit. There are no signs of injury.      Palpations: Abdomen is soft. There is no shifting dullness, fluid wave, hepatomegaly, splenomegaly, mass or pulsatile mass.      Tenderness: There is generalized abdominal tenderness. There is no right CVA tenderness, left CVA tenderness, guarding or rebound. Negative signs include Sol's sign and McBurney's sign.   Musculoskeletal:      Right lower leg: No edema.      Left lower leg: No edema.   Lymphadenopathy:      Cervical: No cervical adenopathy.   Skin:     General: Skin is warm and dry.      Findings: No erythema or rash.   Neurological:      General: No focal deficit present.      Mental Status: She is alert and oriented to person, place, and time. Mental status is at baseline.      Cranial Nerves: Cranial nerves are intact. No cranial nerve deficit, dysarthria or facial asymmetry.      Motor: Motor function is intact. No weakness, tremor, atrophy, abnormal muscle tone or seizure activity.      Gait: Gait is intact.   Psychiatric:         Attention and Perception: Attention and perception normal.         Mood and Affect: Mood and affect normal.         Speech: Speech normal.         Behavior: Behavior normal. Behavior is cooperative.         Thought Content: Thought content normal.          Cognition and Memory: Cognition and memory normal.         Judgment: Judgment normal.        Result Review :                 Assessment and Plan    Problem List Items Addressed This Visit        Allergies and Adverse Reactions    Allergic rhinitis (Chronic)  This condition is stable at this time.  Continue current medications.       Cardiac and Vasculature    Hypercholesterolemia (Chronic)  Last of the panel was abnormal.  Patient cannot tolerate statins.  May need to discuss other treatment options at next visit.  Will recheck lipid panel appropriate time and make further recommendations.       ENT    Sinusitis, chronic (Chronic)  Stable.  Continue current medications.    Overview     Added automatically from request for surgery [id number]            Sleep    Insomnia (Chronic)  Stable.  Continue current medications.  Controlled substance contract on file.  Urine drug screen ordered.  Risks of medication including sedation, respiratory depression, and dependency discussed.  Patient has had much relief with temazepam for several years for her insomnia.  She would like to continue with at this time despite risks.  Follow-up in 3 months for routine checkups.    Relevant Medications    temazepam (RESTORIL) 30 MG capsule    Other Relevant Orders    Urine Drug Screen - Urine, Clean Catch      Other Visit Diagnoses     Encounter to establish care with new doctor    -  Primary    Other long term (current) drug therapy         Relevant Orders    Urine Drug Screen - Urine, Clean Catch    Stage 3 chronic kidney disease, unspecified whether stage 3a or 3b CKD      Stable per patient's report.  Follow-up with nephrology.  Avoid nephrotoxic agents.  Stay well hydrated.    Viral gastritis      Patient symptoms somewhat consistent with viral gastritis.  Encouraged her to drink plenty of fluids and eat a bland diet.  Encouraged her to contact gastroenterology office that she is established with if her gastric symptoms do not  improve or worsen.  She is agreeable to this plan and verbalized understanding.        I spent 30 minutes caring for Lexie on this date of service. This time includes time spent by me in the following activities:preparing for the visit, reviewing tests, obtaining and/or reviewing a separately obtained history, performing a medically appropriate examination and/or evaluation , counseling and educating the patient/family/caregiver and documenting information in the medical record  Follow Up   Return in about 3 months (around 4/8/2021) for Next scheduled follow up, or sooner if any other problems.  Patient was given instructions and counseling regarding her condition or for health maintenance advice. Please see specific information pulled into the AVS if appropriate.

## 2021-01-18 ENCOUNTER — TELEPHONE (OUTPATIENT)
Dept: FAMILY MEDICINE CLINIC | Facility: CLINIC | Age: 71
End: 2021-01-18

## 2021-01-18 NOTE — TELEPHONE ENCOUNTER
Caller: Lexie Cardona    Relationship to patient: Self    Best call back number: [phone]    Patient is needing:     Patient states she tested positive for COVID on 1/17. She states she is having a cough, fever headache and chills. She would like to know what she can do for relief. She states the only antibiotic she can take is doxycycline. Please advise.

## 2021-01-19 NOTE — TELEPHONE ENCOUNTER
Called patient and she said she did not want to set up an appointment at this time. Patient said she would call if she felt any worse

## 2021-01-27 ENCOUNTER — TELEMEDICINE (OUTPATIENT)
Dept: FAMILY MEDICINE CLINIC | Facility: CLINIC | Age: 71
End: 2021-01-27

## 2021-01-27 VITALS — BODY MASS INDEX: 26.63 KG/M2 | TEMPERATURE: 97.1 F | HEIGHT: 64 IN | WEIGHT: 156 LBS

## 2021-01-27 DIAGNOSIS — B34.9 VIRAL ILLNESS: ICD-10-CM

## 2021-01-27 DIAGNOSIS — U07.1 COVID-19 VIRUS INFECTION: Primary | ICD-10-CM

## 2021-01-27 PROCEDURE — 99213 OFFICE O/P EST LOW 20 MIN: CPT | Performed by: FAMILY MEDICINE

## 2021-01-28 NOTE — PROGRESS NOTES
"Chief Complaint  Follow-up (covid)    Subjective          Lexie Cardona presents to [facility] for   Patient is a pleasant 70-year-old  female that presents today via video visit to follow-up on Covid diagnosis.  Patient was diagnosed on 1/16/2021.  Symptoms began on 1/15/2021.  She has been seen in urgent care last week and was started on doxycycline and short course of prednisone.  She was only given a very short supply due to history of CKD.  She reports she has spoken to her nephrologist and was cleared to take full course of doxycycline and steroid.  These were called in a few days ago; however, patient has not yet picked these up.  She reports her symptoms are ongoing.  She is still having fevers up to 10 1-1 02 at times.  Most the time temperature is normal.  She is still having body aches and fatigue.  She does have cough that is sometimes productive of clear sputum.  Denies shortness of breath or difficulty breathing.  Denies wheezing.  Denies chest pain.  She has been drinking fluids regularly, but has had less appetite.  She has had diarrhea regularly for several days.  She denies nausea or vomiting.  She has been quarantining at home.  She denies any other new or worsening symptoms at this time.      Objective   Vital Signs:   Temp 97.1 °F (36.2 °C)   Ht 162.6 cm (64\")   Wt 70.8 kg (156 lb)   BMI 26.78 kg/m²     Physical Exam  Vitals signs and nursing note reviewed.   Constitutional:       General: She is awake. She is not in acute distress.     Appearance: Normal appearance. She is well-groomed. She is not ill-appearing, toxic-appearing or diaphoretic.      Interventions: She is not intubated.  HENT:      Head: Normocephalic and atraumatic.      Right Ear: Hearing and external ear normal.      Left Ear: Hearing and external ear normal.   Eyes:      General: Lids are normal.      Extraocular Movements: Extraocular movements intact.   Neck:      " Musculoskeletal: Full passive range of motion without pain.   Pulmonary:      Effort: Pulmonary effort is normal. No tachypnea, bradypnea, accessory muscle usage or respiratory distress. She is not intubated.      Breath sounds: No stridor.   Skin:     General: Skin is dry.   Neurological:      General: No focal deficit present.      Mental Status: She is alert and oriented to person, place, and time. Mental status is at baseline.      Motor: No tremor or seizure activity.   Psychiatric:         Attention and Perception: Attention and perception normal.         Mood and Affect: Mood and affect normal.         Speech: Speech normal.         Behavior: Behavior normal. Behavior is cooperative.         Thought Content: Thought content normal.         Cognition and Memory: Cognition and memory normal.         Judgment: Judgment normal.        Result Review :                 Assessment and Plan    Problem List Items Addressed This Visit     None      Visit Diagnoses     COVID-19 virus infection    -  Primary    Viral illness            At this time we will continue doxycycline and prednisone.  Patient counseled to go pick these up from pharmacy.  Encouraged ample hydration with Pedialyte and clear fluids.  Continue Delsym cough syrup as needed.  Continue Tylenol for fever and aches and pains.  Instructed patient to seek immediate medical attention if she develops any new or worsening symptoms.  She is agreeable to this plan and verbalized understanding.  We will follow up on Friday afternoon to reassess.  Patient is agreeable with this plan and verbalized understanding.    I spent 15 minutes caring for Lexie on this date of service. This time includes time spent by me in the following activities:preparing for the visit, reviewing tests, obtaining and/or reviewing a separately obtained history, performing a medically appropriate examination and/or evaluation , counseling and educating the patient/family/caregiver and  documenting information in the medical record  Follow Up   No follow-ups on file.  Patient was given instructions and counseling regarding her condition or for health maintenance advice. Please see specific information pulled into the AVS if appropriate.

## 2021-01-29 ENCOUNTER — APPOINTMENT (OUTPATIENT)
Dept: GENERAL RADIOLOGY | Facility: HOSPITAL | Age: 71
End: 2021-01-29

## 2021-01-29 ENCOUNTER — HOSPITAL ENCOUNTER (INPATIENT)
Facility: HOSPITAL | Age: 71
LOS: 5 days | Discharge: HOME | End: 2021-02-04
Attending: EMERGENCY MEDICINE | Admitting: INTERNAL MEDICINE

## 2021-01-29 DIAGNOSIS — R19.7 DIARRHEA, UNSPECIFIED TYPE: ICD-10-CM

## 2021-01-29 DIAGNOSIS — Z74.09 IMPAIRED FUNCTIONAL MOBILITY, BALANCE, GAIT, AND ENDURANCE: ICD-10-CM

## 2021-01-29 DIAGNOSIS — J12.82 PNEUMONIA DUE TO COVID-19 VIRUS: ICD-10-CM

## 2021-01-29 DIAGNOSIS — Z74.09 IMPAIRED MOBILITY AND ADLS: ICD-10-CM

## 2021-01-29 DIAGNOSIS — U07.1 PNEUMONIA DUE TO COVID-19 VIRUS: ICD-10-CM

## 2021-01-29 DIAGNOSIS — J96.01 ACUTE RESPIRATORY FAILURE WITH HYPOXIA (HCC): ICD-10-CM

## 2021-01-29 DIAGNOSIS — J96.00 ACUTE RESPIRATORY FAILURE DUE TO COVID-19 (HCC): Primary | ICD-10-CM

## 2021-01-29 DIAGNOSIS — U07.1 ACUTE RESPIRATORY FAILURE DUE TO COVID-19 (HCC): Primary | ICD-10-CM

## 2021-01-29 DIAGNOSIS — Z78.9 IMPAIRED MOBILITY AND ADLS: ICD-10-CM

## 2021-01-29 LAB
ALBUMIN SERPL-MCNC: 3.4 G/DL (ref 3.5–5.2)
ALBUMIN/GLOB SERPL: 0.9 G/DL
ALP SERPL-CCNC: 65 U/L (ref 39–117)
ALT SERPL W P-5'-P-CCNC: 19 U/L (ref 1–33)
ANION GAP SERPL CALCULATED.3IONS-SCNC: 14 MMOL/L (ref 5–15)
ARTERIAL PATENCY WRIST A: ABNORMAL
AST SERPL-CCNC: 33 U/L (ref 1–32)
ATMOSPHERIC PRESS: 754 MMHG
BASE EXCESS BLDA CALC-SCNC: 0.1 MMOL/L (ref 0–2)
BASOPHILS # BLD AUTO: 0.02 10*3/MM3 (ref 0–0.2)
BASOPHILS NFR BLD AUTO: 0.2 % (ref 0–1.5)
BDY SITE: ABNORMAL
BILIRUB SERPL-MCNC: 0.7 MG/DL (ref 0–1.2)
BUN SERPL-MCNC: 14 MG/DL (ref 8–23)
BUN/CREAT SERPL: 13.1 (ref 7–25)
CALCIUM SPEC-SCNC: 9.7 MG/DL (ref 8.6–10.5)
CHLORIDE SERPL-SCNC: 98 MMOL/L (ref 98–107)
CO2 SERPL-SCNC: 26 MMOL/L (ref 22–29)
CREAT SERPL-MCNC: 1.07 MG/DL (ref 0.57–1)
D-LACTATE SERPL-SCNC: 1.2 MMOL/L (ref 0.5–2)
D-LACTATE SERPL-SCNC: 2.5 MMOL/L (ref 0.5–2)
DEPRECATED RDW RBC AUTO: 39.1 FL (ref 37–54)
EOSINOPHIL # BLD AUTO: 0.07 10*3/MM3 (ref 0–0.4)
EOSINOPHIL NFR BLD AUTO: 0.8 % (ref 0.3–6.2)
ERYTHROCYTE [DISTWIDTH] IN BLOOD BY AUTOMATED COUNT: 12.6 % (ref 12.3–15.4)
FLUAV RNA RESP QL NAA+PROBE: NOT DETECTED
FLUBV RNA RESP QL NAA+PROBE: NOT DETECTED
GAS FLOW AIRWAY: 5 LPM
GFR SERPL CREATININE-BSD FRML MDRD: 51 ML/MIN/1.73
GLOBULIN UR ELPH-MCNC: 3.6 GM/DL
GLUCOSE SERPL-MCNC: 104 MG/DL (ref 65–99)
HCO3 BLDA-SCNC: 23.1 MMOL/L (ref 20–26)
HCT VFR BLD AUTO: 46.8 % (ref 34–46.6)
HGB BLD-MCNC: 16.3 G/DL (ref 12–15.9)
IMM GRANULOCYTES # BLD AUTO: 0.03 10*3/MM3 (ref 0–0.05)
IMM GRANULOCYTES NFR BLD AUTO: 0.3 % (ref 0–0.5)
LACTATE HOLD SPECIMEN: NORMAL
LYMPHOCYTES # BLD AUTO: 1.6 10*3/MM3 (ref 0.7–3.1)
LYMPHOCYTES NFR BLD AUTO: 18.6 % (ref 19.6–45.3)
Lab: ABNORMAL
MCH RBC QN AUTO: 30 PG (ref 26.6–33)
MCHC RBC AUTO-ENTMCNC: 34.8 G/DL (ref 31.5–35.7)
MCV RBC AUTO: 86 FL (ref 79–97)
MODALITY: ABNORMAL
MONOCYTES # BLD AUTO: 0.4 10*3/MM3 (ref 0.1–0.9)
MONOCYTES NFR BLD AUTO: 4.7 % (ref 5–12)
NEUTROPHILS NFR BLD AUTO: 6.48 10*3/MM3 (ref 1.7–7)
NEUTROPHILS NFR BLD AUTO: 75.4 % (ref 42.7–76)
NRBC BLD AUTO-RTO: 0 /100 WBC (ref 0–0.2)
NT-PROBNP SERPL-MCNC: 106.1 PG/ML (ref 0–900)
PCO2 BLDA: 32.1 MM HG (ref 35–45)
PH BLDA: 7.46 PH UNITS (ref 7.35–7.45)
PLATELET # BLD AUTO: 317 10*3/MM3 (ref 140–450)
PMV BLD AUTO: 9.6 FL (ref 6–12)
PO2 BLDA: 66.9 MM HG (ref 83–108)
POTASSIUM SERPL-SCNC: 3.4 MMOL/L (ref 3.5–5.2)
PROCALCITONIN SERPL-MCNC: 0.12 NG/ML (ref 0–0.25)
PROT SERPL-MCNC: 7 G/DL (ref 6–8.5)
RBC # BLD AUTO: 5.44 10*6/MM3 (ref 3.77–5.28)
SAO2 % BLDCOA: 94.2 % (ref 94–99)
SARS-COV-2 RNA RESP QL NAA+PROBE: DETECTED
SODIUM SERPL-SCNC: 138 MMOL/L (ref 136–145)
TROPONIN T SERPL-MCNC: <0.01 NG/ML (ref 0–0.03)
VENTILATOR MODE: ABNORMAL
WBC # BLD AUTO: 8.6 10*3/MM3 (ref 3.4–10.8)
WHOLE BLOOD HOLD SPECIMEN: NORMAL

## 2021-01-29 PROCEDURE — 93010 ELECTROCARDIOGRAM REPORT: CPT | Performed by: INTERNAL MEDICINE

## 2021-01-29 PROCEDURE — G0378 HOSPITAL OBSERVATION PER HR: HCPCS

## 2021-01-29 PROCEDURE — 83605 ASSAY OF LACTIC ACID: CPT | Performed by: STUDENT IN AN ORGANIZED HEALTH CARE EDUCATION/TRAINING PROGRAM

## 2021-01-29 PROCEDURE — 87636 SARSCOV2 & INF A&B AMP PRB: CPT | Performed by: STUDENT IN AN ORGANIZED HEALTH CARE EDUCATION/TRAINING PROGRAM

## 2021-01-29 PROCEDURE — 25010000002 DEXAMETHASONE PER 1 MG: Performed by: STUDENT IN AN ORGANIZED HEALTH CARE EDUCATION/TRAINING PROGRAM

## 2021-01-29 PROCEDURE — XW033E5 INTRODUCTION OF REMDESIVIR ANTI-INFECTIVE INTO PERIPHERAL VEIN, PERCUTANEOUS APPROACH, NEW TECHNOLOGY GROUP 5: ICD-10-PCS | Performed by: INTERNAL MEDICINE

## 2021-01-29 PROCEDURE — 71045 X-RAY EXAM CHEST 1 VIEW: CPT

## 2021-01-29 PROCEDURE — 80053 COMPREHEN METABOLIC PANEL: CPT | Performed by: STUDENT IN AN ORGANIZED HEALTH CARE EDUCATION/TRAINING PROGRAM

## 2021-01-29 PROCEDURE — 85025 COMPLETE CBC W/AUTO DIFF WBC: CPT | Performed by: STUDENT IN AN ORGANIZED HEALTH CARE EDUCATION/TRAINING PROGRAM

## 2021-01-29 PROCEDURE — 93005 ELECTROCARDIOGRAM TRACING: CPT | Performed by: STUDENT IN AN ORGANIZED HEALTH CARE EDUCATION/TRAINING PROGRAM

## 2021-01-29 PROCEDURE — 84484 ASSAY OF TROPONIN QUANT: CPT | Performed by: STUDENT IN AN ORGANIZED HEALTH CARE EDUCATION/TRAINING PROGRAM

## 2021-01-29 PROCEDURE — 99285 EMERGENCY DEPT VISIT HI MDM: CPT

## 2021-01-29 PROCEDURE — 84145 PROCALCITONIN (PCT): CPT | Performed by: STUDENT IN AN ORGANIZED HEALTH CARE EDUCATION/TRAINING PROGRAM

## 2021-01-29 PROCEDURE — 82803 BLOOD GASES ANY COMBINATION: CPT

## 2021-01-29 PROCEDURE — 83880 ASSAY OF NATRIURETIC PEPTIDE: CPT | Performed by: STUDENT IN AN ORGANIZED HEALTH CARE EDUCATION/TRAINING PROGRAM

## 2021-01-29 PROCEDURE — 87040 BLOOD CULTURE FOR BACTERIA: CPT | Performed by: STUDENT IN AN ORGANIZED HEALTH CARE EDUCATION/TRAINING PROGRAM

## 2021-01-29 PROCEDURE — 36600 WITHDRAWAL OF ARTERIAL BLOOD: CPT

## 2021-01-29 RX ORDER — DEXAMETHASONE SODIUM PHOSPHATE 4 MG/ML
6 INJECTION, SOLUTION INTRA-ARTICULAR; INTRALESIONAL; INTRAMUSCULAR; INTRAVENOUS; SOFT TISSUE ONCE
Status: COMPLETED | OUTPATIENT
Start: 2021-01-29 | End: 2021-01-29

## 2021-01-29 RX ORDER — SODIUM CHLORIDE 0.9 % (FLUSH) 0.9 %
10 SYRINGE (ML) INJECTION AS NEEDED
Status: DISCONTINUED | OUTPATIENT
Start: 2021-01-29 | End: 2021-02-04 | Stop reason: HOSPADM

## 2021-01-29 RX ORDER — TEMAZEPAM 15 MG/1
30 CAPSULE ORAL NIGHTLY PRN
Status: DISCONTINUED | OUTPATIENT
Start: 2021-01-29 | End: 2021-02-04 | Stop reason: HOSPADM

## 2021-01-29 RX ORDER — MONTELUKAST SODIUM 10 MG/1
10 TABLET ORAL NIGHTLY
Status: DISCONTINUED | OUTPATIENT
Start: 2021-01-29 | End: 2021-02-04 | Stop reason: HOSPADM

## 2021-01-29 RX ORDER — SODIUM CHLORIDE 0.9 % (FLUSH) 0.9 %
10 SYRINGE (ML) INJECTION EVERY 12 HOURS SCHEDULED
Status: DISCONTINUED | OUTPATIENT
Start: 2021-01-29 | End: 2021-02-04 | Stop reason: HOSPADM

## 2021-01-29 RX ORDER — ALBUTEROL SULFATE 90 UG/1
2 AEROSOL, METERED RESPIRATORY (INHALATION)
Status: DISCONTINUED | OUTPATIENT
Start: 2021-01-29 | End: 2021-02-04 | Stop reason: HOSPADM

## 2021-01-29 RX ORDER — HYDROXYZINE PAMOATE 25 MG/1
50 CAPSULE ORAL EVERY 4 HOURS PRN
Status: DISCONTINUED | OUTPATIENT
Start: 2021-01-29 | End: 2021-01-30

## 2021-01-29 RX ORDER — DEXAMETHASONE SODIUM PHOSPHATE 4 MG/ML
6 INJECTION, SOLUTION INTRA-ARTICULAR; INTRALESIONAL; INTRAMUSCULAR; INTRAVENOUS; SOFT TISSUE EVERY 12 HOURS
Status: DISCONTINUED | OUTPATIENT
Start: 2021-01-29 | End: 2021-02-04 | Stop reason: HOSPADM

## 2021-01-29 RX ORDER — TRAZODONE HYDROCHLORIDE 100 MG/1
100 TABLET ORAL EVERY EVENING
Status: DISCONTINUED | OUTPATIENT
Start: 2021-01-29 | End: 2021-01-30

## 2021-01-29 RX ORDER — AZELASTINE 1 MG/ML
2 SPRAY, METERED NASAL 2 TIMES DAILY
Status: DISCONTINUED | OUTPATIENT
Start: 2021-01-29 | End: 2021-01-31

## 2021-01-29 RX ADMIN — SODIUM CHLORIDE 1000 ML: 900 INJECTION, SOLUTION INTRAVENOUS at 18:00

## 2021-01-29 RX ADMIN — SODIUM CHLORIDE 1000 ML: 900 INJECTION, SOLUTION INTRAVENOUS at 17:00

## 2021-01-29 RX ADMIN — REMDESIVIR 200 MG: 100 INJECTION, POWDER, LYOPHILIZED, FOR SOLUTION INTRAVENOUS at 21:34

## 2021-01-29 RX ADMIN — DEXAMETHASONE SODIUM PHOSPHATE 6 MG: 4 INJECTION, SOLUTION INTRAMUSCULAR; INTRAVENOUS at 16:56

## 2021-01-29 RX ADMIN — SODIUM CHLORIDE, PRESERVATIVE FREE 10 ML: 5 INJECTION INTRAVENOUS at 21:38

## 2021-01-30 LAB
ALBUMIN SERPL-MCNC: 2.6 G/DL (ref 3.5–5.2)
ALP SERPL-CCNC: 52 U/L (ref 39–117)
ALT SERPL W P-5'-P-CCNC: 15 U/L (ref 1–33)
ANION GAP SERPL CALCULATED.3IONS-SCNC: 10 MMOL/L (ref 5–15)
ANISOCYTOSIS BLD QL: ABNORMAL
AST SERPL-CCNC: 26 U/L (ref 1–32)
BILIRUB CONJ SERPL-MCNC: <0.2 MG/DL (ref 0–0.3)
BILIRUB INDIRECT SERPL-MCNC: ABNORMAL MG/DL
BILIRUB SERPL-MCNC: 0.3 MG/DL (ref 0–1.2)
BUN SERPL-MCNC: 12 MG/DL (ref 8–23)
BUN/CREAT SERPL: 17.1 (ref 7–25)
CALCIUM SPEC-SCNC: 8.5 MG/DL (ref 8.6–10.5)
CHLORIDE SERPL-SCNC: 109 MMOL/L (ref 98–107)
CO2 SERPL-SCNC: 21 MMOL/L (ref 22–29)
CREAT SERPL-MCNC: 0.7 MG/DL (ref 0.57–1)
DEPRECATED RDW RBC AUTO: 40.7 FL (ref 37–54)
ERYTHROCYTE [DISTWIDTH] IN BLOOD BY AUTOMATED COUNT: 12.8 % (ref 12.3–15.4)
GFR SERPL CREATININE-BSD FRML MDRD: 83 ML/MIN/1.73
GLUCOSE SERPL-MCNC: 115 MG/DL (ref 65–99)
HCT VFR BLD AUTO: 40.7 % (ref 34–46.6)
HGB BLD-MCNC: 13.7 G/DL (ref 12–15.9)
LYMPHOCYTES # BLD MANUAL: 0.81 10*3/MM3 (ref 0.7–3.1)
LYMPHOCYTES NFR BLD MANUAL: 17 % (ref 19.6–45.3)
LYMPHOCYTES NFR BLD MANUAL: 5 % (ref 5–12)
MCH RBC QN AUTO: 29.3 PG (ref 26.6–33)
MCHC RBC AUTO-ENTMCNC: 33.7 G/DL (ref 31.5–35.7)
MCV RBC AUTO: 87.2 FL (ref 79–97)
MONOCYTES # BLD AUTO: 0.24 10*3/MM3 (ref 0.1–0.9)
NEUTROPHILS # BLD AUTO: 3.61 10*3/MM3 (ref 1.7–7)
NEUTROPHILS NFR BLD MANUAL: 71 % (ref 42.7–76)
NEUTS BAND NFR BLD MANUAL: 5 % (ref 0–5)
PLATELET # BLD AUTO: 297 10*3/MM3 (ref 140–450)
PMV BLD AUTO: 9.9 FL (ref 6–12)
POTASSIUM SERPL-SCNC: 4.2 MMOL/L (ref 3.5–5.2)
PROT SERPL-MCNC: 5.7 G/DL (ref 6–8.5)
QT INTERVAL: 408 MS
QTC INTERVAL: 476 MS
RBC # BLD AUTO: 4.67 10*6/MM3 (ref 3.77–5.28)
SMALL PLATELETS BLD QL SMEAR: ADEQUATE
SODIUM SERPL-SCNC: 140 MMOL/L (ref 136–145)
VARIANT LYMPHS NFR BLD MANUAL: 2 % (ref 0–5)
WBC # BLD AUTO: 4.75 10*3/MM3 (ref 3.4–10.8)
WBC MORPH BLD: NORMAL

## 2021-01-30 PROCEDURE — 94799 UNLISTED PULMONARY SVC/PX: CPT

## 2021-01-30 PROCEDURE — 85007 BL SMEAR W/DIFF WBC COUNT: CPT | Performed by: INTERNAL MEDICINE

## 2021-01-30 PROCEDURE — 80048 BASIC METABOLIC PNL TOTAL CA: CPT | Performed by: INTERNAL MEDICINE

## 2021-01-30 PROCEDURE — 85025 COMPLETE CBC W/AUTO DIFF WBC: CPT | Performed by: INTERNAL MEDICINE

## 2021-01-30 PROCEDURE — 25010000002 DEXAMETHASONE PER 1 MG: Performed by: INTERNAL MEDICINE

## 2021-01-30 PROCEDURE — 94760 N-INVAS EAR/PLS OXIMETRY 1: CPT

## 2021-01-30 PROCEDURE — 80076 HEPATIC FUNCTION PANEL: CPT | Performed by: INTERNAL MEDICINE

## 2021-01-30 PROCEDURE — 94640 AIRWAY INHALATION TREATMENT: CPT

## 2021-01-30 PROCEDURE — 25010000002 ENOXAPARIN PER 10 MG: Performed by: INTERNAL MEDICINE

## 2021-01-30 RX ORDER — SODIUM CHLORIDE 9 MG/ML
INJECTION, SOLUTION INTRAVENOUS
Status: COMPLETED
Start: 2021-01-30 | End: 2021-01-30

## 2021-01-30 RX ORDER — TRAZODONE HYDROCHLORIDE 50 MG/1
25 TABLET ORAL EVERY EVENING
Status: DISCONTINUED | OUTPATIENT
Start: 2021-01-30 | End: 2021-01-31

## 2021-01-30 RX ADMIN — TRAZODONE HYDROCHLORIDE 100 MG: 100 TABLET ORAL at 00:23

## 2021-01-30 RX ADMIN — REMDESIVIR 100 MG: 100 INJECTION, POWDER, LYOPHILIZED, FOR SOLUTION INTRAVENOUS at 21:18

## 2021-01-30 RX ADMIN — SODIUM CHLORIDE 500 ML: 9 INJECTION, SOLUTION INTRAVENOUS at 10:40

## 2021-01-30 RX ADMIN — Medication 25 MG: at 21:04

## 2021-01-30 RX ADMIN — ALBUTEROL SULFATE 2 PUFF: 90 AEROSOL, METERED RESPIRATORY (INHALATION) at 07:33

## 2021-01-30 RX ADMIN — ALBUTEROL SULFATE 2 PUFF: 90 AEROSOL, METERED RESPIRATORY (INHALATION) at 10:52

## 2021-01-30 RX ADMIN — TEMAZEPAM 30 MG: 15 CAPSULE ORAL at 21:18

## 2021-01-30 RX ADMIN — ENOXAPARIN SODIUM 40 MG: 40 INJECTION SUBCUTANEOUS at 21:03

## 2021-01-30 RX ADMIN — MONTELUKAST 10 MG: 10 TABLET, FILM COATED ORAL at 00:23

## 2021-01-30 RX ADMIN — SODIUM CHLORIDE, PRESERVATIVE FREE 10 ML: 5 INJECTION INTRAVENOUS at 10:28

## 2021-01-30 RX ADMIN — ALBUTEROL SULFATE 2 PUFF: 90 AEROSOL, METERED RESPIRATORY (INHALATION) at 20:07

## 2021-01-30 RX ADMIN — SODIUM CHLORIDE, PRESERVATIVE FREE 10 ML: 5 INJECTION INTRAVENOUS at 21:03

## 2021-01-30 RX ADMIN — ALBUTEROL SULFATE 2 PUFF: 90 AEROSOL, METERED RESPIRATORY (INHALATION) at 14:43

## 2021-01-30 RX ADMIN — DEXAMETHASONE SODIUM PHOSPHATE 6 MG: 4 INJECTION, SOLUTION INTRAMUSCULAR; INTRAVENOUS at 00:23

## 2021-01-30 RX ADMIN — MONTELUKAST 10 MG: 10 TABLET, FILM COATED ORAL at 21:04

## 2021-01-30 RX ADMIN — ENOXAPARIN SODIUM 40 MG: 40 INJECTION SUBCUTANEOUS at 00:24

## 2021-01-30 RX ADMIN — AZELASTINE HYDROCHLORIDE 2 SPRAY: 137 SPRAY, METERED NASAL at 10:29

## 2021-01-30 RX ADMIN — ENOXAPARIN SODIUM 40 MG: 40 INJECTION SUBCUTANEOUS at 10:28

## 2021-01-30 RX ADMIN — TEMAZEPAM 30 MG: 15 CAPSULE ORAL at 00:24

## 2021-01-30 RX ADMIN — DEXAMETHASONE SODIUM PHOSPHATE 6 MG: 4 INJECTION, SOLUTION INTRAMUSCULAR; INTRAVENOUS at 10:27

## 2021-01-30 RX ADMIN — DEXAMETHASONE SODIUM PHOSPHATE 6 MG: 4 INJECTION, SOLUTION INTRAMUSCULAR; INTRAVENOUS at 21:04

## 2021-01-31 LAB
ALBUMIN SERPL-MCNC: 2.8 G/DL (ref 3.5–5.2)
ALP SERPL-CCNC: 57 U/L (ref 39–117)
ALT SERPL W P-5'-P-CCNC: 19 U/L (ref 1–33)
AST SERPL-CCNC: 31 U/L (ref 1–32)
BILIRUB CONJ SERPL-MCNC: <0.2 MG/DL (ref 0–0.3)
BILIRUB INDIRECT SERPL-MCNC: ABNORMAL MG/DL
BILIRUB SERPL-MCNC: 0.3 MG/DL (ref 0–1.2)
CREAT SERPL-MCNC: 0.79 MG/DL (ref 0.57–1)
GFR SERPL CREATININE-BSD FRML MDRD: 72 ML/MIN/1.73
PROT SERPL-MCNC: 5.8 G/DL (ref 6–8.5)

## 2021-01-31 PROCEDURE — 94799 UNLISTED PULMONARY SVC/PX: CPT

## 2021-01-31 PROCEDURE — 82565 ASSAY OF CREATININE: CPT | Performed by: INTERNAL MEDICINE

## 2021-01-31 PROCEDURE — 80076 HEPATIC FUNCTION PANEL: CPT | Performed by: INTERNAL MEDICINE

## 2021-01-31 PROCEDURE — 94760 N-INVAS EAR/PLS OXIMETRY 1: CPT

## 2021-01-31 PROCEDURE — 25010000002 ENOXAPARIN PER 10 MG: Performed by: INTERNAL MEDICINE

## 2021-01-31 PROCEDURE — 25010000002 DEXAMETHASONE PER 1 MG: Performed by: INTERNAL MEDICINE

## 2021-01-31 RX ORDER — TRAZODONE HYDROCHLORIDE 50 MG/1
50 TABLET ORAL EVERY EVENING
Status: DISCONTINUED | OUTPATIENT
Start: 2021-01-31 | End: 2021-02-04 | Stop reason: HOSPADM

## 2021-01-31 RX ORDER — FLUCONAZOLE 100 MG/1
100 TABLET ORAL ONCE
Status: COMPLETED | OUTPATIENT
Start: 2021-01-31 | End: 2021-01-31

## 2021-01-31 RX ORDER — CETIRIZINE HYDROCHLORIDE 10 MG/1
10 TABLET ORAL DAILY
Status: DISCONTINUED | OUTPATIENT
Start: 2021-01-31 | End: 2021-02-04 | Stop reason: HOSPADM

## 2021-01-31 RX ADMIN — ALBUTEROL SULFATE 2 PUFF: 90 AEROSOL, METERED RESPIRATORY (INHALATION) at 08:05

## 2021-01-31 RX ADMIN — SODIUM CHLORIDE, PRESERVATIVE FREE 10 ML: 5 INJECTION INTRAVENOUS at 20:54

## 2021-01-31 RX ADMIN — ENOXAPARIN SODIUM 40 MG: 40 INJECTION SUBCUTANEOUS at 08:42

## 2021-01-31 RX ADMIN — TRAZODONE HYDROCHLORIDE 50 MG: 50 TABLET ORAL at 20:54

## 2021-01-31 RX ADMIN — TEMAZEPAM 30 MG: 15 CAPSULE ORAL at 20:54

## 2021-01-31 RX ADMIN — ALBUTEROL SULFATE 2 PUFF: 90 AEROSOL, METERED RESPIRATORY (INHALATION) at 11:20

## 2021-01-31 RX ADMIN — DEXAMETHASONE SODIUM PHOSPHATE 6 MG: 4 INJECTION, SOLUTION INTRAMUSCULAR; INTRAVENOUS at 08:41

## 2021-01-31 RX ADMIN — HYDROCODONE POLISTIREX AND CHLORPHENIRAMINE POLISTIREX 5 ML: 10; 8 SUSPENSION, EXTENDED RELEASE ORAL at 08:50

## 2021-01-31 RX ADMIN — ALBUTEROL SULFATE 2 PUFF: 90 AEROSOL, METERED RESPIRATORY (INHALATION) at 15:51

## 2021-01-31 RX ADMIN — SODIUM CHLORIDE, PRESERVATIVE FREE 10 ML: 5 INJECTION INTRAVENOUS at 08:42

## 2021-01-31 RX ADMIN — DEXAMETHASONE SODIUM PHOSPHATE 6 MG: 4 INJECTION, SOLUTION INTRAMUSCULAR; INTRAVENOUS at 20:54

## 2021-01-31 RX ADMIN — ALBUTEROL SULFATE 2 PUFF: 90 AEROSOL, METERED RESPIRATORY (INHALATION) at 19:25

## 2021-01-31 RX ADMIN — CETIRIZINE HYDROCHLORIDE 10 MG: 10 TABLET, FILM COATED ORAL at 08:50

## 2021-01-31 RX ADMIN — ENOXAPARIN SODIUM 40 MG: 40 INJECTION SUBCUTANEOUS at 20:54

## 2021-01-31 RX ADMIN — REMDESIVIR 100 MG: 100 INJECTION, POWDER, LYOPHILIZED, FOR SOLUTION INTRAVENOUS at 20:53

## 2021-01-31 RX ADMIN — FLUCONAZOLE 100 MG: 100 TABLET ORAL at 20:54

## 2021-01-31 RX ADMIN — MONTELUKAST 10 MG: 10 TABLET, FILM COATED ORAL at 20:54

## 2021-02-01 LAB
ALBUMIN SERPL-MCNC: 2.9 G/DL (ref 3.5–5.2)
ALP SERPL-CCNC: 51 U/L (ref 39–117)
ALT SERPL W P-5'-P-CCNC: 18 U/L (ref 1–33)
ANION GAP SERPL CALCULATED.3IONS-SCNC: 9 MMOL/L (ref 5–15)
AST SERPL-CCNC: 20 U/L (ref 1–32)
BASOPHILS # BLD AUTO: 0.01 10*3/MM3 (ref 0–0.2)
BASOPHILS NFR BLD AUTO: 0.1 % (ref 0–1.5)
BILIRUB CONJ SERPL-MCNC: <0.2 MG/DL (ref 0–0.3)
BILIRUB INDIRECT SERPL-MCNC: ABNORMAL MG/DL
BILIRUB SERPL-MCNC: 0.4 MG/DL (ref 0–1.2)
BUN SERPL-MCNC: 15 MG/DL (ref 8–23)
BUN/CREAT SERPL: 21.7 (ref 7–25)
CALCIUM SPEC-SCNC: 8.6 MG/DL (ref 8.6–10.5)
CHLORIDE SERPL-SCNC: 108 MMOL/L (ref 98–107)
CO2 SERPL-SCNC: 23 MMOL/L (ref 22–29)
CREAT SERPL-MCNC: 0.69 MG/DL (ref 0.57–1)
DEPRECATED RDW RBC AUTO: 39.6 FL (ref 37–54)
EOSINOPHIL # BLD AUTO: 0 10*3/MM3 (ref 0–0.4)
EOSINOPHIL NFR BLD AUTO: 0 % (ref 0.3–6.2)
ERYTHROCYTE [DISTWIDTH] IN BLOOD BY AUTOMATED COUNT: 12.8 % (ref 12.3–15.4)
GFR SERPL CREATININE-BSD FRML MDRD: 84 ML/MIN/1.73
GLUCOSE SERPL-MCNC: 129 MG/DL (ref 65–99)
HCT VFR BLD AUTO: 37.9 % (ref 34–46.6)
HGB BLD-MCNC: 13.3 G/DL (ref 12–15.9)
IMM GRANULOCYTES # BLD AUTO: 0.09 10*3/MM3 (ref 0–0.05)
IMM GRANULOCYTES NFR BLD AUTO: 0.7 % (ref 0–0.5)
LYMPHOCYTES # BLD AUTO: 1.85 10*3/MM3 (ref 0.7–3.1)
LYMPHOCYTES NFR BLD AUTO: 13.7 % (ref 19.6–45.3)
MCH RBC QN AUTO: 29.7 PG (ref 26.6–33)
MCHC RBC AUTO-ENTMCNC: 35.1 G/DL (ref 31.5–35.7)
MCV RBC AUTO: 84.6 FL (ref 79–97)
MONOCYTES # BLD AUTO: 0.65 10*3/MM3 (ref 0.1–0.9)
MONOCYTES NFR BLD AUTO: 4.8 % (ref 5–12)
NEUTROPHILS NFR BLD AUTO: 10.88 10*3/MM3 (ref 1.7–7)
NEUTROPHILS NFR BLD AUTO: 80.7 % (ref 42.7–76)
NRBC BLD AUTO-RTO: 0 /100 WBC (ref 0–0.2)
PLATELET # BLD AUTO: 380 10*3/MM3 (ref 140–450)
PMV BLD AUTO: 9.6 FL (ref 6–12)
POTASSIUM SERPL-SCNC: 4.1 MMOL/L (ref 3.5–5.2)
PROT SERPL-MCNC: 5.7 G/DL (ref 6–8.5)
RBC # BLD AUTO: 4.48 10*6/MM3 (ref 3.77–5.28)
SODIUM SERPL-SCNC: 140 MMOL/L (ref 136–145)
WBC # BLD AUTO: 13.48 10*3/MM3 (ref 3.4–10.8)

## 2021-02-01 PROCEDURE — 97166 OT EVAL MOD COMPLEX 45 MIN: CPT

## 2021-02-01 PROCEDURE — 94799 UNLISTED PULMONARY SVC/PX: CPT

## 2021-02-01 PROCEDURE — 25010000002 ENOXAPARIN PER 10 MG: Performed by: INTERNAL MEDICINE

## 2021-02-01 PROCEDURE — 85025 COMPLETE CBC W/AUTO DIFF WBC: CPT | Performed by: INTERNAL MEDICINE

## 2021-02-01 PROCEDURE — 94760 N-INVAS EAR/PLS OXIMETRY 1: CPT

## 2021-02-01 PROCEDURE — 25010000002 DEXAMETHASONE PER 1 MG: Performed by: INTERNAL MEDICINE

## 2021-02-01 PROCEDURE — 97162 PT EVAL MOD COMPLEX 30 MIN: CPT

## 2021-02-01 PROCEDURE — 80076 HEPATIC FUNCTION PANEL: CPT | Performed by: INTERNAL MEDICINE

## 2021-02-01 PROCEDURE — 36415 COLL VENOUS BLD VENIPUNCTURE: CPT | Performed by: INTERNAL MEDICINE

## 2021-02-01 PROCEDURE — 80048 BASIC METABOLIC PNL TOTAL CA: CPT | Performed by: INTERNAL MEDICINE

## 2021-02-01 RX ORDER — ALUMINA, MAGNESIA, AND SIMETHICONE 2400; 2400; 240 MG/30ML; MG/30ML; MG/30ML
15 SUSPENSION ORAL EVERY 6 HOURS PRN
Status: DISCONTINUED | OUTPATIENT
Start: 2021-02-01 | End: 2021-02-04 | Stop reason: HOSPADM

## 2021-02-01 RX ADMIN — HYDROCODONE POLISTIREX AND CHLORPHENIRAMINE POLISTIREX 5 ML: 10; 8 SUSPENSION, EXTENDED RELEASE ORAL at 22:34

## 2021-02-01 RX ADMIN — SODIUM CHLORIDE, PRESERVATIVE FREE 10 ML: 5 INJECTION INTRAVENOUS at 20:43

## 2021-02-01 RX ADMIN — ENOXAPARIN SODIUM 40 MG: 40 INJECTION SUBCUTANEOUS at 08:20

## 2021-02-01 RX ADMIN — ALBUTEROL SULFATE 2 PUFF: 90 AEROSOL, METERED RESPIRATORY (INHALATION) at 07:44

## 2021-02-01 RX ADMIN — DEXAMETHASONE SODIUM PHOSPHATE 6 MG: 4 INJECTION, SOLUTION INTRAMUSCULAR; INTRAVENOUS at 20:41

## 2021-02-01 RX ADMIN — TEMAZEPAM 30 MG: 15 CAPSULE ORAL at 21:03

## 2021-02-01 RX ADMIN — ALUMINUM HYDROXIDE, MAGNESIUM HYDROXIDE, AND DIMETHICONE 15 ML: 400; 400; 40 SUSPENSION ORAL at 21:39

## 2021-02-01 RX ADMIN — ENOXAPARIN SODIUM 40 MG: 40 INJECTION SUBCUTANEOUS at 20:41

## 2021-02-01 RX ADMIN — DEXAMETHASONE SODIUM PHOSPHATE 6 MG: 4 INJECTION, SOLUTION INTRAMUSCULAR; INTRAVENOUS at 08:19

## 2021-02-01 RX ADMIN — ALBUTEROL SULFATE 2 PUFF: 90 AEROSOL, METERED RESPIRATORY (INHALATION) at 14:31

## 2021-02-01 RX ADMIN — CETIRIZINE HYDROCHLORIDE 10 MG: 10 TABLET, FILM COATED ORAL at 08:20

## 2021-02-01 RX ADMIN — HYDROCODONE POLISTIREX AND CHLORPHENIRAMINE POLISTIREX 5 ML: 10; 8 SUSPENSION, EXTENDED RELEASE ORAL at 01:54

## 2021-02-01 RX ADMIN — SODIUM CHLORIDE, PRESERVATIVE FREE 10 ML: 5 INJECTION INTRAVENOUS at 08:19

## 2021-02-01 RX ADMIN — REMDESIVIR 100 MG: 100 INJECTION, POWDER, LYOPHILIZED, FOR SOLUTION INTRAVENOUS at 21:03

## 2021-02-01 RX ADMIN — TRAZODONE HYDROCHLORIDE 50 MG: 50 TABLET ORAL at 20:42

## 2021-02-01 RX ADMIN — SODIUM CHLORIDE, PRESERVATIVE FREE 10 ML: 5 INJECTION INTRAVENOUS at 20:46

## 2021-02-01 RX ADMIN — ALBUTEROL SULFATE 2 PUFF: 90 AEROSOL, METERED RESPIRATORY (INHALATION) at 19:13

## 2021-02-01 RX ADMIN — ALBUTEROL SULFATE 2 PUFF: 90 AEROSOL, METERED RESPIRATORY (INHALATION) at 11:44

## 2021-02-01 RX ADMIN — MONTELUKAST 10 MG: 10 TABLET, FILM COATED ORAL at 20:41

## 2021-02-01 NOTE — THERAPY EVALUATION
Patient Name: Lexie Cardona  : 1950    MRN: [id number]                              Today's Date: 2021       Admit Date: 2021    Visit Dx:     ICD-10-CM ICD-9-CM   1. Acute respiratory failure due to COVID-19 (CMS/HCC)  U07.1 518.81    J96.00 079.89   2. Diarrhea, unspecified type  R19.7 787.91   3. Impaired functional mobility, balance, gait, and endurance  Z74.09 V49.89   4. Impaired mobility and ADLs  Z74.09 V49.89    Z78.9      Patient Active Problem List   Diagnosis   • Vitamin D deficiency   • Restless legs   • Myoclonic disorder   • Meniere's disease   • Insomnia   • IBS (irritable bowel syndrome)   • Hypercholesterolemia   • History of colon polyps   • Gout   • Gastroesophageal reflux disease   • Gastric polyp   • Fundic gland polyposis of stomach   • Elevated levels of transaminase & lactic acid dehydrogenase   • Chronic kidney disease (CKD), stage II (mild)   • Allergic rhinitis   • Primary osteoarthritis of both hands   • Primary open angle glaucoma of both eyes, mild stage   • Cataract   • Carpal tunnel syndrome of right wrist   • Nasal obstruction   • Nasal turbinate hypertrophy   • Nasal septal deformity   • Sinusitis, chronic   • Nasal valve collapse   • Minnie bullosa   • Acute respiratory failure due to COVID-19 (CMS/HCC)   • Acute respiratory failure with hypoxia (CMS/HCC)   • Pneumonia due to COVID-19 virus     Past Medical History:   Diagnosis Date   • Allergic rhinitis    • Blastomycosis    • Chronic bronchitis (CMS/HCC)    • Diverticulitis of colon    • Gastritis    • Gastroesophageal reflux disease    • Gastroparesis    • Gout    • Hypercholesterolemia    • IBS (irritable bowel syndrome)    • Meniere's disease    • Myoclonic disorder    • Osteoarthritis    • Skin cancer    • Sleep apnea      Past Surgical History:   Procedure Laterality Date   • BRONCHOSCOPY     • CARPAL TUNNEL RELEASE WITH CUBITAL TUNNEL RELEASE     • COLONOSCOPY W/ POLYPECTOMY     • CYSTOSCOPY     •  ENDOSCOPIC FUNCTIONAL SINUS SURGERY (FESS) Right 3/6/2018   • LAPAROSCOPIC CHOLECYSTECTOMY     • OVARIAN CYST REMOVAL     • TOTAL ABDOMINAL HYSTERECTOMY WITH SALPINGO OOPHORECTOMY       General Information     [facility] Name 02/01/21 1255          OT Time and Intention    Document Type  evaluation  -     Mode of Treatment  individual therapy;occupational therapy  -[facility] Name 02/01/21 1255          General Information    Patient Profile Reviewed  yes  -     Prior Level of Function  independent:;all household mobility;community mobility;transfer;bed mobility;ADL's;home management;driving;shopping;using stairs  -     Existing Precautions/Restrictions  fall;oxygen therapy device and L/min  -     Row Name 02/01/21 1255          Living Environment    Lives With  spouse  -[facility] Name 02/01/21 1255          Home Main Entrance    Number of Stairs, Main Entrance  none  -[facility] Name 02/01/21 1255          Stairs Within Home, Primary    Stairs, Within Home, Primary  three steps from entery to hallway  -     Number of Stairs, Within Home, Primary  three  -     Stairs Comment, Within Home, Primary  tub shower combo has a shower chair does not use; grab bars in shower raised toilet; adjustable bed, reports has a walker at home unsure what kind and does not use ; no O2 at home ; reports  is retired  -[facility] Name 02/01/21 1255          Cognition    Orientation Status (Cognition)  oriented x 4;verbal cues/prompts needed for orientation  -[facility] Name 02/01/21 1255          Safety Issues, Functional Mobility    Safety Issues Affecting Function (Mobility)  awareness of need for assistance;impulsivity;insight into deficits/self-awareness;judgment;safety precaution awareness;safety precautions follow-through/compliance  [facility]     Impairments Affecting Function (Mobility)  balance;coordination;endurance/activity tolerance;shortness of breath;strength  -       User Key  (r) = Recorded By, (t) = Taken By,  (c) = Cosigned By    Initials Name Provider Type     Breanne Melendez, OTR/L Occupational Therapist          Mobility/ADL's     Row Name 02/01/21 1255          Bed Mobility    Bed Mobility  supine-sit  -     Supine-Sit [place] (Bed Mobility)  modified independence;verbal cues  -     Sit-Supine [place] (Bed Mobility)  modified independence  -     Assistive Device (Bed Mobility)  bed rails;head of bed elevated  -     Row Name 02/01/21 1255          Transfers    Transfers  sit-stand transfer;toilet transfer  -     Sit-Stand [place] (Transfers)  standby assist;verbal cues  -     [place] Level (Toilet Transfer)  standby assist;contact guard;nonverbal cues (demo/gesture);verbal cues  -[facility] Name 02/01/21 1255          Toilet Transfer    Type (Toilet Transfer)  stand-sit;sit-stand  -[facility] Name 02/01/21 1255          Functional Mobility    Functional Mobility- Ind. Level  contact guard assist;verbal cues required;nonverbal cues required (demo/gesture)  -     Functional Mobility- Device  other (see comments) none  -     Functional Mobility- Safety Issues  supplemental O2  -     Functional Mobility- Comment  sways off balance, cues for safety with O2 line; O2 drops with act  -[facility] Name 02/01/21 1255          Activities of Daily Living    BADL Assessment/Intervention  lower body dressing;grooming;toileting;bathing  -[facility] Name 02/01/21 1255          Lower Body Dressing Assessment/Training    [place] Level (Lower Body Dressing)  doff;don;shoes/slippers;set up;standby assist;verbal cues  [facility]     Position (Lower Body Dressing)  edge of bed sitting  -[facility] Name 02/01/21 1255          Grooming Assessment/Training    Comment (Grooming)  pt SBA to CGA to stand at the sink and wash hands  -[facility] Name 02/01/21 1255          Toileting Assessment/Training    [place] Level (Toileting)  toileting skills;standby assist;contact guard assist;verbal cues;nonverbal  cues (demo/gesture)  -[facility] Name 02/01/21 1255          Bathing Assessment/Intervention    Comment (Bathing)  pt educated on benefit of shower chair and sitting for LB dressing/bathing; educated on benefit of BUE ex, and increasing endurance  -       User Key  (r) = Recorded By, (t) = Taken By, (c) = Cosigned By    Initials Name Provider Type     Breanne Melendez, OTR/L Occupational Therapist        Obj/Interventions     [facility] Name 02/01/21 1255          Sensory Interventions    Comment, Sensory Intervention  BUE light touch WFL ; reports difficulty with right arms since prior surgery  -[facility] Name 02/01/21 1255          Vision Assessment/Intervention    Visual Impairment/Limitations  corrective lenses full-time reports some hearing loss  -[facility] Name 02/01/21 1255          Range of Motion Comprehensive    General Range of Motion  bilateral upper extremity ROM WFL  -[facility] Name 02/01/21 1255          Strength Comprehensive (MMT)    Comment, General Manual Muscle Testing (MMT) Assessment  BUE  grossly 4/5; RUE grossly 4-/5; LUE grossly 4/5  -[facility] Name 02/01/21 1255          Balance    Balance Assessment  sitting static balance;sitting dynamic balance;standing static balance;standing dynamic balance  -     Static Sitting Balance  WFL  -     Dynamic Sitting Balance  mild impairment  -     Static Standing Balance  mild impairment  -     Dynamic Standing Balance  mild impairment  -       User Key  (r) = Recorded By, (t) = Taken By, (c) = Cosigned By    Initials Name Provider Type     Breanne Melendez, OTR/L Occupational Therapist        Goals/Plan     [facility] Name 02/01/21 1255          Transfer Goal 1 (OT)    Activity/Assistive Device (Transfer Goal 1, OT)  toilet  -     [place] Level/Cues Needed (Transfer Goal 1, OT)  modified independence  -     Time Frame (Transfer Goal 1, OT)  long term goal (LTG);by discharge  -     Progress/Outcome (Transfer Goal 1, OT)  goal not met   -BH     Row Name 02/01/21 1254          Bathing Goal 1 (OT)    Activity/Device (Bathing Goal 1, OT)  bathing skills, all  -     [place] Level/Cues Needed (Bathing Goal 1, OT)  set-up required;supervision required;verbal cues required  -     Time Frame (Bathing Goal 1, OT)  long term goal (LTG);by discharge  -     Progress/Outcomes (Bathing Goal 1, OT)  goal not met  -BH     Row Name 02/01/21 1255          ROM Goal 1 (OT)    ROM Goal 1 (OT)  Pt will engage in 30-40 min functional task with O2 90 or up.  -     Time Frame (ROM Goal 1, OT)  long term goal (LTG);by discharge  -     Progress/Outcome (ROM Goal 1, OT)  goal not met  -BH     Row Name 02/01/21 1255          Strength Goal 1 (OT)    Strength Goal 1 (OT)  Pt will be independent with BUE HEP.  -     Time Frame (Strength Goal 1, OT)  long term goal (LTG);by discharge  -     Progress/Outcome (Strength Goal 1, OT)  goal not met  -BH     Row Name 02/01/21 0183          Therapy Assessment/Plan (OT)    Planned Therapy Interventions (OT)  activity tolerance training;adaptive equipment training;BADL retraining;cognitive/visual perception retraining;functional balance retraining;IADL retraining;neuromuscular control/coordination retraining;occupation/activity based interventions;passive ROM/stretching;patient/caregiver education/training;strengthening exercise;transfer/mobility retraining;ROM/therapeutic exercise  -       User Key  (r) = Recorded By, (t) = Taken By, (c) = Cosigned By    Initials Name Provider Type     Breanne Melendez, OTR/L Occupational Therapist        Clinical Impression     Row Name 02/01/21 0439          Pain Assessment    Additional Documentation  Pain Scale: Numbers Pre/Post-Treatment (Group)  -BH     Row Name 02/01/21 6363          Pain Scale: Numbers Pre/Post-Treatment    Pretreatment Pain Rating  0/10 - no pain  -     Posttreatment Pain Rating  0/10 - no pain  -     Pain Intervention(s)  Ambulation/increased  activity;Distraction;Rest;Repositioned  -     Row Name 02/01/21 1255          Plan of Care Review    Plan of Care Reviewed With  patient  -     Row Name 02/01/21 1255          Therapy Assessment/Plan (OT)    Patient/Family Therapy Goal Statement (OT)  to go home  [facility]     Rehab Potential (OT)  fair, will monitor progress closely  -     Criteria for Skilled Therapeutic Interventions Met (OT)  yes;meets criteria;skilled treatment is necessary  [facility]     Therapy Frequency (OT)  other (see comments) 5-7 days a week  -     Predicted Duration of Therapy Intervention (OT)  until d/c  -     Row Name 02/01/21 1255          Therapy Plan Review/Discharge Plan (OT)    Anticipated Discharge Disposition (OT)  home with 24/7 care;home with home health  -     Row Name 02/01/21 1258          Vital Signs    Pretreatment Heart Rate (beats/min)  73  -BH     Intratreatment Heart Rate (beats/min)  68  -     Posttreatment Heart Rate (beats/min)  65  -BH     Pre SpO2 (%)  91  -     O2 Delivery Pre Treatment  nasal cannula 5L  -     Intra SpO2 (%)  81  -     O2 Delivery Intra Treatment  nasal cannula  -     Post SpO2 (%)  90  -BH     Pre Patient Position  Supine  -     Intra Patient Position  Sitting after mobility  -     Post Patient Position  Supine  -     Recovery Time  takes 1-3 mintues to get back to 90 with resting and deep breathing  -     Row Name 02/01/21 1255          Positioning and Restraints    Pre-Treatment Position  in bed  -     Post Treatment Position  bed  -     In Bed  supine;call light within reach;encouraged to call for assist;exit alarm on;side rails up x2  -       User Key  (r) = Recorded By, (t) = Taken By, (c) = Cosigned By    Initials Name Provider Type     Breanne Melendez, OTR/L Occupational Therapist        Outcome Measures     Row Name 02/01/21 1255          How much help from another is currently needed...    Putting on and taking off regular lower body clothing?  3  -BH      Bathing (including washing, rinsing, and drying)  3  -     Toileting (which includes using toilet bed pan or urinal)  3  -     Putting on and taking off regular upper body clothing  3  -     Taking care of personal grooming (such as brushing teeth)  4  -     Eating meals  4  -     AM-PAC 6 Clicks Score (OT)  20  -     Row Name 02/01/21 1255          Functional Assessment    Outcome Measure Options  AM-PAC 6 Clicks Daily Activity (OT)  -       User Key  (r) = Recorded By, (t) = Taken By, (c) = Cosigned By    Initials Name Provider Type     Breanne Melendez, OTR/L Occupational Therapist        Occupational Therapy Education                 Title: PT OT SLP Therapies (In Progress)     Topic: Occupational Therapy (In Progress)     Point: ADL training (In Progress)     Description:   Instruct learner(s) on proper safety adaptation and remediation techniques during self care or transfers.   Instruct in proper use of assistive devices.              Learning Progress Summary           Patient Acceptance, E, NR by  at 2/1/2021 1346    Comment: Educated about OT and POC. Educated on safety throughout and deep breathing.                   Point: Home exercise program (Not Started)     Description:   Instruct learner(s) on appropriate technique for monitoring, assisting and/or progressing therapeutic exercises/activities.              Learner Progress:  Not documented in this visit.          Point: Precautions (In Progress)     Description:   Instruct learner(s) on prescribed precautions during self-care and functional transfers.              Learning Progress Summary           Patient Acceptance, E, NR by  at 2/1/2021 1346    Comment: Educated about OT and POC. Educated on safety throughout and deep breathing.                   Point: Body mechanics (Not Started)     Description:   Instruct learner(s) on proper positioning and spine alignment during self-care, functional mobility activities and/or exercises.               Learner Progress:  Not documented in this visit.                      User Key     Initials Effective Dates Name Provider Type [facility] 06/08/18 -  Breanne Melendez, OTR/L Occupational Therapist OT              OT Recommendation and Plan  Planned Therapy Interventions (OT): activity tolerance training, adaptive equipment training, BADL retraining, cognitive/visual perception retraining, functional balance retraining, IADL retraining, neuromuscular control/coordination retraining, occupation/activity based interventions, passive ROM/stretching, patient/caregiver education/training, strengthening exercise, transfer/mobility retraining, ROM/therapeutic exercise  Therapy Frequency (OT): other (see comments)(5-7 days a week)  Plan of Care Review  Plan of Care Reviewed With: patient  Outcome Summary: OT kami completed this date. Pt engaged with tasks reports she has been getting up on her own to go to the bathroom. However, pt's O2 levles on the 5L drops as low as 81 with mobility and ADL tasks and task 1-3 minutes of resting and deep breathing to rise to 90. Pt mod I with bed mobility. Pt SBA with sit to stand of bed and toilet and CGA with mobility with off balance noted and decreased safety with O2 line. Pt SBA to don and doff shoes on EOB. Pt may benefit from skilled OT to reach PLOF/max independence with ADL including BUE strength and endurance for functional tasks. Recommend 24/7 care and further OT and PT at d/c.     Time Calculation:   Time Calculation- OT     Row Name 02/01/21 6735             Time Calculation-     OT Start Time  1255  -      OT Stop Time  1350  -      OT Time Calculation (min)  55 min  -      OT Received On  02/01/21  -      OT Goal Re-Cert Due Date  02/14/21  -        User Key  (r) = Recorded By, (t) = Taken By, (c) = Cosigned By    Initials Name Provider Type     Breanne Melendez, OTR/L Occupational Therapist        Therapy Charges for Today     Code  Description Service Date Service Provider Modifiers Qty    [id number] HC OT EVAL MOD COMPLEXITY 4 2/1/2021 Breanne Melendez, OTR/L GO 1               AMARA Chilel/ELSA  2/1/2021

## 2021-02-01 NOTE — PLAN OF CARE
Goal Outcome Evaluation:  Plan of Care Reviewed With: patient     Outcome Summary: PT eval completed on this date. Patient reports that she has been up and down to bathroom all morning but fatigues when she has stands to long. Bed mobility: Nathan for supine<>sit  and scooting in bed with use of bed rails and HOB up. Patient has hosptial type bed at home (no bed rails) Transfer: SBA for sit<>Stand gait: CGA for ambulation 15'x1 with no AD. Patient has a sway back posture in standing and during ambulation. SPO2% dropped to 85% post ambulation with patient reporting increased work of breathing. Patient recovered in sitting to 91% in 1 minutes. Balance: 22/28 on Tinetti balance and gait assessment which indicates moderate fall risk. Patient would benefit from skilled PT to improve activity tolerance and return to PLOF. Anticipate home with assist and HHPT.

## 2021-02-01 NOTE — PROGRESS NOTES
[facility] Medicine Services  INPATIENT PROGRESS NOTE    Length of Stay: 2  Date of Admission: 1/29/2021  Primary Care Physician: Matthias Gomez DO    Subjective   Chief Complaint: Shortness of air.    HPI: Patient is seen for follow-up.  She is doing better, less short of air, less deconditioned and her O2 requirement is down to 5 L nasal prongs with saturation in the low 90s.    Review of Systems   Constitutional: Positive for activity change and fatigue. Negative for appetite change, chills, diaphoresis and fever.   HENT: Negative for trouble swallowing and voice change.    Eyes: Negative for photophobia and visual disturbance.   Respiratory: Positive for shortness of breath. Negative for cough, choking, chest tightness, wheezing and stridor.    Cardiovascular: Negative for chest pain, palpitations and leg swelling.   Gastrointestinal: Negative for abdominal distention, abdominal pain, blood in stool, constipation, diarrhea, nausea and vomiting.   Endocrine: Negative for cold intolerance, heat intolerance, polydipsia, polyphagia and polyuria.   Genitourinary: Negative for decreased urine volume, difficulty urinating, dysuria, enuresis, flank pain, frequency, hematuria and urgency.   Musculoskeletal: Negative for arthralgias, gait problem, myalgias, neck pain and neck stiffness.   Skin: Negative for pallor, rash and wound.   Neurological: Negative for dizziness, tremors, seizures, syncope, facial asymmetry, speech difficulty, weakness, light-headedness, numbness and headaches.   Hematological: Does not bruise/bleed easily.   Psychiatric/Behavioral: Negative for agitation, behavioral problems and confusion.       Objective    Temp:  [96.3 °F (35.7 °C)-97.6 °F (36.4 °C)] 97.6 °F (36.4 °C)  Heart Rate:  [50-78] 56  Resp:  [16-18] 18  BP: (102-116)/(52-59) 109/58    Physical Exam  Vitals signs and nursing note reviewed.   Constitutional:       General: She is not in acute  distress.     Appearance: She is well-developed. She is not diaphoretic.   HENT:      Head: Normocephalic and atraumatic.      Right Ear: External ear normal.      Left Ear: External ear normal.      Nose: Nose normal.   Eyes:      Extraocular Movements: Extraocular movements intact.      Conjunctiva/sclera: Conjunctivae normal.      Pupils: Pupils are equal, round, and reactive to light.   Neck:      Musculoskeletal: Normal range of motion and neck supple.      Thyroid: No thyromegaly.      Vascular: No JVD.   Cardiovascular:      Rate and Rhythm: Normal rate and regular rhythm.      Heart sounds: Normal heart sounds. No murmur. No friction rub. No gallop.    Pulmonary:      Effort: Pulmonary effort is normal. No respiratory distress.      Breath sounds: No stridor. Decreased breath sounds and rhonchi present. No wheezing or rales.   Chest:      Chest wall: No tenderness.   Abdominal:      General: Bowel sounds are normal. There is no distension.      Palpations: Abdomen is soft. There is no mass.      Tenderness: There is no abdominal tenderness. There is no guarding or rebound.      Hernia: No hernia is present.   Musculoskeletal: Normal range of motion.         General: No swelling, tenderness or deformity.      Right lower leg: No edema.      Left lower leg: No edema.   Skin:     General: Skin is warm and dry.      Coloration: Skin is not jaundiced or pale.      Findings: No bruising, erythema, lesion or rash.   Neurological:      General: No focal deficit present.      Mental Status: She is alert and oriented to person, place, and time.      Cranial Nerves: No cranial nerve deficit.      Sensory: No sensory deficit.      Motor: No weakness.      Coordination: Coordination normal.      Gait: Gait normal.      Deep Tendon Reflexes: Reflexes are normal and symmetric. Reflexes normal.   Psychiatric:         Mood and Affect: Mood normal.         Behavior: Behavior normal. Behavior is cooperative.         Thought  Content: Thought content normal.         Judgment: Judgment normal.           Medication Review:    Current Facility-Administered Medications:   •  albuterol sulfate HFA (PROVENTIL HFA;VENTOLIN HFA;PROAIR HFA) inhaler 2 puff, 2 puff, Inhalation, 4x Daily - RT, Olman Harding MD, 2 puff at 02/01/21 0744  •  cetirizine (zyrTEC) tablet 10 mg, 10 mg, Oral, Daily, Olman Harding MD, 10 mg at 02/01/21 0820  •  dexamethasone (DECADRON) injection 6 mg, 6 mg, Intravenous, Q12H, Olman Harding MD, 6 mg at 02/01/21 0819  •  enoxaparin (LOVENOX) syringe 40 mg, 40 mg, Subcutaneous, Q12H, Olman Harding MD, 40 mg at 02/01/21 0820  •  HYDROcod Polst-CPM Polst ER (TUSSIONEX PENNKINETIC) 10-8 MG/5ML ER suspension 5 mL, 5 mL, Oral, Q12H PRN, Olman Harding MD, 5 mL at 02/01/21 0154  •  montelukast (SINGULAIR) tablet 10 mg, 10 mg, Oral, Nightly, Olman Harding MD, 10 mg at 01/31/21 2054  •  Pharmacy Consult - Remdesivir, , Does not apply, Continuous PRN, Olman Harding MD  •  [COMPLETED] remdesivir 200 mg in sodium chloride 0.9 % 250 mL IVPB (powder vial), 200 mg, Intravenous, Q24H, 200 mg at 01/29/21 2134 **FOLLOWED BY** remdesivir 100 mg in sodium chloride 0.9 % 250 mL IVPB (powder vial), 100 mg, Intravenous, Q24H, Olman Harding MD, 100 mg at 01/31/21 2053  •  [COMPLETED] Insert peripheral IV, , , Once **AND** sodium chloride 0.9 % flush 10 mL, 10 mL, Intravenous, PRN, Olman Harding MD  •  sodium chloride 0.9 % flush 10 mL, 10 mL, Intravenous, Q12H, Olman Harding MD, 10 mL at 02/01/21 0819  •  sodium chloride 0.9 % flush 10 mL, 10 mL, Intravenous, PRN, Olman Harding MD  •  temazepam (RESTORIL) capsule 30 mg, 30 mg, Oral, Nightly PRN, Olman Harding MD, 30 mg at 01/31/21 2054  •  traZODone (DESYREL) tablet 50 mg, 50 mg, Oral, Q PM, Olman Harding MD, 50 mg at 01/31/21 2054    Results Review:  I have reviewed the labs, radiology results, and diagnostic studies.    Laboratory Data:   Results from last 7 days   Lab  Units 02/01/21  0645 01/31/21  0617 01/30/21  0647 01/29/21  1654   SODIUM mmol/L 140  --  140 138   POTASSIUM mmol/L 4.1  --  4.2 3.4*   CHLORIDE mmol/L 108*  --  109* 98   CO2 mmol/L 23.0  --  21.0* 26.0   BUN mg/dL 15  --  12 14   CREATININE mg/dL 0.69 0.79 0.70 1.07*   GLUCOSE mg/dL 129*  --  115* 104*   CALCIUM mg/dL 8.6  --  8.5* 9.7   BILIRUBIN mg/dL 0.4 0.3 0.3 0.7   ALK PHOS U/L 51 57 52 65   ALT (SGPT) U/L 18 19 15 19   AST (SGOT) U/L 20 31 26 33*   ANION GAP mmol/L 9.0  --  10.0 14.0     Estimated Creatinine Clearance: 70.8 mL/min (by C-G formula based on SCr of 0.69 mg/dL).          Results from last 7 days   Lab Units 02/01/21  0645 01/30/21  0647 01/29/21  1653   WBC 10*3/mm3 13.48* 4.75 8.60   HEMOGLOBIN g/dL 13.3 13.7 16.3*   HEMATOCRIT % 37.9 40.7 46.8*   PLATELETS 10*3/mm3 380 297 317           Culture Data:   Blood Culture   Date Value Ref Range Status   01/29/2021 No growth at 2 days  Preliminary   01/29/2021 No growth at 2 days  Preliminary     No results found for: URINECX  No results found for: RESPCX  No results found for: WOUNDCX  No results found for: STOOLCX  No components found for: BODYFLD    Radiology Data:   Imaging Results (Last 24 Hours)     ** No results found for the last 24 hours. **          I have reviewed the patient's current medications.     Assessment/Plan     Hospital Problem List:  Principal Problem:    Acute respiratory failure with hypoxia (CMS/HCC)  Active Problems:    Pneumonia due to COVID-19 virus    Acute respiratory failure with hypoxia secondary to COVID-19 viral pneumonia: Continue noninvasive respiratory therapy, Decadron, inhalers and remdesivir.  Blood cultures have shown no growth.  Leukocytosis is reactive and will be monitored.    Continue GI and DVT prophylaxis.    The patient was evaluated during the global COVID-19 pandemic, and the diagnosis was suspected/considered upon their initial presentation.  Evaluation, treatment, and testing were consistent  with current guidelines for patients who present with complaints or symptoms that may be related to COVID-19.      Discharge Planning: In progress.    Leonidas Alejandre MD   02/01/21   10:21 CST      \

## 2021-02-01 NOTE — PLAN OF CARE
Goal Outcome Evaluation:  Plan of Care Reviewed With: patient     Outcome Summary: OT kami completed this date. Pt engaged with tasks reports she has been getting up on her own to go to the bathroom. However, pt's O2 levles on the 5L drops as low as 81 with mobility and ADL tasks and task 1-3 minutes of resting and deep breathing to rise to 90. Pt mod I with bed mobility. Pt SBA with sit to stand of bed and toilet and CGA with mobility with off balance noted and decreased safety with O2 line. Pt SBA to don and doff shoes on EOB. Pt may benefit from skilled OT to reach PLOF/max independence with ADL including BUE strength and endurance for functional tasks. Recommend 24/7 care and further OT and PT at d/c.

## 2021-02-02 LAB
ALBUMIN SERPL-MCNC: 2.8 G/DL (ref 3.5–5.2)
ALP SERPL-CCNC: 54 U/L (ref 39–117)
ALT SERPL W P-5'-P-CCNC: 23 U/L (ref 1–33)
AST SERPL-CCNC: 23 U/L (ref 1–32)
BILIRUB CONJ SERPL-MCNC: <0.2 MG/DL (ref 0–0.3)
BILIRUB INDIRECT SERPL-MCNC: ABNORMAL MG/DL
BILIRUB SERPL-MCNC: 0.4 MG/DL (ref 0–1.2)
CREAT SERPL-MCNC: 0.74 MG/DL (ref 0.57–1)
GFR SERPL CREATININE-BSD FRML MDRD: 78 ML/MIN/1.73
PROT SERPL-MCNC: 5.8 G/DL (ref 6–8.5)
WHOLE BLOOD HOLD SPECIMEN: NORMAL

## 2021-02-02 PROCEDURE — 97110 THERAPEUTIC EXERCISES: CPT

## 2021-02-02 PROCEDURE — 97530 THERAPEUTIC ACTIVITIES: CPT

## 2021-02-02 PROCEDURE — 94799 UNLISTED PULMONARY SVC/PX: CPT

## 2021-02-02 PROCEDURE — 97116 GAIT TRAINING THERAPY: CPT

## 2021-02-02 PROCEDURE — 25010000002 ENOXAPARIN PER 10 MG: Performed by: INTERNAL MEDICINE

## 2021-02-02 PROCEDURE — 82565 ASSAY OF CREATININE: CPT | Performed by: INTERNAL MEDICINE

## 2021-02-02 PROCEDURE — 25010000002 DEXAMETHASONE PER 1 MG: Performed by: INTERNAL MEDICINE

## 2021-02-02 PROCEDURE — 80076 HEPATIC FUNCTION PANEL: CPT | Performed by: INTERNAL MEDICINE

## 2021-02-02 PROCEDURE — 94760 N-INVAS EAR/PLS OXIMETRY 1: CPT

## 2021-02-02 RX ORDER — BUDESONIDE AND FORMOTEROL FUMARATE DIHYDRATE 160; 4.5 UG/1; UG/1
2 AEROSOL RESPIRATORY (INHALATION)
Status: DISCONTINUED | OUTPATIENT
Start: 2021-02-02 | End: 2021-02-04 | Stop reason: HOSPADM

## 2021-02-02 RX ORDER — BENZONATATE 100 MG/1
100 CAPSULE ORAL 3 TIMES DAILY PRN
Status: DISCONTINUED | OUTPATIENT
Start: 2021-02-02 | End: 2021-02-04 | Stop reason: HOSPADM

## 2021-02-02 RX ADMIN — ENOXAPARIN SODIUM 40 MG: 40 INJECTION SUBCUTANEOUS at 08:15

## 2021-02-02 RX ADMIN — DEXAMETHASONE SODIUM PHOSPHATE 6 MG: 4 INJECTION, SOLUTION INTRAMUSCULAR; INTRAVENOUS at 08:15

## 2021-02-02 RX ADMIN — ALBUTEROL SULFATE 2 PUFF: 90 AEROSOL, METERED RESPIRATORY (INHALATION) at 19:03

## 2021-02-02 RX ADMIN — DEXAMETHASONE SODIUM PHOSPHATE 6 MG: 4 INJECTION, SOLUTION INTRAMUSCULAR; INTRAVENOUS at 21:04

## 2021-02-02 RX ADMIN — ALUMINUM HYDROXIDE, MAGNESIUM HYDROXIDE, AND DIMETHICONE 15 ML: 400; 400; 40 SUSPENSION ORAL at 21:04

## 2021-02-02 RX ADMIN — SODIUM CHLORIDE, PRESERVATIVE FREE 10 ML: 5 INJECTION INTRAVENOUS at 21:44

## 2021-02-02 RX ADMIN — TRAZODONE HYDROCHLORIDE 50 MG: 50 TABLET ORAL at 21:05

## 2021-02-02 RX ADMIN — SODIUM CHLORIDE, PRESERVATIVE FREE 10 ML: 5 INJECTION INTRAVENOUS at 08:15

## 2021-02-02 RX ADMIN — CETIRIZINE HYDROCHLORIDE 10 MG: 10 TABLET, FILM COATED ORAL at 08:15

## 2021-02-02 RX ADMIN — TEMAZEPAM 30 MG: 15 CAPSULE ORAL at 21:19

## 2021-02-02 RX ADMIN — MONTELUKAST 10 MG: 10 TABLET, FILM COATED ORAL at 21:05

## 2021-02-02 RX ADMIN — ENOXAPARIN SODIUM 40 MG: 40 INJECTION SUBCUTANEOUS at 21:05

## 2021-02-02 RX ADMIN — REMDESIVIR 100 MG: 100 INJECTION, POWDER, LYOPHILIZED, FOR SOLUTION INTRAVENOUS at 21:35

## 2021-02-02 RX ADMIN — HYDROCODONE POLISTIREX AND CHLORPHENIRAMINE POLISTIREX 5 ML: 10; 8 SUSPENSION, EXTENDED RELEASE ORAL at 21:05

## 2021-02-02 RX ADMIN — ALBUTEROL SULFATE 2 PUFF: 90 AEROSOL, METERED RESPIRATORY (INHALATION) at 07:20

## 2021-02-02 RX ADMIN — ALBUTEROL SULFATE 2 PUFF: 90 AEROSOL, METERED RESPIRATORY (INHALATION) at 12:00

## 2021-02-02 NOTE — PROGRESS NOTES
Discharge Planning Assessment  [facility]     Patient Name: Lexie Cardona  MRN: [id number]  Today's Date: 2/2/2021    Admit Date: 1/29/2021    Discharge Needs Assessment     Row Name 02/02/21 1012       Living Environment    Lives With  spouse    Name(s) of Who Lives With Patient  Rodolfo Cardona (spouse)    Current Living Arrangements  home/apartment/condo Information on face sheet confirmed.    Primary Care Provided by  self    Provides Primary Care For  no one    Family Caregiver if Needed  spouse;child(fili), adult    Family Caregiver Names  Rodolfo Cardona (spouse)    Quality of Family Relationships  involved;helpful    Able to Return to Prior Arrangements  yes    Living Arrangement Comments  Patient stated that she has 3 children that reside in [facility]. Patient voiced that she has good support from her family.       Resource/Environmental Concerns    Resource/Environmental Concerns  none    Transportation Concerns  car, none       Transition Planning    Patient/Family Anticipates Transition to  home with family    Patient/Family Anticipated Services at Transition  none    Transportation Anticipated  family or friend will provide       Discharge Needs Assessment    Readmission Within the Last 30 Days  no previous admission in last 30 days    Equipment Currently Used at Home  shower chair    Concerns to be Addressed  denies needs/concerns at this time    Anticipated Changes Related to Illness  none    Equipment Needed After Discharge  oxygen        Discharge Plan     Row Name 02/02/21 1021       Plan    Plan  Home with no services    Provided Post Acute Provider List?  Yes    Post Acute Provider List  DME Supplier Patient requests [facility] for home DME needs.    Delivered To  Patient    Method of Delivery  Telephone    Plan Comments  LACE assessment completed with patient. SW discussed with patient therapy recommendations: 24/7 care and further OT/PT. Patient denies need for home health services.  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 She also states that she has been ambulation in her room without the use of an assistive device and/or assistance from staff. Patient voiced having good family support. Patient anticipates DME: home oxygen may be needed at d/c. SW explained CM role if home DME is ordered for patient at d/c. Patient denies any additional needs and/or concerns. Continue with medical management....Carmina William LSW    Row Name 02/02/21 0930       Plan    Plan Comments  Cont stay. Patient is on 5L o2 currently. She is progressing with therapy. They rec HH at discharge with 24/7 care. Will need follow up...RICH MATA CM        Continued Care and Services - Admitted Since 1/29/2021    Coordination has not been started for this encounter.       Expected Discharge Date and Time     Expected Discharge Date Expected Discharge Time    Feb 5, 2021         Demographic Summary     Row Name 02/02/21 1008       General Information    Admission Type  inpatient    Arrived From  emergency department    Referral Source  high risk screening LACE score 9    Preferred Language  English     Used During This Interaction  no       Contact Information    Contact Information Obtained for          Functional Status     Row Name 02/02/21 1010       Functional Status    Usual Activity Tolerance  good    Functional Status Comments  Patient is independent with ADL's.       Functional Status, IADL    Medications  independent Pharmacy, [facility], and prescription coverage confirmed.    Meal Preparation  independent    Housekeeping  independent    Laundry  independent    Shopping  independent       Mental Status Summary    Recent Changes in Mental Status/Cognitive Functioning  no changes       Employment/    Employment Status  retired Noted on face sheet .        Psychosocial    No documentation.       Abuse/Neglect    No documentation.       Legal    No documentation.       Substance Abuse    No documentation.        Patient Forms    No documentation.           KIRIT Hassan

## 2021-02-02 NOTE — PLAN OF CARE
Goal Outcome Evaluation:      Patient is resting well, vitals are stable tolerating diet, ambulating well.  Still on 5L O2.

## 2021-02-02 NOTE — PLAN OF CARE
Goal Outcome Evaluation:  Plan of Care Reviewed With: patient  Progress: improving  Outcome Summary: Pt supine in bed and agreeable to tehrapy. Pt t/f sup to sit with mod ind. Pt performed B LE ther ex for 20 reps sitting EOB with no c/o. Pt stood with spv and amb 40ft in room without AD with SBA. Pt destted during gait to 86% but recovered with 1 min 30 sec of seated rest with PLB. Pt returned to sup with spv and all needs met. Pt would cont to benefit from therapy upon DC.

## 2021-02-02 NOTE — PROGRESS NOTES
[facility] Medicine Services  INPATIENT PROGRESS NOTE    Length of Stay: 3  Date of Admission: 1/29/2021  Primary Care Physician: Matthias Gomez DO    Subjective   Chief Complaint: Shortness of air.    HPI: Patient is seen for follow-up.  She is doing better, less short of air, less deconditioned and remains on supplemental oxygen of 5 L nasal prongs with saturation in the low 90s.    Review of Systems   Constitutional: Positive for activity change and fatigue. Negative for appetite change, chills, diaphoresis and fever.   HENT: Negative for trouble swallowing and voice change.    Eyes: Negative for photophobia and visual disturbance.   Respiratory: Positive for shortness of breath. Negative for cough, choking, chest tightness, wheezing and stridor.    Cardiovascular: Negative for chest pain, palpitations and leg swelling.   Gastrointestinal: Negative for abdominal distention, abdominal pain, blood in stool, constipation, diarrhea, nausea and vomiting.   Endocrine: Negative for cold intolerance, heat intolerance, polydipsia, polyphagia and polyuria.   Genitourinary: Negative for decreased urine volume, difficulty urinating, dysuria, enuresis, flank pain, frequency, hematuria and urgency.   Musculoskeletal: Negative for arthralgias, gait problem, myalgias, neck pain and neck stiffness.   Skin: Negative for pallor, rash and wound.   Neurological: Negative for dizziness, tremors, seizures, syncope, facial asymmetry, speech difficulty, weakness, light-headedness, numbness and headaches.   Hematological: Does not bruise/bleed easily.   Psychiatric/Behavioral: Negative for agitation, behavioral problems and confusion.       Objective    Temp:  [96.7 °F (35.9 °C)-97.3 °F (36.3 °C)] 97.3 °F (36.3 °C)  Heart Rate:  [54-67] 67  Resp:  [16-19] 18  BP: ()/(55-72) 99/55    Physical Exam  Vitals signs and nursing note reviewed.   Constitutional:       General: She is not in  acute distress.     Appearance: She is well-developed. She is not diaphoretic.   HENT:      Head: Normocephalic and atraumatic.      Right Ear: External ear normal.      Left Ear: External ear normal.      Nose: Nose normal.   Eyes:      Extraocular Movements: Extraocular movements intact.      Conjunctiva/sclera: Conjunctivae normal.      Pupils: Pupils are equal, round, and reactive to light.   Neck:      Musculoskeletal: Normal range of motion and neck supple.      Thyroid: No thyromegaly.      Vascular: No JVD.   Cardiovascular:      Rate and Rhythm: Normal rate and regular rhythm.      Heart sounds: Normal heart sounds. No murmur. No friction rub. No gallop.    Pulmonary:      Effort: Pulmonary effort is normal. No respiratory distress.      Breath sounds: No stridor. Decreased breath sounds and rhonchi present. No wheezing or rales.   Chest:      Chest wall: No tenderness.   Abdominal:      General: Bowel sounds are normal. There is no distension.      Palpations: Abdomen is soft. There is no mass.      Tenderness: There is no abdominal tenderness. There is no guarding or rebound.      Hernia: No hernia is present.   Musculoskeletal: Normal range of motion.         General: No swelling, tenderness or deformity.      Right lower leg: No edema.      Left lower leg: No edema.   Skin:     General: Skin is warm and dry.      Coloration: Skin is not jaundiced or pale.      Findings: No bruising, erythema, lesion or rash.   Neurological:      General: No focal deficit present.      Mental Status: She is alert and oriented to person, place, and time.      Cranial Nerves: No cranial nerve deficit.      Sensory: No sensory deficit.      Motor: No weakness.      Coordination: Coordination normal.      Gait: Gait normal.      Deep Tendon Reflexes: Reflexes are normal and symmetric. Reflexes normal.   Psychiatric:         Mood and Affect: Mood normal.         Behavior: Behavior normal. Behavior is cooperative.          Thought Content: Thought content normal.         Judgment: Judgment normal.           Medication Review:    Current Facility-Administered Medications:   •  albuterol sulfate HFA (PROVENTIL HFA;VENTOLIN HFA;PROAIR HFA) inhaler 2 puff, 2 puff, Inhalation, 4x Daily - RT, Olman Harding MD, 2 puff at 02/02/21 0720  •  aluminum-magnesium hydroxide-simethicone (MAALOX MAX) 400-400-40 MG/5ML suspension 15 mL, 15 mL, Oral, Q6H PRN, Shyam Solorzano MD, 15 mL at 02/01/21 2139  •  benzonatate (TESSALON) capsule 100 mg, 100 mg, Oral, TID PRN, Leonidas Alejandre MD  •  budesonide-formoterol (SYMBICORT) 160-4.5 MCG/ACT inhaler 2 puff, 2 puff, Inhalation, BID - RT, Leonidas Alejandre MD  •  cetirizine (zyrTEC) tablet 10 mg, 10 mg, Oral, Daily, Olman Harding MD, 10 mg at 02/02/21 0815  •  dexamethasone (DECADRON) injection 6 mg, 6 mg, Intravenous, Q12H, Olman Harding MD, 6 mg at 02/02/21 0815  •  enoxaparin (LOVENOX) syringe 40 mg, 40 mg, Subcutaneous, Q12H, Olman Harding MD, 40 mg at 02/02/21 0815  •  HYDROcod Polst-CPM Polst ER (TUSSIONEX PENNKINETIC) 10-8 MG/5ML ER suspension 5 mL, 5 mL, Oral, Q12H PRN, Olman Harding MD, 5 mL at 02/01/21 2234  •  montelukast (SINGULAIR) tablet 10 mg, 10 mg, Oral, Nightly, Olman Harding MD, 10 mg at 02/01/21 2041  •  Pharmacy Consult - Remdesivir, , Does not apply, Continuous PRN, Olman Harding MD  •  [COMPLETED] remdesivir 200 mg in sodium chloride 0.9 % 250 mL IVPB (powder vial), 200 mg, Intravenous, Q24H, 200 mg at 01/29/21 2134 **FOLLOWED BY** remdesivir 100 mg in sodium chloride 0.9 % 250 mL IVPB (powder vial), 100 mg, Intravenous, Q24H, Olman Harding MD, 100 mg at 02/01/21 2103  •  [COMPLETED] Insert peripheral IV, , , Once **AND** sodium chloride 0.9 % flush 10 mL, 10 mL, Intravenous, PRN, Olman Harding MD  •  sodium chloride 0.9 % flush 10 mL, 10 mL, Intravenous, Q12H, Olman Harding MD, 10 mL at 02/02/21 0815  •  sodium chloride 0.9 % flush 10 mL, 10 mL,  Intravenous, PRN, Olman Harding MD  •  temazepam (RESTORIL) capsule 30 mg, 30 mg, Oral, Nightly PRN, Olman Harding MD, 30 mg at 02/01/21 2103  •  traZODone (DESYREL) tablet 50 mg, 50 mg, Oral, Q PM, Olman Harding MD, 50 mg at 02/01/21 2042    Results Review:  I have reviewed the labs, radiology results, and diagnostic studies.    Laboratory Data:   Results from last 7 days   Lab Units 02/02/21  0601 02/01/21  0645 01/31/21  0617 01/30/21  0647 01/29/21  1654   SODIUM mmol/L  --  140  --  140 138   POTASSIUM mmol/L  --  4.1  --  4.2 3.4*   CHLORIDE mmol/L  --  108*  --  109* 98   CO2 mmol/L  --  23.0  --  21.0* 26.0   BUN mg/dL  --  15  --  12 14   CREATININE mg/dL 0.74 0.69 0.79 0.70 1.07*   GLUCOSE mg/dL  --  129*  --  115* 104*   CALCIUM mg/dL  --  8.6  --  8.5* 9.7   BILIRUBIN mg/dL 0.4 0.4 0.3 0.3 0.7   ALK PHOS U/L 54 51 57 52 65   ALT (SGPT) U/L 23 18 19 15 19   AST (SGOT) U/L 23 20 31 26 33*   ANION GAP mmol/L  --  9.0  --  10.0 14.0     Estimated Creatinine Clearance: 70.8 mL/min (by C-G formula based on SCr of 0.74 mg/dL).          Results from last 7 days   Lab Units 02/01/21  0645 01/30/21  0647 01/29/21  1653   WBC 10*3/mm3 13.48* 4.75 8.60   HEMOGLOBIN g/dL 13.3 13.7 16.3*   HEMATOCRIT % 37.9 40.7 46.8*   PLATELETS 10*3/mm3 380 297 317           Culture Data:   No results found for: BLOODCX  No results found for: URINECX  No results found for: RESPCX  No results found for: WOUNDCX  No results found for: STOOLCX  No components found for: BODYFLD    Radiology Data:   Imaging Results (Last 24 Hours)     ** No results found for the last 24 hours. **          I have reviewed the patient's current medications.     Assessment/Plan     Hospital Problem List:  Principal Problem:    Acute respiratory failure with hypoxia (CMS/HCC)  Active Problems:    Pneumonia due to COVID-19 virus    Acute respiratory failure with hypoxia secondary to COVID-19 viral pneumonia: Continue noninvasive respiratory therapy,  Decadron, inhalers and remdesivir.  Blood cultures have shown no growth.  Leukocytosis is reactive and will be monitored.    Continue GI and DVT prophylaxis.    The patient was evaluated during the global COVID-19 pandemic, and the diagnosis was suspected/considered upon their initial presentation.  Evaluation, treatment, and testing were consistent with current guidelines for patients who present with complaints or symptoms that may be related to COVID-19.      Discharge Planning: In progress.    Leonidas Alejandre MD   02/02/21   10:45 CST      \

## 2021-02-02 NOTE — THERAPY TREATMENT NOTE
Acute Care - Physical Therapy Treatment Note  [facility]     Patient Name: Lexie Cardona  : 1950  MRN: [id number]  Today's Date: 2021           PT Assessment (last 12 hours)      PT Evaluation and Treatment     Row Name 21 1037          Physical Therapy Time and Intention    Subjective Information  no complaints  -TW     Document Type  therapy note (daily note)  -TW     Mode of Treatment  physical therapy;individual therapy  -TW     Patient Effort  excellent  -TW     Row Name 21 1037          General Information    Patient Profile Reviewed  yes  -TW     Patient Observations  alert;cooperative;agree to therapy  -TW     General Observations of Patient  Pt supine in bed.  -TW     Existing Precautions/Restrictions  fall;oxygen therapy device and L/min  -TW     Row Name 21 1037          Cognition    Affect/Mental Status (Cognitive)  WFL  -TW     Orientation Status (Cognition)  oriented x 4  -TW     Follows Commands (Cognition)  WFL  -TW     Cognitive Function (Cognitive)  WFL  -TW     Personal Safety Interventions  fall prevention program maintained;nonskid shoes/slippers when out of bed;muscle strengthening facilitated  -TW     Row Name 21 1037          Pain Scale: Numbers Pre/Post-Treatment    Pretreatment Pain Rating  0/10 - no pain  -TW     Posttreatment Pain Rating  0/10 - no pain  -TW     Row Name 21 1037          Bed Mobility    Supine-Sit [place] (Bed Mobility)  modified independence  -TW     Sit-Supine [place] (Bed Mobility)  modified independence  -TW     Assistive Device (Bed Mobility)  head of bed elevated  -TW     Row Name 21 1037          Transfers    Transfers  stand-sit transfer  -TW     Sit-Stand [place] (Transfers)  supervision  -TW     Stand-Sit [place] (Transfers)  supervision  -TW     Row Name 21 1037          Gait/Stairs (Locomotion)    Gait/Stairs Locomotion  gait/ambulation independence  -TW     [place]  Level (Gait)  standby assist  -TW     Assistive Device (Gait)  other (see comments) none  -TW     Distance in Feet (Gait)  44ft  -TW     Comment (Gait/Stairs)  Pt with no LOB but did desat to 86% while amb. Pt was able to recover after 1.5 minute seated rest with PLB.  -     Row Name 02/02/21 1037          Safety Issues, Functional Mobility    Impairments Affecting Function (Mobility)  balance;endurance/activity tolerance;shortness of breath;strength  -TW     Row Name 02/02/21 1037          Motor Skills    Therapeutic Exercise  hip;knee;ankle  -TW     Row Name 02/02/21 1037          Hip (Therapeutic Exercise)    Hip (Therapeutic Exercise)  AROM (active range of motion)  -TW     Hip AROM (Therapeutic Exercise)  bilateral;sitting  -TW     Row Name 02/02/21 1037          Knee (Therapeutic Exercise)    Knee (Therapeutic Exercise)  AROM (active range of motion)  -TW     Knee AROM (Therapeutic Exercise)  bilateral;sitting  -TW     Row Name 02/02/21 1037          Ankle (Therapeutic Exercise)    Ankle (Therapeutic Exercise)  AROM (active range of motion)  -TW     Ankle AROM (Therapeutic Exercise)  bilateral;sitting  -TW     Row Name 02/02/21 1037          Plan of Care Review    Plan of Care Reviewed With  patient  -TW     Progress  improving  -TW     Outcome Summary  Pt supine in bed and agreeable to tehrapy. Pt t/f sup to sit with mod ind. Pt performed B LE ther ex for 20 reps sitting EOB with no c/o. Pt stood with spv and amb 40ft in room without AD with SBA. Pt destted during gait to 86% but recovered with 1 min 30 sec of seated rest with PLB. Pt returned to sup with spv and all needs met. Pt would cont to benefit from therapy upon DC.  -TW     Row Name 02/02/21 1037          Vital Signs    Pretreatment Heart Rate (beats/min)  72  -TW     Intratreatment Heart Rate (beats/min)  78  -TW     Posttreatment Heart Rate (beats/min)  74  -TW     Pre SpO2 (%)  94  -TW     O2 Delivery Pre Treatment  nasal cannula  -TW     Intra  SpO2 (%)  86  -TW     O2 Delivery Intra Treatment  nasal cannula  -TW     Post SpO2 (%)  95  -TW     O2 Delivery Post Treatment  nasal cannula  -TW     Pre Patient Position  Supine  -TW     Intra Patient Position  Standing  -TW     Post Patient Position  Supine  -TW     Row Name 02/02/21 1037          Transfer Goal 1 (PT)    Activity/Assistive Device (Transfer Goal 1, PT)  sit-to-stand/stand-to-sit;bed-to-chair/chair-to-bed  -TW     [place] Level/Cues Needed (Transfer Goal 1, PT)  independent  -TW     Time Frame (Transfer Goal 1, PT)  by discharge  -TW     Progress/Outcome (Transfer Goal 1, PT)  goal not met  -TW     Row Name 02/02/21 1037          Gait Training Goal 1 (PT)    Activity/Assistive Device (Gait Training Goal 1, PT)  gait (walking locomotion);backward stepping  -TW     [place] Level (Gait Training Goal 1, PT)  independent  -TW     Distance (Gait Training Goal 1, PT)  50'x3  -TW     Time Frame (Gait Training Goal 1, PT)  by discharge  -TW     Progress/Outcome (Gait Training Goal 1, PT)  goal not met  -TW     Row Name 02/02/21 1037          Patient Education Goal (PT)    Activity (Patient Education Goal, PT)  Patient will be I with HEP to improve balance, activity tolerance, and improve strength  -TW     [place]/Cues/Accuracy (Memory Goal 2, PT)  demonstrates adequately;independent  -TW     Time Frame (Patient Education Goal, PT)  by discharge  -TW     Row Name 02/02/21 1037          Positioning and Restraints    Pre-Treatment Position  in bed  -TW     Post Treatment Position  bed  -TW     In Bed  supine;call light within reach;encouraged to call for assist;exit alarm on  -TW     Row Name 02/02/21 1037          Therapy Assessment/Plan (PT)    Rehab Potential (PT)  good, to achieve stated therapy goals  -TW     Row Name 02/02/21 1037          Progress Summary (PT)    Progress Toward Functional Goals (PT)  progress toward functional goals is good  -TW       User Key  (r) = Recorded By, (t) =  Taken By, (c) = Cosigned By    Initials Name Provider Type    TW Nilay Stallings PTA Physical Therapy Assistant          PT Recommendation and Plan     Progress Summary (PT)  Progress Toward Functional Goals (PT): progress toward functional goals is good  Plan of Care Reviewed With: patient  Progress: improving  Outcome Summary: Pt supine in bed and agreeable to tehrapy. Pt t/f sup to sit with mod ind. Pt performed B LE ther ex for 20 reps sitting EOB with no c/o. Pt stood with spv and amb 40ft in room without AD with SBA. Pt destted during gait to 86% but recovered with 1 min 30 sec of seated rest with PLB. Pt returned to sup with spv and all needs met. Pt would cont to benefit from therapy upon DC.       Time Calculation:   PT Charges     Row Name 02/02/21 1540             Time Calculation    Start Time  1037  -TW      Stop Time  1105  -TW      Time Calculation (min)  28 min  -TW      PT Received On  02/02/21  -TW      PT Goal Re-Cert Due Date  02/14/21  -TW         Time Calculation- PT    Total Timed Code Minutes- PT  28 minute(s)  -TW        User Key  (r) = Recorded By, (t) = Taken By, (c) = Cosigned By    Initials Name Provider Type    TW Nilay Stallings PTA Physical Therapy Assistant        Therapy Charges for Today     Code Description Service Date Service Provider Modifiers Qty    10622059543 HC GAIT TRAINING EA 15 MIN 2/2/2021 Nilay Stallings PTA GP 1    06032255176 HC PT THER PROC EA 15 MIN 2/2/2021 Nilay Stallings PTA GP 1          PT G-Codes  Outcome Measure Options: AM-PAC 6 Clicks Daily Activity (OT)  AM-PAC 6 Clicks Score (PT): 20  AM-PAC 6 Clicks Score (OT): 22  Tinetti Total Score: 22    Nilay Stallings PTA  2/2/2021

## 2021-02-02 NOTE — THERAPY TREATMENT NOTE
Patient Name: Lexie Cardona  : 1950    MRN: [id number]                              Today's Date: 2021       Admit Date: 2021    Visit Dx:     ICD-10-CM ICD-9-CM   1. Acute respiratory failure due to COVID-19 (CMS/HCC)  U07.1 518.81    J96.00 079.89   2. Diarrhea, unspecified type  R19.7 787.91   3. Impaired functional mobility, balance, gait, and endurance  Z74.09 V49.89   4. Impaired mobility and ADLs  Z74.09 V49.89    Z78.9      Patient Active Problem List   Diagnosis   • Vitamin D deficiency   • Restless legs   • Myoclonic disorder   • Meniere's disease   • Insomnia   • IBS (irritable bowel syndrome)   • Hypercholesterolemia   • History of colon polyps   • Gout   • Gastroesophageal reflux disease   • Gastric polyp   • Fundic gland polyposis of stomach   • Elevated levels of transaminase & lactic acid dehydrogenase   • Chronic kidney disease (CKD), stage II (mild)   • Allergic rhinitis   • Primary osteoarthritis of both hands   • Primary open angle glaucoma of both eyes, mild stage   • Cataract   • Carpal tunnel syndrome of right wrist   • Nasal obstruction   • Nasal turbinate hypertrophy   • Nasal septal deformity   • Sinusitis, chronic   • Nasal valve collapse   • Minnie bullosa   • Acute respiratory failure due to COVID-19 (CMS/HCC)   • Acute respiratory failure with hypoxia (CMS/HCC)   • Pneumonia due to COVID-19 virus     Past Medical History:   Diagnosis Date   • Allergic rhinitis    • Blastomycosis    • Chronic bronchitis (CMS/HCC)    • Diverticulitis of colon    • Gastritis    • Gastroesophageal reflux disease    • Gastroparesis    • Gout    • Hypercholesterolemia    • IBS (irritable bowel syndrome)    • Meniere's disease    • Myoclonic disorder    • Osteoarthritis    • Skin cancer    • Sleep apnea      Past Surgical History:   Procedure Laterality Date   • BRONCHOSCOPY     • CARPAL TUNNEL RELEASE WITH CUBITAL TUNNEL RELEASE     • COLONOSCOPY W/ POLYPECTOMY     • CYSTOSCOPY     •  ENDOSCOPIC FUNCTIONAL SINUS SURGERY (FESS) Right 3/6/2018   • LAPAROSCOPIC CHOLECYSTECTOMY     • OVARIAN CYST REMOVAL     • TOTAL ABDOMINAL HYSTERECTOMY WITH SALPINGO OOPHORECTOMY       General Information     [facility] Name 02/02/21 1506          OT Time and Intention    Document Type  therapy note (daily note)  -     Mode of Treatment  individual therapy;occupational therapy  -[facility] Name 02/02/21 1506          General Information    Patient Profile Reviewed  yes  -     Existing Precautions/Restrictions  fall;oxygen therapy device and L/min  -[facility] Name 02/02/21 1506          Cognition    Orientation Status (Cognition)  oriented x 4  -[facility] Name 02/02/21 1506          Safety Issues, Functional Mobility    Impairments Affecting Function (Mobility)  balance;endurance/activity tolerance;shortness of breath;strength  -       User Key  (r) = Recorded By, (t) = Taken By, (c) = Cosigned By    Initials Name Provider Type     Wilfred Madison OT Occupational Therapist          Mobility/ADL's     Row Name 02/02/21 1506          Bed Mobility    Supine-Sit [place] (Bed Mobility)  modified independence;verbal cues  -     Sit-Supine [place] (Bed Mobility)  modified independence  -[facility] Name 02/02/21 1506          Transfers    Transfers  sit-stand transfer;toilet transfer  -     Bed-Chair [place] (Transfers)  independent  -     Sit-Stand [place] (Transfers)  independent  -     [place] Level (Toilet Transfer)  independent  -[facility] Name 02/02/21 1506          Toilet Transfer    Type (Toilet Transfer)  stand-sit;sit-stand  -[facility] Name 02/02/21 1506          Functional Mobility    Functional Mobility- Ind. Level  supervision required  -     Functional Mobility-Distance (Feet)  60  -     Functional Mobility- Safety Issues  supplemental O2  -     Row Name 02/02/21 1506          Activities of Daily Living    BADL Assessment/Intervention  toileting;grooming  -[facility]  Name 02/02/21 1506          Grooming Assessment/Training    [place] Level (Grooming)  grooming skills;independent  -     Position (Grooming)  sink side;unsupported standing  -     Row Name 02/02/21 1506          Toileting Assessment/Training    [place] Level (Toileting)  toileting skills;independent  -     Position (Toileting)  supported sitting  -       User Key  (r) = Recorded By, (t) = Taken By, (c) = Cosigned By    Initials Name Provider Type    Wilfred Huitron OT Occupational Therapist        Obj/Interventions     Row Name 02/02/21 1506          Shoulder (Therapeutic Exercise)    Shoulder (Therapeutic Exercise)  strengthening exercise  -     Shoulder Strengthening (Therapeutic Exercise)  1 lb free weight;2 sets  -     Row Name 02/02/21 1506          Elbow/Forearm (Therapeutic Exercise)    Elbow/Forearm (Therapeutic Exercise)  strengthening exercise  -     Elbow/Forearm Strengthening (Therapeutic Exercise)  1 lb free weight;2 sets  -     Row Name 02/02/21 1506          Wrist (Therapeutic Exercise)    Wrist (Therapeutic Exercise)  strengthening exercise  -     Wrist Strengthening (Therapeutic Exercise)  1 lb free weight;2 sets  -     Row Name 02/02/21 1506          Therapeutic Exercise    Therapeutic Exercise  shoulder;wrist;elbow/forearm  -       User Key  (r) = Recorded By, (t) = Taken By, (c) = Cosigned By    Initials Name Provider Type    Wilfred Huitron OT Occupational Therapist        Goals/Plan     Row Name 02/02/21 1306          Transfer Goal 1 (OT)    Activity/Assistive Device (Transfer Goal 1, OT)  toilet  -     [place] Level/Cues Needed (Transfer Goal 1, OT)  modified independence  -     Time Frame (Transfer Goal 1, OT)  long term goal (LTG);by discharge  -     Progress/Outcome (Transfer Goal 1, OT)  goal met  -     Row Name 02/02/21 1306          Bathing Goal 1 (OT)    Activity/Device (Bathing Goal 1, OT)  bathing skills, all  -     [place]  Level/Cues Needed (Bathing Goal 1, OT)  set-up required;supervision required;verbal cues required  -     Time Frame (Bathing Goal 1, OT)  long term goal (LTG);by discharge  -     Progress/Outcomes (Bathing Goal 1, OT)  goal not met  -JH     Row Name 02/02/21 1306          ROM Goal 1 (OT)    ROM Goal 1 (OT)  Pt will engage in 30-40 min functional task with O2 90 or up.  -     Time Frame (ROM Goal 1, OT)  long term goal (LTG);by discharge  -     Progress/Outcome (ROM Goal 1, OT)  goal not met  -JH     Row Name 02/02/21 1306          Strength Goal 1 (OT)    Strength Goal 1 (OT)  Pt will be independent with BUE HEP.  -     Time Frame (Strength Goal 1, OT)  long term goal (LTG);by discharge  -     Progress/Outcome (Strength Goal 1, OT)  goal not met  -       User Key  (r) = Recorded By, (t) = Taken By, (c) = Cosigned By    Initials Name Provider Type     Wilfred Madison, OT Occupational Therapist        Clinical Impression     Row Name 02/02/21 1506          Pain Assessment    Additional Documentation  Pain Scale: Numbers Pre/Post-Treatment (Group)  -JH     Row Name 02/02/21 1506          Pain Scale: Numbers Pre/Post-Treatment    Pretreatment Pain Rating  0/10 - no pain  -     Posttreatment Pain Rating  0/10 - no pain  -JH     Row Name 02/02/21 1506          Therapy Assessment/Plan (OT)    Rehab Potential (OT)  good, to achieve stated therapy goals  -JH     Row Name 02/02/21 1506          Therapy Plan Review/Discharge Plan (OT)    Anticipated Discharge Disposition (OT)  home with home health  -[facility] 02/02/21 1506          Vital Signs    Pretreatment Heart Rate (beats/min)  70  -     Intratreatment Heart Rate (beats/min)  71  -     Posttreatment Heart Rate (beats/min)  69  -     Pre SpO2 (%)  95  -     O2 Delivery Pre Treatment  nasal cannula  -     Intra SpO2 (%)  87  -     O2 Delivery Intra Treatment  nasal cannula  -     Post SpO2 (%)  91  -     O2 Delivery Post Treatment   nasal cannula  -     Pre Patient Position  Supine  -     Intra Patient Position  Sitting  -     Post Patient Position  Supine  -     Row Name 02/02/21 1506          Positioning and Restraints    Pre-Treatment Position  in bed  -     Post Treatment Position  bed  -     In Bed  supine;call light within reach;notified [facility]  -       User Key  (r) = Recorded By, (t) = Taken By, (c) = Cosigned By    Initials Name Provider Type     Wilfred Madison OT Occupational Therapist        Outcome Measures     Row Name 02/02/21 1506          How much help from another is currently needed...    Putting on and taking off regular lower body clothing?  3  -JH     Bathing (including washing, rinsing, and drying)  3  -JH     Toileting (which includes using toilet bed pan or urinal)  4  -     Putting on and taking off regular upper body clothing  4  -     Taking care of personal grooming (such as brushing teeth)  4  -     Eating meals  4  -     AM-PAC 6 Clicks Score (OT)  [facility]     Row Name 02/02/21 1506          Functional Assessment    Outcome Measure Options  AM-PAC 6 Clicks Daily Activity (OT)  [facility]       User Key  (r) = Recorded By, (t) = Taken By, (c) = Cosigned By    Initials Name Provider Type    Wilfred Huitron OT Occupational Therapist        Occupational Therapy Education                 Title: PT OT SLP Therapies (In Progress)     Topic: Occupational Therapy (In Progress)     Point: ADL training (In Progress)     Description:   Instruct learner(s) on proper safety adaptation and remediation techniques during self care or transfers.   Instruct in proper use of assistive devices.              Learning Progress Summary           Patient Acceptance, E, NR by  at 2/1/2021 1346    Comment: Educated about OT and POC. Educated on safety throughout and deep breathing.                   Point: Home exercise program (Not Started)     Description:   Instruct learner(s) on appropriate technique for monitoring,  assisting and/or progressing therapeutic exercises/activities.              Learner Progress:  Not documented in this visit.          Point: Precautions (In Progress)     Description:   Instruct learner(s) on prescribed precautions during self-care and functional transfers.              Learning Progress Summary           Patient Acceptance, E, NR by  at 2/1/2021 1346    Comment: Educated about OT and POC. Educated on safety throughout and deep breathing.                   Point: Body mechanics (Not Started)     Description:   Instruct learner(s) on proper positioning and spine alignment during self-care, functional mobility activities and/or exercises.              Learner Progress:  Not documented in this visit.                      User Key     Initials Effective Dates Name Provider Type [facility] 06/08/18 -  Breanne Melendez, OTR/L Occupational Therapist OT              OT Recommendation and Plan     Plan of Care Review  Plan of Care Reviewed With: patient  Outcome Summary: OT Treatment completed on this date. Pt pleasant and agreeable to therapy. Bed Mobility: Independent Transfers; Independent Funct Mob: CGA for ~40' in room - upon returning to sitting pt O2 sats at 87% while on 5L of O2. Pt able to recover in <30 seconds with pused lip breathing technique. Pt completed 2 x 20 with 1 lb resist for BUE to improve BUE strength and endurance needed for the completion of functional activities. Pt left in supine with all needs met and all items withing reach.     Time Calculation:   Time Calculation- OT     Row Name 02/02/21 1646             Time Calculation-     OT Start Time  1506  -      OT Stop Time  1546  -      OT Time Calculation (min)  40 min  -      OT Received On  02/02/21  -        User Key  (r) = Recorded By, (t) = Taken By, (c) = Cosigned By    Initials Name Provider Type     Wilfred Mdaison OT Occupational Therapist        Therapy Charges for Today     Code Description Service Date  Service Provider Modifiers Qty    90269209038 HC OT THER PROC EA 15 MIN 2/2/2021 Wilfred Madison OT GO 2    [id number]  OT THERAPEUTIC ACT EA 15 MIN 2/2/2021 Wilfred Madison OT GO 1               Wilfred Madison OT  2/2/2021

## 2021-02-02 NOTE — PLAN OF CARE
Goal Outcome Evaluation:  Plan of Care Reviewed With: patient     Outcome Summary: OT Treatment completed on this date. Pt pleasant and agreeable to therapy. Bed Mobility: Independent Transfers; Independent Funct Mob: CGA for ~40' in room - upon returning to sitting pt O2 sats at 87% while on 5L of O2. Pt able to recover in <30 seconds with pused lip breathing technique. Pt completed 2 x 20 with 1 lb resist for BUE to improve BUE strength and endurance needed for the completion of functional activities. Pt left in supine with all needs met and all items withing reach.

## 2021-02-03 LAB
ALBUMIN SERPL-MCNC: 2.9 G/DL (ref 3.5–5.2)
ALP SERPL-CCNC: 51 U/L (ref 39–117)
ALT SERPL W P-5'-P-CCNC: 22 U/L (ref 1–33)
ANION GAP SERPL CALCULATED.3IONS-SCNC: 9 MMOL/L (ref 5–15)
AST SERPL-CCNC: 17 U/L (ref 1–32)
BACTERIA SPEC AEROBE CULT: NORMAL
BACTERIA SPEC AEROBE CULT: NORMAL
BASOPHILS # BLD AUTO: 0.02 10*3/MM3 (ref 0–0.2)
BASOPHILS NFR BLD AUTO: 0.1 % (ref 0–1.5)
BILIRUB CONJ SERPL-MCNC: <0.2 MG/DL (ref 0–0.3)
BILIRUB INDIRECT SERPL-MCNC: ABNORMAL MG/DL
BILIRUB SERPL-MCNC: 0.5 MG/DL (ref 0–1.2)
BUN SERPL-MCNC: 15 MG/DL (ref 8–23)
BUN/CREAT SERPL: 21.7 (ref 7–25)
CALCIUM SPEC-SCNC: 8.5 MG/DL (ref 8.6–10.5)
CHLORIDE SERPL-SCNC: 106 MMOL/L (ref 98–107)
CO2 SERPL-SCNC: 24 MMOL/L (ref 22–29)
CREAT SERPL-MCNC: 0.69 MG/DL (ref 0.57–1)
DEPRECATED RDW RBC AUTO: 38.5 FL (ref 37–54)
EOSINOPHIL # BLD AUTO: 0 10*3/MM3 (ref 0–0.4)
EOSINOPHIL NFR BLD AUTO: 0 % (ref 0.3–6.2)
ERYTHROCYTE [DISTWIDTH] IN BLOOD BY AUTOMATED COUNT: 12.6 % (ref 12.3–15.4)
GFR SERPL CREATININE-BSD FRML MDRD: 84 ML/MIN/1.73
GLUCOSE SERPL-MCNC: 119 MG/DL (ref 65–99)
HCT VFR BLD AUTO: 39.8 % (ref 34–46.6)
HGB BLD-MCNC: 14.1 G/DL (ref 12–15.9)
IMM GRANULOCYTES # BLD AUTO: 0.2 10*3/MM3 (ref 0–0.05)
IMM GRANULOCYTES NFR BLD AUTO: 1.3 % (ref 0–0.5)
LYMPHOCYTES # BLD AUTO: 2.48 10*3/MM3 (ref 0.7–3.1)
LYMPHOCYTES NFR BLD AUTO: 16.2 % (ref 19.6–45.3)
MCH RBC QN AUTO: 29.7 PG (ref 26.6–33)
MCHC RBC AUTO-ENTMCNC: 35.4 G/DL (ref 31.5–35.7)
MCV RBC AUTO: 84 FL (ref 79–97)
MONOCYTES # BLD AUTO: 0.74 10*3/MM3 (ref 0.1–0.9)
MONOCYTES NFR BLD AUTO: 4.8 % (ref 5–12)
NEUTROPHILS NFR BLD AUTO: 11.87 10*3/MM3 (ref 1.7–7)
NEUTROPHILS NFR BLD AUTO: 77.6 % (ref 42.7–76)
NRBC BLD AUTO-RTO: 0 /100 WBC (ref 0–0.2)
PLATELET # BLD AUTO: 406 10*3/MM3 (ref 140–450)
PMV BLD AUTO: 9.7 FL (ref 6–12)
POTASSIUM SERPL-SCNC: 4.1 MMOL/L (ref 3.5–5.2)
PROT SERPL-MCNC: 5.8 G/DL (ref 6–8.5)
RBC # BLD AUTO: 4.74 10*6/MM3 (ref 3.77–5.28)
SODIUM SERPL-SCNC: 139 MMOL/L (ref 136–145)
WBC # BLD AUTO: 15.31 10*3/MM3 (ref 3.4–10.8)

## 2021-02-03 PROCEDURE — 94799 UNLISTED PULMONARY SVC/PX: CPT

## 2021-02-03 PROCEDURE — 80048 BASIC METABOLIC PNL TOTAL CA: CPT | Performed by: INTERNAL MEDICINE

## 2021-02-03 PROCEDURE — 25010000002 DEXAMETHASONE PER 1 MG: Performed by: INTERNAL MEDICINE

## 2021-02-03 PROCEDURE — 97116 GAIT TRAINING THERAPY: CPT

## 2021-02-03 PROCEDURE — 94760 N-INVAS EAR/PLS OXIMETRY 1: CPT

## 2021-02-03 PROCEDURE — 36415 COLL VENOUS BLD VENIPUNCTURE: CPT | Performed by: INTERNAL MEDICINE

## 2021-02-03 PROCEDURE — 94640 AIRWAY INHALATION TREATMENT: CPT

## 2021-02-03 PROCEDURE — 25010000002 ENOXAPARIN PER 10 MG: Performed by: INTERNAL MEDICINE

## 2021-02-03 PROCEDURE — 80076 HEPATIC FUNCTION PANEL: CPT | Performed by: INTERNAL MEDICINE

## 2021-02-03 PROCEDURE — 85025 COMPLETE CBC W/AUTO DIFF WBC: CPT | Performed by: INTERNAL MEDICINE

## 2021-02-03 PROCEDURE — 97530 THERAPEUTIC ACTIVITIES: CPT

## 2021-02-03 RX ADMIN — BUDESONIDE AND FORMOTEROL FUMARATE DIHYDRATE 2 PUFF: 160; 4.5 AEROSOL RESPIRATORY (INHALATION) at 07:37

## 2021-02-03 RX ADMIN — ALBUTEROL SULFATE 2 PUFF: 90 AEROSOL, METERED RESPIRATORY (INHALATION) at 07:31

## 2021-02-03 RX ADMIN — TRAZODONE HYDROCHLORIDE 50 MG: 50 TABLET ORAL at 20:20

## 2021-02-03 RX ADMIN — TEMAZEPAM 30 MG: 15 CAPSULE ORAL at 20:20

## 2021-02-03 RX ADMIN — CETIRIZINE HYDROCHLORIDE 10 MG: 10 TABLET, FILM COATED ORAL at 08:53

## 2021-02-03 RX ADMIN — ALBUTEROL SULFATE 2 PUFF: 90 AEROSOL, METERED RESPIRATORY (INHALATION) at 20:47

## 2021-02-03 RX ADMIN — ALBUTEROL SULFATE 2 PUFF: 90 AEROSOL, METERED RESPIRATORY (INHALATION) at 15:14

## 2021-02-03 RX ADMIN — ENOXAPARIN SODIUM 40 MG: 40 INJECTION SUBCUTANEOUS at 08:53

## 2021-02-03 RX ADMIN — ALUMINUM HYDROXIDE, MAGNESIUM HYDROXIDE, AND DIMETHICONE 15 ML: 400; 400; 40 SUSPENSION ORAL at 20:20

## 2021-02-03 RX ADMIN — DEXAMETHASONE SODIUM PHOSPHATE 6 MG: 4 INJECTION, SOLUTION INTRAMUSCULAR; INTRAVENOUS at 08:53

## 2021-02-03 RX ADMIN — SODIUM CHLORIDE, PRESERVATIVE FREE 10 ML: 5 INJECTION INTRAVENOUS at 08:53

## 2021-02-03 RX ADMIN — BUDESONIDE AND FORMOTEROL FUMARATE DIHYDRATE 2 PUFF: 160; 4.5 AEROSOL RESPIRATORY (INHALATION) at 20:47

## 2021-02-03 RX ADMIN — ALBUTEROL SULFATE 2 PUFF: 90 AEROSOL, METERED RESPIRATORY (INHALATION) at 11:31

## 2021-02-03 RX ADMIN — SODIUM CHLORIDE, PRESERVATIVE FREE 10 ML: 5 INJECTION INTRAVENOUS at 20:21

## 2021-02-03 RX ADMIN — ENOXAPARIN SODIUM 40 MG: 40 INJECTION SUBCUTANEOUS at 20:20

## 2021-02-03 RX ADMIN — MONTELUKAST 10 MG: 10 TABLET, FILM COATED ORAL at 20:20

## 2021-02-03 RX ADMIN — DEXAMETHASONE SODIUM PHOSPHATE 6 MG: 4 INJECTION, SOLUTION INTRAMUSCULAR; INTRAVENOUS at 20:20

## 2021-02-03 NOTE — THERAPY TREATMENT NOTE
Acute Care - Physical Therapy Treatment Note  [facility]     Patient Name: Lexie Cardona  : 1950  MRN: [id number]  Today's Date: 2/3/2021           PT Assessment (last 12 hours)      PT Evaluation and Treatment     Row Name 21 1302          Physical Therapy Time and Intention    Subjective Information  no complaints  -TW     Document Type  therapy note (daily note)  -TW     Mode of Treatment  physical therapy;individual therapy  -TW     Patient Effort  excellent  -TW     Row Name 21 1302          General Information    Patient Profile Reviewed  yes  -TW     Patient Observations  alert;cooperative;agree to therapy  -TW     General Observations of Patient  Pt supine in bed but agreeable to therapy.  -TW     Existing Precautions/Restrictions  fall;oxygen therapy device and L/min  -TW     Row Name 21 1302          Cognition    Affect/Mental Status (Cognitive)  WFL  -TW     Orientation Status (Cognition)  oriented x 4  -TW     Follows Commands (Cognition)  WFL  -TW     Cognitive Function (Cognitive)  WFL  -TW     Personal Safety Interventions  fall prevention program maintained;nonskid shoes/slippers when out of bed  -TW     Row Name 21 1302          Pain Scale: Numbers Pre/Post-Treatment    Pretreatment Pain Rating  0/10 - no pain  -TW     Posttreatment Pain Rating  0/10 - no pain  -TW     Row Name 21 1302          Bed Mobility    Supine-Sit [place] (Bed Mobility)  modified independence  -TW     Sit-Supine [place] (Bed Mobility)  modified independence  -TW     Assistive Device (Bed Mobility)  head of bed elevated  -TW     Row Name 21 1302          Transfers    Transfers  stand-sit transfer  -TW     Sit-Stand [place] (Transfers)  modified independence  -TW     Stand-Sit [place] (Transfers)  modified independence  -TW     Row Name 21 1302          Gait/Stairs (Locomotion)    Gait/Stairs Locomotion  gait/ambulation independence  -TW      [place] Level (Gait)  modified independence  -TW     Assistive Device (Gait)  other (see comments) none  -TW     Distance in Feet (Gait)  44ft  -TW     Comment (Gait/Stairs)  Pt desatted to 82% on 2L NC O2 after amb 44ft but was able to recover within 2 minutes after supine t/f.  -TW     Row Name 02/03/21 1302          Safety Issues, Functional Mobility    Impairments Affecting Function (Mobility)  balance;endurance/activity tolerance;shortness of breath;strength  -TW     Row Name 02/03/21 1302          Plan of Care Review    Plan of Care Reviewed With  patient  -TW     Progress  improving  -TW     Outcome Summary  Pt agreeable to amb in room. Pt t/f sup to sit with spv and HOB slightly elevated. Pt amb without AD for 44ft with no LOB noted. Pt desatted to 82% but recovered within 2 minute after supine t/f.  Pt would cont to benefit from therapy upon DC.  -TW     Row Name 02/03/21 1302          Vital Signs    Pretreatment Heart Rate (beats/min)  68  -TW     Intratreatment Heart Rate (beats/min)  94  -TW     Posttreatment Heart Rate (beats/min)  80  -TW     Pre SpO2 (%)  93  -TW     O2 Delivery Pre Treatment  supplemental O2  -TW     Intra SpO2 (%)  82  -TW     O2 Delivery Intra Treatment  supplemental O2  -TW     Post SpO2 (%)  93  -TW     O2 Delivery Post Treatment  supplemental O2  -TW     Pre Patient Position  Supine  -TW     Intra Patient Position  Standing  -TW     Post Patient Position  Supine  -TW     Row Name 02/03/21 1302          Transfer Goal 1 (PT)    Activity/Assistive Device (Transfer Goal 1, PT)  sit-to-stand/stand-to-sit;bed-to-chair/chair-to-bed  -TW     [place] Level/Cues Needed (Transfer Goal 1, PT)  independent  -TW     Time Frame (Transfer Goal 1, PT)  by discharge  -TW     Progress/Outcome (Transfer Goal 1, PT)  goal not met  -TW     Row Name 02/03/21 1302          Gait Training Goal 1 (PT)    Activity/Assistive Device (Gait Training Goal 1, PT)  gait (walking locomotion);backward  stepping  -TW     [place] Level (Gait Training Goal 1, PT)  independent  -TW     Distance (Gait Training Goal 1, PT)  50'x3  -TW     Time Frame (Gait Training Goal 1, PT)  by discharge  -TW     Progress/Outcome (Gait Training Goal 1, PT)  goal not met  -TW     Row Name 02/03/21 1302          Patient Education Goal (PT)    Activity (Patient Education Goal, PT)  Patient will be I with HEP to improve balance, activity tolerance, and improve strength  -TW     [place]/Cues/Accuracy (Memory Goal 2, PT)  demonstrates adequately;independent  -TW     Time Frame (Patient Education Goal, PT)  by discharge  -TW     Row Name 02/03/21 1302          Positioning and Restraints    Pre-Treatment Position  in bed  -TW     Post Treatment Position  bed  -TW     In Bed  supine;call light within reach;encouraged to call for assist;exit alarm on  -TW     Row Name 02/03/21 1302          Therapy Assessment/Plan (PT)    Rehab Potential (PT)  good, to achieve stated therapy goals  -TW     Row Name 02/03/21 1302          Progress Summary (PT)    Progress Toward Functional Goals (PT)  progress toward functional goals is good  -TW       User Key  (r) = Recorded By, (t) = Taken By, (c) = Cosigned By    Initials Name Provider Type    TW Nilay Stallings, PTA Physical Therapy Assistant          PT Recommendation and Plan     Progress Summary (PT)  Progress Toward Functional Goals (PT): progress toward functional goals is good  Plan of Care Reviewed With: patient  Progress: improving  Outcome Summary: Pt agreeable to amb in room. Pt t/f sup to sit with spv and HOB slightly elevated. Pt amb without AD for 44ft with no LOB noted. Pt desatted to 82% but recovered within 2 minute after supine t/f.  Pt would cont to benefit from therapy upon DC.       Time Calculation:   PT Charges     Row Name 02/03/21 1541             Time Calculation    Start Time  1302  -TW      Stop Time  1328  -TW      Time Calculation (min)  26 min  -TW      PT  Received On  02/03/21  -TW      PT Goal Re-Cert Due Date  02/14/21  -TW         Time Calculation- PT    Total Timed Code Minutes- PT  26 minute(s)  -TW        User Key  (r) = Recorded By, (t) = Taken By, (c) = Cosigned By    Initials Name Provider Type    TW Nilay Stallings PTA Physical Therapy Assistant        Therapy Charges for Today     Code Description Service Date Service Provider Modifiers Qty    15274245550 HC GAIT TRAINING EA 15 MIN 2/2/2021 Nilay Stallings, LIBERTY GP 1    29734862981 HC PT THER PROC EA 15 MIN 2/2/2021 Nilay Stallings, LIBERTY GP 1    63930471983 HC GAIT TRAINING EA 15 MIN 2/3/2021 Nilay Stallings, LIBERTY GP 1    70807913050 HC PT THERAPEUTIC ACT EA 15 MIN 2/3/2021 Nilay Stallings, LIBERTY GP 1          PT G-Codes  Outcome Measure Options: AM-PAC 6 Clicks Daily Activity (OT)  AM-PAC 6 Clicks Score (PT): 20  AM-PAC 6 Clicks Score (OT): 22  Tinetti Total Score: 22    Nilay Stallings PTA  2/3/2021

## 2021-02-03 NOTE — PLAN OF CARE
Goal Outcome Evaluation:  Plan of Care Reviewed With: patient  Progress: improving  Outcome Summary: Pt agreeable to amb in room. Pt t/f sup to sit with spv and HOB slightly elevated. Pt amb without AD for 44ft with no LOB noted. Pt desatted to 82% but recovered within 2 minute after supine t/f.  Pt would cont to benefit from therapy upon DC.

## 2021-02-03 NOTE — PLAN OF CARE
Goal Outcome Evaluation:  Plan of Care Reviewed With: patient  Progress: improving  Outcome Summary: patient vss stable, weaned to 4L NC, no complaints at  this time.

## 2021-02-03 NOTE — PLAN OF CARE
Goal Outcome Evaluation:     Progress: improving  Outcome Summary: Pt denies pain at this time, pt weaned to 2 Liters oxygen nasal canula, pt ambulating in room, pt hopeful to discharge home soon.

## 2021-02-03 NOTE — PROGRESS NOTES
[facility] Medicine Services  INPATIENT PROGRESS NOTE    Length of Stay: 4  Date of Admission: 1/29/2021  Primary Care Physician: Matthias Gomez DO    Subjective   Chief Complaint: No new complaints.      HPI: Patient is seen for follow-up.  She is doing better, less short of air, less deconditioned and her O2 requirement is down to 2.5 L nasal prongs with saturation in the low 90s.  She voices no new complaints.     Review of Systems   Constitutional: Positive for activity change and fatigue. Negative for appetite change, chills, diaphoresis and fever.   HENT: Negative for trouble swallowing and voice change.    Eyes: Negative for photophobia and visual disturbance.   Respiratory: Positive for shortness of breath. Negative for cough, choking, chest tightness, wheezing and stridor.    Cardiovascular: Negative for chest pain, palpitations and leg swelling.   Gastrointestinal: Negative for abdominal distention, abdominal pain, blood in stool, constipation, diarrhea, nausea and vomiting.   Endocrine: Negative for cold intolerance, heat intolerance, polydipsia, polyphagia and polyuria.   Genitourinary: Negative for decreased urine volume, difficulty urinating, dysuria, enuresis, flank pain, frequency, hematuria and urgency.   Musculoskeletal: Negative for arthralgias, gait problem, myalgias, neck pain and neck stiffness.   Skin: Negative for pallor, rash and wound.   Neurological: Negative for dizziness, tremors, seizures, syncope, facial asymmetry, speech difficulty, weakness, light-headedness, numbness and headaches.   Hematological: Does not bruise/bleed easily.   Psychiatric/Behavioral: Negative for agitation, behavioral problems and confusion.       Objective    Temp:  [96 °F (35.6 °C)-96.9 °F (36.1 °C)] 96 °F (35.6 °C)  Heart Rate:  [55-72] 72  Resp:  [16-20] 18  BP: (100-135)/(55-79) 100/55    Physical Exam  Vitals signs and nursing note reviewed.   Constitutional:        General: She is not in acute distress.     Appearance: She is well-developed. She is not diaphoretic.   HENT:      Head: Normocephalic and atraumatic.      Right Ear: External ear normal.      Left Ear: External ear normal.      Nose: Nose normal.   Eyes:      Extraocular Movements: Extraocular movements intact.      Conjunctiva/sclera: Conjunctivae normal.      Pupils: Pupils are equal, round, and reactive to light.   Neck:      Musculoskeletal: Normal range of motion and neck supple.      Thyroid: No thyromegaly.      Vascular: No JVD.   Cardiovascular:      Rate and Rhythm: Normal rate and regular rhythm.      Heart sounds: Normal heart sounds. No murmur. No friction rub. No gallop.    Pulmonary:      Effort: Pulmonary effort is normal. No respiratory distress.      Breath sounds: No stridor. Decreased breath sounds and rhonchi present. No wheezing or rales.   Chest:      Chest wall: No tenderness.   Abdominal:      General: Bowel sounds are normal. There is no distension.      Palpations: Abdomen is soft. There is no mass.      Tenderness: There is no abdominal tenderness. There is no guarding or rebound.      Hernia: No hernia is present.   Musculoskeletal: Normal range of motion.         General: No swelling, tenderness or deformity.      Right lower leg: No edema.      Left lower leg: No edema.   Skin:     General: Skin is warm and dry.      Coloration: Skin is not jaundiced or pale.      Findings: No bruising, erythema, lesion or rash.   Neurological:      General: No focal deficit present.      Mental Status: She is alert and oriented to person, place, and time.      Cranial Nerves: No cranial nerve deficit.      Sensory: No sensory deficit.      Motor: No weakness.      Coordination: Coordination normal.      Gait: Gait normal.      Deep Tendon Reflexes: Reflexes are normal and symmetric. Reflexes normal.   Psychiatric:         Mood and Affect: Mood normal.         Behavior: Behavior normal. Behavior  is cooperative.         Thought Content: Thought content normal.         Judgment: Judgment normal.           Medication Review:    Current Facility-Administered Medications:   •  albuterol sulfate HFA (PROVENTIL HFA;VENTOLIN HFA;PROAIR HFA) inhaler 2 puff, 2 puff, Inhalation, 4x Daily - RT, Olman Harding MD, 2 puff at 02/03/21 0731  •  aluminum-magnesium hydroxide-simethicone (MAALOX MAX) 400-400-40 MG/5ML suspension 15 mL, 15 mL, Oral, Q6H PRN, Shyam Solorzano MD, 15 mL at 02/02/21 2104  •  benzonatate (TESSALON) capsule 100 mg, 100 mg, Oral, TID PRN, Leonidas Alejandre MD  •  budesonide-formoterol (SYMBICORT) 160-4.5 MCG/ACT inhaler 2 puff, 2 puff, Inhalation, BID - RT, Leonidas Alejandre MD, 2 puff at 02/03/21 0737  •  cetirizine (zyrTEC) tablet 10 mg, 10 mg, Oral, Daily, Olman Harding MD, 10 mg at 02/03/21 0853  •  dexamethasone (DECADRON) injection 6 mg, 6 mg, Intravenous, Q12H, Olman Harding MD, 6 mg at 02/03/21 0853  •  enoxaparin (LOVENOX) syringe 40 mg, 40 mg, Subcutaneous, Q12H, Olman Harding MD, 40 mg at 02/03/21 0853  •  HYDROcod Polst-CPM Polst ER (TUSSIONEX PENNKINETIC) 10-8 MG/5ML ER suspension 5 mL, 5 mL, Oral, Q12H PRN, Olman Harding MD, 5 mL at 02/02/21 2105  •  montelukast (SINGULAIR) tablet 10 mg, 10 mg, Oral, Nightly, Olman Harding MD, 10 mg at 02/02/21 2105  •  [COMPLETED] Insert peripheral IV, , , Once **AND** sodium chloride 0.9 % flush 10 mL, 10 mL, Intravenous, PRN, Olman Harding MD  •  sodium chloride 0.9 % flush 10 mL, 10 mL, Intravenous, Q12H, Olman Harding MD, 10 mL at 02/03/21 0853  •  sodium chloride 0.9 % flush 10 mL, 10 mL, Intravenous, PRN, Olman Harding MD  •  temazepam (RESTORIL) capsule 30 mg, 30 mg, Oral, Nightly PRN, Olman Harding MD, 30 mg at 02/02/21 2119  •  traZODone (DESYREL) tablet 50 mg, 50 mg, Oral, Q PM, Olman Harding MD, 50 mg at 02/02/21 2105    Results Review:  I have reviewed the labs, radiology results, and diagnostic  studies.    Laboratory Data:   Results from last 7 days   Lab Units 02/03/21  0643 02/02/21  0601 02/01/21  0645  01/30/21  0647   SODIUM mmol/L 139  --  140  --  140   POTASSIUM mmol/L 4.1  --  4.1  --  4.2   CHLORIDE mmol/L 106  --  108*  --  109*   CO2 mmol/L 24.0  --  23.0  --  21.0*   BUN mg/dL 15  --  15  --  12   CREATININE mg/dL 0.69 0.74 0.69   < > 0.70   GLUCOSE mg/dL 119*  --  129*  --  115*   CALCIUM mg/dL 8.5*  --  8.6  --  8.5*   BILIRUBIN mg/dL 0.5 0.4 0.4   < > 0.3   ALK PHOS U/L 51 54 51   < > 52   ALT (SGPT) U/L 22 23 18   < > 15   AST (SGOT) U/L 17 23 20   < > 26   ANION GAP mmol/L 9.0  --  9.0  --  10.0    < > = values in this interval not displayed.     Estimated Creatinine Clearance: 70.5 mL/min (by C-G formula based on SCr of 0.69 mg/dL).          Results from last 7 days   Lab Units 02/03/21  0643 02/01/21  0645 01/30/21  0647 01/29/21  1653   WBC 10*3/mm3 15.31* 13.48* 4.75 8.60   HEMOGLOBIN g/dL 14.1 13.3 13.7 16.3*   HEMATOCRIT % 39.8 37.9 40.7 46.8*   PLATELETS 10*3/mm3 406 380 297 317           Culture Data:   No results found for: BLOODCX  No results found for: URINECX  No results found for: RESPCX  No results found for: WOUNDCX  No results found for: STOOLCX  No components found for: BODYFLD    Radiology Data:   Imaging Results (Last 24 Hours)     ** No results found for the last 24 hours. **          I have reviewed the patient's current medications.     Assessment/Plan     Hospital Problem List:  Principal Problem:    Acute respiratory failure with hypoxia (CMS/HCC)  Active Problems:    Pneumonia due to COVID-19 virus    Acute respiratory failure with hypoxia secondary to COVID-19 viral pneumonia: Much improved.  Continue noninvasive respiratory therapy, Decadron and inhalers.  She has completed a course of remdesivir.  Blood cultures have shown no growth.  Leukocytosis is reactive and will be monitored.    Continue GI and DVT prophylaxis.    The patient was evaluated during the  global COVID-19 pandemic, and the diagnosis was suspected/considered upon their initial presentation.  Evaluation, treatment, and testing were consistent with current guidelines for patients who present with complaints or symptoms that may be related to COVID-19.      Discharge Planning: Likely discharge in 1 to 2 days.      Leonidas Alejandre MD   02/03/21   10:37 CST      \

## 2021-02-04 ENCOUNTER — READMISSION MANAGEMENT (OUTPATIENT)
Dept: CALL CENTER | Facility: HOSPITAL | Age: 71
End: 2021-02-04

## 2021-02-04 VITALS
RESPIRATION RATE: 18 BRPM | HEIGHT: 67 IN | WEIGHT: 167.33 LBS | TEMPERATURE: 96.6 F | BODY MASS INDEX: 26.26 KG/M2 | DIASTOLIC BLOOD PRESSURE: 64 MMHG | SYSTOLIC BLOOD PRESSURE: 113 MMHG | OXYGEN SATURATION: 95 % | HEART RATE: 72 BPM

## 2021-02-04 LAB
ALBUMIN SERPL-MCNC: 2.9 G/DL (ref 3.5–5.2)
ALP SERPL-CCNC: 62 U/L (ref 39–117)
ALT SERPL W P-5'-P-CCNC: 20 U/L (ref 1–33)
ANION GAP SERPL CALCULATED.3IONS-SCNC: 8 MMOL/L (ref 5–15)
AST SERPL-CCNC: 15 U/L (ref 1–32)
BASOPHILS # BLD AUTO: 0.04 10*3/MM3 (ref 0–0.2)
BASOPHILS NFR BLD AUTO: 0.3 % (ref 0–1.5)
BILIRUB CONJ SERPL-MCNC: <0.2 MG/DL (ref 0–0.3)
BILIRUB INDIRECT SERPL-MCNC: ABNORMAL MG/DL
BILIRUB SERPL-MCNC: 0.5 MG/DL (ref 0–1.2)
BUN SERPL-MCNC: 17 MG/DL (ref 8–23)
BUN/CREAT SERPL: 20.2 (ref 7–25)
CALCIUM SPEC-SCNC: 9 MG/DL (ref 8.6–10.5)
CHLORIDE SERPL-SCNC: 103 MMOL/L (ref 98–107)
CO2 SERPL-SCNC: 26 MMOL/L (ref 22–29)
CREAT SERPL-MCNC: 0.84 MG/DL (ref 0.57–1)
DEPRECATED RDW RBC AUTO: 40.3 FL (ref 37–54)
EOSINOPHIL # BLD AUTO: 0 10*3/MM3 (ref 0–0.4)
EOSINOPHIL NFR BLD AUTO: 0 % (ref 0.3–6.2)
ERYTHROCYTE [DISTWIDTH] IN BLOOD BY AUTOMATED COUNT: 13 % (ref 12.3–15.4)
GFR SERPL CREATININE-BSD FRML MDRD: 67 ML/MIN/1.73
GLUCOSE SERPL-MCNC: 123 MG/DL (ref 65–99)
HCT VFR BLD AUTO: 41.8 % (ref 34–46.6)
HGB BLD-MCNC: 14.7 G/DL (ref 12–15.9)
IMM GRANULOCYTES # BLD AUTO: 0.38 10*3/MM3 (ref 0–0.05)
IMM GRANULOCYTES NFR BLD AUTO: 2.8 % (ref 0–0.5)
LYMPHOCYTES # BLD AUTO: 2.19 10*3/MM3 (ref 0.7–3.1)
LYMPHOCYTES NFR BLD AUTO: 16.2 % (ref 19.6–45.3)
MCH RBC QN AUTO: 30.1 PG (ref 26.6–33)
MCHC RBC AUTO-ENTMCNC: 35.2 G/DL (ref 31.5–35.7)
MCV RBC AUTO: 85.5 FL (ref 79–97)
MONOCYTES # BLD AUTO: 0.79 10*3/MM3 (ref 0.1–0.9)
MONOCYTES NFR BLD AUTO: 5.8 % (ref 5–12)
NEUTROPHILS NFR BLD AUTO: 10.15 10*3/MM3 (ref 1.7–7)
NEUTROPHILS NFR BLD AUTO: 74.9 % (ref 42.7–76)
NRBC BLD AUTO-RTO: 0 /100 WBC (ref 0–0.2)
PLATELET # BLD AUTO: 443 10*3/MM3 (ref 140–450)
PMV BLD AUTO: 9.7 FL (ref 6–12)
POTASSIUM SERPL-SCNC: 4.2 MMOL/L (ref 3.5–5.2)
PROT SERPL-MCNC: 5.8 G/DL (ref 6–8.5)
RBC # BLD AUTO: 4.89 10*6/MM3 (ref 3.77–5.28)
SODIUM SERPL-SCNC: 137 MMOL/L (ref 136–145)
WBC # BLD AUTO: 13.55 10*3/MM3 (ref 3.4–10.8)

## 2021-02-04 PROCEDURE — 94799 UNLISTED PULMONARY SVC/PX: CPT

## 2021-02-04 PROCEDURE — 85025 COMPLETE CBC W/AUTO DIFF WBC: CPT | Performed by: INTERNAL MEDICINE

## 2021-02-04 PROCEDURE — 80076 HEPATIC FUNCTION PANEL: CPT | Performed by: INTERNAL MEDICINE

## 2021-02-04 PROCEDURE — 94760 N-INVAS EAR/PLS OXIMETRY 1: CPT

## 2021-02-04 PROCEDURE — 97535 SELF CARE MNGMENT TRAINING: CPT

## 2021-02-04 PROCEDURE — 97110 THERAPEUTIC EXERCISES: CPT

## 2021-02-04 PROCEDURE — 80048 BASIC METABOLIC PNL TOTAL CA: CPT | Performed by: INTERNAL MEDICINE

## 2021-02-04 PROCEDURE — 25010000002 ENOXAPARIN PER 10 MG: Performed by: INTERNAL MEDICINE

## 2021-02-04 PROCEDURE — 25010000002 DEXAMETHASONE PER 1 MG: Performed by: INTERNAL MEDICINE

## 2021-02-04 RX ORDER — BUDESONIDE AND FORMOTEROL FUMARATE DIHYDRATE 160; 4.5 UG/1; UG/1
2 AEROSOL RESPIRATORY (INHALATION)
Qty: 1 EACH | Refills: 1 | Status: SHIPPED | OUTPATIENT
Start: 2021-02-04 | End: 2021-06-17

## 2021-02-04 RX ORDER — ALBUTEROL SULFATE 90 UG/1
2 AEROSOL, METERED RESPIRATORY (INHALATION)
Qty: 2 G | Refills: 1 | Status: SHIPPED | OUTPATIENT
Start: 2021-02-04 | End: 2022-09-30

## 2021-02-04 RX ADMIN — ALBUTEROL SULFATE 2 PUFF: 90 AEROSOL, METERED RESPIRATORY (INHALATION) at 11:05

## 2021-02-04 RX ADMIN — BUDESONIDE AND FORMOTEROL FUMARATE DIHYDRATE 2 PUFF: 160; 4.5 AEROSOL RESPIRATORY (INHALATION) at 06:52

## 2021-02-04 RX ADMIN — ENOXAPARIN SODIUM 40 MG: 40 INJECTION SUBCUTANEOUS at 08:29

## 2021-02-04 RX ADMIN — DEXAMETHASONE SODIUM PHOSPHATE 6 MG: 4 INJECTION, SOLUTION INTRAMUSCULAR; INTRAVENOUS at 08:28

## 2021-02-04 RX ADMIN — SODIUM CHLORIDE, PRESERVATIVE FREE 10 ML: 5 INJECTION INTRAVENOUS at 08:29

## 2021-02-04 RX ADMIN — CETIRIZINE HYDROCHLORIDE 10 MG: 10 TABLET, FILM COATED ORAL at 08:29

## 2021-02-04 RX ADMIN — ALBUTEROL SULFATE 2 PUFF: 90 AEROSOL, METERED RESPIRATORY (INHALATION) at 06:52

## 2021-02-04 NOTE — DISCHARGE SUMMARY
[facility] Medicine Services  DISCHARGE SUMMARY       Date of Admission: 1/29/2021  Date of Discharge:  2/4/2021  Primary Care Physician: Matthias Gomez DO    Presenting Problem/History of Present Illness:  Diarrhea, unspecified type [R19.7]  Acute respiratory failure due to COVID-19 (CMS/HCC) [U07.1, J96.00]  Acute respiratory failure due to COVID-19 (CMS/HCC) [U07.1, J96.00]       Final Discharge Diagnoses:  Active Hospital Problems    Diagnosis   • **Acute respiratory failure with hypoxia (CMS/HCC)   • Pneumonia due to COVID-19 virus       Consults:   Consults     No orders found from 12/31/2020 to 1/30/2021.          Procedures Performed: None.                Pertinent Test Results:   Lab Results (last 7 days)     Procedure Component Value Units Date/Time    Hepatic Function Panel [[id number]]  (Abnormal) Collected: 02/04/21 0537    Specimen: Blood Updated: 02/04/21 0711     Total Protein 5.8 g/dL      Albumin 2.90 g/dL      ALT (SGPT) 20 U/L      AST (SGOT) 15 U/L      Alkaline Phosphatase 62 U/L      Total Bilirubin 0.5 mg/dL      Bilirubin, Direct <0.2 mg/dL      Bilirubin, Indirect --     Comment: Unable to calculate       Basic Metabolic Panel [[id number]]  (Abnormal) Collected: 02/04/21 0537    Specimen: Blood Updated: 02/04/21 0711     Glucose 123 mg/dL      BUN 17 mg/dL      Creatinine 0.84 mg/dL      Sodium 137 mmol/L      Potassium 4.2 mmol/L      Chloride 103 mmol/L      CO2 26.0 mmol/L      Calcium 9.0 mg/dL      eGFR Non African Amer 67 mL/min/1.73      BUN/Creatinine Ratio 20.2     Anion Gap 8.0 mmol/L     Narrative:      GFR Normal >60  Chronic Kidney Disease <60  Kidney Failure <15      CBC & Differential [[id number]]  (Abnormal) Collected: 02/04/21 0536    Specimen: Blood Updated: 02/04/21 0655    Narrative:      The following orders were created for panel order CBC & Differential.  Procedure                               Abnormality         Status                      ---------                               -----------         ------                     CBC Auto Differential[[id number]]        Abnormal            Final result                 Please view results for these tests on the individual orders.    CBC Auto Differential [[id number]]  (Abnormal) Collected: 02/04/21 0536    Specimen: Blood Updated: 02/04/21 0655     WBC 13.55 10*3/mm3      RBC 4.89 10*6/mm3      Hemoglobin 14.7 g/dL      Hematocrit 41.8 %      MCV 85.5 fL      MCH 30.1 pg      MCHC 35.2 g/dL      RDW 13.0 %      RDW-SD 40.3 fl      MPV 9.7 fL      Platelets 443 10*3/mm3      Neutrophil % 74.9 %      Lymphocyte % 16.2 %      Monocyte % 5.8 %      Eosinophil % 0.0 %      Basophil % 0.3 %      Immature Grans % 2.8 %      Neutrophils, Absolute 10.15 10*3/mm3      Lymphocytes, Absolute 2.19 10*3/mm3      Monocytes, Absolute 0.79 10*3/mm3      Eosinophils, Absolute 0.00 10*3/mm3      Basophils, Absolute 0.04 10*3/mm3      Immature Grans, Absolute 0.38 10*3/mm3      nRBC 0.0 /100 WBC     Blood Culture - Blood, Arm, Left [[id number]] Collected: 01/29/21 1718    Specimen: Blood from Arm, Left Updated: 02/03/21 1730     Blood Culture No growth at 5 days    Blood Culture - Blood, Blood, Venous Line [[id number]] Collected: 01/29/21 1653    Specimen: Blood, Venous Line Updated: 02/03/21 1700     Blood Culture No growth at 5 days    Hepatic Function Panel [[id number]]  (Abnormal) Collected: 02/03/21 0643    Specimen: Blood Updated: 02/03/21 0725     Total Protein 5.8 g/dL      Albumin 2.90 g/dL      ALT (SGPT) 22 U/L      AST (SGOT) 17 U/L      Alkaline Phosphatase 51 U/L      Total Bilirubin 0.5 mg/dL      Bilirubin, Direct <0.2 mg/dL      Bilirubin, Indirect --     Comment: Unable to calculate       Basic Metabolic Panel [[id number]]  (Abnormal) Collected: 02/03/21 0643    Specimen: Blood Updated: 02/03/21 0725     Glucose 119 mg/dL      BUN 15 mg/dL      Creatinine 0.69 mg/dL      Sodium 139 mmol/L       Potassium 4.1 mmol/L      Chloride 106 mmol/L      CO2 24.0 mmol/L      Calcium 8.5 mg/dL      eGFR Non African Amer 84 mL/min/1.73      BUN/Creatinine Ratio 21.7     Anion Gap 9.0 mmol/L     Narrative:      GFR Normal >60  Chronic Kidney Disease <60  Kidney Failure <15      CBC & Differential [[id number]]  (Abnormal) Collected: 02/03/21 0643    Specimen: Blood Updated: 02/03/21 0701    Narrative:      The following orders were created for panel order CBC & Differential.  Procedure                               Abnormality         Status                     ---------                               -----------         ------                     CBC Auto Differential[[id number]]        Abnormal            Final result                 Please view results for these tests on the individual orders.    CBC Auto Differential [[id number]]  (Abnormal) Collected: 02/03/21 0643    Specimen: Blood Updated: 02/03/21 0701     WBC 15.31 10*3/mm3      RBC 4.74 10*6/mm3      Hemoglobin 14.1 g/dL      Hematocrit 39.8 %      MCV 84.0 fL      MCH 29.7 pg      MCHC 35.4 g/dL      RDW 12.6 %      RDW-SD 38.5 fl      MPV 9.7 fL      Platelets 406 10*3/mm3      Neutrophil % 77.6 %      Lymphocyte % 16.2 %      Monocyte % 4.8 %      Eosinophil % 0.0 %      Basophil % 0.1 %      Immature Grans % 1.3 %      Neutrophils, Absolute 11.87 10*3/mm3      Lymphocytes, Absolute 2.48 10*3/mm3      Monocytes, Absolute 0.74 10*3/mm3      Eosinophils, Absolute 0.00 10*3/mm3      Basophils, Absolute 0.02 10*3/mm3      Immature Grans, Absolute 0.20 10*3/mm3      nRBC 0.0 /100 WBC     Extra Tubes [[id number]] Collected: 02/02/21 0601    Specimen: Blood, Venous Line Updated: 02/02/21 0716    Narrative:      The following orders were created for panel order Extra Tubes.  Procedure                               Abnormality         Status                     ---------                               -----------         ------                     Lavender Top[[id number]]                                      Final result                 Please view results for these tests on the individual orders.    Vickie Top [[id number]] Collected: 02/02/21 0601    Specimen: Blood Updated: 02/02/21 0716     Extra Tube hold for add-on     Comment: Auto resulted       Hepatic Function Panel [[id number]]  (Abnormal) Collected: 02/02/21 0601    Specimen: Blood Updated: 02/02/21 0645     Total Protein 5.8 g/dL      Albumin 2.80 g/dL      ALT (SGPT) 23 U/L      AST (SGOT) 23 U/L      Alkaline Phosphatase 54 U/L      Total Bilirubin 0.4 mg/dL      Bilirubin, Direct <0.2 mg/dL      Bilirubin, Indirect --     Comment: Unable to calculate       Creatinine, Serum [[id number]]  (Normal) Collected: 02/02/21 0601    Specimen: Blood Updated: 02/02/21 0645     Creatinine 0.74 mg/dL      eGFR Non African Amer 78 mL/min/1.73     Narrative:      GFR Normal >60  Chronic Kidney Disease <60  Kidney Failure <15      Basic Metabolic Panel [[id number]]  (Abnormal) Collected: 02/01/21 0645    Specimen: Blood Updated: 02/01/21 0724     Glucose 129 mg/dL      BUN 15 mg/dL      Creatinine 0.69 mg/dL      Sodium 140 mmol/L      Potassium 4.1 mmol/L      Chloride 108 mmol/L      CO2 23.0 mmol/L      Calcium 8.6 mg/dL      eGFR Non African Amer 84 mL/min/1.73      BUN/Creatinine Ratio 21.7     Anion Gap 9.0 mmol/L     Narrative:      GFR Normal >60  Chronic Kidney Disease <60  Kidney Failure <15      Hepatic Function Panel [[id number]]  (Abnormal) Collected: 02/01/21 0645    Specimen: Blood Updated: 02/01/21 0724     Total Protein 5.7 g/dL      Albumin 2.90 g/dL      ALT (SGPT) 18 U/L      AST (SGOT) 20 U/L      Alkaline Phosphatase 51 U/L      Total Bilirubin 0.4 mg/dL      Bilirubin, Direct <0.2 mg/dL      Bilirubin, Indirect --     Comment: Unable to calculate       CBC & Differential [[id number]]  (Abnormal) Collected: 02/01/21 0645    Specimen: Blood Updated: 02/01/21 0709    Narrative:      The following orders were created for  panel order CBC & Differential.  Procedure                               Abnormality         Status                     ---------                               -----------         ------                     CBC Auto Differential[[id number]]        Abnormal            Final result                 Please view results for these tests on the individual orders.    CBC Auto Differential [[id number]]  (Abnormal) Collected: 02/01/21 0645    Specimen: Blood Updated: 02/01/21 0709     WBC 13.48 10*3/mm3      RBC 4.48 10*6/mm3      Hemoglobin 13.3 g/dL      Hematocrit 37.9 %      MCV 84.6 fL      MCH 29.7 pg      MCHC 35.1 g/dL      RDW 12.8 %      RDW-SD 39.6 fl      MPV 9.6 fL      Platelets 380 10*3/mm3      Neutrophil % 80.7 %      Lymphocyte % 13.7 %      Monocyte % 4.8 %      Eosinophil % 0.0 %      Basophil % 0.1 %      Immature Grans % 0.7 %      Neutrophils, Absolute 10.88 10*3/mm3      Lymphocytes, Absolute 1.85 10*3/mm3      Monocytes, Absolute 0.65 10*3/mm3      Eosinophils, Absolute 0.00 10*3/mm3      Basophils, Absolute 0.01 10*3/mm3      Immature Grans, Absolute 0.09 10*3/mm3      nRBC 0.0 /100 WBC     Hepatic Function Panel [[id number]]  (Abnormal) Collected: 01/31/21 0617    Specimen: Blood Updated: 01/31/21 0703     Total Protein 5.8 g/dL      Albumin 2.80 g/dL      ALT (SGPT) 19 U/L      AST (SGOT) 31 U/L      Comment: Specimen hemolyzed.  Results may be affected.        Alkaline Phosphatase 57 U/L      Total Bilirubin 0.3 mg/dL      Bilirubin, Direct <0.2 mg/dL      Comment: Specimen hemolyzed. Results may be affected.        Bilirubin, Indirect --     Comment: Unable to calculate       Creatinine, Serum [[id number]]  (Normal) Collected: 01/31/21 0617    Specimen: Blood Updated: 01/31/21 0702     Creatinine 0.79 mg/dL      eGFR Non African Amer 72 mL/min/1.73     Narrative:      GFR Normal >60  Chronic Kidney Disease <60  Kidney Failure <15      Manual Differential [[id number]]  (Abnormal) Collected: 01/30/21  0647    Specimen: Blood Updated: 01/30/21 0825     Neutrophil % 71.0 %      Lymphocyte % 17.0 %      Monocyte % 5.0 %      Bands %  5.0 %      Atypical Lymphocyte % 2.0 %      Neutrophils Absolute 3.61 10*3/mm3      Lymphocytes Absolute 0.81 10*3/mm3      Monocytes Absolute 0.24 10*3/mm3      Anisocytosis Slight/1+     WBC Morphology Normal     Platelet Estimate Adequate    Basic Metabolic Panel [[id number]]  (Abnormal) Collected: 01/30/21 0647    Specimen: Blood Updated: 01/30/21 0712     Glucose 115 mg/dL      BUN 12 mg/dL      Creatinine 0.70 mg/dL      Sodium 140 mmol/L      Potassium 4.2 mmol/L      Chloride 109 mmol/L      CO2 21.0 mmol/L      Calcium 8.5 mg/dL      eGFR Non African Amer 83 mL/min/1.73      BUN/Creatinine Ratio 17.1     Anion Gap 10.0 mmol/L     Narrative:      GFR Normal >60  Chronic Kidney Disease <60  Kidney Failure <15      Hepatic Function Panel [[id number]]  (Abnormal) Collected: 01/30/21 0647    Specimen: Blood Updated: 01/30/21 0707     Total Protein 5.7 g/dL      Albumin 2.60 g/dL      ALT (SGPT) 15 U/L      AST (SGOT) 26 U/L      Alkaline Phosphatase 52 U/L      Total Bilirubin 0.3 mg/dL      Bilirubin, Direct <0.2 mg/dL      Bilirubin, Indirect --     Comment: Unable to calculate       CBC & Differential [[id number]] Collected: 01/30/21 0647    Specimen: Blood Updated: 01/30/21 0659    Narrative:      The following orders were created for panel order CBC & Differential.  Procedure                               Abnormality         Status                     ---------                               -----------         ------                     Scan Slide[[id number]]                                                                  CBC Auto Differential[[id number]]        Normal              Final result                 Please view results for these tests on the individual orders.    CBC Auto Differential [[id number]]  (Normal) Collected: 01/30/21 0647    Specimen: Blood Updated: 01/30/21  0659     WBC 4.75 10*3/mm3      RBC 4.67 10*6/mm3      Hemoglobin 13.7 g/dL      Hematocrit 40.7 %      MCV 87.2 fL      MCH 29.3 pg      MCHC 33.7 g/dL      RDW 12.8 %      RDW-SD 40.7 fl      MPV 9.9 fL      Platelets 297 10*3/mm3     Lactic Acid, Reflex [[id number]]  (Normal) Collected: 01/29/21 2048    Specimen: Blood Updated: 01/29/21 2111     Lactate 1.2 mmol/L     Lactic Acid, Reflex Timer (This will reflex a repeat order 3-3:15 hours after ordered.) [[id number]] Collected: 01/29/21 1652    Specimen: Blood Updated: 01/29/21 2032     Hold Tube Hold for add-ons.     Comment: Auto resulted.       COVID-19 and FLU A/B PCR - Swab, Nasopharynx [[id number]]  (Abnormal) Collected: 01/29/21 1736    Specimen: Swab from Nasopharynx Updated: 01/29/21 1825     COVID19 Detected     Comment: Initiate Enhanced Percautions        Influenza A PCR Not Detected     Influenza B PCR Not Detected    Narrative:      Fact sheet for providers: https://www.fda.gov/media/695417/download    Fact sheet for patients: https://www.fda.gov/media/516086/download    Test performed by PCR.  Influenza A and Influenza B negative results should be considered presumptive in samples that have a positive SARS-CoV-2 result.    Competitive inhibition studies showed that SARS-CoV-2 virus, when present at concentrations above 3.6E+04 copies/mL, can inhibit the detection and amplification of influenza A and influenza B virus RNA if present at or below 1.8E+02 copies/mL or 4.9E+02 copies/mL, respectively, and may lead to false negative influenza virus results. If co-infection with influenza A or influenza B virus is suspected in samples with a positive SARS-CoV-2 result, the sample should be re-tested with another FDA cleared, approved, or authorized influenza test, if influenza virus detection would change clinical management.    Extra Tubes [[id number]] Collected: 01/29/21 1654    Specimen: Blood, Venous Line Updated: 01/29/21 1802    Narrative:      The  "following orders were created for panel order Extra Tubes.  Procedure                               Abnormality         Status                     ---------                               -----------         ------                     Light Blue Top[[id number]]                                   Final result                 Please view results for these tests on the individual orders.    Light Blue Top [[id number]] Collected: 01/29/21 1654    Specimen: Blood Updated: 01/29/21 1802     Extra Tube hold for add-on     Comment: Auto resulted       Procalcitonin [[id number]]  (Normal) Collected: 01/29/21 1653    Specimen: Blood Updated: 01/29/21 1724     Procalcitonin 0.12 ng/mL     Narrative:      As a Marker for Sepsis (Non-Neonates):   1. <0.5 ng/mL represents a low risk of severe sepsis and/or septic shock.  1. >2 ng/mL represents a high risk of severe sepsis and/or septic shock.    As a Marker for Lower Respiratory Tract Infections that require antibiotic therapy:  PCT on Admission     Antibiotic Therapy             6-12 Hrs later  > 0.5                Strongly Recommended            >0.25 - <0.5         Recommended  0.1 - 0.25           Discouraged                   Remeasure/reassess PCT  <0.1                 Strongly Discouraged          Remeasure/reassess PCT      As 28 day mortality risk marker: \"Change in Procalcitonin Result\" (> 80 % or <=80 %) if Day 0 (or Day 1) and Day 4 values are available. Refer to http://www.[employer]-pct-calculator.com/   Change in PCT <=80 %   A decrease of PCT levels below or equal to 80 % defines a positive change in PCT test result representing a higher risk for 28-day all-cause mortality of patients diagnosed with severe sepsis or septic shock.  Change in PCT > 80 %   A decrease of PCT levels of more than 80 % defines a negative change in PCT result representing a lower risk for 28-day all-cause mortality of patients diagnosed with severe sepsis or septic shock.                Results " may be falsely decreased if patient taking Biotin.     Lactic Acid, Plasma [[id number]]  (Abnormal) Collected: 01/29/21 1652    Specimen: Blood Updated: 01/29/21 1718     Lactate 2.5 mmol/L     Comprehensive Metabolic Panel [[id number]]  (Abnormal) Collected: 01/29/21 1654    Specimen: Blood Updated: 01/29/21 1718     Glucose 104 mg/dL      BUN 14 mg/dL      Creatinine 1.07 mg/dL      Sodium 138 mmol/L      Potassium 3.4 mmol/L      Chloride 98 mmol/L      CO2 26.0 mmol/L      Calcium 9.7 mg/dL      Total Protein 7.0 g/dL      Albumin 3.40 g/dL      ALT (SGPT) 19 U/L      AST (SGOT) 33 U/L      Alkaline Phosphatase 65 U/L      Total Bilirubin 0.7 mg/dL      eGFR Non African Amer 51 mL/min/1.73      Globulin 3.6 gm/dL      A/G Ratio 0.9 g/dL      BUN/Creatinine Ratio 13.1     Anion Gap 14.0 mmol/L     Narrative:      GFR Normal >60  Chronic Kidney Disease <60  Kidney Failure <15      Troponin [[id number]]  (Normal) Collected: 01/29/21 1654    Specimen: Blood Updated: 01/29/21 1717     Troponin T <0.010 ng/mL     Narrative:      Troponin T Reference Range:  <= 0.03 ng/mL-   Negative for AMI  >0.03 ng/mL-     Abnormal for myocardial necrosis.  Clinicians would have to utilize clinical acumen, EKG, Troponin and serial changes to determine if it is an Acute Myocardial Infarction or myocardial injury due to an underlying chronic condition.       Results may be falsely decreased if patient taking Biotin.      BNP [[id number]]  (Normal) Collected: 01/29/21 1654    Specimen: Blood Updated: 01/29/21 1716     proBNP 106.1 pg/mL     Narrative:      Among patients with dyspnea, NT-proBNP is highly sensitive for the detection of acute congestive heart failure. In addition NT-proBNP of <300 pg/ml effectively rules out acute congestive heart failure with 99% negative predictive value.    Results may be falsely decreased if patient taking Biotin.      Blood Gas, Arterial - [[id number]]  (Abnormal) Collected: 01/29/21 1659    Specimen:  Arterial Blood Updated: 01/29/21 1715     Site Left Brachial     Sterling's Test N/A     pH, Arterial 7.464 pH units      Comment: 83 Value above reference range        pCO2, Arterial 32.1 mm Hg      Comment: 84 Value below reference range        pO2, Arterial 66.9 mm Hg      Comment: 84 Value below reference range        HCO3, Arterial 23.1 mmol/L      Base Excess, Arterial 0.1 mmol/L      O2 Saturation, Arterial 94.2 %      Barometric Pressure for Blood Gas 754 mmHg      Modality Nasal Cannula     Flow Rate 5.0 lpm      Ventilator Mode NA     Collected by SALEEM     Comment: Meter: [id number]     :  [id number]       CBC & Differential [[id number]]  (Abnormal) Collected: 01/29/21 1653    Specimen: Blood Updated: 01/29/21 1657    Narrative:      The following orders were created for panel order CBC & Differential.  Procedure                               Abnormality         Status                     ---------                               -----------         ------                     CBC Auto Differential[[id number]]        Abnormal            Final result                 Please view results for these tests on the individual orders.    CBC Auto Differential [[id number]]  (Abnormal) Collected: 01/29/21 1653    Specimen: Blood Updated: 01/29/21 1657     WBC 8.60 10*3/mm3      RBC 5.44 10*6/mm3      Hemoglobin 16.3 g/dL      Hematocrit 46.8 %      MCV 86.0 fL      MCH 30.0 pg      MCHC 34.8 g/dL      RDW 12.6 %      RDW-SD 39.1 fl      MPV 9.6 fL      Platelets 317 10*3/mm3      Neutrophil % 75.4 %      Lymphocyte % 18.6 %      Monocyte % 4.7 %      Eosinophil % 0.8 %      Basophil % 0.2 %      Immature Grans % 0.3 %      Neutrophils, Absolute 6.48 10*3/mm3      Lymphocytes, Absolute 1.60 10*3/mm3      Monocytes, Absolute 0.40 10*3/mm3      Eosinophils, Absolute 0.07 10*3/mm3      Basophils, Absolute 0.02 10*3/mm3      Immature Grans, Absolute 0.03 10*3/mm3      nRBC 0.0 /100 WBC         Imaging Results (Last 7  "Days)     Procedure Component Value Units Date/Time    XR Chest 1 View [[id number]] Collected: 01/29/21 1713     Updated: 01/29/21 1737    Narrative:      Chest x-ray single view.       CLINICAL INDICATION: Shortness of breath. Covid 19    COMPARISON: Chest October 12, 2016.    FINDINGS: Cardiac silhouette is normal in size. Pulmonary  vascularity is unremarkable.     Infiltrative changes left mid and lower lung field, left lower  lobe. This is compatible with pneumonitis. Slight increased  bronchovascular markings right midlung field as well.      Impression:      Infiltrative changes left mid and lower lung field,  left lower lobe compatible with pneumonitis.    Electronically signed by:  Vito Koehler MD  1/29/2021 5:35 PM CST  Workstation: [id number]            Chief Complaint on Day of Discharge: None    Hospital Course:  Patient was admitted for acute respiratory failure with hypoxia secondary to COVID-19 viral pneumonia and started on noninvasive respiratory therapy, Decadron, remdesivir and inhalers.  She did improve, became less symptomatic and completed a course of remdesivir and Decadron.  Blood cultures have shown no growth.    Her oxygen requirement was down to 1 to 2 L of nasal prongs with saturation in the low 90s.  Patient did desaturate to about 86% on room air with activity.  She will be discharged home with supplemental oxygen of 2 L nasal prongs as well as bronchodilators.    Ambulatory home health was offered on discharge but patient declined.    Condition on Discharge: Stable and improved.    Physical Exam on Discharge:  BP 95/56 (BP Location: Left arm, Patient Position: Lying)   Pulse 82   Temp 96 °F (35.6 °C) (Oral)   Resp 18   Ht 170.2 cm (67\")   Wt 75.9 kg (167 lb 5.3 oz)   LMP 03/22/1999 (Approximate) Comment: 2000  SpO2 92%   BMI 26.21 kg/m²   Physical Exam  Vitals signs and nursing note reviewed.   Constitutional:       General: She is not in acute distress.     Appearance: She is " well-developed. She is not diaphoretic.   HENT:      Head: Normocephalic and atraumatic.      Right Ear: External ear normal.      Left Ear: External ear normal.      Nose: Nose normal.   Eyes:      Extraocular Movements: Extraocular movements intact.      Conjunctiva/sclera: Conjunctivae normal.      Pupils: Pupils are equal, round, and reactive to light.   Neck:      Musculoskeletal: Normal range of motion and neck supple.      Thyroid: No thyromegaly.      Vascular: No JVD.   Cardiovascular:      Rate and Rhythm: Normal rate and regular rhythm.      Heart sounds: Normal heart sounds. No murmur. No friction rub. No gallop.    Pulmonary:      Effort: Pulmonary effort is normal. No respiratory distress.      Breath sounds: No stridor. Rhonchi present. No wheezing or rales.   Chest:      Chest wall: No tenderness.   Abdominal:      General: Bowel sounds are normal. There is no distension.      Palpations: Abdomen is soft. There is no mass.      Tenderness: There is no abdominal tenderness. There is no right CVA tenderness, left CVA tenderness, guarding or rebound.      Hernia: No hernia is present.   Musculoskeletal: Normal range of motion.         General: No tenderness or deformity.      Right lower leg: No edema.      Left lower leg: No edema.   Skin:     General: Skin is warm and dry.      Coloration: Skin is not jaundiced or pale.      Findings: No erythema or rash.   Neurological:      General: No focal deficit present.      Mental Status: She is alert and oriented to person, place, and time.      Cranial Nerves: No cranial nerve deficit.      Sensory: No sensory deficit.      Motor: No weakness.      Coordination: Coordination normal.      Gait: Gait normal.      Deep Tendon Reflexes: Reflexes are normal and symmetric. Reflexes normal.   Psychiatric:         Mood and Affect: Mood normal.         Behavior: Behavior normal. Behavior is cooperative.         Thought Content: Thought content normal.          Judgment: Judgment normal.           Discharge Disposition:  Home or Self Care    Discharge Medications:     Discharge Medications      New Medications      Instructions Start Date   albuterol sulfate  (90 Base) MCG/ACT inhaler  Commonly known as: PROVENTIL HFA;VENTOLIN HFA;PROAIR HFA   2 puffs, Inhalation, 4 Times Daily - RT      budesonide-formoterol 160-4.5 MCG/ACT inhaler  Commonly known as: SYMBICORT   2 puffs, Inhalation, 2 Times Daily - RT         Continue These Medications      Instructions Start Date   CALCIUM + D PO   1,200 mg, Oral, Daily      cetirizine 10 MG tablet  Commonly known as: zyrTEC   10 mg, Oral, Daily      Colace 100 MG capsule  Generic drug: docusate sodium   Oral, Daily PRN      CRANBERRY PO   4,200 mg, Oral, Daily      EPINEPHrine 0.3 MG/0.3ML solution auto-injector injection  Commonly known as: EpiPen 2-Jose   Use as directed for Allergic Reaction      Estring 2 MG vaginal ring  Generic drug: estradiol   INSERT 2 MG INTO THE VAGINA EVERY 3 MONTHS      Flax Seed Oil 1000 MG capsule   1 capsule, Oral, 2 Times Daily      GAVISCON PO   Oral, As Needed, Tablets Or Liquid, As Needed      hydrOXYzine pamoate 50 MG capsule  Commonly known as: VISTARIL   100 mg, Oral, 3 Times Daily PRN      mometasone 50 MCG/ACT nasal spray  Commonly known as: NASONEX   2 sprays, Nasal, Daily      montelukast 10 MG tablet  Commonly known as: SINGULAIR   10 mg, Oral, Every Night at Bedtime      multivitamin with minerals tablet tablet   1 tablet, Oral, Daily      mupirocin 2 % nasal ointment  Commonly known as: BACTROBAN   Apply to the inside of each nostril with a cotton swab two times daily, morning and evening, for 5 days before surgery.      olopatadine 0.6 % solution nasal solution  Commonly known as: PATANASE   2 sprays, Each Nare, 2 Times Daily      HERBER-COLACE PO   Oral, Daily PRN      PROBIOTIC FORMULA PO   1 tablet, Oral, Nightly, 10 billion cell (2 billion ea) capsule      promethazine 25 MG  tablet  Commonly known as: PHENERGAN   25 mg, Oral, Every 6 Hours PRN      temazepam 30 MG capsule  Commonly known as: RESTORIL   30 mg, Oral, Nightly PRN      traZODone 100 MG tablet  Commonly known as: DESYREL   100 mg, Oral, Every Evening      valACYclovir 1000 MG tablet  Commonly known as: VALTREX   Oral, Daily PRN         Stop These Medications    doxycycline 100 MG enteric coated tablet  Commonly known as: DORYX            Discharge Diet: Heart healthy.    Activity at Discharge: As tolerated.    Discharge Care Plan/Instructions: Patient has been advised to take her medications as prescribed and to return to the emergency room the event of any worsening symptoms.  She was also instructed to follow CDC guidelines with respect to self quarantine, social distancing and wearing a facial coverings.    Follow-up Appointments:   Future Appointments   Date Time Provider Department Center   2/11/2021  9:15 AM Matthias Gomez DO [facility]  [facility]   4/9/2021  9:15 AM Matthias Gomez DO [facility]  [facility]       Test Results Pending at Discharge:     Leonidas Alejandre MD  02/04/21  10:07 CST    Time: 35 minutes.

## 2021-02-04 NOTE — DISCHARGE PLACEMENT REQUEST
"Darius Cardona (70 y.o. Female)     Date of Birth Social Security Number Address Home Phone MRN    1950  [address] [id number] [phone] [id number]    Quaker Marital Status          Mandaeism        Admission Date Admission Type Admitting Provider Attending Provider Department, Room/Bed    1/29/21 Emergency Olman Harding MD Popescu, Tudor, MD [address], 422/1    Discharge Date Discharge Disposition Discharge Destination         Home or Self Care              Attending Provider: Olman Harding MD    Allergies: Augmentin [Amoxicillin-pot Clavulanate], Ceclor [Cefaclor], Nsaids, Other, Penicillins, Aspirin, Biaxin [Clarithromycin], Ciprofloxacin, Contrast Dye, Cymbalta [Duloxetine Hcl], Demerol [Meperidine], Iodine, Macrobid [Nitrofurantoin Monohyd Macro], Requip [Ropinirole Hcl], Septra [Sulfamethoxazole-trimethoprim], Statins, Zithromax [Azithromycin], Astelin [Azelastine], Niaspan [Niacin Er], Nickel    Isolation: Enh Drop/Con   Infection: COVID (confirmed) (01/29/21)   Code Status: CPR    Ht: 170.2 cm (67\")   Wt: 75.9 kg (167 lb 5.3 oz)    Admission Cmt: None   Principal Problem: Acute respiratory failure with hypoxia (CMS/HCC) [J96.01]                 Active Insurance as of 1/29/2021     Primary Coverage     Payor Plan Insurance Group Employer/Plan Group    MEDICARE MEDICARE A & B      Payor Plan Address Payor Plan Phone Number Payor Plan Fax Number Effective Dates    [address] [phone]  2/1/2016 - None Entered    [address] [id number]       Subscriber Name Subscriber Birth Date Member ID       DARIUS CARDONA 1950 [id number]           Secondary Coverage     Payor Plan Insurance Group Employer/Plan Group    ANTHEM BLUE CROSS ANTHEM BLUE CROSS BLUE SHIELD PPO [id number]     Payor Plan Address Payor Plan Phone Number Payor Plan Fax Number Effective Dates    [address] [phone]  1/1/2020 - None Entered    [facility]" "[id number]       Subscriber Name Subscriber Birth Date Member ID       DARIUS CARDONA 1950 [id number]                 Emergency Contacts      (Rel.) Home Phone Work Phone Mobile Phone    Rodolfo Cardona (Spouse) [phone] -- [phone]    CANDELARIA TRAVIS (Son) -- -- [phone]        Deana Diez RN [facility]  [phone] phone  [phone] fax       21 0830 21 0832 21 0833   Oxygen Therapy   SpO2 93 % 91 % 90 %   Device (Oxygen Therapy) nasal cannula  (sitting) room air  (sitting) room air  (ambulating)   Flow (L/min) 2  --   --       21 0834 21 0836 21 0837   Oxygen Therapy   SpO2 (!) 88 % (!) 86 % 92 %   Device (Oxygen Therapy) room air  (walking) room air  (walking) nasal cannula   Flow (L/min)  --   --  2     [address] [address]  Dept. Phone:  [phone]  Dept. Fax:   Date Ordered: 2021         Patient:  Darius Cardona MRN:  [id number]   [address] [place] [id number] :  1950  SSN:    Phone: [phone] Sex:  F     Weight: 75.9 kg (167 lb 5.3 oz)         Ht Readings from Last 1 Encounters:   21 170.2 cm (67\")         Oxygen Therapy         (Order ID: [id number])    Diagnosis:  Pneumonia due to COVID-19 virus (U07.1,J12.82 [ICD-10-CM] 480.8,079.89 [ICD-9-CM])  Acute respiratory failure with hypoxia (CMS/HCC) (J96.01 [ICD-10-CM] 518.81 [ICD-9-CM])   Quantity:  1     Delivery Modality: Nasal Cannula  Liters Per Minute: 2  Duration: Continuous  Duration: With Exertion  Duration: During Sleep  Equipment:  Oxygen Concentrator &  &  Portable Gaseous Oxygen System & Portable Oxygen Contents Gaseous &  Conserving Regulator  Length of Need (99 Months = Lifetime): 12 Months        Authorizing Provider's Phone: [phone]   Authorizing Provider:Leonidas Alejandre MD  Authorizing Provider's NPI: " [id number]  Order Entered By: Leonidas Alejandre MD 2/4/2021 10:06 AM     Electronically signed by: Leonidas Alejandre MD 2/4/2021 10:06 AM                 History & Physical      Olman Harding MD at 01/29/21 1741          History and Physical  Olman Harding MD  Hospitalist    Date of admission: 1/29/2021    Patient Care Team:  Matthias Gomez DO as PCP - General (Family Medicine)    Chief complaint   Chief Complaint   Patient presents with   • Diarrhea   • Headache   • Generalized weakness     Subjective     Patient is a 70 y.o. female admitted for generalized weakness, cough, congestion, headaches, shortness of breath, low grade fever accompanied by poor appetite, mild nausea and diarrhea, symptoms present for the last 10 or 14 days since she has been diagnosed as Covid +. She was recently prescribed Prednisone and Doxycycline without any improvement in her overall condition.     History  Past Medical History:   Diagnosis Date   • Allergic rhinitis    • Blastomycosis    • Chronic bronchitis (CMS/HCC)    • Diverticulitis of colon    • Gastritis    • Gastroesophageal reflux disease    • Gastroparesis    • Gout    • Hypercholesterolemia    • IBS (irritable bowel syndrome)    • Meniere's disease    • Myoclonic disorder    • Osteoarthritis    • Skin cancer    • Sleep apnea      Past Surgical History:   Procedure Laterality Date   • BRONCHOSCOPY     • CARPAL TUNNEL RELEASE WITH CUBITAL TUNNEL RELEASE     • COLONOSCOPY W/ POLYPECTOMY     • CYSTOSCOPY     • ENDOSCOPIC FUNCTIONAL SINUS SURGERY (FESS) Right 3/6/2018   • LAPAROSCOPIC CHOLECYSTECTOMY     • OVARIAN CYST REMOVAL     • TOTAL ABDOMINAL HYSTERECTOMY WITH SALPINGO OOPHORECTOMY       Family History   Problem Relation Age of Onset   • Diabetes Mother    • Colon cancer Father    • Breast cancer Paternal Grandmother      Social History     Tobacco Use   • Smoking status: Former Smoker     Years: 30.00     Types: Cigarettes     Quit date: 2001     Years since  quittin.0   • Smokeless tobacco: Never Used   Substance Use Topics   • Alcohol use: No   • Drug use: No     Medications Prior to Admission   Medication Sig Dispense Refill Last Dose   • Calcium Citrate-Vitamin D (CALCIUM + D PO) Take 1,200 mg by mouth Daily.   2021 at Unknown time   • cetirizine (ZyrTEC) 10 MG tablet Take 10 mg by mouth daily.   2021 at Unknown time   • docusate sodium (Colace) 100 MG capsule Take  by mouth Daily As Needed.   Past Week at Unknown time   • doxycycline (DORYX) 100 MG enteric coated tablet Take 1 tablet by mouth 2 (Two) Times a Day for 10 days. 20 tablet 0 2021 at Unknown time   • Estring 2 MG vaginal ring INSERT 2 MG INTO THE VAGINA EVERY 3 MONTHS 1 each 4 Past Month at Unknown time   • mometasone (NASONEX) 50 MCG/ACT nasal spray 2 sprays into the nostril(s) as directed by provider Daily. 17 g 12 2021 at Unknown time   • montelukast (SINGULAIR) 10 MG tablet Take 1 tablet by mouth every night at bedtime. 90 tablet 3 2021 at Unknown time   • Multiple Vitamins-Minerals (CENTRUM SILVER PO) Take 1 tablet by mouth daily.   2021 at Unknown time   • mupirocin (BACTROBAN) 2 % nasal ointment Apply to the inside of each nostril with a cotton swab two times daily, morning and evening, for 5 days before surgery. 10 each 6 Past Week at Unknown time   • olopatadine (PATANASE) 0.6 % solution nasal solution 2 sprays by Each Nare route 2 (Two) Times a Day. 30 g 11 2021 at Unknown time   • Probiotic Product (PROBIOTIC FORMULA PO) Take 1 tablet by mouth Every Night. 10 billion cell (2 billion ea) capsule    2021 at Unknown time   • promethazine (PHENERGAN) 25 MG tablet Take 1 tablet by mouth Every 6 (Six) Hours As Needed for Nausea or Vomiting. 30 tablet 2 Past Week at Unknown time   • Sennosides-Docusate Sodium (HERBER-COLACE PO) Take  by mouth Daily As Needed.   Past Week at Unknown time   • temazepam (RESTORIL) 30 MG capsule Take 1 capsule by mouth At Night  As Needed for Sleep. 30 capsule 2 1/28/2021 at Unknown time   • traZODone (DESYREL) 100 MG tablet Take 1 tablet by mouth Every Evening. 90 tablet 3 1/28/2021 at Unknown time   • valACYclovir (VALTREX) 1000 MG tablet Take  by mouth Daily As Needed.   Past Month at Unknown time   • Alum Hydroxide-Mag Carbonate (GAVISCON PO) Take  by mouth As Needed. Tablets Or Liquid, As Needed       • CRANBERRY PO Take 4,200 mg by mouth Daily.      • EPINEPHrine (EPIPEN 2-ALBERT) 0.3 MG/0.3ML solution auto-injector injection Use as directed for Allergic Reaction 1 each 5    • Flaxseed, Linseed, (FLAX SEED OIL) 1000 MG capsule Take 1 capsule by mouth 2 (two) times a day.      • hydrOXYzine pamoate (VISTARIL) 50 MG capsule Take 2 capsules by mouth 3 (Three) Times a Day As Needed for Itching. 180 capsule 5      Allergies:  Augmentin [amoxicillin-pot clavulanate], Ceclor [cefaclor], Nsaids, Other, Penicillins, Aspirin, Biaxin [clarithromycin], Ciprofloxacin, Contrast dye, Cymbalta [duloxetine hcl], Demerol [meperidine], Iodine, Macrobid [nitrofurantoin monohyd macro], Requip [ropinirole hcl], Septra [sulfamethoxazole-trimethoprim], Statins, Zithromax [azithromycin], Astelin [azelastine], Niaspan [niacin er], and Nickel    Review of Systems  Review of Systems   Constitutional: Positive for fatigue and fever.   Respiratory: Positive for cough and shortness of breath. Negative for wheezing.    Cardiovascular: Negative for chest pain, palpitations and leg swelling.   Gastrointestinal: Positive for diarrhea and nausea. Negative for abdominal distention, abdominal pain, anal bleeding and vomiting.   Genitourinary: Negative for decreased urine volume, dysuria, frequency and pelvic pain.   Musculoskeletal: Negative for arthralgias and back pain.   Skin: Positive for pallor. Negative for color change and rash.   Neurological: Positive for weakness, light-headedness and headaches. Negative for seizures and syncope.   Psychiatric/Behavioral: Negative  for agitation, behavioral problems and confusion.   All other systems reviewed and are negative.      Objective     Vital Signs  Temp:  [97.8 °F (36.6 °C)] 97.8 °F (36.6 °C)  Heart Rate:  [80-92] 82  Resp:  [18-20] 20  BP: ()/(43-61) 107/58    Physical Exam:  Physical Exam  Vitals signs reviewed.   Constitutional:       General: She is not in acute distress.     Appearance: She is ill-appearing.   HENT:      Head: Normocephalic and atraumatic.   Eyes:      General: No scleral icterus.     Extraocular Movements: Extraocular movements intact.      Pupils: Pupils are equal, round, and reactive to light.   Neck:      Musculoskeletal: Normal range of motion and neck supple. No neck rigidity or muscular tenderness.   Cardiovascular:      Rate and Rhythm: Normal rate and regular rhythm.   Pulmonary:      Effort: Pulmonary effort is normal. No respiratory distress.      Breath sounds: No stridor. Rales present. No wheezing.   Abdominal:      General: Bowel sounds are normal. There is no distension.      Palpations: Abdomen is soft. There is no mass.      Tenderness: There is no abdominal tenderness.   Musculoskeletal: Normal range of motion.         General: No swelling, tenderness or deformity.      Right lower leg: No edema.      Left lower leg: No edema.   Skin:     General: Skin is warm and dry.      Coloration: Skin is pale. Skin is not jaundiced.   Neurological:      General: No focal deficit present.      Mental Status: She is alert and oriented to person, place, and time. Mental status is at baseline.      Cranial Nerves: No cranial nerve deficit.      Sensory: No sensory deficit.      Motor: No weakness.   Psychiatric:         Mood and Affect: Mood normal.         Behavior: Behavior normal.         Results Review:   Lab Results (last 24 hours)     Procedure Component Value Units Date/Time    Lactic Acid, Reflex [[id number]]  (Normal) Collected: 01/29/21 2048    Specimen: Blood Updated: 01/29/21 2111      Lactate 1.2 mmol/L     Lactic Acid, Reflex Timer (This will reflex a repeat order 3-3:15 hours after ordered.) [[id number]] Collected: 01/29/21 1652    Specimen: Blood Updated: 01/29/21 2032     Hold Tube Hold for add-ons.     Comment: Auto resulted.       COVID-19 and FLU A/B PCR - Swab, Nasopharynx [[id number]]  (Abnormal) Collected: 01/29/21 1736    Specimen: Swab from Nasopharynx Updated: 01/29/21 1825     COVID19 Detected     Comment: Initiate Enhanced Percautions        Influenza A PCR Not Detected     Influenza B PCR Not Detected    Narrative:      Fact sheet for providers: https://www.fda.gov/media/398462/download    Fact sheet for patients: https://www.fda.gov/media/329974/download    Test performed by PCR.  Influenza A and Influenza B negative results should be considered presumptive in samples that have a positive SARS-CoV-2 result.    Competitive inhibition studies showed that SARS-CoV-2 virus, when present at concentrations above 3.6E+04 copies/mL, can inhibit the detection and amplification of influenza A and influenza B virus RNA if present at or below 1.8E+02 copies/mL or 4.9E+02 copies/mL, respectively, and may lead to false negative influenza virus results. If co-infection with influenza A or influenza B virus is suspected in samples with a positive SARS-CoV-2 result, the sample should be re-tested with another FDA cleared, approved, or authorized influenza test, if influenza virus detection would change clinical management.    Extra Tubes [[id number]] Collected: 01/29/21 1654    Specimen: Blood, Venous Line Updated: 01/29/21 1802    Narrative:      The following orders were created for panel order Extra Tubes.  Procedure                               Abnormality         Status                     ---------                               -----------         ------                     Light Blue Top[[id number]]                                   Final result                 Please view results for these  "tests on the individual orders.    Light Blue Top [[id number]] Collected: 01/29/21 1654    Specimen: Blood Updated: 01/29/21 1802     Extra Tube hold for add-on     Comment: Auto resulted       Blood Culture - Blood, Arm, Left [[id number]] Collected: 01/29/21 1718    Specimen: Blood from Arm, Left Updated: 01/29/21 1724    Procalcitonin [[id number]]  (Normal) Collected: 01/29/21 1653    Specimen: Blood Updated: 01/29/21 1724     Procalcitonin 0.12 ng/mL     Narrative:      As a Marker for Sepsis (Non-Neonates):   1. <0.5 ng/mL represents a low risk of severe sepsis and/or septic shock.  1. >2 ng/mL represents a high risk of severe sepsis and/or septic shock.    As a Marker for Lower Respiratory Tract Infections that require antibiotic therapy:  PCT on Admission     Antibiotic Therapy             6-12 Hrs later  > 0.5                Strongly Recommended            >0.25 - <0.5         Recommended  0.1 - 0.25           Discouraged                   Remeasure/reassess PCT  <0.1                 Strongly Discouraged          Remeasure/reassess PCT      As 28 day mortality risk marker: \"Change in Procalcitonin Result\" (> 80 % or <=80 %) if Day 0 (or Day 1) and Day 4 values are available. Refer to http://www.[facility]-pct-calculator.com/   Change in PCT <=80 %   A decrease of PCT levels below or equal to 80 % defines a positive change in PCT test result representing a higher risk for 28-day all-cause mortality of patients diagnosed with severe sepsis or septic shock.  Change in PCT > 80 %   A decrease of PCT levels of more than 80 % defines a negative change in PCT result representing a lower risk for 28-day all-cause mortality of patients diagnosed with severe sepsis or septic shock.                Results may be falsely decreased if patient taking Biotin.     Lactic Acid, Plasma [[id number]]  (Abnormal) Collected: 01/29/21 1652    Specimen: Blood Updated: 01/29/21 1718     Lactate 2.5 mmol/L     Comprehensive Metabolic Panel " [[id number]]  (Abnormal) Collected: 01/29/21 1654    Specimen: Blood Updated: 01/29/21 1718     Glucose 104 mg/dL      BUN 14 mg/dL      Creatinine 1.07 mg/dL      Sodium 138 mmol/L      Potassium 3.4 mmol/L      Chloride 98 mmol/L      CO2 26.0 mmol/L      Calcium 9.7 mg/dL      Total Protein 7.0 g/dL      Albumin 3.40 g/dL      ALT (SGPT) 19 U/L      AST (SGOT) 33 U/L      Alkaline Phosphatase 65 U/L      Total Bilirubin 0.7 mg/dL      eGFR Non African Amer 51 mL/min/1.73      Globulin 3.6 gm/dL      A/G Ratio 0.9 g/dL      BUN/Creatinine Ratio 13.1     Anion Gap 14.0 mmol/L     Narrative:      GFR Normal >60  Chronic Kidney Disease <60  Kidney Failure <15      Troponin [[id number]]  (Normal) Collected: 01/29/21 1654    Specimen: Blood Updated: 01/29/21 1717     Troponin T <0.010 ng/mL     Narrative:      Troponin T Reference Range:  <= 0.03 ng/mL-   Negative for AMI  >0.03 ng/mL-     Abnormal for myocardial necrosis.  Clinicians would have to utilize clinical acumen, EKG, Troponin and serial changes to determine if it is an Acute Myocardial Infarction or myocardial injury due to an underlying chronic condition.       Results may be falsely decreased if patient taking Biotin.      BNP [[id number]]  (Normal) Collected: 01/29/21 1654    Specimen: Blood Updated: 01/29/21 1716     proBNP 106.1 pg/mL     Narrative:      Among patients with dyspnea, NT-proBNP is highly sensitive for the detection of acute congestive heart failure. In addition NT-proBNP of <300 pg/ml effectively rules out acute congestive heart failure with 99% negative predictive value.    Results may be falsely decreased if patient taking Biotin.      Blood Gas, Arterial - [[id number]]  (Abnormal) Collected: 01/29/21 1659    Specimen: Arterial Blood Updated: 01/29/21 1715     Site Left Brachial     Sterling's Test N/A     pH, Arterial 7.464 pH units      Comment: 83 Value above reference range        pCO2, Arterial 32.1 mm Hg      Comment: 84 Value below  reference range        pO2, Arterial 66.9 mm Hg      Comment: 84 Value below reference range        HCO3, Arterial 23.1 mmol/L      Base Excess, Arterial 0.1 mmol/L      O2 Saturation, Arterial 94.2 %      Barometric Pressure for Blood Gas 754 mmHg      Modality Nasal Cannula     Flow Rate 5.0 lpm      Ventilator Mode NA     Collected by SALEEM     Comment: Meter: [id number]     :  [id number]       CBC & Differential [[id number]]  (Abnormal) Collected: 01/29/21 1653    Specimen: Blood Updated: 01/29/21 1657    Narrative:      The following orders were created for panel order CBC & Differential.  Procedure                               Abnormality         Status                     ---------                               -----------         ------                     CBC Auto Differential[[id number]]        Abnormal            Final result                 Please view results for these tests on the individual orders.    CBC Auto Differential [[id number]]  (Abnormal) Collected: 01/29/21 1653    Specimen: Blood Updated: 01/29/21 1657     WBC 8.60 10*3/mm3      RBC 5.44 10*6/mm3      Hemoglobin 16.3 g/dL      Hematocrit 46.8 %      MCV 86.0 fL      MCH 30.0 pg      MCHC 34.8 g/dL      RDW 12.6 %      RDW-SD 39.1 fl      MPV 9.6 fL      Platelets 317 10*3/mm3      Neutrophil % 75.4 %      Lymphocyte % 18.6 %      Monocyte % 4.7 %      Eosinophil % 0.8 %      Basophil % 0.2 %      Immature Grans % 0.3 %      Neutrophils, Absolute 6.48 10*3/mm3      Lymphocytes, Absolute 1.60 10*3/mm3      Monocytes, Absolute 0.40 10*3/mm3      Eosinophils, Absolute 0.07 10*3/mm3      Basophils, Absolute 0.02 10*3/mm3      Immature Grans, Absolute 0.03 10*3/mm3      nRBC 0.0 /100 WBC     Blood Culture - Blood, Blood, Venous Line [[id number]] Collected: 01/29/21 1653    Specimen: Blood, Venous Line Updated: 01/29/21 1653          Imaging Results (Last 24 Hours)     Procedure Component Value Units Date/Time    XR Chest 1 View [[id number]]  Collected: 01/29/21 1713     Updated: 01/29/21 1737    Narrative:      Chest x-ray single view.       CLINICAL INDICATION: Shortness of breath. Covid 19    COMPARISON: Chest October 12, 2016.    FINDINGS: Cardiac silhouette is normal in size. Pulmonary  vascularity is unremarkable.     Infiltrative changes left mid and lower lung field, left lower  lobe. This is compatible with pneumonitis. Slight increased  bronchovascular markings right midlung field as well.      Impression:      Infiltrative changes left mid and lower lung field,  left lower lobe compatible with pneumonitis.    Electronically signed by:  Vito Koehler MD  1/29/2021 5:35 PM CST  Workstation: [id number]          Assessment/Plan       Acute respiratory failure with hypoxia (CMS/HCC)    Pneumonia due to COVID-19 virus    Continue with respiratory support, symptomatic treatment, supplemental Oxygen, PRN Proventil, IV Decadron / IV Remdesivir.     Olman Harding MD  01/29/21  22:29 CST      Electronically signed by Olman Harding MD at 01/29/21 4356

## 2021-02-04 NOTE — THERAPY TREATMENT NOTE
Patient Name: Lexie Cardona  : 1950    MRN: [id number]                              Today's Date: 2021       Admit Date: 2021    Visit Dx:     ICD-10-CM ICD-9-CM   1. Acute respiratory failure due to COVID-19 (CMS/[facility])  U07.1 518.81    J96.00 079.89   2. Diarrhea, unspecified type  R19.7 787.91   3. Impaired functional mobility, balance, gait, and endurance  Z74.09 V49.89   4. Impaired mobility and ADLs  Z74.09 V49.89    Z78.9    5. Pneumonia due to COVID-19 virus  U07.1 480.8    J12.82 079.89   6. Acute respiratory failure with hypoxia (CMS/[facility])  J96.01 518.81     Patient Active Problem List   Diagnosis   • Vitamin D deficiency   • Restless legs   • Myoclonic disorder   • Meniere's disease   • Insomnia   • IBS (irritable bowel syndrome)   • Hypercholesterolemia   • History of colon polyps   • Gout   • Gastroesophageal reflux disease   • Gastric polyp   • Fundic gland polyposis of stomach   • Elevated levels of transaminase & lactic acid dehydrogenase   • Chronic kidney disease (CKD), stage II (mild)   • Allergic rhinitis   • Primary osteoarthritis of both hands   • Primary open angle glaucoma of both eyes, mild stage   • Cataract   • Carpal tunnel syndrome of right wrist   • Nasal obstruction   • Nasal turbinate hypertrophy   • Nasal septal deformity   • Sinusitis, chronic   • Nasal valve collapse   • Minnie bullosa   • Acute respiratory failure due to COVID-19 (CMS/[facility])   • Acute respiratory failure with hypoxia (CMS/[facility])   • Pneumonia due to COVID-19 virus     Past Medical History:   Diagnosis Date   • Allergic rhinitis    • Blastomycosis    • Chronic bronchitis (CMS/[facility])    • Diverticulitis of colon    • Gastritis    • Gastroesophageal reflux disease    • Gastroparesis    • Gout    • Hypercholesterolemia    • IBS (irritable bowel syndrome)    • Meniere's disease    • Myoclonic disorder    • Osteoarthritis    • Skin cancer    • Sleep apnea      Past Surgical History:   Procedure  Laterality Date   • BRONCHOSCOPY     • CARPAL TUNNEL RELEASE WITH CUBITAL TUNNEL RELEASE     • COLONOSCOPY W/ POLYPECTOMY     • CYSTOSCOPY     • ENDOSCOPIC FUNCTIONAL SINUS SURGERY (FESS) Right 3/6/2018   • LAPAROSCOPIC CHOLECYSTECTOMY     • OVARIAN CYST REMOVAL     • TOTAL ABDOMINAL HYSTERECTOMY WITH SALPINGO OOPHORECTOMY       General Information     Row Name 02/04/21 0840          OT Time and Intention    Document Type  therapy note (daily note)  -LW     Mode of Treatment  individual therapy;occupational therapy  -     Row Name 02/04/21 0840          General Information    Patient Profile Reviewed  yes  -       User Key  (r) = Recorded By, (t) = Taken By, (c) = Cosigned By    Initials Name Provider Type    Daphnie Sanchez COTA/L Occupational Therapy Assistant          Mobility/ADL's     Row Name 02/04/21 0840          Transfers    Transfers  toilet transfer;bed-chair transfer;sit-stand transfer  -     Bed-Chair [place] (Transfers)  independent  -     Sit-Stand [place] (Transfers)  independent  -     [place] Level (Toilet Transfer)  independent  -     Row Name 02/04/21 0840          Toilet Transfer    Type (Toilet Transfer)  sit-stand;stand-sit  -     Row Name 02/04/21 0840          Activities of Daily Living    BADL Assessment/Intervention  grooming;toileting  -     Row Name 02/04/21 0840          Grooming Assessment/Training    [place] Level (Grooming)  oral care regimen;wash face, hands;independent  -     Position (Grooming)  sink side  -     Row Name 02/04/21 0840          Toileting Assessment/Training    [place] Level (Toileting)  adjust/manage clothing;perform perineal hygiene;independent  -     Row Name 02/04/21 0840          Bathing Assessment/Intervention    Comment (Bathing)  Pt completed bathing with [place].  -       User Key  (r) = Recorded By, (t) = Taken By, (c) = Cosigned By    Initials Name Provider Type    Daphnie Sanchez COTA/ELSA  Occupational Therapy Assistant        Obj/Interventions     Row Name 02/04/21 0840          Shoulder (Therapeutic Exercise)    Shoulder (Therapeutic Exercise)  strengthening exercise  -     Shoulder Strengthening (Therapeutic Exercise)  flexion;extension;aBduction;aDduction;external rotation;internal rotation;horizontal aBduction/aDduction;resistance band;red;10 repetitions;2 sets  -     Row Name 02/04/21 0840          Elbow/Forearm (Therapeutic Exercise)    Elbow/Forearm (Therapeutic Exercise)  AROM (active range of motion);strengthening exercise  -     Elbow/Forearm AROM (Therapeutic Exercise)  flexion;extension;10 repetitions;2 sets  -LW     Elbow/Forearm Strengthening (Therapeutic Exercise)  resistance band;red;10 repetitions;2 sets  -     Row Name 02/04/21 0840          Wrist (Therapeutic Exercise)    Wrist (Therapeutic Exercise)  AROM (active range of motion)  -     Wrist AROM (Therapeutic Exercise)  flexion;extension;10 repetitions;2 sets  -     Wrist Strengthening (Therapeutic Exercise)  bilateral;resistance band;red;10 repetitions;2 sets  -     Row Name 02/04/21 0840          Therapeutic Exercise    Therapeutic Exercise  shoulder;elbow/forearm;wrist  -       User Key  (r) = Recorded By, (t) = Taken By, (c) = Cosigned By    Initials Name Provider Type    Daphnie Sanchez COTA/L Occupational Therapy Assistant        Goals/Plan     Row Name 02/04/21 0840          Transfer Goal 1 (OT)    [place] Level/Cues Needed (Transfer Goal 1, OT)  independent  -       User Key  (r) = Recorded By, (t) = Taken By, (c) = Cosigned By    Initials Name Provider Type    Daphnie Sanchez COTA/L Occupational Therapy Assistant        Clinical Impression     Row Name 02/04/21 1216          Pain Scale: Numbers Pre/Post-Treatment    Pretreatment Pain Rating  0/10 - no pain  -     Posttreatment Pain Rating  0/10 - no pain  -     Row Name 02/04/21 1216          Plan of Care Review    Outcome Summary  Pt  completed bath independently this morning. Pt did agree to complete grooming with [place] sink side. Pt performed toileting with [place]. Completed 10x2 sets of red t-h band with HEP. Educated pt on home safety.  -LW     Row Name 02/04/21 1216          Positioning and Restraints    Pre-Treatment Position  in bed  -LW     Post Treatment Position  bed  -LW     In Bed  fowlers;call light within reach;encouraged to call for assist;exit alarm on;side rails up x2  -LW       User Key  (r) = Recorded By, (t) = Taken By, (c) = Cosigned By    Initials Name Provider Type    Daphnie Sanchez COTA/L Occupational Therapy Assistant        Outcome Measures     Row Name 02/04/21 0840          How much help from another is currently needed...    Putting on and taking off regular lower body clothing?  4  -LW     Bathing (including washing, rinsing, and drying)  4  -LW     Toileting (which includes using toilet bed pan or urinal)  4  -LW     Putting on and taking off regular upper body clothing  4  -LW     Taking care of personal grooming (such as brushing teeth)  4  -LW     Eating meals  4  -LW     AM-PAC 6 Clicks Score (OT)  24  -LW       User Key  (r) = Recorded By, (t) = Taken By, (c) = Cosigned By    Initials Name Provider Type    Daphnie Sanchez COTA/L Occupational Therapy Assistant        Occupational Therapy Education                 Title: PT OT SLP Therapies (Resolved)     Topic: Occupational Therapy (Resolved)     Point: ADL training (Resolved)     Description:   Instruct learner(s) on proper safety adaptation and remediation techniques during self care or transfers.   Instruct in proper use of assistive devices.              Learning Progress Summary           Patient Acceptance, E, NR by  at 2/1/2021 1346    Comment: Educated about OT and POC. Educated on safety throughout and deep breathing.                   Point: Home exercise program (Resolved)     Description:   Instruct learner(s) on appropriate  technique for monitoring, assisting and/or progressing therapeutic exercises/activities.              Learner Progress:  Not documented in this visit.          Point: Precautions (Resolved)     Description:   Instruct learner(s) on prescribed precautions during self-care and functional transfers.              Learning Progress Summary           Patient Acceptance, E, NR by  at 2/1/2021 1346    Comment: Educated about OT and POC. Educated on safety throughout and deep breathing.                   Point: Body mechanics (Resolved)     Description:   Instruct learner(s) on proper positioning and spine alignment during self-care, functional mobility activities and/or exercises.              Learner Progress:  Not documented in this visit.                      User Key     Initials Effective Dates Name Provider Type [facility] 06/08/18 -  Breanne Melendez, OTR/L Occupational Therapist OT              OT Recommendation and Plan     Plan of Care Review  Outcome Summary: Pt completed bath independently this morning. Pt did agree to complete grooming with [place] sink side. Pt performed toileting with [place]. Completed 10x2 sets of red t-h band with HEP. Educated pt on home safety.     Time Calculation:   Time Calculation- OT     Row Name 02/04/21 0840             Time Calculation- OT    OT Start Time  0840  -LW      OT Stop Time  0935  -LW      OT Time Calculation (min)  55 min  -LW      Total Timed Code Minutes- OT  55 minute(s)  -LW      OT Received On  02/04/21  -LW        User Key  (r) = Recorded By, (t) = Taken By, (c) = Cosigned By    Initials Name Provider Type     Daphnie William COTA/L Occupational Therapy Assistant        Therapy Charges for Today     Code Description Service Date Service Provider Modifiers Qty    55349885764 HC OT SELF CARE/MGMT/TRAIN EA 15 MIN 2/4/2021 Daphnie William COTA/L GO 2    05695729160 HC OT THER PROC EA 15 MIN 2/4/2021 Daphnie William COTA/ELSA GO 2                Daphnie William, REYES/ELSA  2/4/2021

## 2021-02-04 NOTE — NURSING NOTE
Patients oxygen remains at 91% on room air when resting in bed, upon ambulating in room oxygen saturation dropped to 86%. Pt did not have any shortness of breath. Pt returned to sitting position and recovered to 90% room air. 2 Liters oxygen applied. Patients oxygen currently 93 percent on 2 Liters of oxygen.

## 2021-02-04 NOTE — PLAN OF CARE
Goal Outcome Evaluation:Patient sitting up in bed awake alert and oriented offers no complaints. Encouraged use of incentive spirometer tolerated well. Condition stable in no acute distress at this time.

## 2021-02-04 NOTE — PROGRESS NOTES
"Adult Nutrition  Assessment    Patient Name:  Lexie Cardona  YOB: 1950  MRN: [id number]  Admit Date:  1/29/2021    Assessment Date:  2/4/2021    Comments:  71yo female assessed by RD for LOS. Pt admit w/ acute respiratory failure and pneumonia r/t +COVID 19. Pt is s/p course of remdesivir and continues  Decadron BID at this time. Only requires 2L 02 NC currently and MD notes \"much improved 2/3\"- d/c planning in progress. H/o CKD stg2 and IBS. Alb 2.9L. Reg diet, intakes average 81% over LOS. Admit wt 156# and currently 167# w/ BMI 26.1. Nutrition appropriate for condition at this time. RD to follow hospital course.    Anthropometrics     Row Name 02/04/21 0516          Anthropometrics    Weight  75.9 kg (167 lb 5.3 oz)         Electronically signed by:  Alyse Chandler RD  02/04/21 09:56 CST  "

## 2021-02-05 ENCOUNTER — TRANSITIONAL CARE MANAGEMENT TELEPHONE ENCOUNTER (OUTPATIENT)
Dept: CALL CENTER | Facility: HOSPITAL | Age: 71
End: 2021-02-05

## 2021-02-05 NOTE — OUTREACH NOTE
Prep Survey      Responses   [facility] patient discharged from?  [place]   Is LACE score < 7 ?  No   Emergency Room discharge w/ pulse ox?  No   Eligibility  [facility]   Date of Admission  01/29/21   Date of Discharge  02/04/21   Discharge diagnosis  covid   Does the patient have one of the following disease processes/diagnoses(primary or secondary)?  COVID-19   Does the patient have Home health ordered?  No   Is there a DME ordered?  Yes   [facility]   Prep survey completed?  Yes          Darling Slater RN

## 2021-02-05 NOTE — OUTREACH NOTE
Call Center TCM Note      Responses   [facility] patient discharged from?  [place]   Does the patient have one of the following disease processes/diagnoses(primary or secondary)?  COVID-19   COVID-19 underlying condition?  None   TCM attempt successful?  Yes   Call start time  1240   Call end time  1251   Discharge diagnosis  covid   Meds reviewed with patient/caregiver?  Yes   Is the patient having any side effects they believe may be caused by any medication additions or changes?  No   Does the patient have all medications ordered at discharge?  Yes   Is the patient taking all medications as directed (includes completed medication regime)?  Yes   Does the patient have a primary care provider?   Yes   Comments regarding PCP  Has a followup with Dr Gomez via telehealth video visit on 2/11/2021   Does the patient have an appointment with their PCP or specialist within 7 days of discharge?  Yes   Has the patient kept scheduled appointments due by today?  N/A   Has home health visited the patient within 72 hours of discharge?  N/A   What DME was ordered?  [facility]   Has all DME been delivered?  Yes   DME comments  02 2 [facility] sats are 95 % with 02 on, but with it off with activity can drop to 84.    Psychosocial issues?  No   Did the patient receive a copy of their discharge instructions?  Yes   Did the patient receive a copy of COVID-19 specific instructions?  Yes   Nursing interventions  Reviewed instructions with patient   What is the patient's perception of their health status since discharge?  Improving   Does the patient have any of the following symptoms?  Shortness of breath   Nursing Interventions  Nurse provided patient education   Pulse Ox monitoring  None   Is the patient/caregiver able to teach back steps to recovery at home?  Set small, achievable goals for return to baseline health, Rest and rebuild strength, gradually increase activity, Make a list of questions for provider's appointment    If the patient is a current smoker, are they able to teach back resources for cessation?  Not a smoker   Is the patient/caregiver able to teach back the hierarchy of who to call/visit for symptoms/problems? PCP, Specialist, Home health nurse, Urgent Care, ED, 911  Yes   TCM call completed?  Yes   Wrap up additional comments  Quarantine discussed. Will change toothbrush.           Otoniel Fung RN    2/5/2021, 12:51 EST

## 2021-02-06 ENCOUNTER — READMISSION MANAGEMENT (OUTPATIENT)
Dept: CALL CENTER | Facility: HOSPITAL | Age: 71
End: 2021-02-06

## 2021-02-06 NOTE — OUTREACH NOTE
COVID-19 Week 1 Survey      Responses   [facility] patient discharged from?  [place]   Does the patient have one of the following disease processes/diagnoses(primary or secondary)?  COVID-19   COVID-19 underlying condition?  None   Call Number  Call 2   Week 1 Call successful?  Yes   Call start time  1039   Call end time  1045   Discharge diagnosis  covid   List who call center can speak with  -Rodolfo Wade reviewed with patient/caregiver?  Yes   Is the patient having any side effects they believe may be caused by any medication additions or changes?  No   Does the patient have all medications ordered at discharge?  Yes   Is the patient taking all medications as directed (includes completed medication regime)?  Yes   Medication comments  taking inhalers as prescribed-she reported the schedule to me   Does the patient have a primary care provider?   Yes   Comments regarding PCP  Has a followup with Dr Gomez via telehealth video visit on 2/11/2021   Does the patient have an appointment with their PCP or specialist within 7 days of discharge?  Yes   Has the patient kept scheduled appointments due by today?  N/A   Has home health visited the patient within 72 hours of discharge?  N/A   What DME was ordered?  [facility]   Has all DME been delivered?  Yes   DME comments  O2 at 2 L continuous at 94%,   Psychosocial issues?  No   Psychosocial comments  has  and 3 children to help   Did the patient receive a copy of their discharge instructions?  Yes   Did the patient receive a copy of COVID-19 specific instructions?  Yes   Nursing interventions  Reviewed instructions with patient   What is the patient's perception of their health status since discharge?  Improving   Does the patient have any of the following symptoms?  Shortness of breath   Nursing Interventions  Nurse provided patient education   Pulse Ox monitoring  Intermittent   Pulse Ox device source  Patient   O2 Sat comments  94% on 2 L RA,  if she takes off O2- it drops to 84%   O2 Sat: education provided  Monitoring frequency, Sat levels, When to seek care   Is the patient/caregiver able to teach back steps to recovery at home?  Set small, achievable goals for return to baseline health, Eat a well-balance diet   If the patient is a current smoker, are they able to teach back resources for cessation?  Not a smoker   Is the patient/caregiver able to teach back the hierarchy of who to call/visit for symptoms/problems? PCP, Specialist, Home health nurse, Urgent Care, ED, 911  Yes   COVID-19 call completed?  Yes   Wrap up additional comments  She is eating well, now has an appetite, sleeping on couch, improving.          Britt Diaz, RN

## 2021-02-07 ENCOUNTER — READMISSION MANAGEMENT (OUTPATIENT)
Dept: CALL CENTER | Facility: HOSPITAL | Age: 71
End: 2021-02-07

## 2021-02-07 NOTE — OUTREACH NOTE
COVID-19 Week 1 Survey      Responses   [facility] patient discharged from?  [place]   Does the patient have one of the following disease processes/diagnoses(primary or secondary)?  COVID-19   COVID-19 underlying condition?  None   Call Number  Call 3   Week 1 Call successful?  Yes   Call start time  0951   Call end time  0956   Discharge diagnosis  covid   What is the patient's perception of their health status since discharge?  Improving   Does the patient have any of the following symptoms?  None   Pulse Ox monitoring  Intermittent   Pulse Ox device source  Patient   O2 Sat comments  94% 2L/NC if she takes if off and is sitting it will stay at 93% RA.   COVID-19 call completed?  Yes   Wrap up additional comments  Doing well this am.  No problems or questions at this time.          Cecelia Barraza, RN

## 2021-02-10 ENCOUNTER — TELEPHONE (OUTPATIENT)
Dept: FAMILY MEDICINE CLINIC | Facility: CLINIC | Age: 71
End: 2021-02-10

## 2021-02-10 NOTE — TELEPHONE ENCOUNTER
PATIENT CALLS       REQUESTING SOMETHING FOR THRUSH SHE STATES SHE HAS IT AND HER THROAT HURTS FROM IT   NO OTHER SYMPTOMS PROVIDED     SHE IS SCHEDULED FOR AN APPT TOMORROW AND WANTED TO KEEP THAT APPOINTMENT AND ALSO WANTED TO  REQUEST SOMETHING TO BE CALLED IN     GOOD CONTACT NUMBER   [phone] (H)      VERIFIED   [facility]/pharmacy #[id number] - [place], [place] - [address] - [phone] [facility] [phone]   [phone]

## 2021-02-15 ENCOUNTER — READMISSION MANAGEMENT (OUTPATIENT)
Dept: CALL CENTER | Facility: HOSPITAL | Age: 71
End: 2021-02-15

## 2021-02-15 ENCOUNTER — TELEMEDICINE (OUTPATIENT)
Dept: FAMILY MEDICINE CLINIC | Facility: CLINIC | Age: 71
End: 2021-02-15

## 2021-02-15 VITALS
HEIGHT: 67 IN | SYSTOLIC BLOOD PRESSURE: 120 MMHG | OXYGEN SATURATION: 95 % | BODY MASS INDEX: 26.68 KG/M2 | HEART RATE: 84 BPM | DIASTOLIC BLOOD PRESSURE: 78 MMHG | WEIGHT: 170 LBS

## 2021-02-15 DIAGNOSIS — U07.1 PNEUMONIA DUE TO COVID-19 VIRUS: ICD-10-CM

## 2021-02-15 DIAGNOSIS — J96.01 ACUTE RESPIRATORY FAILURE WITH HYPOXIA (HCC): ICD-10-CM

## 2021-02-15 DIAGNOSIS — B37.0 ORAL THRUSH: ICD-10-CM

## 2021-02-15 DIAGNOSIS — Z09 HOSPITAL DISCHARGE FOLLOW-UP: Primary | ICD-10-CM

## 2021-02-15 DIAGNOSIS — J12.82 PNEUMONIA DUE TO COVID-19 VIRUS: ICD-10-CM

## 2021-02-15 PROBLEM — M15.9 GENERALIZED OSTEOARTHRITIS: Status: ACTIVE | Noted: 2021-02-15

## 2021-02-15 PROBLEM — N18.30 STAGE 3 CHRONIC KIDNEY DISEASE (HCC): Status: ACTIVE | Noted: 2021-02-15

## 2021-02-15 PROBLEM — M85.89 OSTEOPENIA OF MULTIPLE SITES: Status: ACTIVE | Noted: 2021-02-15

## 2021-02-15 PROCEDURE — 99495 TRANSJ CARE MGMT MOD F2F 14D: CPT | Performed by: FAMILY MEDICINE

## 2021-02-15 RX ORDER — FLUCONAZOLE 150 MG/1
150 TABLET ORAL DAILY
Qty: 7 TABLET | Refills: 0 | Status: SHIPPED | OUTPATIENT
Start: 2021-02-15 | End: 2021-02-22

## 2021-02-15 NOTE — OUTREACH NOTE
COVID-19 Week 2 Survey      Responses   [facility] patient discharged from?  [place]   Does the patient have one of the following disease processes/diagnoses(primary or secondary)?  COVID-19   COVID-19 underlying condition?  None   Call Number  Call 1   COVID-19 Week 2: Call 1 attempt successful?  Yes   Call start time  1425   Call end time  1432   Discharge diagnosis  covid   Is the patient having any side effects they believe may be caused by any medication additions or changes?  Yes   Side effects comments   anaphylactic reaction to Nystatin--took vistaril for it   Medication comments  nystatin swish and swallow--for thrush--had analphylactic reaction with this   Has the patient kept scheduled appointments due by today?  Yes   Psychosocial issues?  No   Nursing Interventions  Nurse provided patient education   Pulse Ox monitoring  Intermittent   Pulse Ox device source  Patient   O2 Sat comments  94% 2L/NC if she takes if off and is sitting it will stay at 93% RA.   Is the patient/caregiver able to teach back the hierarchy of who to call/visit for symptoms/problems? PCP, Specialist, Home health nurse, Urgent Care, ED, 911  Yes   COVID-19 call completed?  Yes          Naye Lopes RN

## 2021-02-15 NOTE — PROGRESS NOTES
Transitional Care Follow Up Visit  Subjective     Lexievalentin Cardona is a 71 y.o. female who presents for a transitional care management visit.    Within 48 business hours after discharge our office contacted her via telephone to coordinate her care and needs.      I reviewed and discussed the details of that call along with the discharge summary, hospital problems, inpatient lab results, inpatient diagnostic studies, and consultation reports with Lexie.     Current outpatient and discharge medications have been reconciled for the patient.  Reviewed by: Matthias Gomez DO      Date of TCM Phone Call 2/4/2021   [facility]   Date of Admission 1/29/2021   Date of Discharge 2/4/2021     Risk for Readmission (LACE) Score: 9 (2/4/2021  5:00 AM)      History of Present Illness   Course During Hospital Stay: Patient initially presented to emergency department on 1/29/2021 due to worsening shortness of breath secondary to Covid 19.  She was admitted on 1/29/2021 due to acute respiratory failure with hypoxia secondary to COVID-19 viral pneumonia.  She was started on noninvasive respiratory therapy, Decadron, remdesivir, and multiple inhalers.  She had significant improvement and became less symptomatic and completed a course of remdesivir and Decadron.  Blood culture showed no growth.  Oxygen requirement was down to 1 to 2 L via nasal cannulae with saturations in the low 90s at time of discharge.  At time of discharge she was noted to desaturate to about 86% on room air with activity and was discharged home with 2 L supplemental oxygen and bronchodilators.  Patient reports that since discharge she has been feeling much better.  She does still have some wheezing occasionally and is using nebulizer treatments for this.  Denies excessive shortness of breath.  Oxygen saturations have been in the mid to upper 90s on room air.  She has stopped using supplemental oxygen at this time.  She denies  "any chest pain or significant dyspnea.  She does have some throat irritation and believes this is secondary to suspected thrush as she was started on a Symbicort inhaler.  She did take Diflucan in the hospital which did seem to help with this.  She was started on nystatin in the outpatient setting but reports she had a \"allergic reaction to this\".  She reports she had a \"anaphylactic reaction in my stomach\".  She reports that her \"anaphylactic reactions are not like the normal reactions and it almost caused a bowel obstruction\".  She is discontinue nystatin at this time but is still having irritation in her mouth and throat.  She would like to try Diflucan.  Denies any fevers or chills.  Denies nausea or vomiting.  Denies diarrhea constipation.  Overall she is feeling much better.     The following portions of the patient's history were reviewed and updated as appropriate: allergies, current medications, past family history, past medical history, past social history, past surgical history and problem list.    Review of Systems   Constitutional: Negative for activity change, chills, fever and unexpected weight change.   HENT: Positive for sore throat. Negative for congestion, ear pain, facial swelling, hearing loss, rhinorrhea, tinnitus, trouble swallowing and voice change.    Eyes: Negative for visual disturbance.   Respiratory: Negative for cough and shortness of breath.    Cardiovascular: Negative for chest pain, palpitations and leg swelling.   Gastrointestinal: Negative for abdominal pain, blood in stool, constipation, diarrhea, nausea and vomiting.   Endocrine: Negative for polydipsia, polyphagia and polyuria.   Genitourinary: Negative for dysuria, frequency, hematuria and urgency.   Musculoskeletal: Negative for arthralgias and myalgias.   Skin: Negative for rash.   Neurological: Negative for dizziness, light-headedness, numbness and headaches.   Psychiatric/Behavioral: Negative for sleep disturbance and " suicidal ideas.       Objective   Physical Exam  Vitals signs and nursing note reviewed.   Constitutional:       General: She is awake. She is not in acute distress.     Appearance: Normal appearance. She is well-groomed. She is not ill-appearing, toxic-appearing or diaphoretic.      Interventions: She is not intubated.  HENT:      Head: Normocephalic and atraumatic.      Right Ear: Hearing and external ear normal.      Left Ear: Hearing and external ear normal.      Nose: Nose normal.   Eyes:      General: Lids are normal.      Extraocular Movements:      Right eye: Normal extraocular motion and no nystagmus.      Left eye: Normal extraocular motion and no nystagmus.      Conjunctiva/sclera: Conjunctivae normal.   Neck:      Musculoskeletal: Full passive range of motion without pain.   Cardiovascular:      Rate and Rhythm: Normal rate.   Pulmonary:      Effort: Pulmonary effort is normal. No tachypnea, bradypnea, accessory muscle usage, prolonged expiration, respiratory distress or retractions. She is not intubated.      Breath sounds: No stridor.   Skin:     General: Skin is dry.   Neurological:      General: No focal deficit present.      Mental Status: She is alert and oriented to person, place, and time. Mental status is at baseline.   Psychiatric:         Attention and Perception: Attention and perception normal.         Mood and Affect: Mood and affect normal.         Speech: Speech normal.         Behavior: Behavior normal. Behavior is cooperative.         Thought Content: Thought content normal.         Cognition and Memory: Cognition and memory normal.         Judgment: Judgment normal.         Assessment/Plan   Diagnoses and all orders for this visit:    1. Hospital discharge follow-up (Primary)  Patient has been doing fairly well since time of discharge.  At this time I counseled her to continue Symbicort inhaler and bronchodilators as needed.  We will follow-up in 1 week to reassess.    2. Acute  respiratory failure with hypoxia (CMS/HCC)  Resolved.  Continue Symbicort and bronchodilators.  Continue supplemental oxygen if needed.  Seek immediate medical attention for new or worsening symptoms.    3. Pneumonia due to COVID-19 virus  Symptoms have improved/resolved.  Continue current care.  Will follow.    4. Oral thrush  Diflucan ordered.  Swish and spit after Symbicort.  Will follow.    Other orders  -     fluconazole (Diflucan) 150 MG tablet; Take 1 tablet by mouth Daily for 7 days.  Dispense: 7 tablet; Refill: 0

## 2021-02-15 NOTE — PATIENT INSTRUCTIONS
Oral Thrush, Adult    Oral thrush, also called oral candidiasis, is a fungal infection that develops in the mouth and throat and on the tongue. It causes white patches to form on the mouth and tongue. Thrush is most common in older adults, but it can occur at any age.  Many cases of thrush are mild, but this infection can also be serious. Thrush can be a repeated (recurrent) problem for certain people who have a weak body defense system (immune system). The weakness can be caused by chronic illnesses, or by taking medicines that limit the body's ability to fight infection. If a person has difficulty fighting infection, the fungus that causes thrush can spread through the body. This can cause life-threatening blood or organ infections.  What are the causes?  This condition is caused by a fungus (yeast) called Candida albicans.  · This fungus is normally present in small amounts in the mouth and on other mucous membranes. It usually causes no harm.  · If conditions are present that allow the fungus to grow without control, it invades surrounding tissues and becomes an infection.  · Other Candida species can also lead to thrush (rare).  What increases the risk?  This condition is more likely to develop in:  · People with a weakened immune system.  · Older adults.  · People with HIV (human immunodeficiency virus).  · People with diabetes.  · People with dry mouth (xerostomia).  · Pregnant women.  · People with poor dental care, especially people who have false teeth.  · People who use antibiotic medicines.  What are the signs or symptoms?  Symptoms of this condition can vary from mild and moderate to severe and persistent. Symptoms may include:  · A burning feeling in the mouth and throat. This can occur at the start of a thrush infection.  · White patches that stick to the mouth and tongue. The tissue around the patches may be red, raw, and painful. If rubbed (during tooth brushing, for example), the patches and the  tissue of the mouth may bleed easily.  · A bad taste in the mouth or difficulty tasting foods.  · A cottony feeling in the mouth.  · Pain during eating and swallowing.  · Poor appetite.  · Cracking at the corners of the mouth.  How is this diagnosed?  This condition is diagnosed based on:  · Physical exam. Your health care provider will look in your mouth.  · Health history. Your health care provider will ask you questions about your health.  How is this treated?  This condition is treated with medicines called antifungals, which prevent the growth of fungi. These medicines are either applied directly to the affected area (topical) or swallowed (oral). The treatment will depend on the severity of the condition.  Mild thrush  Mild cases of thrush may clear up with the use of an antifungal mouth rinse or lozenges. Treatment usually lasts about 14 days.  Moderate to severe thrush  · More severe thrush infections that have spread to the esophagus are treated with an oral antifungal medicine. A topical antifungal medicine may also be used.  · For some severe infections, treatment may need to continue for more than 14 days.  · Oral antifungal medicines are rarely used during pregnancy because they may be harmful to the unborn child. If you are pregnant, talk with your health care provider about options for treatment.  Persistent or recurrent thrush  For cases of thrush that do not go away or keep coming back:  · Treatment may be needed twice as long as the symptoms last.  · Treatment will include both oral and topical antifungal medicines.  · People with a weakened immune system can take an antifungal medicine on a continuous basis to prevent thrush infections.  It is important to treat conditions that make a person more likely to get thrush, such as diabetes or HIV.  Follow these instructions at home:  Medicines  · Take over-the-counter and prescription medicines only as told by your health care provider.  · Talk with  your health care provider about an over-the-counter medicine called gentian violet, which kills bacteria and fungi.  Relieving soreness and discomfort  To help reduce the discomfort of thrush:  · Drink cold liquids such as water or iced tea.  · Try flavored ice treats or frozen juices.  · Eat foods that are easy to swallow, such as gelatin, ice cream, or custard.  · Try drinking from a straw if the patches in your mouth are painful.    General instructions  · Eat plain, unflavored yogurt as directed by your health care provider. Check the label to make sure the yogurt contains live cultures. This yogurt can help healthy bacteria to grow in the mouth and can stop the growth of the fungus that causes thrush.  · If you wear dentures, remove the dentures before going to bed, brush them vigorously, and soak them in a cleaning solution as directed by your health care provider.  · Rinse your mouth with a warm salt-water mixture several times a day. To make a salt-water mixture, completely dissolve 1/2-1 tsp of salt in 1 cup of warm water.  Contact a health care provider if:  · Your symptoms are getting worse or are not improving within 7 days of starting treatment.  · You have symptoms of a spreading infection, such as white patches on the skin outside of the mouth.  This information is not intended to replace advice given to you by your health care provider. Make sure you discuss any questions you have with your health care provider.  Document Revised: 03/22/2019 Document Reviewed: 09/11/2017  [employer] Patient Education © 2020 [employer]

## 2021-02-17 RX ORDER — TRAZODONE HYDROCHLORIDE 100 MG/1
100 TABLET ORAL EVERY EVENING
Qty: 90 TABLET | Refills: 3 | Status: SHIPPED | OUTPATIENT
Start: 2021-02-17 | End: 2021-02-17 | Stop reason: SDUPTHER

## 2021-02-17 RX ORDER — TRAZODONE HYDROCHLORIDE 100 MG/1
100 TABLET ORAL EVERY EVENING
Qty: 90 TABLET | Refills: 3 | Status: SHIPPED | OUTPATIENT
Start: 2021-02-17 | End: 2021-04-16 | Stop reason: SDUPTHER

## 2021-02-19 ENCOUNTER — READMISSION MANAGEMENT (OUTPATIENT)
Dept: CALL CENTER | Facility: HOSPITAL | Age: 71
End: 2021-02-19

## 2021-02-19 NOTE — OUTREACH NOTE
COVID-19 Week 3 Survey      Responses   [facility] patient discharged from?  [place]   Does the patient have one of the following disease processes/diagnoses(primary or secondary)?  COVID-19   COVID-19 underlying condition?  None   Call Number  Call 1   COVID-19 Week 3: Call 1 attempt successful?  Yes   Call start time  1212   Call end time  1213   Discharge diagnosis  covid   Meds reviewed with patient/caregiver?  Yes   Is the patient having any side effects they believe may be caused by any medication additions or changes?  No   Does the patient have all medications ordered at discharge?  Yes   Is the patient taking all medications as directed (includes completed medication regime)?  Yes   Does the patient have a primary care provider?   Yes   Does the patient have an appointment with their PCP or specialist within 7 days of discharge?  Yes   Has the patient kept scheduled appointments due by today?  Yes   Psychosocial issues?  No   Did the patient receive a copy of their discharge instructions?  Yes   Did the patient receive a copy of COVID-19 specific instructions?  Yes   Nursing interventions  Reviewed instructions with patient, Educated on MyChart   What is the patient's perception of their health status since discharge?  Improving   Does the patient have any of the following symptoms?  None   Nursing Interventions  Nurse provided patient education   Pulse Ox monitoring  Intermittent   Pulse Ox device source  Patient   Is the patient/caregiver able to teach back steps to recovery at home?  Set small, achievable goals for return to baseline health, Rest and rebuild strength, gradually increase activity   If the patient is a current smoker, are they able to teach back resources for cessation?  Not a smoker   Is the patient/caregiver able to teach back the hierarchy of who to call/visit for symptoms/problems? PCP, Specialist, Home health nurse, Urgent Care, ED, 911  Yes   COVID-19 call completed?  Yes           Katina Miguel RN

## 2021-02-25 ENCOUNTER — OFFICE VISIT (OUTPATIENT)
Dept: FAMILY MEDICINE CLINIC | Facility: CLINIC | Age: 71
End: 2021-02-25

## 2021-02-25 VITALS
HEART RATE: 88 BPM | HEIGHT: 67 IN | BODY MASS INDEX: 26.38 KG/M2 | OXYGEN SATURATION: 95 % | TEMPERATURE: 98.2 F | SYSTOLIC BLOOD PRESSURE: 110 MMHG | DIASTOLIC BLOOD PRESSURE: 70 MMHG | WEIGHT: 168.1 LBS

## 2021-02-25 DIAGNOSIS — R05.3 PERSISTENT COUGH: Primary | ICD-10-CM

## 2021-02-25 PROCEDURE — 99214 OFFICE O/P EST MOD 30 MIN: CPT | Performed by: FAMILY MEDICINE

## 2021-02-25 RX ORDER — TRIAMCINOLONE ACETONIDE 0.25 MG/G
CREAM TOPICAL 2 TIMES DAILY
Qty: 453 G | Refills: 0 | Status: SHIPPED | OUTPATIENT
Start: 2021-02-25 | End: 2021-07-15

## 2021-02-25 RX ORDER — MONTELUKAST SODIUM 10 MG/1
10 TABLET ORAL
Qty: 90 TABLET | Refills: 3 | Status: SHIPPED | OUTPATIENT
Start: 2021-02-25 | End: 2021-12-08 | Stop reason: SDUPTHER

## 2021-02-26 ENCOUNTER — READMISSION MANAGEMENT (OUTPATIENT)
Dept: CALL CENTER | Facility: HOSPITAL | Age: 71
End: 2021-02-26

## 2021-02-26 NOTE — OUTREACH NOTE
COVID-19 Week 4 Survey      Responses   [facility] patient discharged from?  [place]   Does the patient have one of the following disease processes/diagnoses(primary or secondary)?  COVID-19   COVID-19 underlying condition?  None   Call Number  Call 1   COVID-19 Week 4: Call 1 attempt successful?  Yes   Call start time  1330   Call end time  1337   Discharge diagnosis  covid   Is the patient having any side effects they believe may be caused by any medication additions or changes?  No   Does the patient have all medications ordered at discharge?  Yes   Is the patient taking all medications as directed (includes completed medication regime)?  Yes   Is the patient still receiving Home Health Services?  Yes   DME comments  Not using O2   What is the patient's perception of their health status since discharge?  Improving   Does the patient have any of the following symptoms?  Cough   Nursing Interventions  Nurse provided patient education   Pulse Ox monitoring  Intermittent   Pulse Ox device source  Patient   O2 Sat comments  95% on RA   O2 Sat: education provided  Sat levels, When to seek care, Monitoring frequency   O2 Sat education comments  told pt. if unable to maintain O2 level 92% or greater to return to ED.   Is the patient/caregiver able to teach back steps to recovery at home?  Eat a well-balance diet, Rest and rebuild strength, gradually increase activity   Is the patient/caregiver able to teach back the hierarchy of who to call/visit for symptoms/problems? PCP, Specialist, Home health nurse, Urgent Care, ED, 911  Yes   Is the patient interested in additional calls from an ambulatory ?  NOTE:  applies to high risk patients requiring additional follow-up.  No   Did the patient feel the follow up calls were helpful during their recovery period?  Yes   Was the number of calls appropriate?  Yes   Interested in COVID-19 Plasma Donation?  Uncertain [told pt. if interested to contact [facility].]   Wrap up additional comments  Pt. states she is doing better and still has an occasional cough . Took CXR recently have not received results back yet. Pt. states she is able to run errands but endurance is not back all the way yet. Told pt. to pace self and take breaks in between.           Michelle Street RN

## 2021-03-02 ENCOUNTER — TELEPHONE (OUTPATIENT)
Dept: FAMILY MEDICINE CLINIC | Facility: CLINIC | Age: 71
End: 2021-03-02

## 2021-03-02 NOTE — TELEPHONE ENCOUNTER
Ms. Gordon called to check and see if you had contacted [facility] and discontinued her oxygen since she no longer needs this. Please call back. Thank you.

## 2021-03-03 ENCOUNTER — LAB (OUTPATIENT)
Dept: LAB | Facility: HOSPITAL | Age: 71
End: 2021-03-03

## 2021-03-03 ENCOUNTER — TRANSCRIBE ORDERS (OUTPATIENT)
Dept: LAB | Facility: HOSPITAL | Age: 71
End: 2021-03-03

## 2021-03-03 DIAGNOSIS — N18.30 STAGE 3 CHRONIC KIDNEY DISEASE, UNSPECIFIED WHETHER STAGE 3A OR 3B CKD (HCC): Primary | ICD-10-CM

## 2021-03-03 DIAGNOSIS — N18.30 STAGE 3 CHRONIC KIDNEY DISEASE, UNSPECIFIED WHETHER STAGE 3A OR 3B CKD (HCC): ICD-10-CM

## 2021-03-03 LAB
ALBUMIN SERPL-MCNC: 4 G/DL (ref 3.5–5.2)
ANION GAP SERPL CALCULATED.3IONS-SCNC: 8.2 MMOL/L (ref 5–15)
BUN SERPL-MCNC: 11 MG/DL (ref 8–23)
BUN/CREAT SERPL: 10.8 (ref 7–25)
CALCIUM SPEC-SCNC: 9.5 MG/DL (ref 8.6–10.5)
CHLORIDE SERPL-SCNC: 107 MMOL/L (ref 98–107)
CO2 SERPL-SCNC: 24.8 MMOL/L (ref 22–29)
CREAT SERPL-MCNC: 1.02 MG/DL (ref 0.57–1)
CREAT UR-MCNC: 121.7 MG/DL
GFR SERPL CREATININE-BSD FRML MDRD: 54 ML/MIN/1.73
GLUCOSE SERPL-MCNC: 89 MG/DL (ref 65–99)
PHOSPHATE SERPL-MCNC: 4 MG/DL (ref 2.5–4.5)
POTASSIUM SERPL-SCNC: 4.1 MMOL/L (ref 3.5–5.2)
PROT UR-MCNC: 11 MG/DL
PROT/CREAT UR: 90.4 MG/G CREA (ref 0–200)
SODIUM SERPL-SCNC: 140 MMOL/L (ref 136–145)

## 2021-03-03 PROCEDURE — 36415 COLL VENOUS BLD VENIPUNCTURE: CPT

## 2021-03-03 PROCEDURE — 80069 RENAL FUNCTION PANEL: CPT

## 2021-03-03 PROCEDURE — 84156 ASSAY OF PROTEIN URINE: CPT

## 2021-03-03 PROCEDURE — 82570 ASSAY OF URINE CREATININE: CPT

## 2021-03-15 ENCOUNTER — BULK ORDERING (OUTPATIENT)
Dept: CASE MANAGEMENT | Facility: OTHER | Age: 71
End: 2021-03-15

## 2021-03-15 DIAGNOSIS — Z23 IMMUNIZATION DUE: ICD-10-CM

## 2021-03-22 RX ORDER — EPINEPHRINE 0.3 MG/.3ML
INJECTION SUBCUTANEOUS
Qty: 1 EACH | Refills: 5 | Status: SHIPPED | OUTPATIENT
Start: 2021-03-22 | End: 2021-12-03 | Stop reason: SDUPTHER

## 2021-03-22 NOTE — PROGRESS NOTES
"Chief Complaint  Follow-up    Subjective          Lexie Cardona presents to [facility]  Patient is a pleasant 71-year-old  female that presents today to follow-up on recent diagnosis of viral pneumonia secondary to COVID-19.  Patient reports overall she is feeling much better but she still having some persistent cough with clear sputum production.    Cough  This is a recurrent problem. The current episode started more than 1 month ago. The problem has been waxing and waning. The problem occurs hourly. The cough is productive of sputum. Associated symptoms include postnasal drip. Pertinent negatives include no chest pain, chills, ear congestion, ear pain, fever, headaches, heartburn, hemoptysis, myalgias, nasal congestion, rash, rhinorrhea, sore throat, shortness of breath, sweats, weight loss or wheezing. Nothing aggravates the symptoms. Risk factors: recent covid infection  She has tried a beta-agonist inhaler for the symptoms. The treatment provided mild relief. Her past medical history is significant for pneumonia. There is no history of asthma, bronchiectasis, bronchitis, COPD, emphysema or environmental allergies.       Objective   Vital Signs:   /70   Pulse 88   Temp 98.2 °F (36.8 °C)   Ht 170.2 cm (67\")   Wt 76.2 kg (168 lb 1.6 oz)   SpO2 95%   BMI 26.33 kg/m²     Physical Exam  Vitals and nursing note reviewed.   Constitutional:       General: She is awake. She is not in acute distress.     Appearance: Normal appearance. She is well-developed, well-groomed and overweight. She is not ill-appearing, toxic-appearing or diaphoretic.      Interventions: She is not intubated.  HENT:      Head: Normocephalic and atraumatic.      Right Ear: Hearing and external ear normal.      Left Ear: Hearing and external ear normal.      Nose: Nose normal.   Eyes:      General: Lids are normal. No scleral icterus.        Right eye: No discharge.         Left eye: No " discharge.      Extraocular Movements: Extraocular movements intact.      Right eye: Normal extraocular motion and no nystagmus.      Left eye: Normal extraocular motion and no nystagmus.      Conjunctiva/sclera: Conjunctivae normal.      Pupils: Pupils are equal, round, and reactive to light.   Neck:      Thyroid: No thyroid mass or thyromegaly.      Trachea: Trachea and phonation normal. No tracheal tenderness or tracheal deviation.   Cardiovascular:      Rate and Rhythm: Normal rate and regular rhythm.  No extrasystoles are present.     Chest Wall: PMI is not displaced. No thrill.      Heart sounds: Normal heart sounds, S1 normal and S2 normal. Heart sounds not distant. No murmur heard.   No systolic murmur is present.   No diastolic murmur is present.   No friction rub. No gallop. No S3 or S4 sounds.    Pulmonary:      Effort: Pulmonary effort is normal. No tachypnea, bradypnea, accessory muscle usage, prolonged expiration, respiratory distress or retractions. She is not intubated.      Breath sounds: Normal breath sounds and air entry. No stridor, decreased air movement or transmitted upper airway sounds. No decreased breath sounds, wheezing, rhonchi or rales.   Abdominal:      General: Abdomen is flat. Bowel sounds are normal. There is no distension or abdominal bruit. There are no signs of injury.      Palpations: Abdomen is soft. There is no shifting dullness, fluid wave, hepatomegaly, splenomegaly, mass or pulsatile mass.      Tenderness: There is no abdominal tenderness. There is no right CVA tenderness, left CVA tenderness, guarding or rebound. Negative signs include Sol's sign and McBurney's sign.   Musculoskeletal:      Cervical back: Full passive range of motion without pain, normal range of motion and neck supple. No edema, erythema or rigidity. Normal range of motion.      Right lower leg: No edema.      Left lower leg: No edema.   Lymphadenopathy:      Cervical: No cervical adenopathy.   Skin:      General: Skin is warm and dry.      Findings: No erythema or rash.   Neurological:      General: No focal deficit present.      Mental Status: She is alert and oriented to person, place, and time. Mental status is at baseline.      Cranial Nerves: Cranial nerves are intact. No cranial nerve deficit, dysarthria or facial asymmetry.      Motor: Motor function is intact. No weakness, tremor, atrophy, abnormal muscle tone or seizure activity.      Gait: Gait is intact.   Psychiatric:         Attention and Perception: Attention normal. She is attentive. She does not perceive auditory or visual hallucinations.         Mood and Affect: Mood and affect normal. Mood is not anxious, depressed or elated. Affect is not labile, blunt, flat, angry, tearful or inappropriate.         Speech: Speech normal. She is communicative. Speech is not rapid and pressured, delayed, slurred or tangential.         Behavior: Behavior normal. Behavior is not agitated, slowed, aggressive, withdrawn, hyperactive or combative. Behavior is cooperative.         Thought Content: Thought content normal.         Cognition and Memory: Cognition and memory normal. Cognition is not impaired. Memory is not impaired. She does not exhibit impaired recent memory or impaired remote memory.         Judgment: Judgment normal. Judgment is not impulsive or inappropriate.        Result Review :                 Assessment and Plan    Diagnoses and all orders for this visit:    1. Persistent cough (Primary)  We will get chest x-ray to rule out lingering consolidation.  Will start Singulair for resistant allergy type symptoms.  Will follow closely and adjust as needed.  Patient agreeable with plan verbalized understanding.  -     XR Chest PA & Lateral; Future    Other orders  -     montelukast (SINGULAIR) 10 MG tablet; Take 1 tablet by mouth every night at bedtime.  Dispense: 90 tablet; Refill: 3  -     triamcinolone (KENALOG) 0.025 % cream; Apply  topically to the  appropriate area as directed 2 (Two) Times a Day.  Dispense: 453 g; Refill: 0      I spent 15 minutes caring for Lexie on this date of service. This time includes time spent by me in the following activities:preparing for the visit, obtaining and/or reviewing a separately obtained history, performing a medically appropriate examination and/or evaluation , counseling and educating the patient/family/caregiver, ordering medications, tests, or procedures and documenting information in the medical record  Follow Up   No follow-ups on file.  Patient was given instructions and counseling regarding her condition or for health maintenance advice. Please see specific information pulled into the AVS if appropriate.

## 2021-04-16 ENCOUNTER — TELEPHONE (OUTPATIENT)
Dept: FAMILY MEDICINE CLINIC | Facility: CLINIC | Age: 71
End: 2021-04-16

## 2021-04-16 ENCOUNTER — OFFICE VISIT (OUTPATIENT)
Dept: FAMILY MEDICINE CLINIC | Facility: CLINIC | Age: 71
End: 2021-04-16

## 2021-04-16 ENCOUNTER — LAB (OUTPATIENT)
Dept: LAB | Facility: HOSPITAL | Age: 71
End: 2021-04-16

## 2021-04-16 VITALS
OXYGEN SATURATION: 100 % | WEIGHT: 174 LBS | HEIGHT: 64 IN | TEMPERATURE: 97.7 F | DIASTOLIC BLOOD PRESSURE: 80 MMHG | HEART RATE: 69 BPM | BODY MASS INDEX: 29.71 KG/M2 | SYSTOLIC BLOOD PRESSURE: 124 MMHG

## 2021-04-16 DIAGNOSIS — R25.2 MUSCLE CRAMPS: ICD-10-CM

## 2021-04-16 DIAGNOSIS — Z78.0 POSTMENOPAUSAL: ICD-10-CM

## 2021-04-16 DIAGNOSIS — E78.00 HYPERCHOLESTEROLEMIA: ICD-10-CM

## 2021-04-16 DIAGNOSIS — F51.01 PRIMARY INSOMNIA: Chronic | ICD-10-CM

## 2021-04-16 DIAGNOSIS — N18.30 STAGE 3 CHRONIC KIDNEY DISEASE, UNSPECIFIED WHETHER STAGE 3A OR 3B CKD (HCC): ICD-10-CM

## 2021-04-16 DIAGNOSIS — J32.9 CHRONIC SINUSITIS, UNSPECIFIED LOCATION: Primary | ICD-10-CM

## 2021-04-16 DIAGNOSIS — F51.01 PRIMARY INSOMNIA: ICD-10-CM

## 2021-04-16 LAB
ALBUMIN SERPL-MCNC: 4.2 G/DL (ref 3.5–5.2)
ALBUMIN/GLOB SERPL: 1.8 G/DL
ALP SERPL-CCNC: 79 U/L (ref 39–117)
ALT SERPL W P-5'-P-CCNC: 17 U/L (ref 1–33)
ANION GAP SERPL CALCULATED.3IONS-SCNC: 8.1 MMOL/L (ref 5–15)
AST SERPL-CCNC: 23 U/L (ref 1–32)
BASOPHILS # BLD AUTO: 0.08 10*3/MM3 (ref 0–0.2)
BASOPHILS NFR BLD AUTO: 1.3 % (ref 0–1.5)
BILIRUB SERPL-MCNC: 0.5 MG/DL (ref 0–1.2)
BUN SERPL-MCNC: 15 MG/DL (ref 8–23)
BUN/CREAT SERPL: 10.8 (ref 7–25)
CALCIUM SPEC-SCNC: 10.3 MG/DL (ref 8.6–10.5)
CHLORIDE SERPL-SCNC: 106 MMOL/L (ref 98–107)
CHOLEST SERPL-MCNC: 302 MG/DL (ref 0–200)
CO2 SERPL-SCNC: 28.9 MMOL/L (ref 22–29)
CREAT SERPL-MCNC: 1.39 MG/DL (ref 0.57–1)
DEPRECATED RDW RBC AUTO: 43.7 FL (ref 37–54)
EOSINOPHIL # BLD AUTO: 0.23 10*3/MM3 (ref 0–0.4)
EOSINOPHIL NFR BLD AUTO: 3.8 % (ref 0.3–6.2)
ERYTHROCYTE [DISTWIDTH] IN BLOOD BY AUTOMATED COUNT: 13.5 % (ref 12.3–15.4)
GFR SERPL CREATININE-BSD FRML MDRD: 37 ML/MIN/1.73
GLOBULIN UR ELPH-MCNC: 2.3 GM/DL
GLUCOSE SERPL-MCNC: 90 MG/DL (ref 65–99)
HCT VFR BLD AUTO: 47.3 % (ref 34–46.6)
HDLC SERPL-MCNC: 55 MG/DL (ref 40–60)
HGB BLD-MCNC: 15.7 G/DL (ref 12–15.9)
IMM GRANULOCYTES # BLD AUTO: 0.01 10*3/MM3 (ref 0–0.05)
IMM GRANULOCYTES NFR BLD AUTO: 0.2 % (ref 0–0.5)
LDLC SERPL CALC-MCNC: 203 MG/DL (ref 0–100)
LDLC/HDLC SERPL: 3.66 {RATIO}
LYMPHOCYTES # BLD AUTO: 2.75 10*3/MM3 (ref 0.7–3.1)
LYMPHOCYTES NFR BLD AUTO: 45.5 % (ref 19.6–45.3)
MAGNESIUM SERPL-MCNC: 2.2 MG/DL (ref 1.6–2.4)
MCH RBC QN AUTO: 29.6 PG (ref 26.6–33)
MCHC RBC AUTO-ENTMCNC: 33.2 G/DL (ref 31.5–35.7)
MCV RBC AUTO: 89.1 FL (ref 79–97)
MONOCYTES # BLD AUTO: 0.5 10*3/MM3 (ref 0.1–0.9)
MONOCYTES NFR BLD AUTO: 8.3 % (ref 5–12)
NEUTROPHILS NFR BLD AUTO: 2.47 10*3/MM3 (ref 1.7–7)
NEUTROPHILS NFR BLD AUTO: 40.9 % (ref 42.7–76)
NRBC BLD AUTO-RTO: 0 /100 WBC (ref 0–0.2)
PLATELET # BLD AUTO: 275 10*3/MM3 (ref 140–450)
PMV BLD AUTO: 9.9 FL (ref 6–12)
POTASSIUM SERPL-SCNC: 4.2 MMOL/L (ref 3.5–5.2)
PROT SERPL-MCNC: 6.5 G/DL (ref 6–8.5)
RBC # BLD AUTO: 5.31 10*6/MM3 (ref 3.77–5.28)
SODIUM SERPL-SCNC: 143 MMOL/L (ref 136–145)
TRIGL SERPL-MCNC: 229 MG/DL (ref 0–150)
VLDLC SERPL-MCNC: 44 MG/DL (ref 5–40)
WBC # BLD AUTO: 6.04 10*3/MM3 (ref 3.4–10.8)

## 2021-04-16 PROCEDURE — 99214 OFFICE O/P EST MOD 30 MIN: CPT | Performed by: FAMILY MEDICINE

## 2021-04-16 PROCEDURE — 85025 COMPLETE CBC W/AUTO DIFF WBC: CPT

## 2021-04-16 PROCEDURE — 80061 LIPID PANEL: CPT

## 2021-04-16 PROCEDURE — 83735 ASSAY OF MAGNESIUM: CPT

## 2021-04-16 PROCEDURE — 80053 COMPREHEN METABOLIC PANEL: CPT

## 2021-04-16 PROCEDURE — 36415 COLL VENOUS BLD VENIPUNCTURE: CPT

## 2021-04-16 RX ORDER — ALUMINUM HYDROXIDE, MAGNESIUM HYDROXIDE, DIMETHICONE 400; 400; 40 MG/5ML; MG/5ML; MG/5ML
1 LIQUID ORAL
COMMUNITY
End: 2021-08-31

## 2021-04-16 RX ORDER — ASCORBIC ACID 500 MG
TABLET ORAL
COMMUNITY
End: 2021-06-07

## 2021-04-16 RX ORDER — TRAZODONE HYDROCHLORIDE 150 MG/1
150 TABLET ORAL NIGHTLY
Qty: 30 TABLET | Refills: 0 | Status: SHIPPED | OUTPATIENT
Start: 2021-04-16 | End: 2021-05-13 | Stop reason: SDUPTHER

## 2021-04-17 DIAGNOSIS — F51.01 PRIMARY INSOMNIA: Chronic | ICD-10-CM

## 2021-04-17 RX ORDER — TEMAZEPAM 30 MG/1
30 CAPSULE ORAL NIGHTLY PRN
Qty: 30 CAPSULE | Refills: 2 | Status: SHIPPED | OUTPATIENT
Start: 2021-04-17 | End: 2021-07-15

## 2021-04-19 ENCOUNTER — IMMUNIZATION (OUTPATIENT)
Dept: VACCINE CLINIC | Facility: HOSPITAL | Age: 71
End: 2021-04-19

## 2021-04-19 PROCEDURE — 0001A: CPT | Performed by: THORACIC SURGERY (CARDIOTHORACIC VASCULAR SURGERY)

## 2021-04-19 PROCEDURE — 91300 HC SARSCOV02 VAC 30MCG/0.3ML IM: CPT | Performed by: THORACIC SURGERY (CARDIOTHORACIC VASCULAR SURGERY)

## 2021-04-19 RX ORDER — TEMAZEPAM 30 MG/1
30 CAPSULE ORAL NIGHTLY PRN
Qty: 30 CAPSULE | Refills: 2 | OUTPATIENT
Start: 2021-04-19

## 2021-04-22 ENCOUNTER — HOSPITAL ENCOUNTER (EMERGENCY)
Facility: HOSPITAL | Age: 71
Discharge: HOME | End: 2021-04-22
Attending: EMERGENCY MEDICINE | Admitting: EMERGENCY MEDICINE

## 2021-04-22 VITALS
HEART RATE: 83 BPM | TEMPERATURE: 97.7 F | RESPIRATION RATE: 16 BRPM | SYSTOLIC BLOOD PRESSURE: 131 MMHG | WEIGHT: 170 LBS | HEIGHT: 64 IN | BODY MASS INDEX: 29.02 KG/M2 | DIASTOLIC BLOOD PRESSURE: 75 MMHG | OXYGEN SATURATION: 98 %

## 2021-04-22 DIAGNOSIS — T88.1XXA POST-IMMUNIZATION REACTION, INITIAL ENCOUNTER: Primary | ICD-10-CM

## 2021-04-22 PROCEDURE — 99283 EMERGENCY DEPT VISIT LOW MDM: CPT

## 2021-04-22 PROCEDURE — 63710000001 PREDNISONE PER 1 MG: Performed by: EMERGENCY MEDICINE

## 2021-04-22 RX ORDER — DOXYCYCLINE 100 MG/1
100 CAPSULE ORAL 2 TIMES DAILY
Qty: 14 CAPSULE | Refills: 0 | Status: SHIPPED | OUTPATIENT
Start: 2021-04-22 | End: 2021-06-17

## 2021-04-22 RX ORDER — METHYLPREDNISOLONE 4 MG/1
TABLET ORAL
Qty: 21 EACH | Refills: 0 | Status: SHIPPED | OUTPATIENT
Start: 2021-04-22 | End: 2021-06-17

## 2021-04-22 RX ORDER — PREDNISONE 20 MG/1
40 TABLET ORAL ONCE
Status: COMPLETED | OUTPATIENT
Start: 2021-04-22 | End: 2021-04-22

## 2021-04-22 RX ADMIN — PREDNISONE 40 MG: 20 TABLET ORAL at 21:41

## 2021-04-23 NOTE — ED PROVIDER NOTES
Subjective   Patient presents emergency department complaint of left arm swelling and redness status post a Covid shot approximately 3 to 4 days ago.  Patient notes that the day after she had a Covid shot she had a fever to 101.4.  Patient is some soreness in the arm but today she had rash to the area.  Patient no longer having fever.  Patient having no nausea vomiting, no bowel or bladder changes.  Patient having no shortness of breath.  Patient notes that she did have Covid infection itself several months before her vaccine injection.          Review of Systems   Constitutional: Negative.  Negative for appetite change, chills and fever.   HENT: Negative.  Negative for congestion.    Eyes: Negative.  Negative for photophobia and visual disturbance.   Respiratory: Negative.  Negative for cough, chest tightness and shortness of breath.    Cardiovascular: Negative.  Negative for chest pain and palpitations.   Gastrointestinal: Negative.  Negative for abdominal pain, constipation, diarrhea, nausea and vomiting.   Endocrine: Negative.    Genitourinary: Negative.  Negative for decreased urine volume, dysuria, flank pain and hematuria.   Musculoskeletal: Negative.  Negative for arthralgias, back pain, myalgias, neck pain and neck stiffness.   Skin: Positive for rash. Negative for pallor.   Neurological: Negative.  Negative for dizziness, syncope, weakness, light-headedness, numbness and headaches.   Psychiatric/Behavioral: Negative.  Negative for confusion and suicidal ideas. The patient is not nervous/anxious.    All other systems reviewed and are negative.      Past Medical History:   Diagnosis Date   • Allergic rhinitis    • Blastomycosis    • Chronic bronchitis (CMS/HCC)    • Diverticulitis of colon    • Gastritis    • Gastroesophageal reflux disease    • Gastroparesis    • Gout    • Hypercholesterolemia    • IBS (irritable bowel syndrome)    • Meniere's disease    • Myoclonic disorder    • Osteoarthritis    • Skin  cancer    • Sleep apnea        Allergies   Allergen Reactions   • Augmentin [Amoxicillin-Pot Clavulanate] Anaphylaxis   • Ceclor [Cefaclor] Anaphylaxis   • Nsaids Anaphylaxis   • Other Anaphylaxis     Cats  E.E.S. 200  Lead   Mold  [place] Pastrani - Anaphylaxis   • Penicillins Anaphylaxis   • Aspirin Tinnitus     tinnitis    • Biaxin [Clarithromycin] Other (See Comments)     angioedema   • Ciprofloxacin Other (See Comments)     Lips burning   • Contrast Dye Hives and Itching   • Cymbalta [Duloxetine Hcl] Swelling     Tongue turned red and was swollen   • Demerol [Meperidine] Nausea And Vomiting   • Iodine GI Intolerance     And iodide containing products   • Macrobid [Nitrofurantoin Monohyd Macro] Diarrhea, Nausea And Vomiting and GI Intolerance   • Requip [Ropinirole Hcl] Diarrhea, Nausea And Vomiting and GI Intolerance   • Septra [Sulfamethoxazole-Trimethoprim] Nausea And Vomiting and GI Intolerance   • Statins Myalgia       *muscle enzyme increase with myalgias   • Zithromax [Azithromycin] Unknown - Low Severity     States she hasn't had a reaction, but doctor said he wasn't going to give it to her   • Astelin [Azelastine] Unknown - Low Severity   • Nystatin GI Intolerance   • Niaspan [Niacin Er] Rash   • Nickel Rash       Past Surgical History:   Procedure Laterality Date   • BRONCHOSCOPY     • CARPAL TUNNEL RELEASE WITH CUBITAL TUNNEL RELEASE     • COLONOSCOPY W/ POLYPECTOMY     • CYSTOSCOPY     • ENDOSCOPIC FUNCTIONAL SINUS SURGERY (FESS) Right 3/6/2018   • LAPAROSCOPIC CHOLECYSTECTOMY     • OVARIAN CYST REMOVAL     • TOTAL ABDOMINAL HYSTERECTOMY WITH SALPINGO OOPHORECTOMY         Family History   Problem Relation Age of Onset   • Diabetes Mother    • Colon cancer Father    • Breast cancer Paternal Grandmother        Social History     Socioeconomic History   • Marital status:      Spouse name: Not on file   • Number of children: Not on file   • Years of education: Not on file   • Highest education  level: Not on file   Tobacco Use   • Smoking status: Former Smoker     Years: 30.00     Types: Cigarettes     Quit date:      Years since quittin.3   • Smokeless tobacco: Never Used   Substance and Sexual Activity   • Alcohol use: No   • Drug use: No   • Sexual activity: Not Currently     Comment:            Objective   Physical Exam  Vitals and nursing note reviewed.   Constitutional:       General: She is not in acute distress.     Appearance: Normal appearance. She is well-developed. She is not ill-appearing or diaphoretic.   HENT:      Head: Normocephalic and atraumatic.      Nose: Nose normal.   Eyes:      General: No scleral icterus.     Conjunctiva/sclera: Conjunctivae normal.   Neck:      Vascular: No JVD.   Cardiovascular:      Rate and Rhythm: Normal rate and regular rhythm.      Heart sounds: Normal heart sounds. No murmur heard.   No friction rub. No gallop.    Pulmonary:      Effort: Pulmonary effort is normal. No respiratory distress.      Breath sounds: No wheezing or rales.   Chest:      Chest wall: No tenderness.   Abdominal:      General: There is no distension.      Palpations: Abdomen is soft. There is no mass.      Tenderness: There is no abdominal tenderness. There is no guarding or rebound.   Musculoskeletal:         General: Tenderness present. No deformity. Normal range of motion.      Cervical back: Normal range of motion and neck supple.      Comments: Patient with tenderness and warmth to the left upper extremity in the area of her injection site.  There is mild erythema in this area.  There is no fluctuance or induration noted.  No distal skin changes.  The signs and symptoms seem consistent with an inflammatory reaction from the injection.   Lymphadenopathy:      Cervical: No cervical adenopathy.   Skin:     General: Skin is warm and dry.      Capillary Refill: Capillary refill takes less than 2 seconds.      Coloration: Skin is not pale.      Findings: No erythema or  rash.   Neurological:      General: No focal deficit present.      Mental Status: She is alert and oriented to person, place, and time.      Cranial Nerves: No cranial nerve deficit.      Motor: No abnormal muscle tone.   Psychiatric:         Behavior: Behavior normal.         Thought Content: Thought content normal.         Judgment: Judgment normal.         Procedures           ED Course                                 Labs Reviewed - No data to display    No orders to display     Plan steroid treatment with outpatient interval follow-up.          MDM    Final diagnoses:   Post-immunization reaction, initial encounter       ED Disposition  ED Disposition     ED Disposition Condition Comment    Discharge Stable           Matthias Gomez, DO  [address]  [place] [address]  [phone]    On 4/26/2021  If not improved for further evaluation and care         Medication List      New Prescriptions    doxycycline 100 MG capsule  Commonly known as: MONODOX  Take 1 capsule by mouth 2 (Two) Times a Day.     methylPREDNISolone 4 MG dose pack  Commonly known as: MEDROL  Take as directed on package instructions.           Where to Get Your Medications      These medications were sent to [facility]/pharmacy #[id number] - [place], [place] - [address] [place] [address] [phone]  - [phone] [address] [address]    Phone: [phone]   · doxycycline 100 MG capsule  · methylPREDNISolone 4 MG dose pack          Shayne Glover MD  04/22/21 1003

## 2021-04-23 NOTE — DISCHARGE INSTRUCTIONS
Followup next week with your doctor to recheck your symptoms.  Doxycycline only if the redness is associated with fevers.  Continue your home antihistamines with the steroid pack. Return with any new or worsening symptoms, or any concerns.

## 2021-05-10 ENCOUNTER — APPOINTMENT (OUTPATIENT)
Dept: VACCINE CLINIC | Facility: HOSPITAL | Age: 71
End: 2021-05-10

## 2021-05-11 DIAGNOSIS — F51.01 PRIMARY INSOMNIA: ICD-10-CM

## 2021-05-11 RX ORDER — TRAZODONE HYDROCHLORIDE 150 MG/1
150 TABLET ORAL NIGHTLY
Qty: 30 TABLET | Refills: 0 | Status: CANCELLED | OUTPATIENT
Start: 2021-05-11

## 2021-05-13 DIAGNOSIS — F51.01 PRIMARY INSOMNIA: ICD-10-CM

## 2021-05-14 RX ORDER — TRAZODONE HYDROCHLORIDE 150 MG/1
150 TABLET ORAL NIGHTLY
Qty: 30 TABLET | Refills: 5 | Status: SHIPPED | OUTPATIENT
Start: 2021-05-14 | End: 2021-05-19 | Stop reason: SDUPTHER

## 2021-05-19 DIAGNOSIS — F51.01 PRIMARY INSOMNIA: ICD-10-CM

## 2021-05-19 RX ORDER — TRAZODONE HYDROCHLORIDE 150 MG/1
150 TABLET ORAL NIGHTLY
Qty: 90 TABLET | Refills: 1 | Status: SHIPPED | OUTPATIENT
Start: 2021-05-19 | End: 2021-12-01

## 2021-05-19 NOTE — TELEPHONE ENCOUNTER
Last OV   4/16/21    Next [facility] OV    Visit date not found    Last Script Written 5/14/21      Last Tesfaye       Please advise on refill    Thank you

## 2021-06-07 ENCOUNTER — OFFICE VISIT (OUTPATIENT)
Dept: OBSTETRICS AND GYNECOLOGY | Facility: CLINIC | Age: 71
End: 2021-06-07

## 2021-06-07 VITALS
DIASTOLIC BLOOD PRESSURE: 80 MMHG | WEIGHT: 173 LBS | SYSTOLIC BLOOD PRESSURE: 140 MMHG | BODY MASS INDEX: 29.53 KG/M2 | HEIGHT: 64 IN

## 2021-06-07 DIAGNOSIS — R35.0 URINARY FREQUENCY: ICD-10-CM

## 2021-06-07 DIAGNOSIS — Z79.890 HORMONE REPLACEMENT THERAPY (POSTMENOPAUSAL): ICD-10-CM

## 2021-06-07 DIAGNOSIS — Z01.419 ENCOUNTER FOR GYNECOLOGICAL EXAMINATION WITHOUT ABNORMAL FINDING: Primary | ICD-10-CM

## 2021-06-07 PROCEDURE — G0101 CA SCREEN;PELVIC/BREAST EXAM: HCPCS | Performed by: NURSE PRACTITIONER

## 2021-06-07 RX ORDER — ESTRADIOL 2 MG/1
2 RING VAGINAL
Qty: 1 EACH | Refills: 4 | Status: SHIPPED | OUTPATIENT
Start: 2021-06-07 | End: 2022-03-10

## 2021-06-07 NOTE — PROGRESS NOTES
Subjective   Lexie Cardona is a 71 y.o. presents for annual exam and has c/o urinary frequency, especially around the time that she should be changing out her Estring.    S/P ANTONELLA/BSO in 2015 for ovarian cysts  Last pap- 2014; no hx of abnormal   Last mammo- 6/25/2020 @ [facility]    Gynecologic Exam  The patient's pertinent negatives include no genital itching, genital lesions, genital odor, genital rash, pelvic pain, vaginal bleeding or vaginal discharge. Associated symptoms include frequency. Pertinent negatives include no abdominal pain, chills, constipation, diarrhea, dysuria, fever, flank pain, hematuria or urgency. She is not sexually active. She is postmenopausal.       The following portions of the patient's history were reviewed and updated as appropriate: allergies, current medications, past family history, past medical history, past social history, past surgical history and problem list.    Review of Systems   Constitutional: Negative for activity change, appetite change, chills, diaphoresis, fatigue, fever, unexpected weight gain and unexpected weight loss.   Respiratory: Negative for chest tightness and shortness of breath.    Cardiovascular: Negative for chest pain and palpitations.   Gastrointestinal: Negative for abdominal distention, abdominal pain, constipation and diarrhea.   Endocrine: Negative.    Genitourinary: Positive for frequency. Negative for breast discharge, breast lump, breast pain, decreased urine volume, difficulty urinating, dysuria, flank pain, genital sores, hematuria, pelvic pain, pelvic pressure, urgency, urinary incontinence, vaginal bleeding, vaginal discharge and vaginal pain.   Musculoskeletal: Negative for myalgias.   Skin: Negative for color change, dry skin and skin lesions.   Neurological: Negative for light-headedness and headache.   Psychiatric/Behavioral: Negative for agitation, dysphoric mood, sleep disturbance, depressed mood and stress. The patient is  not nervous/anxious.        Objective   Physical Exam  Vitals and nursing note reviewed. Exam conducted with a chaperone present.   Constitutional:       General: She is awake. She is not in acute distress.     Appearance: Normal appearance. She is well-developed, well-groomed and overweight. She is not ill-appearing, toxic-appearing or diaphoretic.   Neck:      Thyroid: No thyromegaly.   Cardiovascular:      Rate and Rhythm: Normal rate and regular rhythm.      Heart sounds: Normal heart sounds.   Pulmonary:      Effort: Pulmonary effort is normal.      Breath sounds: Normal breath sounds.   Chest:      Breasts: Manish Score is 5. Breasts are symmetrical.         Right: Normal. No swelling, bleeding, inverted nipple, mass, nipple discharge, skin change or tenderness.         Left: Normal. No swelling, bleeding, inverted nipple, mass, nipple discharge, skin change or tenderness.   Abdominal:      General: Bowel sounds are normal. There is no distension.      Palpations: Abdomen is soft.      Tenderness: There is no abdominal tenderness.   Genitourinary:     General: Normal vulva.      Exam position: Lithotomy position.      Manish stage (genital): 5.      Labia:         Right: No rash, tenderness, lesion or injury.         Left: No rash, tenderness, lesion or injury.       Urethra: No prolapse, urethral pain, urethral swelling or urethral lesion.      Vagina: Normal.      Comments: Uterus, cervix and adnexa absent. Well healed vaginal cuff. Pap not indicated. Unable to execute Kegal correctly; pt is pushing downward.  Lymphadenopathy:      Upper Body:      Right upper body: No supraclavicular, axillary or pectoral adenopathy.      Left upper body: No supraclavicular, axillary or pectoral adenopathy.      Lower Body: No right inguinal adenopathy. No left inguinal adenopathy.   Skin:     General: Skin is warm and dry.   Neurological:      Mental Status: She is alert and oriented to person, place, and time.    Psychiatric:         Attention and Perception: Attention and perception normal.         Mood and Affect: Mood and affect normal.         Speech: Speech normal.         Behavior: Behavior normal. Behavior is cooperative.           Assessment/Plan   Diagnoses and all orders for this visit:    1. Encounter for gynecological examination without abnormal finding (Primary)    2. Urinary frequency  -     Ambulatory Referral to Physical Therapy Evaluate and treat, Pelvic Floor    3. Hormone replacement therapy (postmenopausal)    Other orders  -     estradiol (Estring) 2 MG vaginal ring; Insert 2 mg into the vagina Every 3 (Three) Months. follow package directions  Dispense: 1 each; Refill: 4      RBA and potential s/e of Estring reviewed; pt verbalizes understanding and wants to continue HRT at this time. Reviewed breast cancer screening recommendations.

## 2021-06-17 ENCOUNTER — OFFICE VISIT (OUTPATIENT)
Dept: FAMILY MEDICINE CLINIC | Facility: CLINIC | Age: 71
End: 2021-06-17

## 2021-06-17 VITALS
SYSTOLIC BLOOD PRESSURE: 118 MMHG | OXYGEN SATURATION: 98 % | BODY MASS INDEX: 29.16 KG/M2 | HEART RATE: 63 BPM | DIASTOLIC BLOOD PRESSURE: 74 MMHG | HEIGHT: 64 IN | WEIGHT: 170.8 LBS

## 2021-06-17 DIAGNOSIS — N18.30 STAGE 3 CHRONIC KIDNEY DISEASE, UNSPECIFIED WHETHER STAGE 3A OR 3B CKD (HCC): Primary | ICD-10-CM

## 2021-06-17 DIAGNOSIS — J32.9 CHRONIC SINUSITIS, UNSPECIFIED LOCATION: ICD-10-CM

## 2021-06-17 PROCEDURE — 99214 OFFICE O/P EST MOD 30 MIN: CPT | Performed by: FAMILY MEDICINE

## 2021-06-22 ENCOUNTER — LAB (OUTPATIENT)
Dept: LAB | Facility: HOSPITAL | Age: 71
End: 2021-06-22

## 2021-06-22 DIAGNOSIS — N18.30 STAGE 3 CHRONIC KIDNEY DISEASE, UNSPECIFIED WHETHER STAGE 3A OR 3B CKD (HCC): ICD-10-CM

## 2021-06-22 LAB
ANION GAP SERPL CALCULATED.3IONS-SCNC: 9.2 MMOL/L (ref 5–15)
BUN SERPL-MCNC: 10 MG/DL (ref 8–23)
BUN/CREAT SERPL: 8.5 (ref 7–25)
CALCIUM SPEC-SCNC: 9.3 MG/DL (ref 8.6–10.5)
CHLORIDE SERPL-SCNC: 106 MMOL/L (ref 98–107)
CO2 SERPL-SCNC: 26.8 MMOL/L (ref 22–29)
CREAT SERPL-MCNC: 1.17 MG/DL (ref 0.57–1)
GFR SERPL CREATININE-BSD FRML MDRD: 46 ML/MIN/1.73
GLUCOSE SERPL-MCNC: 105 MG/DL (ref 65–99)
POTASSIUM SERPL-SCNC: 4 MMOL/L (ref 3.5–5.2)
SODIUM SERPL-SCNC: 142 MMOL/L (ref 136–145)

## 2021-06-22 PROCEDURE — 80048 BASIC METABOLIC PNL TOTAL CA: CPT

## 2021-06-22 PROCEDURE — 36415 COLL VENOUS BLD VENIPUNCTURE: CPT

## 2021-07-15 ENCOUNTER — OFFICE VISIT (OUTPATIENT)
Dept: FAMILY MEDICINE CLINIC | Facility: CLINIC | Age: 71
End: 2021-07-15

## 2021-07-15 VITALS
HEIGHT: 64 IN | WEIGHT: 171.8 LBS | OXYGEN SATURATION: 99 % | BODY MASS INDEX: 29.33 KG/M2 | HEART RATE: 62 BPM | SYSTOLIC BLOOD PRESSURE: 126 MMHG | DIASTOLIC BLOOD PRESSURE: 70 MMHG

## 2021-07-15 DIAGNOSIS — E78.00 HYPERCHOLESTEROLEMIA: ICD-10-CM

## 2021-07-15 DIAGNOSIS — N18.30 STAGE 3 CHRONIC KIDNEY DISEASE, UNSPECIFIED WHETHER STAGE 3A OR 3B CKD (HCC): Primary | ICD-10-CM

## 2021-07-15 DIAGNOSIS — F51.01 PRIMARY INSOMNIA: ICD-10-CM

## 2021-07-15 DIAGNOSIS — J32.9 CHRONIC SINUSITIS, UNSPECIFIED LOCATION: ICD-10-CM

## 2021-07-15 PROCEDURE — 99214 OFFICE O/P EST MOD 30 MIN: CPT | Performed by: FAMILY MEDICINE

## 2021-07-15 RX ORDER — QUETIAPINE FUMARATE 50 MG/1
25 TABLET, FILM COATED ORAL NIGHTLY
Qty: 15 TABLET | Refills: 1 | Status: SHIPPED | OUTPATIENT
Start: 2021-07-15 | End: 2021-08-09 | Stop reason: SDUPTHER

## 2021-07-25 NOTE — SIGNIFICANT NOTE
02/03/21 1252   OTHER   Discipline occupational therapy assistant   Pt in sup at entrance  pt stated tired at this time and stated able to do all own adls tf and mobility  pt stated may have some decreased  strength but didn't want therapy at this time .  Pt sat up for lunch before gomez exit    No respiratory distress. No stridor, Lungs sounds clear with good aeration bilaterally.

## 2021-08-01 PROBLEM — J96.01 ACUTE RESPIRATORY FAILURE WITH HYPOXIA: Status: RESOLVED | Noted: 2021-01-29 | Resolved: 2021-08-01

## 2021-08-01 PROBLEM — J34.89 NASAL VALVE COLLAPSE: Chronic | Status: RESOLVED | Noted: 2018-02-19 | Resolved: 2021-08-01

## 2021-08-01 PROBLEM — J34.3 NASAL TURBINATE HYPERTROPHY: Chronic | Status: RESOLVED | Noted: 2018-02-19 | Resolved: 2021-08-01

## 2021-08-01 PROBLEM — J12.82 PNEUMONIA DUE TO COVID-19 VIRUS: Status: RESOLVED | Noted: 2021-01-29 | Resolved: 2021-08-01

## 2021-08-01 PROBLEM — J34.89 NASAL OBSTRUCTION: Chronic | Status: RESOLVED | Noted: 2018-02-19 | Resolved: 2021-08-01

## 2021-08-01 PROBLEM — U07.1 ACUTE RESPIRATORY FAILURE DUE TO COVID-19 (HCC): Status: RESOLVED | Noted: 2021-01-29 | Resolved: 2021-08-01

## 2021-08-01 PROBLEM — J96.00 ACUTE RESPIRATORY FAILURE DUE TO COVID-19 (HCC): Status: RESOLVED | Noted: 2021-01-29 | Resolved: 2021-08-01

## 2021-08-01 PROBLEM — U07.1 PNEUMONIA DUE TO COVID-19 VIRUS: Status: RESOLVED | Noted: 2021-01-29 | Resolved: 2021-08-01

## 2021-08-01 PROBLEM — J34.89 CONCHA BULLOSA: Chronic | Status: RESOLVED | Noted: 2018-02-19 | Resolved: 2021-08-01

## 2021-08-01 PROBLEM — G56.01 CARPAL TUNNEL SYNDROME OF RIGHT WRIST: Status: RESOLVED | Noted: 2017-07-21 | Resolved: 2021-08-01

## 2021-08-01 PROBLEM — J32.9 SINUSITIS, CHRONIC: Chronic | Status: RESOLVED | Noted: 2018-02-19 | Resolved: 2021-08-01

## 2021-08-01 PROBLEM — J34.2 NASAL SEPTAL DEFORMITY: Chronic | Status: RESOLVED | Noted: 2018-02-19 | Resolved: 2021-08-01

## 2021-08-01 PROBLEM — M95.0 NASAL VALVE COLLAPSE: Chronic | Status: RESOLVED | Noted: 2018-02-19 | Resolved: 2021-08-01

## 2021-08-01 PROBLEM — R35.0 URINARY FREQUENCY: Status: RESOLVED | Noted: 2021-06-07 | Resolved: 2021-08-01

## 2021-08-09 RX ORDER — QUETIAPINE FUMARATE 50 MG/1
25 TABLET, FILM COATED ORAL NIGHTLY
Qty: 45 TABLET | Refills: 1 | Status: SHIPPED | OUTPATIENT
Start: 2021-08-09 | End: 2021-08-31

## 2021-08-09 NOTE — TELEPHONE ENCOUNTER
Last OV   7/15/21    Next [facility] OV    Visit date not found    Last Script Written  7/15/21      Last Tesfaye       Please advise on refill    Thank you

## 2021-08-16 NOTE — PROGRESS NOTES
Chief Complaint  Follow-up (CKD, chronic sinusitis, insomnia, HLD )    Subjective          Lexie Cardona presents to [facility]  Patient is a pleasant 71-year-old  female that presents today with her  to follow-up on CKD, chronic sinusitis, hyperlipidemia, and insomnia.    Chronic Kidney Disease  This is a chronic problem. The current episode started more than 1 year ago. The problem occurs constantly. The problem has been unchanged. Associated symptoms include congestion. Pertinent negatives include no abdominal pain, anorexia, arthralgias, change in bowel habit, chest pain, chills, coughing, diaphoresis, fatigue, fever, headaches, joint swelling, myalgias, nausea, neck pain, numbness, rash, sore throat, swollen glands, urinary symptoms, vertigo, visual change, vomiting or weakness. Treatments tried: Avoiding nephrotoxic agents and hydration. The treatment provided significant relief.   Hyperlipidemia  This is a chronic problem. The current episode started more than 1 year ago. The problem is uncontrolled. Recent lipid tests were reviewed and are variable. Exacerbating diseases include chronic renal disease. She has no history of diabetes, hypothyroidism, liver disease, obesity or nephrotic syndrome. Factors aggravating her hyperlipidemia include fatty foods and atypical antipsychotics. Pertinent negatives include no chest pain, focal sensory loss, focal weakness, leg pain, myalgias or shortness of breath. Current antihyperlipidemic treatment includes herbal therapy, diet change and exercise. The current treatment provides mild improvement of lipids. Compliance problems include adherence to exercise, adherence to diet and medication side effects.  Risk factors for coronary artery disease include dyslipidemia and post-menopausal.   Insomnia  This is a chronic problem. The current episode started more than 1 year ago. The problem occurs daily. Progression since  "onset: Improved with Seroquel. Associated symptoms include congestion. Pertinent negatives include no abdominal pain, anorexia, arthralgias, change in bowel habit, chest pain, chills, coughing, diaphoresis, fatigue, fever, headaches, joint swelling, myalgias, nausea, neck pain, numbness, rash, sore throat, swollen glands, urinary symptoms, vertigo, visual change, vomiting or weakness. Nothing aggravates the symptoms. Treatments tried: Trazodone, Seroquel, sleep hygiene. The treatment provided significant relief.   Sinusitis  This is a recurrent problem. The current episode started more than 1 year ago. The problem has been waxing and waning since onset. There has been no fever. She is experiencing no pain. Associated symptoms include congestion and sinus pressure. Pertinent negatives include no chills, coughing, diaphoresis, ear pain, headaches, hoarse voice, neck pain, shortness of breath, sneezing, sore throat or swollen glands. Treatments tried: Cetirizine, Singulair, Nasonex, albuterol. The treatment provided significant relief.       Objective   Vital Signs:   /70   Pulse 62   Ht 162.6 cm (64\")   Wt 77.9 kg (171 lb 12.8 oz)   SpO2 99%   BMI 29.49 kg/m²     Physical Exam  Vitals and nursing note reviewed.   Constitutional:       General: She is awake. She is not in acute distress.     Appearance: Normal appearance. She is well-developed, well-groomed and overweight. She is not ill-appearing, toxic-appearing or diaphoretic.      Interventions: She is not intubated.  HENT:      Head: Normocephalic and atraumatic.      Right Ear: Hearing and external ear normal.      Left Ear: Hearing and external ear normal.      Nose: Nose normal.      Mouth/Throat:      Lips: Pink. No lesions.   Eyes:      General: Lids are normal. No allergic shiner or scleral icterus.        Right eye: No discharge.         Left eye: No discharge.      Extraocular Movements: Extraocular movements intact.      Right eye: Normal " extraocular motion and no nystagmus.      Left eye: Normal extraocular motion and no nystagmus.      Conjunctiva/sclera: Conjunctivae normal.      Right eye: Right conjunctiva is not injected. No chemosis, exudate or hemorrhage.     Left eye: Left conjunctiva is not injected. No chemosis, exudate or hemorrhage.     Pupils: Pupils are equal, round, and reactive to light.   Neck:      Thyroid: No thyroid mass or thyromegaly.      Trachea: Trachea and phonation normal. No tracheal tenderness or tracheal deviation.   Cardiovascular:      Rate and Rhythm: Normal rate and regular rhythm.  No extrasystoles are present.     Chest Wall: PMI is not displaced. No thrill.      Heart sounds: Normal heart sounds, S1 normal and S2 normal. Heart sounds not distant. No murmur heard.   No systolic murmur is present.   No diastolic murmur is present.   No friction rub. No gallop. No S3 or S4 sounds.    Pulmonary:      Effort: Pulmonary effort is normal. No tachypnea, bradypnea, accessory muscle usage, prolonged expiration, respiratory distress or retractions. She is not intubated.      Breath sounds: Normal breath sounds and air entry. No stridor, decreased air movement or transmitted upper airway sounds. No decreased breath sounds, wheezing, rhonchi or rales.   Abdominal:      General: Abdomen is flat. Bowel sounds are normal. There is no distension or abdominal bruit. There are no signs of injury.      Palpations: Abdomen is soft. There is no shifting dullness, fluid wave, hepatomegaly, splenomegaly, mass or pulsatile mass.      Tenderness: There is no abdominal tenderness. There is no right CVA tenderness, left CVA tenderness, guarding or rebound. Negative signs include Sol's sign and McBurney's sign.   Musculoskeletal:      Cervical back: Full passive range of motion without pain, normal range of motion and neck supple. No edema, erythema or rigidity. Normal range of motion.      Right lower leg: No edema.      Left lower leg:  No edema.   Lymphadenopathy:      Cervical: No cervical adenopathy.   Skin:     General: Skin is warm and dry.      Findings: No erythema or rash.   Neurological:      General: No focal deficit present.      Mental Status: She is alert and oriented to person, place, and time. Mental status is at baseline.      Cranial Nerves: Cranial nerves are intact. No cranial nerve deficit, dysarthria or facial asymmetry.      Motor: Motor function is intact. No weakness, tremor, atrophy, abnormal muscle tone or seizure activity.      Gait: Gait is intact.   Psychiatric:         Attention and Perception: Attention and perception normal. She is attentive. She does not perceive auditory or visual hallucinations.         Mood and Affect: Mood and affect normal. Mood is not anxious, depressed or elated. Affect is not labile, blunt, flat, angry, tearful or inappropriate.         Speech: Speech normal. She is communicative. Speech is not rapid and pressured, delayed, slurred or tangential.         Behavior: Behavior normal. Behavior is not agitated, slowed, aggressive, withdrawn, hyperactive or combative. Behavior is cooperative.         Thought Content: Thought content normal. Thought content is not paranoid or delusional. Thought content does not include homicidal or suicidal ideation. Thought content does not include homicidal or suicidal plan.         Cognition and Memory: Cognition and memory normal. Cognition is not impaired. Memory is not impaired. She does not exhibit impaired recent memory or impaired remote memory.         Judgment: Judgment normal. Judgment is not impulsive or inappropriate.        Result Review :     Common labs    Common Labsle 3/3/21 4/16/21 4/16/21 4/16/21 6/22/21     1026 1026 1026    Glucose 89   90 105 (A)   BUN 11   15 10   Creatinine 1.02 (A)   1.39 (A) 1.17 (A)   eGFR Non African Am 54 (A)   37 (A) 46 (A)   Sodium 140   143 142   Potassium 4.1   4.2 4.0   Chloride 107   106 106   Calcium 9.5    "10.3 9.3   Albumin 4.00   4.20    Total Bilirubin    0.5    Alkaline Phosphatase    79    AST (SGOT)    23    ALT (SGPT)    17    WBC  6.04      Hemoglobin  15.7      Hematocrit  47.3 (A)      Platelets  275      Total Cholesterol   302 (A)     Triglycerides   229 (A)     HDL Cholesterol   55     LDL Cholesterol    203 (A)     (A) Abnormal value                      Assessment and Plan    Diagnoses and all orders for this visit:    1. Stage 3 chronic kidney disease, unspecified whether stage 3a or 3b CKD (CMS/HCC) (Primary)  Renal function labs have been stable.  Continue to avoid nephrotoxic agents.  Continue to maintain good hydration practices.  Avoid NSAIDs.  Will watch blood pressure closely.  We will screen for diabetes routinely to work on risk modification.  Will follow and adjust as needed.    2. Chronic sinusitis, unspecified location  Stable.  Continue cetirizine 10 mg daily, Singulair 10 mg daily, and Nasonex at this time.  Avoid aggravating factors and substances.  Follow with ENT as needed.  Seek immediate medical attention for new or worsening symptoms.    3. Primary insomnia  Improved/stable.  Continue trazodone 150 mg nightly and Seroquel 50 mg nightly.  Continue to work on sleep hygiene and seek immediate medical attention for new or worsening symptoms.    4. Hypercholesterolemia  Continue to work on heart healthy low-fat/low-cholesterol diet and regular exercise.  Patient reports she has had an allergy to \"all the cholesterol medicines\".  She does not wish to try different statin, fenofibrate, niacin, or any other treatment at this time.  Counseled her to look into new injectable medications and discuss these with new PCP.  Risks of poorly controlled cholesterol and triglycerides discussed.  Patient handout on fats and how they affect cholesterol triglycerides provided.  Follow and adjust care as needed.    Other orders  -     Discontinue: QUEtiapine (SEROquel) 50 MG tablet; Take 0.5 tablets by " mouth Every Night. For insomnia.  Dispense: 15 tablet; Refill: 1    Voice dictation software was used in the creation of this document.  This software has been known to produce occasional errors in speech recognition.  For any questions, or for clarification regarding anything in this document please feel free to contact me.       I spent 30 minutes caring for Lexie on this date of service. This time includes time spent by me in the following activities:preparing for the visit, reviewing tests, obtaining and/or reviewing a separately obtained history, performing a medically appropriate examination and/or evaluation , counseling and educating the patient/family/caregiver, ordering medications, tests, or procedures, documenting information in the medical record, independently interpreting results and communicating that information with the patient/family/caregiver and care coordination  Follow Up   No follow-ups on file.  Patient was given instructions and counseling regarding her condition or for health maintenance advice. Please see specific information pulled into the AVS if appropriate.

## 2021-08-19 ENCOUNTER — TELEPHONE (OUTPATIENT)
Dept: FAMILY MEDICINE CLINIC | Facility: CLINIC | Age: 71
End: 2021-08-19

## 2021-08-31 ENCOUNTER — OFFICE VISIT (OUTPATIENT)
Dept: FAMILY MEDICINE CLINIC | Facility: CLINIC | Age: 71
End: 2021-08-31

## 2021-08-31 VITALS
HEART RATE: 71 BPM | BODY MASS INDEX: 30.22 KG/M2 | OXYGEN SATURATION: 96 % | SYSTOLIC BLOOD PRESSURE: 102 MMHG | DIASTOLIC BLOOD PRESSURE: 80 MMHG | HEIGHT: 64 IN | WEIGHT: 177 LBS

## 2021-08-31 DIAGNOSIS — E78.00 HYPERCHOLESTEROLEMIA: ICD-10-CM

## 2021-08-31 DIAGNOSIS — F41.1 GAD (GENERALIZED ANXIETY DISORDER): ICD-10-CM

## 2021-08-31 DIAGNOSIS — Z00.00 MEDICARE ANNUAL WELLNESS VISIT, SUBSEQUENT: ICD-10-CM

## 2021-08-31 DIAGNOSIS — M25.50 POLYARTHRALGIA: Primary | ICD-10-CM

## 2021-08-31 DIAGNOSIS — K21.9 GASTROESOPHAGEAL REFLUX DISEASE, UNSPECIFIED WHETHER ESOPHAGITIS PRESENT: ICD-10-CM

## 2021-08-31 DIAGNOSIS — Z76.89 ENCOUNTER TO ESTABLISH CARE: ICD-10-CM

## 2021-08-31 DIAGNOSIS — F51.01 PRIMARY INSOMNIA: ICD-10-CM

## 2021-08-31 DIAGNOSIS — N18.30 STAGE 3 CHRONIC KIDNEY DISEASE, UNSPECIFIED WHETHER STAGE 3A OR 3B CKD (HCC): ICD-10-CM

## 2021-08-31 PROCEDURE — 99214 OFFICE O/P EST MOD 30 MIN: CPT | Performed by: FAMILY MEDICINE

## 2021-08-31 PROCEDURE — G0439 PPPS, SUBSEQ VISIT: HCPCS | Performed by: FAMILY MEDICINE

## 2021-08-31 RX ORDER — TEMAZEPAM 30 MG/1
30 CAPSULE ORAL NIGHTLY PRN
COMMUNITY
End: 2021-09-07 | Stop reason: SDUPTHER

## 2021-09-04 ENCOUNTER — PATIENT MESSAGE (OUTPATIENT)
Dept: FAMILY MEDICINE CLINIC | Facility: CLINIC | Age: 71
End: 2021-09-04

## 2021-09-04 DIAGNOSIS — F51.01 PRIMARY INSOMNIA: Primary | ICD-10-CM

## 2021-09-07 RX ORDER — TEMAZEPAM 30 MG/1
30 CAPSULE ORAL NIGHTLY PRN
Qty: 30 CAPSULE | Refills: 0 | Status: SHIPPED | OUTPATIENT
Start: 2021-09-07 | End: 2021-10-06 | Stop reason: SDUPTHER

## 2021-09-07 NOTE — TELEPHONE ENCOUNTER
From: Lexie Cardona  To: Nazia Fulton MD  Sent: 9/4/2021 4:16 PM CDT  Subject: Prescription Question    Kelly Cardona 1950 need refill Temazapam 30mg [facility] pharmacy

## 2021-09-13 ENCOUNTER — TELEPHONE (OUTPATIENT)
Dept: FAMILY MEDICINE CLINIC | Facility: CLINIC | Age: 71
End: 2021-09-13

## 2021-09-13 ENCOUNTER — LAB (OUTPATIENT)
Dept: LAB | Facility: HOSPITAL | Age: 71
End: 2021-09-13

## 2021-09-13 ENCOUNTER — TRANSCRIBE ORDERS (OUTPATIENT)
Dept: LAB | Facility: HOSPITAL | Age: 71
End: 2021-09-13

## 2021-09-13 DIAGNOSIS — M54.12 CERVICAL RADICULOPATHY: ICD-10-CM

## 2021-09-13 DIAGNOSIS — M54.2 NECK PAIN: Primary | ICD-10-CM

## 2021-09-13 DIAGNOSIS — M25.50 POLYARTHRALGIA: ICD-10-CM

## 2021-09-13 DIAGNOSIS — N18.30 STAGE 3 CHRONIC KIDNEY DISEASE, UNSPECIFIED WHETHER STAGE 3A OR 3B CKD (HCC): Primary | ICD-10-CM

## 2021-09-13 DIAGNOSIS — N18.30 STAGE 3 CHRONIC KIDNEY DISEASE, UNSPECIFIED WHETHER STAGE 3A OR 3B CKD (HCC): ICD-10-CM

## 2021-09-13 PROCEDURE — 80048 BASIC METABOLIC PNL TOTAL CA: CPT

## 2021-09-13 PROCEDURE — 86431 RHEUMATOID FACTOR QUANT: CPT

## 2021-09-13 PROCEDURE — 86038 ANTINUCLEAR ANTIBODIES: CPT

## 2021-09-13 PROCEDURE — 36415 COLL VENOUS BLD VENIPUNCTURE: CPT

## 2021-09-14 ENCOUNTER — PATIENT MESSAGE (OUTPATIENT)
Dept: FAMILY MEDICINE CLINIC | Facility: CLINIC | Age: 71
End: 2021-09-14

## 2021-09-14 LAB
ANION GAP SERPL CALCULATED.3IONS-SCNC: 9 MMOL/L (ref 5–15)
BUN SERPL-MCNC: 11 MG/DL (ref 8–23)
BUN/CREAT SERPL: 10.2 (ref 7–25)
CALCIUM SPEC-SCNC: 9.2 MG/DL (ref 8.6–10.5)
CHLORIDE SERPL-SCNC: 105 MMOL/L (ref 98–107)
CHROMATIN AB SERPL-ACNC: 11.1 IU/ML (ref 0–14)
CO2 SERPL-SCNC: 26 MMOL/L (ref 22–29)
CREAT SERPL-MCNC: 1.08 MG/DL (ref 0.57–1)
GFR SERPL CREATININE-BSD FRML MDRD: 50 ML/MIN/1.73
GLUCOSE SERPL-MCNC: 89 MG/DL (ref 65–99)
POTASSIUM SERPL-SCNC: 4.5 MMOL/L (ref 3.5–5.2)
SODIUM SERPL-SCNC: 140 MMOL/L (ref 136–145)

## 2021-09-15 LAB — ANA SER QL: NEGATIVE

## 2021-09-15 RX ORDER — GABAPENTIN 300 MG/1
300 CAPSULE ORAL NIGHTLY
Qty: 30 CAPSULE | Refills: 0 | Status: SHIPPED | OUTPATIENT
Start: 2021-09-15 | End: 2021-10-18 | Stop reason: SDUPTHER

## 2021-09-15 NOTE — TELEPHONE ENCOUNTER
We can start gabapentin at 300 mg at bedtime.  Call if she thinks she needs more frequently.  Thanks, ALYSA Fulton

## 2021-09-15 NOTE — TELEPHONE ENCOUNTER
Called and spoke to patient-she is wanting Gabapentin for the pain she has in her arms and neck. She said she is maxed out on arthritis meds and gabapentin helped her before.

## 2021-09-17 ENCOUNTER — TELEPHONE (OUTPATIENT)
Dept: FAMILY MEDICINE CLINIC | Facility: CLINIC | Age: 71
End: 2021-09-17

## 2021-09-17 NOTE — TELEPHONE ENCOUNTER
Per Dr. Fulton, Ms. Cardona has been called with recent lab results and recommendations.  Continue current medications and follow-up as planned or sooner if any problems.  
Please let patient know the following labs:    - LEANNE and rheumatoid factor negative  - Kidney function is stable on labs    ThanksALYSA  
12-Feb-2019

## 2021-10-05 ENCOUNTER — PATIENT MESSAGE (OUTPATIENT)
Dept: FAMILY MEDICINE CLINIC | Facility: CLINIC | Age: 71
End: 2021-10-05

## 2021-10-05 DIAGNOSIS — F51.01 PRIMARY INSOMNIA: ICD-10-CM

## 2021-10-06 DIAGNOSIS — F51.01 PRIMARY INSOMNIA: ICD-10-CM

## 2021-10-06 RX ORDER — TEMAZEPAM 30 MG/1
30 CAPSULE ORAL NIGHTLY PRN
Qty: 30 CAPSULE | Refills: 0 | Status: CANCELLED | OUTPATIENT
Start: 2021-10-06

## 2021-10-06 RX ORDER — TEMAZEPAM 30 MG/1
30 CAPSULE ORAL NIGHTLY PRN
Qty: 30 CAPSULE | Refills: 0 | Status: SHIPPED | OUTPATIENT
Start: 2021-10-06 | End: 2021-11-08 | Stop reason: SDUPTHER

## 2021-10-18 ENCOUNTER — TELEPHONE (OUTPATIENT)
Dept: FAMILY MEDICINE CLINIC | Facility: CLINIC | Age: 71
End: 2021-10-18

## 2021-10-18 DIAGNOSIS — M54.12 CERVICAL RADICULOPATHY: ICD-10-CM

## 2021-10-18 DIAGNOSIS — M54.2 NECK PAIN: ICD-10-CM

## 2021-10-18 RX ORDER — GABAPENTIN 300 MG/1
300 CAPSULE ORAL NIGHTLY
Qty: 30 CAPSULE | Refills: 0 | Status: SHIPPED | OUTPATIENT
Start: 2021-10-18 | End: 2021-12-30

## 2021-10-18 RX ORDER — GABAPENTIN 300 MG/1
300 CAPSULE ORAL NIGHTLY
Qty: 30 CAPSULE | Refills: 0 | Status: CANCELLED | OUTPATIENT
Start: 2021-10-18

## 2021-10-18 NOTE — TELEPHONE ENCOUNTER
Last Script 09/15/2021  #30, NR    Next Sandie 12/01/2021    Last OV 08/31/2021    ----- Message from Lexie Cardona sent at 10/18/2021 12:53 PM CDT -----  Regarding: Prescription Question  Contact: [phone]  Gabapentin 300 mg qhs needs refills Thanks

## 2021-10-19 RX ORDER — HYDROXYZINE PAMOATE 50 MG/1
CAPSULE ORAL
Qty: 180 CAPSULE | Refills: 5 | OUTPATIENT
Start: 2021-10-19

## 2021-10-19 NOTE — TELEPHONE ENCOUNTER
Former patient of Dr. Navas.  Does not have a follow-up visit scheduled with me.  We will not refill until she has a follow-up scheduled.

## 2021-11-11 RX ORDER — MOMETASONE FUROATE 50 UG/1
SPRAY, METERED NASAL
Qty: 51 EACH | Refills: 3 | OUTPATIENT
Start: 2021-11-11

## 2021-12-01 ENCOUNTER — TELEPHONE (OUTPATIENT)
Dept: FAMILY MEDICINE CLINIC | Facility: CLINIC | Age: 71
End: 2021-12-01

## 2021-12-01 ENCOUNTER — OFFICE VISIT (OUTPATIENT)
Dept: FAMILY MEDICINE CLINIC | Facility: CLINIC | Age: 71
End: 2021-12-01

## 2021-12-01 VITALS
OXYGEN SATURATION: 97 % | DIASTOLIC BLOOD PRESSURE: 80 MMHG | SYSTOLIC BLOOD PRESSURE: 124 MMHG | HEART RATE: 61 BPM | HEIGHT: 64 IN | WEIGHT: 180 LBS | BODY MASS INDEX: 30.73 KG/M2

## 2021-12-01 DIAGNOSIS — F51.01 PRIMARY INSOMNIA: ICD-10-CM

## 2021-12-01 DIAGNOSIS — E78.00 HYPERCHOLESTEROLEMIA: ICD-10-CM

## 2021-12-01 DIAGNOSIS — N18.30 STAGE 3 CHRONIC KIDNEY DISEASE, UNSPECIFIED WHETHER STAGE 3A OR 3B CKD (HCC): ICD-10-CM

## 2021-12-01 DIAGNOSIS — F41.1 GAD (GENERALIZED ANXIETY DISORDER): ICD-10-CM

## 2021-12-01 DIAGNOSIS — G25.81 RESTLESS LEG: Primary | ICD-10-CM

## 2021-12-01 PROCEDURE — 99214 OFFICE O/P EST MOD 30 MIN: CPT | Performed by: FAMILY MEDICINE

## 2021-12-01 RX ORDER — GABAPENTIN 300 MG/1
CAPSULE ORAL
Qty: 90 CAPSULE | Refills: 0 | Status: SHIPPED | OUTPATIENT
Start: 2021-12-01 | End: 2021-12-30

## 2021-12-01 RX ORDER — CHOLESTYRAMINE LIGHT 4 G/5.7G
4 POWDER, FOR SUSPENSION ORAL DAILY
Qty: 1 EACH | Refills: 2 | Status: SHIPPED | OUTPATIENT
Start: 2021-12-01 | End: 2022-02-23

## 2021-12-01 RX ORDER — TRAZODONE HYDROCHLORIDE 100 MG/1
100 TABLET ORAL NIGHTLY
COMMUNITY
End: 2022-04-24

## 2021-12-01 NOTE — PROGRESS NOTES
"Chief Complaint  Chronic Kidney Disease    Subjective          Lexie Cardona presents to [facility] [place]  History of Present Illness    Patient presents today for follow up.  Patient has been doing ok.  Gabapentin helping some with restless legs, but not enough at night.  She is prescribed 300 mg at bedtime.  Has chronic kidney disease, monitoring required.  On Restoril to help with sleep, medication works most nights.        Objective   Vital Signs:   /80   Pulse 61   Ht 162.6 cm (64\")   Wt 81.6 kg (180 lb)   SpO2 97%   BMI 30.90 kg/m²     Physical Exam  Vitals reviewed.   Constitutional:       General: She is not in acute distress.     Appearance: She is well-developed.   Cardiovascular:      Rate and Rhythm: Normal rate and regular rhythm.      Heart sounds: Normal heart sounds. No murmur heard.      Pulmonary:      Effort: Pulmonary effort is normal. No respiratory distress.      Breath sounds: Normal breath sounds. No wheezing or rales.   Abdominal:      Palpations: Abdomen is soft.      Tenderness: There is no abdominal tenderness.   Skin:     General: Skin is warm and dry.      Findings: No rash.   Neurological:      Mental Status: She is alert and oriented to person, place, and time.        Result Review :   The following data was reviewed by: Nazia Fulton MD on 12/01/2021:  Common labs    Common Labsle 4/16/21 4/16/21 4/16/21 6/22/21 9/13/21    1026 1026 1026     Glucose   90 105 (A) 89   BUN   15 10 11   Creatinine   1.39 (A) 1.17 (A) 1.08 (A)   eGFR Non  Am   37 (A) 46 (A) 50 (A)   Sodium   143 142 140   Potassium   4.2 4.0 4.5   Chloride   106 106 105   Calcium   10.3 9.3 9.2   Albumin   4.20     Total Bilirubin   0.5     Alkaline Phosphatase   79     AST (SGOT)   23     ALT (SGPT)   17     WBC 6.04       Hemoglobin 15.7       Hematocrit 47.3 (A)       Platelets 275       Total Cholesterol  302 (A)      Triglycerides  229 " (A)      HDL Cholesterol  55      LDL Cholesterol   203 (A)      (A) Abnormal value                      Assessment and Plan    Diagnoses and all orders for this visit:    1. Restless leg (Primary)  -     gabapentin (NEURONTIN) 300 MG capsule; Take 1 capsule by mouth Every Morning AND 2 capsules Every Night.  Dispense: 90 capsule; Refill: 0    2. PRITI (generalized anxiety disorder)    3. Hypercholesterolemia  -     Lipid Panel; Future  -     cholestyramine light (Prevalite) 4 g packet; Take 1 packet by mouth Daily.  Dispense: 1 each; Refill: 2    4. Stage 3 chronic kidney disease, unspecified whether stage 3a or 3b CKD (HCC)  -     Basic metabolic panel; Future    5. Primary insomnia      Patient seen for follow up   Increase gabapentin to 300 mg in the AM and 600 mg at bedtime  New prescription provided  This should help with restless legs and any anxiety symptoms  Significant elevation of LDL - 203  Patient unable to tolerate statin and zetia in the past  Will trial cholestyramine at this time  Check kidney function with BMP  Continue restoril, controlled substance agreement on file.          Follow Up   Return in about 3 months (around 3/1/2022) for Recheck.  Patient was given instructions and counseling regarding her condition or for health maintenance advice. Please see specific information pulled into the AVS if appropriate.         This document has been electronically signed by Nazia Fulton MD

## 2021-12-01 NOTE — TELEPHONE ENCOUNTER
[id number] - [place], [place] - [address] . - [phone]  - [phone] FX    prevalite pkt needs clarification on Quantity   Please call

## 2021-12-02 ENCOUNTER — PATIENT MESSAGE (OUTPATIENT)
Dept: FAMILY MEDICINE CLINIC | Facility: CLINIC | Age: 71
End: 2021-12-02

## 2021-12-02 DIAGNOSIS — F51.01 PRIMARY INSOMNIA: ICD-10-CM

## 2021-12-03 RX ORDER — HYDROXYZINE PAMOATE 50 MG/1
100 CAPSULE ORAL 3 TIMES DAILY PRN
Qty: 180 CAPSULE | Refills: 5 | Status: SHIPPED | OUTPATIENT
Start: 2021-12-03 | End: 2022-03-29 | Stop reason: SDUPTHER

## 2021-12-03 RX ORDER — EPINEPHRINE 0.3 MG/.3ML
0.3 INJECTION SUBCUTANEOUS SEE ADMIN INSTRUCTIONS
Qty: 1 EACH | Refills: 5 | Status: SHIPPED | OUTPATIENT
Start: 2021-12-03 | End: 2022-12-15 | Stop reason: SDUPTHER

## 2021-12-03 RX ORDER — TEMAZEPAM 30 MG/1
30 CAPSULE ORAL NIGHTLY PRN
Qty: 30 CAPSULE | Refills: 0 | Status: SHIPPED | OUTPATIENT
Start: 2021-12-03 | End: 2021-12-30 | Stop reason: SDUPTHER

## 2021-12-03 RX ORDER — MOMETASONE FUROATE 50 UG/1
2 SPRAY, METERED NASAL DAILY
Qty: 17 G | Refills: 12 | Status: SHIPPED | OUTPATIENT
Start: 2021-12-03 | End: 2022-03-29 | Stop reason: SDUPTHER

## 2021-12-03 NOTE — TELEPHONE ENCOUNTER
From: Lexie Felton  To: Nazia Fulton MD  Sent: 12/2/2021 8:26 PM CST  Subject: refills    cecy felton [birth year] needs refills on: temazapam 30mgqhs hydroxine pamoate 50mg 2 capsules TID mometasone furoate 2 sprays each nostril BID Epinephrine 0.3mg auto injector prn

## 2021-12-08 RX ORDER — MONTELUKAST SODIUM 10 MG/1
10 TABLET ORAL
Qty: 90 TABLET | Refills: 3 | Status: SHIPPED | OUTPATIENT
Start: 2021-12-08 | End: 2022-03-29 | Stop reason: SDUPTHER

## 2021-12-30 ENCOUNTER — TELEPHONE (OUTPATIENT)
Dept: FAMILY MEDICINE CLINIC | Facility: CLINIC | Age: 71
End: 2021-12-30

## 2021-12-30 DIAGNOSIS — G25.81 RESTLESS LEG: ICD-10-CM

## 2021-12-30 DIAGNOSIS — F51.01 PRIMARY INSOMNIA: ICD-10-CM

## 2021-12-30 RX ORDER — TEMAZEPAM 30 MG/1
30 CAPSULE ORAL NIGHTLY PRN
Qty: 30 CAPSULE | Refills: 0 | Status: SHIPPED | OUTPATIENT
Start: 2022-01-02 | End: 2022-01-10 | Stop reason: SDUPTHER

## 2021-12-30 RX ORDER — GABAPENTIN 300 MG/1
CAPSULE ORAL
Qty: 90 CAPSULE | Refills: 0 | Status: SHIPPED | OUTPATIENT
Start: 2021-12-30 | End: 2022-03-29

## 2021-12-30 NOTE — TELEPHONE ENCOUNTER
No Refill needed, refill sent earlier today by Dr Fulton      .----- Message from Lexie Cardona sent at 12/30/2021 10:38 AM CST -----  Regarding: refill   refill needed on Gabapentin 300 mg one capsule in am two capsules at hs

## 2022-01-09 ENCOUNTER — PATIENT MESSAGE (OUTPATIENT)
Dept: FAMILY MEDICINE CLINIC | Facility: CLINIC | Age: 72
End: 2022-01-09

## 2022-01-09 DIAGNOSIS — F51.01 PRIMARY INSOMNIA: ICD-10-CM

## 2022-01-10 RX ORDER — TEMAZEPAM 30 MG/1
30 CAPSULE ORAL NIGHTLY PRN
Qty: 30 CAPSULE | Refills: 0 | Status: SHIPPED | OUTPATIENT
Start: 2022-01-10 | End: 2022-02-11 | Stop reason: SDUPTHER

## 2022-01-10 NOTE — TELEPHONE ENCOUNTER
From: Lexie Cardona  To: Nazia Fulton MD  Sent: 1/9/2022 9:42 PM CST  Subject: refill    need refill on temazapam

## 2022-01-17 ENCOUNTER — HOSPITAL ENCOUNTER (EMERGENCY)
Facility: HOSPITAL | Age: 72
Discharge: HOME | End: 2022-01-17
Attending: EMERGENCY MEDICINE | Admitting: EMERGENCY MEDICINE

## 2022-01-17 ENCOUNTER — APPOINTMENT (OUTPATIENT)
Dept: GENERAL RADIOLOGY | Facility: HOSPITAL | Age: 72
End: 2022-01-17

## 2022-01-17 VITALS
HEIGHT: 65 IN | WEIGHT: 175 LBS | HEART RATE: 69 BPM | SYSTOLIC BLOOD PRESSURE: 125 MMHG | DIASTOLIC BLOOD PRESSURE: 78 MMHG | BODY MASS INDEX: 29.16 KG/M2 | RESPIRATION RATE: 20 BRPM | OXYGEN SATURATION: 97 % | TEMPERATURE: 98.8 F

## 2022-01-17 DIAGNOSIS — U07.1 COVID-19 VIRUS INFECTION: Primary | ICD-10-CM

## 2022-01-17 LAB
ALBUMIN SERPL-MCNC: 4.7 G/DL (ref 3.5–5.2)
ALBUMIN/GLOB SERPL: 1.6 G/DL
ALP SERPL-CCNC: 69 U/L (ref 39–117)
ALT SERPL W P-5'-P-CCNC: 23 U/L (ref 1–33)
ANION GAP SERPL CALCULATED.3IONS-SCNC: 12 MMOL/L (ref 5–15)
AST SERPL-CCNC: 28 U/L (ref 1–32)
BACTERIA UR QL AUTO: ABNORMAL /HPF
BASOPHILS # BLD AUTO: 0.06 10*3/MM3 (ref 0–0.2)
BASOPHILS NFR BLD AUTO: 0.5 % (ref 0–1.5)
BILIRUB SERPL-MCNC: 0.9 MG/DL (ref 0–1.2)
BILIRUB UR QL STRIP: ABNORMAL
BUN SERPL-MCNC: 15 MG/DL (ref 8–23)
BUN/CREAT SERPL: 14.4 (ref 7–25)
CALCIUM SPEC-SCNC: 9.9 MG/DL (ref 8.6–10.5)
CHLORIDE SERPL-SCNC: 102 MMOL/L (ref 98–107)
CLARITY UR: CLEAR
CO2 SERPL-SCNC: 26 MMOL/L (ref 22–29)
COLOR UR: YELLOW
CREAT SERPL-MCNC: 1.04 MG/DL (ref 0.57–1)
DEPRECATED RDW RBC AUTO: 38.2 FL (ref 37–54)
EOSINOPHIL # BLD AUTO: 0.03 10*3/MM3 (ref 0–0.4)
EOSINOPHIL NFR BLD AUTO: 0.3 % (ref 0.3–6.2)
ERYTHROCYTE [DISTWIDTH] IN BLOOD BY AUTOMATED COUNT: 12.5 % (ref 12.3–15.4)
FLUAV RNA RESP QL NAA+PROBE: NOT DETECTED
FLUBV RNA RESP QL NAA+PROBE: NOT DETECTED
GFR SERPL CREATININE-BSD FRML MDRD: 52 ML/MIN/1.73
GLOBULIN UR ELPH-MCNC: 3 GM/DL
GLUCOSE SERPL-MCNC: 116 MG/DL (ref 65–99)
GLUCOSE UR STRIP-MCNC: NEGATIVE MG/DL
HCT VFR BLD AUTO: 49.1 % (ref 34–46.6)
HGB BLD-MCNC: 17.1 G/DL (ref 12–15.9)
HGB UR QL STRIP.AUTO: ABNORMAL
HOLD SPECIMEN: NORMAL
HYALINE CASTS UR QL AUTO: ABNORMAL /LPF
IMM GRANULOCYTES # BLD AUTO: 0.03 10*3/MM3 (ref 0–0.05)
IMM GRANULOCYTES NFR BLD AUTO: 0.3 % (ref 0–0.5)
KETONES UR QL STRIP: ABNORMAL
LEUKOCYTE ESTERASE UR QL STRIP.AUTO: NEGATIVE
LIPASE SERPL-CCNC: 61 U/L (ref 13–60)
LYMPHOCYTES # BLD AUTO: 2.19 10*3/MM3 (ref 0.7–3.1)
LYMPHOCYTES NFR BLD AUTO: 19.8 % (ref 19.6–45.3)
MCH RBC QN AUTO: 29.4 PG (ref 26.6–33)
MCHC RBC AUTO-ENTMCNC: 34.8 G/DL (ref 31.5–35.7)
MCV RBC AUTO: 84.5 FL (ref 79–97)
MONOCYTES # BLD AUTO: 1.06 10*3/MM3 (ref 0.1–0.9)
MONOCYTES NFR BLD AUTO: 9.6 % (ref 5–12)
NEUTROPHILS NFR BLD AUTO: 69.5 % (ref 42.7–76)
NEUTROPHILS NFR BLD AUTO: 7.69 10*3/MM3 (ref 1.7–7)
NITRITE UR QL STRIP: NEGATIVE
NRBC BLD AUTO-RTO: 0 /100 WBC (ref 0–0.2)
PH UR STRIP.AUTO: 5.5 [PH] (ref 5–9)
PLATELET # BLD AUTO: 284 10*3/MM3 (ref 140–450)
PMV BLD AUTO: 9.5 FL (ref 6–12)
POTASSIUM SERPL-SCNC: 3.5 MMOL/L (ref 3.5–5.2)
PROT SERPL-MCNC: 7.7 G/DL (ref 6–8.5)
PROT UR QL STRIP: ABNORMAL
RBC # BLD AUTO: 5.81 10*6/MM3 (ref 3.77–5.28)
RBC # UR STRIP: ABNORMAL /HPF
REF LAB TEST METHOD: ABNORMAL
SARS-COV-2 RNA RESP QL NAA+PROBE: DETECTED
SODIUM SERPL-SCNC: 140 MMOL/L (ref 136–145)
SP GR UR STRIP: 1.03 (ref 1–1.03)
SQUAMOUS #/AREA URNS HPF: ABNORMAL /HPF
UROBILINOGEN UR QL STRIP: ABNORMAL
WBC # UR STRIP: ABNORMAL /HPF
WBC NRBC COR # BLD: 11.06 10*3/MM3 (ref 3.4–10.8)

## 2022-01-17 PROCEDURE — 85025 COMPLETE CBC W/AUTO DIFF WBC: CPT | Performed by: EMERGENCY MEDICINE

## 2022-01-17 PROCEDURE — 99283 EMERGENCY DEPT VISIT LOW MDM: CPT

## 2022-01-17 PROCEDURE — M0247 HC INTRAVENOUS INFUSION SOTROVIMAB: HCPCS | Performed by: EMERGENCY MEDICINE

## 2022-01-17 PROCEDURE — 74022 RADEX COMPL AQT ABD SERIES: CPT

## 2022-01-17 PROCEDURE — 36415 COLL VENOUS BLD VENIPUNCTURE: CPT

## 2022-01-17 PROCEDURE — 96374 THER/PROPH/DIAG INJ IV PUSH: CPT

## 2022-01-17 PROCEDURE — 25010000002 SOTROVIMAB 500 MG/8ML SOLUTION 8 ML VIAL: Performed by: EMERGENCY MEDICINE

## 2022-01-17 PROCEDURE — 96361 HYDRATE IV INFUSION ADD-ON: CPT

## 2022-01-17 PROCEDURE — 81001 URINALYSIS AUTO W/SCOPE: CPT | Performed by: EMERGENCY MEDICINE

## 2022-01-17 PROCEDURE — 87636 SARSCOV2 & INF A&B AMP PRB: CPT | Performed by: EMERGENCY MEDICINE

## 2022-01-17 PROCEDURE — 25010000002 ONDANSETRON PER 1 MG: Performed by: EMERGENCY MEDICINE

## 2022-01-17 PROCEDURE — 80053 COMPREHEN METABOLIC PANEL: CPT | Performed by: EMERGENCY MEDICINE

## 2022-01-17 PROCEDURE — 83690 ASSAY OF LIPASE: CPT | Performed by: EMERGENCY MEDICINE

## 2022-01-17 RX ORDER — ONDANSETRON 4 MG/1
4 TABLET, ORALLY DISINTEGRATING ORAL EVERY 8 HOURS PRN
Qty: 10 TABLET | Refills: 0 | Status: SHIPPED | OUTPATIENT
Start: 2022-01-17 | End: 2022-09-30

## 2022-01-17 RX ORDER — DIPHENHYDRAMINE HCL 50 MG
50 CAPSULE ORAL ONCE AS NEEDED
Status: DISCONTINUED | OUTPATIENT
Start: 2022-01-17 | End: 2022-01-17 | Stop reason: HOSPADM

## 2022-01-17 RX ORDER — DIPHENHYDRAMINE HYDROCHLORIDE 50 MG/ML
50 INJECTION INTRAMUSCULAR; INTRAVENOUS ONCE AS NEEDED
Status: DISCONTINUED | OUTPATIENT
Start: 2022-01-17 | End: 2022-01-17 | Stop reason: HOSPADM

## 2022-01-17 RX ORDER — METHYLPREDNISOLONE SODIUM SUCCINATE 125 MG/2ML
125 INJECTION, POWDER, LYOPHILIZED, FOR SOLUTION INTRAMUSCULAR; INTRAVENOUS ONCE AS NEEDED
Status: DISCONTINUED | OUTPATIENT
Start: 2022-01-17 | End: 2022-01-17 | Stop reason: HOSPADM

## 2022-01-17 RX ORDER — ALBUTEROL SULFATE 90 UG/1
2 AEROSOL, METERED RESPIRATORY (INHALATION) EVERY 4 HOURS PRN
Qty: 6.7 G | Refills: 0 | Status: SHIPPED | OUTPATIENT
Start: 2022-01-17 | End: 2022-06-30

## 2022-01-17 RX ORDER — EPINEPHRINE 1 MG/ML
0.3 INJECTION, SOLUTION INTRAMUSCULAR; SUBCUTANEOUS ONCE AS NEEDED
Status: DISCONTINUED | OUTPATIENT
Start: 2022-01-17 | End: 2022-01-17 | Stop reason: HOSPADM

## 2022-01-17 RX ORDER — LOPERAMIDE HYDROCHLORIDE 2 MG/1
2 CAPSULE ORAL 4 TIMES DAILY PRN
Qty: 6 CAPSULE | Refills: 0 | Status: SHIPPED | OUTPATIENT
Start: 2022-01-17 | End: 2022-09-30

## 2022-01-17 RX ORDER — SODIUM CHLORIDE 9 MG/ML
125 INJECTION, SOLUTION INTRAVENOUS CONTINUOUS
Status: DISCONTINUED | OUTPATIENT
Start: 2022-01-17 | End: 2022-01-17 | Stop reason: HOSPADM

## 2022-01-17 RX ORDER — SODIUM CHLORIDE 0.9 % (FLUSH) 0.9 %
10 SYRINGE (ML) INJECTION AS NEEDED
Status: DISCONTINUED | OUTPATIENT
Start: 2022-01-17 | End: 2022-01-17 | Stop reason: HOSPADM

## 2022-01-17 RX ORDER — ONDANSETRON 2 MG/ML
4 INJECTION INTRAMUSCULAR; INTRAVENOUS ONCE
Status: COMPLETED | OUTPATIENT
Start: 2022-01-17 | End: 2022-01-17

## 2022-01-17 RX ORDER — DEXTROMETHORPHAN HYDROBROMIDE AND PROMETHAZINE HYDROCHLORIDE 15; 6.25 MG/5ML; MG/5ML
5 SYRUP ORAL 4 TIMES DAILY PRN
Qty: 118 ML | Refills: 0 | Status: SHIPPED | OUTPATIENT
Start: 2022-01-17 | End: 2022-09-30

## 2022-01-17 RX ORDER — SODIUM CHLORIDE 9 MG/ML
30 INJECTION, SOLUTION INTRAVENOUS ONCE
Status: COMPLETED | OUTPATIENT
Start: 2022-01-17 | End: 2022-01-17

## 2022-01-17 RX ADMIN — ONDANSETRON 4 MG: 2 INJECTION INTRAMUSCULAR; INTRAVENOUS at 08:15

## 2022-01-17 RX ADMIN — SODIUM CHLORIDE 30 ML: 9 INJECTION, SOLUTION INTRAVENOUS at 13:25

## 2022-01-17 RX ADMIN — SODIUM CHLORIDE 500 MG: 9 INJECTION, SOLUTION INTRAVENOUS at 12:56

## 2022-01-17 RX ADMIN — SODIUM CHLORIDE 500 ML: 9 INJECTION, SOLUTION INTRAVENOUS at 08:18

## 2022-01-17 RX ADMIN — SODIUM CHLORIDE 125 ML/HR: 9 INJECTION, SOLUTION INTRAVENOUS at 08:16

## 2022-01-17 NOTE — ED NOTES
Patient aware that urine specimen is needed,Patient states unable to urinate at this time     Ibeth Rouse  01/17/22 0856

## 2022-01-17 NOTE — ED NOTES
Pt feeling well at this time with no reaction noted at this time.  Pt tolerating well.     Melia Miller RN  01/17/22 1520

## 2022-01-17 NOTE — ED NOTES
Pt feeling well at this time with no reaction noted at this time.  Pt tolerating well.     Melia Miller RN  01/17/22 [id number]

## 2022-01-17 NOTE — ED NOTES
Pt feeling well at this time with no reaction noted at this time.  Pt tolerating well.     Melia Miller RN  01/17/22 [id number]

## 2022-01-17 NOTE — DISCHARGE INSTRUCTIONS
Return ED fever, vomiting, dehydration, chest pain, shortness of air, worse condition, any other concerns

## 2022-01-17 NOTE — ED PROVIDER NOTES
Subjective   70yo female pmh significant hyperlipidemia/ckd/anxiety/oa presents ED c/o 4d hx congestion/nausea/vomiting/diarrhea (resolved)/sore throat/malaise.  Denies chest pain/abd pain/dysuria/soa.    Labs/radiographic studies reviewed. cxr no active disease. abd xray nonobstuctive bowel gas pattern. sao2 98% RA.  Pt counseled and agreed to receive sotrovimab monoclonal antibody infusion. Stable discharge home with supportive care.  DC precautions provided.      History provided by:  Patient  Illness  Severity:  Moderate  Duration:  4 days  Associated symptoms: congestion, cough, diarrhea, fatigue, nausea, sore throat and vomiting    Associated symptoms: no abdominal pain        Review of Systems   Constitutional: Positive for fatigue.   HENT: Positive for congestion and sore throat.    Eyes: Negative for redness.   Respiratory: Positive for cough.    Cardiovascular: Negative.    Gastrointestinal: Positive for diarrhea, nausea and vomiting. Negative for abdominal pain.   Genitourinary: Negative.    Musculoskeletal: Negative.    Allergic/Immunologic: Negative for immunocompromised state.   All other systems reviewed and are negative.      Past Medical History:   Diagnosis Date   • Acute respiratory failure due to COVID-19 ([facility]) 1/29/2021   • Acute respiratory failure with hypoxia ([facility]) 1/29/2021   • Allergic rhinitis    • Blastomycosis    • Carpal tunnel syndrome of right wrist 7/21/2017   • Chronic bronchitis ([facility])    • Chronic kidney disease (CKD), stage II (mild)    • Minnie bullosa 2/19/2018    Added automatically from request for surgery [id number]   • Diverticulitis of colon    • Gastric polyp    • Gastritis    • Gastroesophageal reflux disease    • Gastroparesis    • Gout    • History of colon polyps    • Hypercholesterolemia    • IBS (irritable bowel syndrome)    • Meniere's disease    • Myoclonic disorder    • Ménière's disease    • Nasal obstruction 2/19/2018    Added automatically from request for surgery  [id number]   • Nasal septal deformity 2/19/2018    Added automatically from request for surgery [id number]   • Nasal turbinate hypertrophy 2/19/2018    Added automatically from request for surgery [id number]   • Nasal valve collapse 2/19/2018    Added automatically from request for surgery [id number]   • Osteoarthritis    • Peripheral edema    • Pneumonia due to COVID-19 virus 1/29/2021   • Sinusitis, chronic 2/19/2018    Added automatically from request for surgery [id number]   • Skin cancer    • Sleep apnea    • Spasm of bladder    • Urinary frequency 6/7/2021       Allergies   Allergen Reactions   • Augmentin [Amoxicillin-Pot Clavulanate] Anaphylaxis   • Ceclor [Cefaclor] Anaphylaxis   • Nsaids Anaphylaxis   • Other Anaphylaxis     Cats  E.E.S. 200  Lead   Mold  Soso Pastrani - Anaphylaxis   • Penicillins Anaphylaxis   • Seroquel [Quetiapine] Anxiety     Elevated heart rate and caused insomnia   • Aspirin Tinnitus     tinnitis    • Biaxin [Clarithromycin] Other (See Comments)     angioedema   • Ciprofloxacin Other (See Comments)     Lips burning   • Contrast Dye Hives and Itching   • Cymbalta [Duloxetine Hcl] Swelling     Tongue turned red and was swollen   • Demerol [Meperidine] Nausea And Vomiting   • Iodine GI Intolerance     And iodide containing products   • Macrobid [Nitrofurantoin Monohyd Macro] Diarrhea, Nausea And Vomiting and GI Intolerance   • Requip [Ropinirole Hcl] Diarrhea, Nausea And Vomiting and GI Intolerance   • Septra [Sulfamethoxazole-Trimethoprim] Nausea And Vomiting and GI Intolerance   • Statins Myalgia       *muscle enzyme increase with myalgias   • Zithromax [Azithromycin] Unknown - Low Severity     States she hasn't had a reaction, but doctor said he wasn't going to give it to her   • Astelin [Azelastine] Unknown - Low Severity   • Nystatin GI Intolerance   • Niaspan [Niacin Er] Rash   • Nickel Rash       Past Surgical History:   Procedure Laterality Date   • BRONCHOSCOPY     • CARPAL TUNNEL RELEASE WITH  CUBITAL TUNNEL RELEASE     • COLONOSCOPY W/ POLYPECTOMY     • CYSTOSCOPY     • ENDOSCOPIC FUNCTIONAL SINUS SURGERY (FESS) Right 3/6/2018   • LAPAROSCOPIC CHOLECYSTECTOMY     • OVARIAN CYST REMOVAL     • TOTAL ABDOMINAL HYSTERECTOMY WITH SALPINGO OOPHORECTOMY         Family History   Problem Relation Age of Onset   • Diabetes Mother    • Colon cancer Father 60   • Breast cancer Paternal Grandmother    • Colon cancer Paternal Uncle 60       Social History     Socioeconomic History   • Marital status:    Tobacco Use   • Smoking status: Former Smoker     Years: 30.00     Types: Cigarettes     Quit date:      Years since quittin.0   • Smokeless tobacco: Never Used   Substance and Sexual Activity   • Alcohol use: No   • Drug use: No   • Sexual activity: Not Currently     Comment:            Objective   Physical Exam  Vitals and nursing note reviewed.   Constitutional:       Appearance: Normal appearance.   HENT:      Head: Normocephalic and atraumatic.      Nose: Nose normal.      Mouth/Throat:      Mouth: Mucous membranes are moist.   Eyes:      Pupils: Pupils are equal, round, and reactive to light.   Cardiovascular:      Rate and Rhythm: Normal rate and regular rhythm.      Pulses: Normal pulses.      Heart sounds: Normal heart sounds. No murmur heard.  No friction rub. No gallop.    Pulmonary:      Effort: Pulmonary effort is normal. No respiratory distress.      Breath sounds: Normal breath sounds. No wheezing, rhonchi or rales.   Abdominal:      General: Abdomen is flat. Bowel sounds are normal. There is no distension.      Palpations: Abdomen is soft.      Tenderness: There is no abdominal tenderness. There is no guarding or rebound.   Musculoskeletal:         General: No swelling or deformity. Normal range of motion.      Cervical back: Normal range of motion and neck supple. No rigidity.      Right lower leg: No edema.      Left lower leg: No edema.   Lymphadenopathy:      Cervical: No  cervical adenopathy.   Skin:     General: Skin is warm and dry.   Neurological:      General: No focal deficit present.      Mental Status: She is alert and oriented to person, place, and time.      GCS: GCS eye subscore is 4. GCS verbal subscore is 5. GCS motor subscore is 6.         Procedures           ED Course      Labs Reviewed   COVID-19 AND FLU A/B, NP SWAB IN TRANSPORT MEDIA 8-12 HR TAT - Abnormal; Notable for the following components:       Result Value    COVID19 Detected (*)     All other components within normal limits    Narrative:     Fact sheet for providers: https://www.fda.gov/media/151039/download    Fact sheet for patients: https://www.fda.gov/media/511937/download    Test performed by PCR.  Influenza A and Influenza B negative results should be considered presumptive in samples that have a positive SARS-CoV-2 result.    Competitive inhibition studies showed that SARS-CoV-2 virus, when present at concentrations above 3.6E+04 copies/mL, can inhibit the detection and amplification of influenza A and influenza B virus RNA if present at or below 1.8E+02 copies/mL or 4.9E+02 copies/mL, respectively, and may lead to false negative influenza virus results. If co-infection with influenza A or influenza B virus is suspected in samples with a positive SARS-CoV-2 result, the sample should be re-tested with another FDA cleared, approved, or authorized influenza test, if influenza virus detection would change clinical management.   COMPREHENSIVE METABOLIC PANEL - Abnormal; Notable for the following components:    Glucose 116 (*)     Creatinine 1.04 (*)     eGFR Non  Amer 52 (*)     All other components within normal limits    Narrative:     GFR Normal >60  Chronic Kidney Disease <60  Kidney Failure <15     CBC WITH AUTO DIFFERENTIAL - Abnormal; Notable for the following components:    WBC 11.06 (*)     RBC 5.81 (*)     Hemoglobin 17.1 (*)     Hematocrit 49.1 (*)     Neutrophils, Absolute 7.69 (*)      Monocytes, Absolute 1.06 (*)     All other components within normal limits   LIPASE - Abnormal; Notable for the following components:    Lipase 61 (*)     All other components within normal limits   URINALYSIS W/ MICROSCOPIC IF INDICATED (NO CULTURE) - Abnormal; Notable for the following components:    Ketones, UA 80 mg/dL (3+) (*)     Bilirubin, UA Small (1+) (*)     Blood, UA Trace (*)     Protein, UA 30 mg/dL (1+) (*)     All other components within normal limits   URINALYSIS, MICROSCOPIC ONLY - Abnormal; Notable for the following components:    RBC, UA 3-5 (*)     Bacteria, UA Trace (*)     Squamous Epithelial Cells, UA 3-5 (*)     All other components within normal limits   CBC AND DIFFERENTIAL    Narrative:     The following orders were created for panel order CBC & Differential.  Procedure                               Abnormality         Status                     ---------                               -----------         ------                     CBC Auto Differential[[id number]]        Abnormal            Final result                 Please view results for these tests on the individual orders.     XR Abdomen 2+ VW with Chest 1 VW    Result Date: 1/17/2022  Narrative: PROCEDURE: Acute abdominal series with chest x-ray and abdominal x-rays with three views. INDICATION: Vomiting. COMPARISON: 2/25/2021 chest x-ray. FINDINGS: Upright PA chest: Heart size normal with normal vascularity. Lungs clear. No pleural effusion. Flat and upright abdominal x-rays: No free intraperitoneal air. Normal abdominal bowel gas pattern with no abnormal distended bowel loops or abnormal air-fluid levels. No suspicious calcifications or soft tissue densities. Postcholecystectomy clips right upper quadrant.     Impression: No acute disease upright PA chest exam. Negative flat and upright abdomen with normal abdominal bowel gas pattern. [id number] Electronically signed by:  Jai Chung MD  1/17/2022 8:09 AM CST Workstation:  [id number]                                               [facility]  Number of Diagnoses or Management Options  COVID-19 virus infection  Diagnosis management comments: The FDA has issued an Emergency Use Authorization (EUA) to permit emergency use of the unapproved product sotrovimabfor treatment of laboratory confirmed COVID-19 in certain ambulatory patients. There is no prescribing information or package insert, however, the FDA has authorized distribution of Fact Sheets for patients and/or caregivers for additional information on this investigational therapy. I have provided the Fact Sheet to and discussed the following with the patient and he/she agreed to proceed:  FDA has authorized the emergency use (EUA) of sotrovimab, which is not an FDA approved drug  The patient has the option to accept or refuse sotrovimab at any time  The significant known and potential risks and benefits of sotrovimab and the extent to which such risks and benefits are unknown  Information on available alternative treatments and the risks and benefits of those alternatives         Amount and/or Complexity of Data Reviewed  Clinical lab tests: reviewed  Tests in the radiology section of CPT®: reviewed        Final diagnoses:   COVID-19 virus infection       ED Disposition  ED Disposition     ED Disposition Condition Comment    Discharge Nazia Allen MD  [address]  [facility] [address] [place] [address]  [phone]    In 1 week           Medication List      New Prescriptions    loperamide 2 MG capsule  Commonly known as: IMODIUM  Take 1 capsule by mouth 4 (Four) Times a Day As Needed for Diarrhea.     ondansetron ODT 4 MG disintegrating tablet  Commonly known as: ZOFRAN-ODT  Place 1 tablet on the tongue Every 8 (Eight) Hours As Needed for Nausea or Vomiting.     promethazine-dextromethorphan 6.25-15 MG/5ML syrup  Commonly known as: PROMETHAZINE-DM  Take 5 mL by mouth 4 (Four) Times a Day As Needed for Cough.         Changed    * albuterol sulfate  (90 Base) MCG/ACT inhaler  Commonly known as: PROVENTIL HFA;VENTOLIN HFA;PROAIR HFA  Inhale 2 puffs 4 (Four) Times a Day.  What changed: Another medication with the same name was added. Make sure you understand how and when to take each.     * albuterol sulfate  (90 Base) MCG/ACT inhaler  Commonly known as: PROVENTIL HFA;VENTOLIN HFA;PROAIR HFA  Inhale 2 puffs Every 4 (Four) Hours As Needed for Wheezing or Shortness of Air.  What changed: You were already taking a medication with the same name, and this prescription was added. Make sure you understand how and when to take each.         * This list has 2 medication(s) that are the same as other medications prescribed for you. Read the directions carefully, and ask your doctor or other care provider to review them with you.               Where to Get Your Medications      These medications were sent to [facility]/pharmacy #[id number] - [place], [place] - [address] [place] [address] [phone]  - [phone] [address] [address]    Phone: [phone]   · albuterol sulfate  (90 Base) MCG/ACT inhaler  · loperamide 2 MG capsule  · ondansetron ODT 4 MG disintegrating tablet  · promethazine-dextromethorphan 6.25-15 MG/5ML syrup          Joshua Forrest MD  01/17/22 1125       Joshua Forrest MD  01/17/22 1128

## 2022-01-18 ENCOUNTER — PATIENT OUTREACH (OUTPATIENT)
Dept: CASE MANAGEMENT | Facility: OTHER | Age: 72
End: 2022-01-18

## 2022-01-18 NOTE — OUTREACH NOTE
ED Potential Covid Discharge Follow-up    Pt seen at [facility] ED 1/17/22 for covid. She states she is feeling better today. She denies need for pcp f/u. 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 Quarantine instructions discussed. She lives with spouse and voices good support.    Rochelle Jolly RN  Ambulatory Case Management    1/18/2022, 15:29 EST

## 2022-01-22 NOTE — TELEPHONE ENCOUNTER
Last Script       09/15/2021  #30, NR     Next Sandie         12/01/2021     Last OV           08/31/2021         ----- Message from Lexie Cardona sent at 10/18/2021 12:53 PM CDT -----  Regarding: Prescription Question  Contact: [phone]  Gabapentin 300 mg qhs needs refills Thanks     Pt returned from ultrasound

## 2022-02-06 NOTE — TELEPHONE ENCOUNTER
Ms. Garibayvalentin Cardona has called back asking if you tried to call her or her  Rodolfo Cardona.   She states she missed a call around 1:45    Please advise    Thank you   Ruslan Barrera)

## 2022-02-10 ENCOUNTER — PATIENT MESSAGE (OUTPATIENT)
Dept: FAMILY MEDICINE CLINIC | Facility: CLINIC | Age: 72
End: 2022-02-10

## 2022-02-10 DIAGNOSIS — F51.01 PRIMARY INSOMNIA: ICD-10-CM

## 2022-02-11 RX ORDER — TEMAZEPAM 30 MG/1
30 CAPSULE ORAL NIGHTLY PRN
Qty: 30 CAPSULE | Refills: 0 | Status: SHIPPED | OUTPATIENT
Start: 2022-02-11 | End: 2022-03-14 | Stop reason: SDUPTHER

## 2022-02-18 NOTE — PROGRESS NOTES
QUICK REFERENCE INFORMATION:  The ABCs of the Annual Wellness Visit    Subsequent Medicare Wellness Visit    HEALTH RISK ASSESSMENT    1950    Recent Hospitalizations:  No hospitalization(s) within the last year..        Current Medical Providers:  Patient Care Team:  Naresh Forrest MD as PCP - General (Family Medicine)  Naresh Forrest MD as PCP - Claims Attributed  Wesley Fox MD as Consulting Physician (Rheumatology)  Jem Acevedo APRN (Inactive) (Obstetrics and Gynecology)  Kirit Murillo MD as Consulting Physician (Ophthalmology)  Doug Hogue MD as Surgeon (Neurosurgery)  Naresh Forrest MD Estes, Walker L, DPM as Consulting Physician (Podiatry)  Jason Copeland MD as Consulting Physician (Nephrology)  Pablo Navas MD as Consulting Physician (Otolaryngology)        Smoking Status:  History   Smoking Status   • Former Smoker   • Years: 30.00   • Quit date: 2001   Smokeless Tobacco   • Never Used       Alcohol Consumption:  History   Alcohol Use No       Depression Screen:   PHQ-2/PHQ-9 Depression Screening 10/24/2018   Little interest or pleasure in doing things 0   Feeling down, depressed, or hopeless 0   Trouble falling or staying asleep, or sleeping too much 2   Feeling tired or having little energy 2   Poor appetite or overeating 2   Feeling bad about yourself - or that you are a failure or have let yourself or your family down 0   Trouble concentrating on things, such as reading the newspaper or watching television 0   Moving or speaking so slowly that other people could have noticed. Or the opposite - being so fidgety or restless that you have been moving around a lot more than usual 0   Thoughts that you would be better off dead, or of hurting yourself in some way 0   Total Score 6   If you checked off any problems, how difficult have these problems made it for you to do your work, take care of things at home, or get along with other people? Not difficult at all        Health Habits and Functional and Cognitive Screening:  Functional & Cognitive Status 10/24/2018   Do you have difficulty preparing food and eating? No   Do you have difficulty bathing yourself, getting dressed or grooming yourself? No   Do you have difficulty using the toilet? No   Do you have difficulty moving around from place to place? No   Do you have trouble with steps or getting out of a bed or a chair? No   In the past year have you fallen or experienced a near fall? No   Current Diet Well Balanced Diet   Dental Exam Up to date   Eye Exam Up to date   Exercise (times per week) 5 times per week   Current Exercise Activities Include Walking   Do you need help using the phone?  No   Are you deaf or do you have serious difficulty hearing?  No   Do you need help with transportation? No   Do you need help shopping? No   Do you need help preparing meals?  No   Do you need help with housework?  No   Do you need help with laundry? No   Do you need help taking your medications? No   Do you need help managing money? No   Do you ever drive or ride in a car without wearing a seat belt? No   Have you felt unusual stress, anger or loneliness in the last month? No   Who do you live with? Spouse   If you need help, do you have trouble finding someone available to you? No   Have you been bothered in the last four weeks by sexual problems? No   Do you have difficulty concentrating, remembering or making decisions? No           Does the patient have evidence of cognitive impairment? No    Aspirin use counseling: Contraindicated from taking ASA      Recent Lab Results:  CMP:  Lab Results   Component Value Date    BUN 12 07/24/2018    CREATININE 0.98 07/24/2018    EGFRIFNONA 57 07/24/2018    BCR 12.2 07/24/2018     07/24/2018    K 4.0 07/24/2018    CO2 28.0 07/24/2018    CALCIUM 9.7 07/24/2018    ALBUMIN 4.30 07/24/2018    BILITOT 0.7 07/24/2018    ALKPHOS 66 07/24/2018    AST 43 (H) 07/24/2018    ALT 30 07/24/2018  7     Lipid Panel:  Lab Results   Component Value Date    CHOL 249 (H) 07/24/2018    TRIG 297 (H) 07/24/2018    HDL 42 (L) 07/24/2018    LDLHDL 3.51 (H) 07/24/2018     HbA1c:  Lab Results   Component Value Date    HGBA1C 5.2 10/12/2016       Visual Acuity:  No exam data present    Age-appropriate Screening Schedule:  Refer to the list below for future screening recommendations based on patient's age, sex and/or medical conditions. Orders for these recommended tests are listed in the plan section. The patient has been provided with a written plan.    Health Maintenance   Topic Date Due   • ZOSTER VACCINE (2 of 2) 12/27/2016   • INFLUENZA VACCINE  08/01/2018   • MAMMOGRAM  06/04/2019   • LIPID PANEL  07/24/2019   • COLONOSCOPY  07/23/2025   • TDAP/TD VACCINES (2 - Td) 09/16/2026   • PNEUMOCOCCAL VACCINES (65+ LOW/MEDIUM RISK)  Excluded        Subjective   History of Present Illness    Lexie Cardona is a 68 y.o. female who presents for an Subsequent Wellness Visit.    The following portions of the patient's history were reviewed and updated as appropriate: allergies, current medications, past family history, past medical history, past social history, past surgical history and problem list.    Outpatient Medications Prior to Visit   Medication Sig Dispense Refill   • acetaminophen (TYLENOL) 500 MG tablet Take 500 mg by mouth.     • Alum Hydroxide-Mag Carbonate (GAVISCON PO) Take  by mouth As Needed. Tablets Or Liquid, As Needed      • Biotin 5 MG capsule Take 1 capsule by mouth Daily.     • Calcium Citrate-Vitamin D (CALCIUM + D PO) Take 1,200 mg by mouth Daily.     • cetirizine (ZyrTEC) 10 MG tablet Take 10 mg by mouth daily.     • Chlorpheniramine Maleate ER 12 MG tablet controlled-release Take 1 tablet by mouth every night at bedtime. 30 each 3   • cholecalciferol (VITAMIN D3) 400 units tablet Take 400 Units by mouth Daily.     • Cranberry-Vitamin C-Vitamin E (CRANBERRY PLUS VITAMIN C) 4200-20-3 MG-MG-UNIT  capsule Take 2 tablets by mouth Every Night.     • doxycycline (MONODOX) 100 MG capsule      • EPINEPHrine (EPIPEN 2-ALBERT) 0.3 MG/0.3ML solution auto-injector injection Use as directed for Allergic Reaction 1 each 5   • estradiol (ESTRING) 2 MG vaginal ring Insert 2 mg into the vagina Every 3 (Three) Months. follow package directions 1 each 4   • Flaxseed, Linseed, (FLAX SEED OIL) 1000 MG capsule Take 1 capsule by mouth 2 (two) times a day.     • fluticasone (FLONASE) 50 MCG/ACT nasal spray 1 spray into the nostril(s) as directed by provider.     • gabapentin (NEURONTIN) 300 MG capsule Take 900 mg by mouth.     • gabapentin (NEURONTIN) 600 MG tablet Take 1 tablet by mouth 2 (Two) Times a Day. 1 qam and 1/2 qhs 60 tablet 2   • hydrOXYzine (VISTARIL) 100 MG capsule Take 1 capsule by mouth 3 (Three) Times a Day As Needed for Itching. 90 capsule 6   • LACTOBACILLUS RHAMNOSUS, GG, PO Take 1 capsule by mouth.     • meclizine (ANTIVERT) 25 MG tablet Take 25 mg by mouth 3 (Three) Times a Day As Needed for dizziness.     • mometasone (NASONEX) 50 MCG/ACT nasal spray 2 sprays into each nostril 2 (Two) Times a Day. 17 g 5   • montelukast (SINGULAIR) 10 MG tablet Take 1 tablet by mouth Every Night. 30 tablet 11   • Multiple Vitamins-Minerals (CENTRUM SILVER PO) Take 1 tablet by mouth daily.     • olopatadine (PATANASE) 0.6 % solution nasal solution 2 sprays by Each Nare route 2 (Two) Times a Day. 30 g 3   • Probiotic Product (PROBIOTIC FORMULA PO) Take 1 tablet by mouth Every Night. 10 billion cell (2 billion ea) capsule      • promethazine (PHENERGAN) 25 MG tablet Take 1 tablet by mouth Every 6 (Six) Hours As Needed for Nausea or Vomiting. 30 tablet 2   • Sennosides 25 MG tablet Take 2 tablets by mouth Every Night.     • temazepam (RESTORIL) 30 MG capsule Take 1 capsule by mouth At Night As Needed for Sleep. 30 capsule 2   • traZODone (DESYREL) 100 MG tablet Take 1 tablet by mouth Every Night. 90 tablet 3   • valACYclovir  "(VALTREX) 1000 MG tablet Take 1 g by mouth.       No facility-administered medications prior to visit.        Patient Active Problem List   Diagnosis   • Vitamin D deficiency   • Spasm of bladder   • Restless legs   • Myoclonic disorder   • Meniere's disease   • Insomnia   • IBS (irritable bowel syndrome)   • Hypercholesterolemia   • History of colon polyps   • Gout   • Gastroesophageal reflux disease   • Gastric polyp   • Fundic gland polyposis of stomach   • Elevated levels of transaminase & lactic acid dehydrogenase   • Chronic kidney disease (CKD), stage II (mild)   • Allergic rhinitis   • Primary osteoarthritis of both hands   • Primary open angle glaucoma of both eyes, mild stage   • Cataract   • Carpal tunnel syndrome of right wrist   • Nasal obstruction   • Nasal turbinate hypertrophy   • Nasal septal deformity   • Sinusitis, chronic   • Nasal valve collapse   • Minnie bullosa   • Pain of right breast       Advance Care Planning:  has an advance directive - a copy HAS NOT been provided. Have asked the patient to send this to us to add to record.    Identification of Risk Factors:  Risk factors include: weight .    Review of Systems    Compared to one year ago, the patient feels her physical health is worse.  Compared to one year ago, the patient feels her mental health is worse.    Objective     Physical Exam    Vitals:    10/24/18 1011   BP: 126/78   Pulse: 67   SpO2: 98%   Weight: 80.3 kg (177 lb)   Height: 162.6 cm (64\")   PainSc: 0-No pain       Patient's Body mass index is 30.38 kg/m². BMI is above normal parameters. Recommendations include: educational material.      Assessment/Plan   Patient Self-Management and Personalized Health Advice  The patient has been provided with information about: diet and preventive services including:   · Advance directive.    Visit Diagnoses:    ICD-10-CM ICD-9-CM   1. Abnormal ultrasound R93.89 793.99   2. Inconclusive mammogram  R92.2 793.82   3. Chronic kidney disease " (CKD), stage II (mild) N18.2 585.2   4. Elevated liver enzymes R74.8 790.5       Orders Placed This Encounter   Procedures   • US breast right complete     Standing Status:   Future     Standing Expiration Date:   10/24/2019     Order Specific Question:   Reason for Exam:     Answer:   repeat abn us   • Comprehensive Metabolic Panel       Outpatient Encounter Prescriptions as of 10/24/2018   Medication Sig Dispense Refill   • acetaminophen (TYLENOL) 500 MG tablet Take 500 mg by mouth.     • Alum Hydroxide-Mag Carbonate (GAVISCON PO) Take  by mouth As Needed. Tablets Or Liquid, As Needed      • Biotin 5 MG capsule Take 1 capsule by mouth Daily.     • Calcium Citrate-Vitamin D (CALCIUM + D PO) Take 1,200 mg by mouth Daily.     • cetirizine (ZyrTEC) 10 MG tablet Take 10 mg by mouth daily.     • Chlorpheniramine Maleate ER 12 MG tablet controlled-release Take 1 tablet by mouth every night at bedtime. 30 each 3   • cholecalciferol (VITAMIN D3) 400 units tablet Take 400 Units by mouth Daily.     • Cranberry-Vitamin C-Vitamin E (CRANBERRY PLUS VITAMIN C) 4200-20-3 MG-MG-UNIT capsule Take 2 tablets by mouth Every Night.     • doxycycline (MONODOX) 100 MG capsule      • EPINEPHrine (EPIPEN 2-ALBERT) 0.3 MG/0.3ML solution auto-injector injection Use as directed for Allergic Reaction 1 each 5   • estradiol (ESTRING) 2 MG vaginal ring Insert 2 mg into the vagina Every 3 (Three) Months. follow package directions 1 each 4   • Flaxseed, Linseed, (FLAX SEED OIL) 1000 MG capsule Take 1 capsule by mouth 2 (two) times a day.     • fluticasone (FLONASE) 50 MCG/ACT nasal spray 1 spray into the nostril(s) as directed by provider.     • gabapentin (NEURONTIN) 300 MG capsule Take 900 mg by mouth.     • gabapentin (NEURONTIN) 600 MG tablet Take 1 tablet by mouth 2 (Two) Times a Day. 1 qam and 1/2 qhs 60 tablet 2   • hydrOXYzine (VISTARIL) 100 MG capsule Take 1 capsule by mouth 3 (Three) Times a Day As Needed for Itching. 90 capsule 6   •  LACTOBACILLUS RHAMNOSUS, GG, PO Take 1 capsule by mouth.     • meclizine (ANTIVERT) 25 MG tablet Take 25 mg by mouth 3 (Three) Times a Day As Needed for dizziness.     • mometasone (NASONEX) 50 MCG/ACT nasal spray 2 sprays into each nostril 2 (Two) Times a Day. 17 g 5   • montelukast (SINGULAIR) 10 MG tablet Take 1 tablet by mouth Every Night. 30 tablet 11   • Multiple Vitamins-Minerals (CENTRUM SILVER PO) Take 1 tablet by mouth daily.     • olopatadine (PATANASE) 0.6 % solution nasal solution 2 sprays by Each Nare route 2 (Two) Times a Day. 30 g 3   • Probiotic Product (PROBIOTIC FORMULA PO) Take 1 tablet by mouth Every Night. 10 billion cell (2 billion ea) capsule      • promethazine (PHENERGAN) 25 MG tablet Take 1 tablet by mouth Every 6 (Six) Hours As Needed for Nausea or Vomiting. 30 tablet 2   • Sennosides 25 MG tablet Take 2 tablets by mouth Every Night.     • temazepam (RESTORIL) 30 MG capsule Take 1 capsule by mouth At Night As Needed for Sleep. 30 capsule 2   • traZODone (DESYREL) 100 MG tablet Take 1 tablet by mouth Every Night. 90 tablet 3   • valACYclovir (VALTREX) 1000 MG tablet Take 1 g by mouth.       No facility-administered encounter medications on file as of 10/24/2018.        Reviewed use of high risk medication in the elderly: yes  Reviewed for potential of harmful drug interactions in the elderly: yes    Follow Up:  Return in about 3 months (around 1/24/2019).     An After Visit Summary and PPPS with all of these plans were given to the patient.

## 2022-02-23 DIAGNOSIS — E78.00 HYPERCHOLESTEROLEMIA: ICD-10-CM

## 2022-02-23 RX ORDER — CHOLESTYRAMINE 4 G/5.5G
POWDER, FOR SUSPENSION ORAL
Qty: 90 PACKET | Refills: 3 | Status: SHIPPED | OUTPATIENT
Start: 2022-02-23 | End: 2022-03-29 | Stop reason: SDUPTHER

## 2022-03-08 ENCOUNTER — LAB (OUTPATIENT)
Dept: LAB | Facility: HOSPITAL | Age: 72
End: 2022-03-08

## 2022-03-08 ENCOUNTER — TRANSCRIBE ORDERS (OUTPATIENT)
Dept: LAB | Facility: HOSPITAL | Age: 72
End: 2022-03-08

## 2022-03-08 ENCOUNTER — PATIENT MESSAGE (OUTPATIENT)
Dept: OBSTETRICS AND GYNECOLOGY | Facility: CLINIC | Age: 72
End: 2022-03-08

## 2022-03-08 DIAGNOSIS — N18.30 STAGE 3 CHRONIC KIDNEY DISEASE, UNSPECIFIED WHETHER STAGE 3A OR 3B CKD: Primary | ICD-10-CM

## 2022-03-08 DIAGNOSIS — N18.30 STAGE 3 CHRONIC KIDNEY DISEASE, UNSPECIFIED WHETHER STAGE 3A OR 3B CKD: ICD-10-CM

## 2022-03-08 DIAGNOSIS — E78.00 HYPERCHOLESTEROLEMIA: ICD-10-CM

## 2022-03-08 LAB
ALBUMIN SERPL-MCNC: 4 G/DL (ref 3.5–5.2)
ANION GAP SERPL CALCULATED.3IONS-SCNC: 12.1 MMOL/L (ref 5–15)
BUN SERPL-MCNC: 11 MG/DL (ref 8–23)
BUN/CREAT SERPL: 10.1 (ref 7–25)
CALCIUM SPEC-SCNC: 9.3 MG/DL (ref 8.6–10.5)
CHLORIDE SERPL-SCNC: 108 MMOL/L (ref 98–107)
CHOLEST SERPL-MCNC: 253 MG/DL (ref 0–200)
CO2 SERPL-SCNC: 23.9 MMOL/L (ref 22–29)
CREAT SERPL-MCNC: 1.09 MG/DL (ref 0.57–1)
CREAT UR-MCNC: 145.4 MG/DL
EGFRCR SERPLBLD CKD-EPI 2021: 54.4 ML/MIN/1.73
GLUCOSE SERPL-MCNC: 103 MG/DL (ref 65–99)
HDLC SERPL-MCNC: 46 MG/DL (ref 40–60)
LDLC SERPL CALC-MCNC: 150 MG/DL (ref 0–100)
LDLC/HDLC SERPL: 3.16 {RATIO}
PHOSPHATE SERPL-MCNC: 3.2 MG/DL (ref 2.5–4.5)
POTASSIUM SERPL-SCNC: 4.1 MMOL/L (ref 3.5–5.2)
PROT ?TM UR-MCNC: 14.8 MG/DL
PROT/CREAT UR: 101.8 MG/G CREA (ref 0–200)
SODIUM SERPL-SCNC: 144 MMOL/L (ref 136–145)
TRIGL SERPL-MCNC: 309 MG/DL (ref 0–150)
VLDLC SERPL-MCNC: 57 MG/DL (ref 5–40)

## 2022-03-08 PROCEDURE — 80061 LIPID PANEL: CPT

## 2022-03-08 PROCEDURE — 84156 ASSAY OF PROTEIN URINE: CPT

## 2022-03-08 PROCEDURE — 36415 COLL VENOUS BLD VENIPUNCTURE: CPT

## 2022-03-08 PROCEDURE — 80069 RENAL FUNCTION PANEL: CPT

## 2022-03-08 PROCEDURE — 82570 ASSAY OF URINE CREATININE: CPT

## 2022-03-08 NOTE — TELEPHONE ENCOUNTER
From: Lexie Cardona  To: PABLO Marcos  Sent: 3/8/2022 12:16 PM CST  Subject: E vaginal ring    Cost for E ring has gone up to 285- help! - need new hormone replacement - [facility]- thank you!

## 2022-03-10 ENCOUNTER — TELEPHONE (OUTPATIENT)
Dept: FAMILY MEDICINE CLINIC | Facility: CLINIC | Age: 72
End: 2022-03-10

## 2022-03-10 RX ORDER — ESTRADIOL 1 MG/1
1 TABLET ORAL DAILY
Qty: 90 TABLET | Refills: 4 | Status: SHIPPED | OUTPATIENT
Start: 2022-03-10 | End: 2022-04-11 | Stop reason: SDUPTHER

## 2022-03-14 DIAGNOSIS — F51.01 PRIMARY INSOMNIA: ICD-10-CM

## 2022-03-14 RX ORDER — TEMAZEPAM 30 MG/1
30 CAPSULE ORAL NIGHTLY PRN
Qty: 30 CAPSULE | Refills: 0 | Status: SHIPPED | OUTPATIENT
Start: 2022-03-14 | End: 2022-04-11 | Stop reason: SDUPTHER

## 2022-03-29 ENCOUNTER — OFFICE VISIT (OUTPATIENT)
Dept: FAMILY MEDICINE CLINIC | Facility: CLINIC | Age: 72
End: 2022-03-29

## 2022-03-29 VITALS
HEIGHT: 64 IN | DIASTOLIC BLOOD PRESSURE: 70 MMHG | WEIGHT: 189 LBS | HEART RATE: 71 BPM | SYSTOLIC BLOOD PRESSURE: 120 MMHG | BODY MASS INDEX: 32.27 KG/M2 | OXYGEN SATURATION: 97 %

## 2022-03-29 DIAGNOSIS — F41.1 GAD (GENERALIZED ANXIETY DISORDER): Primary | ICD-10-CM

## 2022-03-29 DIAGNOSIS — J30.1 ALLERGIC RHINITIS DUE TO POLLEN, UNSPECIFIED SEASONALITY: ICD-10-CM

## 2022-03-29 DIAGNOSIS — N18.30 STAGE 3 CHRONIC KIDNEY DISEASE, UNSPECIFIED WHETHER STAGE 3A OR 3B CKD: ICD-10-CM

## 2022-03-29 DIAGNOSIS — F51.01 PRIMARY INSOMNIA: ICD-10-CM

## 2022-03-29 DIAGNOSIS — G25.81 RESTLESS LEG: ICD-10-CM

## 2022-03-29 DIAGNOSIS — E78.00 HYPERCHOLESTEROLEMIA: ICD-10-CM

## 2022-03-29 PROCEDURE — 99214 OFFICE O/P EST MOD 30 MIN: CPT | Performed by: FAMILY MEDICINE

## 2022-03-29 RX ORDER — EPINEPHRINE 0.3 MG/.3ML
0.3 INJECTION SUBCUTANEOUS SEE ADMIN INSTRUCTIONS
Qty: 1 EACH | Refills: 5 | Status: CANCELLED | OUTPATIENT
Start: 2022-03-29

## 2022-03-29 RX ORDER — TEMAZEPAM 30 MG/1
30 CAPSULE ORAL NIGHTLY PRN
Qty: 30 CAPSULE | Refills: 0 | Status: CANCELLED | OUTPATIENT
Start: 2022-03-29

## 2022-03-29 RX ORDER — ESTRADIOL 1 MG/1
1 TABLET ORAL DAILY
Qty: 90 TABLET | Refills: 4 | Status: CANCELLED | OUTPATIENT
Start: 2022-03-29

## 2022-04-11 DIAGNOSIS — F51.01 PRIMARY INSOMNIA: ICD-10-CM

## 2022-04-11 RX ORDER — TEMAZEPAM 30 MG/1
30 CAPSULE ORAL NIGHTLY PRN
Qty: 30 CAPSULE | Refills: 0 | Status: SHIPPED | OUTPATIENT
Start: 2022-04-11 | End: 2022-04-14

## 2022-04-11 RX ORDER — ESTRADIOL 1 MG/1
1 TABLET ORAL DAILY
Qty: 90 TABLET | Refills: 4 | Status: SHIPPED | OUTPATIENT
Start: 2022-04-11 | End: 2022-05-19

## 2022-04-11 NOTE — TELEPHONE ENCOUNTER
Last Script  03/14/2022  #30, NR    Next Sandie OV 06/30/2022    Last OV 03/29/2022    Requesting to go to mail order pharmacy      ---- Message from Lexie Cardona sent at 4/11/2022  4:18 PM CDT -----  Regarding: Mail order  You will receive a text for mail order from Medicare- will need Temazepam refill now thanks!

## 2022-04-13 DIAGNOSIS — F51.01 PRIMARY INSOMNIA: ICD-10-CM

## 2022-04-13 NOTE — TELEPHONE ENCOUNTER
Pt says tari is supposed send temazepam (Restoril) 30 MG capsule [7754] (Order [id number])     to her but will be the 4/19/2022 before it arrives so just needs enough to get her to the 19th sent to [facility] here in [place] any questions call pt at [phone]

## 2022-04-13 NOTE — TELEPHONE ENCOUNTER
The script on 04/11/2022 went to [facility]  #30, NR  Is requesting a script [facility]    Next Sandie 06/30/2022    Last OV 03/29/2022    ---- Message from Lexie Cardona sent at 4/13/2022 11:41 AM CDT -----  Regarding: Tenazepam  [facility]- they said they do not have new order

## 2022-04-14 RX ORDER — CHOLESTYRAMINE LIGHT 4 G/5.7G
4 POWDER, FOR SUSPENSION ORAL DAILY
Qty: 90 PACKET | Refills: 3 | Status: SHIPPED | OUTPATIENT
Start: 2022-04-14

## 2022-04-14 RX ORDER — MOMETASONE FUROATE 50 UG/1
2 SPRAY, METERED NASAL DAILY
Qty: 17 G | Refills: 12 | Status: SHIPPED | OUTPATIENT
Start: 2022-04-14

## 2022-04-14 RX ORDER — TEMAZEPAM 30 MG/1
30 CAPSULE ORAL NIGHTLY PRN
Qty: 7 CAPSULE | Refills: 0 | Status: SHIPPED | OUTPATIENT
Start: 2022-04-14 | End: 2022-05-17 | Stop reason: SDUPTHER

## 2022-04-14 RX ORDER — HYDROXYZINE PAMOATE 50 MG/1
100 CAPSULE ORAL 3 TIMES DAILY PRN
Qty: 180 CAPSULE | Refills: 5 | Status: SHIPPED | OUTPATIENT
Start: 2022-04-14 | End: 2022-12-27

## 2022-04-14 RX ORDER — MONTELUKAST SODIUM 10 MG/1
10 TABLET ORAL
Qty: 90 TABLET | Refills: 3 | Status: SHIPPED | OUTPATIENT
Start: 2022-04-14 | End: 2023-03-15 | Stop reason: SDUPTHER

## 2022-04-25 ENCOUNTER — PATIENT MESSAGE (OUTPATIENT)
Dept: FAMILY MEDICINE CLINIC | Facility: CLINIC | Age: 72
End: 2022-04-25

## 2022-04-25 ENCOUNTER — TELEPHONE (OUTPATIENT)
Dept: FAMILY MEDICINE CLINIC | Facility: CLINIC | Age: 72
End: 2022-04-25

## 2022-04-25 DIAGNOSIS — F51.01 PRIMARY INSOMNIA: ICD-10-CM

## 2022-04-25 RX ORDER — CETIRIZINE HYDROCHLORIDE 10 MG/1
10 TABLET ORAL DAILY
Qty: 90 TABLET | Refills: 0 | Status: SHIPPED | OUTPATIENT
Start: 2022-04-25

## 2022-04-25 NOTE — TELEPHONE ENCOUNTER
Next Sandie OV 06/30/2022      ----- Message from Lexie Cardona sent at 4/25/2022  5:38 PM CDT -----  Regarding: zyrtec  please send cetirizine 10 mg  prescription to [facility]

## 2022-05-17 DIAGNOSIS — F51.01 PRIMARY INSOMNIA: ICD-10-CM

## 2022-05-17 RX ORDER — TEMAZEPAM 30 MG/1
30 CAPSULE ORAL NIGHTLY PRN
Qty: 30 CAPSULE | Refills: 0 | Status: SHIPPED | OUTPATIENT
Start: 2022-05-17 | End: 2022-06-07 | Stop reason: SDUPTHER

## 2022-05-17 RX ORDER — TEMAZEPAM 30 MG/1
30 CAPSULE ORAL NIGHTLY PRN
Qty: 30 CAPSULE | Refills: 0 | Status: SHIPPED | OUTPATIENT
Start: 2022-05-17 | End: 2022-05-17 | Stop reason: SDUPTHER

## 2022-05-17 NOTE — TELEPHONE ENCOUNTER
Please let patient know that it has been sent, but I do not order 90 day supplies of benzodiazepines.  I have sent for 30 tabs.  Thanks, ALYSA Fulton

## 2022-05-17 NOTE — TELEPHONE ENCOUNTER
Ms Gordon called today a little upset with the rudeness of [facility].     Please send script for her Temazepam 30 mg sent to [facility]  [facility] not [facility].    They will not refill her Medication for the 30 day script sent because they told her that the 7 day emergency supply she picked up to hold until her mail order came in, (which it never did)  in the state of [place] is considered a 30 day supply and they could not fill the New Script sent today by you for another 30 days    She said if she can not get it straightened out with the mail order she will go back to [facility] for her controlled meds but for today she wants a new script sent to [facility] for her Temazepam 30 mg.     I have called [facility] and left message on the pharmacy line to void the 30 day script for her Temazepam.

## 2022-05-19 ENCOUNTER — OFFICE VISIT (OUTPATIENT)
Dept: OBSTETRICS AND GYNECOLOGY | Facility: CLINIC | Age: 72
End: 2022-05-19

## 2022-05-19 VITALS
HEIGHT: 64 IN | BODY MASS INDEX: 31.92 KG/M2 | DIASTOLIC BLOOD PRESSURE: 80 MMHG | WEIGHT: 187 LBS | SYSTOLIC BLOOD PRESSURE: 120 MMHG

## 2022-05-19 DIAGNOSIS — N76.0 ACUTE VAGINITIS: Primary | ICD-10-CM

## 2022-05-19 LAB
CANDIDA ALBICANS: NEGATIVE
GARDNERELLA VAGINALIS: NEGATIVE
T VAGINALIS DNA VAG QL PROBE+SIG AMP: NEGATIVE

## 2022-05-19 PROCEDURE — 87480 CANDIDA DNA DIR PROBE: CPT | Performed by: NURSE PRACTITIONER

## 2022-05-19 PROCEDURE — 87660 TRICHOMONAS VAGIN DIR PROBE: CPT | Performed by: NURSE PRACTITIONER

## 2022-05-19 PROCEDURE — 99213 OFFICE O/P EST LOW 20 MIN: CPT | Performed by: NURSE PRACTITIONER

## 2022-05-19 PROCEDURE — 87510 GARDNER VAG DNA DIR PROBE: CPT | Performed by: NURSE PRACTITIONER

## 2022-05-19 RX ORDER — CONJUGATED ESTROGENS 0.62 MG/G
CREAM VAGINAL
Qty: 30 G | Refills: 6 | Status: SHIPPED | OUTPATIENT
Start: 2022-05-19 | End: 2022-09-30

## 2022-05-19 RX ORDER — METRONIDAZOLE 7.5 MG/G
GEL VAGINAL
Qty: 70 G | Refills: 0 | Status: SHIPPED | OUTPATIENT
Start: 2022-05-19 | End: 2022-09-30

## 2022-05-19 RX ORDER — FLUCONAZOLE 150 MG/1
TABLET ORAL
Qty: 2 TABLET | Refills: 0 | Status: SHIPPED | OUTPATIENT
Start: 2022-05-19 | End: 2022-09-30

## 2022-05-19 RX ORDER — GABAPENTIN 300 MG/1
CAPSULE ORAL
COMMUNITY
End: 2022-09-30

## 2022-05-19 RX ORDER — GUAIFENESIN 600 MG/1
TABLET, EXTENDED RELEASE ORAL
COMMUNITY
End: 2022-09-30

## 2022-05-19 RX ORDER — QUETIAPINE FUMARATE 50 MG/1
TABLET, FILM COATED ORAL
COMMUNITY
End: 2022-09-30

## 2022-05-19 NOTE — PROGRESS NOTES
"Subjective   Chief Complaint   Patient presents with   • Vaginal Discharge     Lexie Cardona is a 72 y.o. year old  presenting to be seen for evaluation of an abnormal vaginal discharge. The discharge is white.  Her symptoms have been present for 1 week(s).  Additional she has noticed burning and vulvar itching.    She is not sexually active.  In the past 12 months there has not been new sexual partners.  Condoms are not typically used.  She would not like to be screened for STD's at today's exam.     Prior to the onset of symptoms she was not on systemic antibiotics.  She has not recently changed soaps/detergents/toilet tissue.  Prior to this visit, she has used a Summer's Adrianne douche and took 10 capsules of  Doxycycline in an attempt to improve her symptoms.    Patient's last menstrual period was 1999 (approximate).    Current birth control method: status post hysterectomy and post menopausal status.    No Additional Complaints Reported    The following portions of the patient's history were reviewed and updated as appropriate:problem list, current medications and allergies    Review of Systems   Constitutional: Negative for activity change, appetite change, chills, diaphoresis, fatigue, fever, unexpected weight gain and unexpected weight loss.   Genitourinary: Negative for difficulty urinating, dysuria, pelvic pain, pelvic pressure, vaginal bleeding, vaginal discharge and vaginal pain.        Vaginal itching        Objective   /80   Ht 162.6 cm (64\")   Wt 84.8 kg (187 lb)   LMP 1999 (Approximate) Comment: 2000  Breastfeeding No   BMI 32.10 kg/m²     General:  well developed; well nourished  no acute distress  appears stated age   Skin:  Not performed.   Pelvis: Clinical staff was present for exam  External genitalia:  normal appearance of the external genitalia including Bartholin's and [place]'s glands.  :  urethral meatus normal;  Vaginal:  atrophic mucosal changes " are present; discharge present -  white and creamy; vag panel obtained  Cervix:  absent.  Uterus:  absent.  Adnexa:  absent, bilateral.     Lab Review   No data reviewed    Imaging   No data reviewed         Diagnoses and all orders for this visit:    Acute vaginitis  -     Gardnerella vaginalis, Trichomonas vaginalis, Candida albicans, DNA - Swab, Vagina    Other orders  -     QUEtiapine (SEROquel) 50 MG tablet; quetiapine 50 mg tablet   TAKE 1/2 TABLET BY MOUTH EVERY NIGHT. FOR INSOMNIA.  -     guaiFENesin (MUCINEX) 600 MG 12 hr tablet; Mucus Relief  mg tablet, extended release   TAKE 1 TABLET AS NEEDED ORALLY EVERY 12 HRS  -     gabapentin (NEURONTIN) 300 MG capsule; gabapentin 300 mg capsule   TAKE 1 CAPSULE BY MOUTH EVERY MORNING AND 2 CAPSULES EVERY NIGHT.  -     conjugated estrogens (Premarin) 0.625 MG/GM vaginal cream; Insert 0.5g vaginally at bedtime 2 nights per week.  -     metroNIDAZOLE (METROGEL VAGINAL) 0.75 % vaginal gel; Insert 5g vaginally at bedtime x5 nights.  -     fluconazole (Diflucan) 150 MG tablet; Take 1 tablet by mouth today and repeat in 4 days.        New Medications Ordered This Visit   Medications   • conjugated estrogens (Premarin) 0.625 MG/GM vaginal cream     Sig: Insert 0.5g vaginally at bedtime 2 nights per week.     Dispense:  30 g     Refill:  6   • metroNIDAZOLE (METROGEL VAGINAL) 0.75 % vaginal gel     Sig: Insert 5g vaginally at bedtime x5 nights.     Dispense:  70 g     Refill:  0   • fluconazole (Diflucan) 150 MG tablet     Sig: Take 1 tablet by mouth today and repeat in 4 days.     Dispense:  2 tablet     Refill:  0     Empirical tx for acute vaginitis. Stop oral estrogen and switch to vaginal Premarin cream. RBA and potential s/e reviewed.     This note was electronically signed.    Isabel Dumont, APRN  May 19, 2022

## 2022-06-07 ENCOUNTER — PATIENT MESSAGE (OUTPATIENT)
Dept: FAMILY MEDICINE CLINIC | Facility: CLINIC | Age: 72
End: 2022-06-07

## 2022-06-07 DIAGNOSIS — F51.01 PRIMARY INSOMNIA: ICD-10-CM

## 2022-06-07 NOTE — TELEPHONE ENCOUNTER
From: Lexie Cardona  To: Nazia Fulton MD  Sent: 6/7/2022 2:05 PM CDT  Subject: Renny    talked with Renny - send Temazepam script for refill by June9 -thanks!

## 2022-06-08 RX ORDER — TEMAZEPAM 30 MG/1
30 CAPSULE ORAL NIGHTLY PRN
Qty: 30 CAPSULE | Refills: 0 | Status: SHIPPED | OUTPATIENT
Start: 2022-06-08 | End: 2022-07-18 | Stop reason: SDUPTHER

## 2022-06-30 ENCOUNTER — OFFICE VISIT (OUTPATIENT)
Dept: FAMILY MEDICINE CLINIC | Facility: CLINIC | Age: 72
End: 2022-06-30

## 2022-06-30 VITALS
BODY MASS INDEX: 31.41 KG/M2 | HEIGHT: 64 IN | OXYGEN SATURATION: 98 % | HEART RATE: 71 BPM | DIASTOLIC BLOOD PRESSURE: 70 MMHG | WEIGHT: 184 LBS | SYSTOLIC BLOOD PRESSURE: 120 MMHG

## 2022-06-30 DIAGNOSIS — N18.30 STAGE 3 CHRONIC KIDNEY DISEASE, UNSPECIFIED WHETHER STAGE 3A OR 3B CKD: ICD-10-CM

## 2022-06-30 DIAGNOSIS — K21.9 GASTROESOPHAGEAL REFLUX DISEASE, UNSPECIFIED WHETHER ESOPHAGITIS PRESENT: ICD-10-CM

## 2022-06-30 DIAGNOSIS — N76.1 SUBACUTE VAGINITIS: ICD-10-CM

## 2022-06-30 DIAGNOSIS — F51.01 PRIMARY INSOMNIA: Primary | ICD-10-CM

## 2022-06-30 DIAGNOSIS — F41.1 GAD (GENERALIZED ANXIETY DISORDER): ICD-10-CM

## 2022-06-30 PROCEDURE — 99214 OFFICE O/P EST MOD 30 MIN: CPT | Performed by: FAMILY MEDICINE

## 2022-06-30 RX ORDER — ESTRADIOL 1 MG/1
1 TABLET ORAL DAILY
COMMUNITY
End: 2022-09-30 | Stop reason: SDUPTHER

## 2022-06-30 NOTE — PROGRESS NOTES
Chief Complaint  Anxiety    Subjective    History of Present Illness {CC  Problem List  Visit  Diagnosis   Encounters  Notes  Medications  Labs  Result Review Imaging  Media :23}     Lexie Cardona presents to [facility] [place] for     Chief Complaint   Patient presents with   • Anxiety      Patient seen today for follow-up.  Has chronic medical problems including GERD, anxiety, hyperlipidemia, history of blastomycosis, insomnia, severe allergic reactions, chronic neck pain with radiculopathy symptoms, carpal tunnel/cubital tunnel.  Patient has overall been feeling well.  Chronic medical problems well controlled.  She is compliant with her medications.  Patient has been having some issues with vaginal irritation and discharge.  She has been seen by APRN with OB/GYN department, negative vaginal swab for Candida, Gardnerella and trichomonas.  She had urinary tract infection, treated with antibiotics.  Still feels like she has urinary symptoms intermittently, however had negative urine culture within the last 1 to 2 weeks.  Patient was given Premarin cream, but has not started using this product yet.      Current Outpatient Medications:   •  albuterol sulfate  (90 Base) MCG/ACT inhaler, Inhale 2 puffs 4 (Four) Times a Day., Disp: 2 g, Rfl: 1  •  Alum Hydroxide-Mag Carbonate (GAVISCON PO), Take  by mouth As Needed. Tablets Or Liquid, As Needed, Disp: , Rfl:   •  calcium carbonate-vitamin d 600-400 MG-UNIT per tablet, Calcium 600 + D(3) 600 mg (1,500 mg)-400 unit tablet  Take by oral route., Disp: , Rfl:   •  cetirizine (zyrTEC) 10 MG tablet, Take 1 tablet by mouth Daily., Disp: 90 tablet, Rfl: 0  •  cholestyramine light (Prevalite) 4 g packet, Take 1 packet by mouth Daily., Disp: 90 packet, Rfl: 3  •  conjugated estrogens (Premarin) 0.625 MG/GM vaginal cream, Insert 0.5g vaginally at bedtime 2 nights per week., Disp: 30 g, Rfl: 6  •  CRANBERRY PO,  Take 4,200 mg by mouth Daily., Disp: , Rfl:   •  docusate sodium (COLACE) 100 MG capsule, Take  by mouth Daily As Needed., Disp: , Rfl:   •  EPINEPHrine (EPIPEN) 0.3 MG/0.3ML solution auto-injector injection, Inject 0.3 mL into the appropriate muscle as directed by prescriber See Admin Instructions. for allergic reaction, Disp: 1 each, Rfl: 5  •  estradiol (ESTRACE) 1 MG tablet, Take 1 mg by mouth Daily., Disp: , Rfl:   •  Flaxseed, Linseed, (FLAX SEED OIL) 1000 MG capsule, Take 1 capsule by mouth 2 (two) times a day., Disp: , Rfl:   •  fluconazole (Diflucan) 150 MG tablet, Take 1 tablet by mouth today and repeat in 4 days., Disp: 2 tablet, Rfl: 0  •  gabapentin (NEURONTIN) 300 MG capsule, gabapentin 300 mg capsule  TAKE 1 CAPSULE BY MOUTH EVERY MORNING AND 2 CAPSULES EVERY NIGHT., Disp: , Rfl:   •  guaiFENesin (MUCINEX) 600 MG 12 hr tablet, Mucus Relief  mg tablet, extended release  TAKE 1 TABLET AS NEEDED ORALLY EVERY 12 HRS, Disp: , Rfl:   •  hydrOXYzine pamoate (VISTARIL) 50 MG capsule, Take 2 capsules by mouth 3 (Three) Times a Day As Needed for Itching or Anxiety., Disp: 180 capsule, Rfl: 5  •  loperamide (IMODIUM) 2 MG capsule, Take 1 capsule by mouth 4 (Four) Times a Day As Needed for Diarrhea., Disp: 6 capsule, Rfl: 0  •  metroNIDAZOLE (METROGEL VAGINAL) 0.75 % vaginal gel, Insert 5g vaginally at bedtime x5 nights., Disp: 70 g, Rfl: 0  •  mometasone (NASONEX) 50 MCG/ACT nasal spray, 2 sprays into the nostril(s) as directed by provider Daily., Disp: 17 g, Rfl: 12  •  montelukast (SINGULAIR) 10 MG tablet, Take 1 tablet by mouth every night at bedtime., Disp: 90 tablet, Rfl: 3  •  Multiple Vitamins-Minerals (CENTRUM SILVER PO), Take 1 tablet by mouth daily., Disp: , Rfl:   •  mupirocin (BACTROBAN) 2 % nasal ointment, Apply to the inside of each nostril with a cotton swab two times daily, morning and evening, for 5 days before surgery., Disp: 10 each, Rfl: 6  •  ondansetron ODT (ZOFRAN-ODT) 4 MG  "disintegrating tablet, Place 1 tablet on the tongue Every 8 (Eight) Hours As Needed for Nausea or Vomiting., Disp: 10 tablet, Rfl: 0  •  Probiotic Product (PROBIOTIC FORMULA PO), Take 1 tablet by mouth Every Night. 10 billion cell (2 billion ea) capsule, Disp: , Rfl:   •  promethazine (PHENERGAN) 25 MG tablet, Take 1 tablet by mouth Every 6 (Six) Hours As Needed for Nausea or Vomiting., Disp: 30 tablet, Rfl: 2  •  promethazine-dextromethorphan (PROMETHAZINE-DM) 6.25-15 MG/5ML syrup, Take 5 mL by mouth 4 (Four) Times a Day As Needed for Cough., Disp: 118 mL, Rfl: 0  •  QUEtiapine (SEROquel) 50 MG tablet, quetiapine 50 mg tablet  TAKE 1/2 TABLET BY MOUTH EVERY NIGHT. FOR INSOMNIA., Disp: , Rfl:   •  temazepam (RESTORIL) 30 MG capsule, Take 1 capsule by mouth At Night As Needed for Sleep for up to 30 days., Disp: 30 capsule, Rfl: 0  •  valACYclovir (VALTREX) 1000 MG tablet, Take  by mouth Daily As Needed., Disp: , Rfl:      Objective       Vital Signs:   /70   Pulse 71   Ht 162.6 cm (64\")   Wt 83.5 kg (184 lb)   SpO2 98%   BMI 31.58 kg/m²     Physical Exam  Vitals reviewed.   Constitutional:       General: She is not in acute distress.     Appearance: She is well-developed.   Cardiovascular:      Rate and Rhythm: Normal rate and regular rhythm.      Heart sounds: Normal heart sounds. No murmur heard.  Pulmonary:      Effort: Pulmonary effort is normal. No respiratory distress.      Breath sounds: Normal breath sounds. No wheezing or rales.   Abdominal:      Palpations: Abdomen is soft.   Skin:     General: Skin is warm and dry.   Neurological:      Mental Status: She is alert and oriented to person, place, and time.        Result Review :{ Labs  Result Review  Imaging  Med Tab  Media :23}   The following data was reviewed by: Nazia Fulton MD on 06/30/2022    Common labs    Common Labsle 9/13/21 1/17/22 1/17/22 3/8/22 3/8/22     0737 0737 1020 1020   Glucose 89  116 (A)  103 (A)   BUN 11  15  11 "   Creatinine 1.08 (A)  1.04 (A)  1.09 (A)   eGFR Non African Am 50 (A)  52 (A)     Sodium 140  140  144   Potassium 4.5  3.5  4.1   Chloride 105  102  108 (A)   Calcium 9.2  9.9  9.3   Albumin   4.70  4.00   Total Bilirubin   0.9     Alkaline Phosphatase   69     AST (SGOT)   28     ALT (SGPT)   23     WBC  11.06 (A)      Hemoglobin  17.1 (A)      Hematocrit  49.1 (A)      Platelets  284      Total Cholesterol    253 (A)    Triglycerides    309 (A)    HDL Cholesterol    46    LDL Cholesterol     150 (A)    (A) Abnormal value                    Assessment and Plan {CC Problem List  Visit Diagnosis  ROS  Review (Popup)  Health Maintenance  Quality  BestPractice  Medications  SmartSets  SnapShot Encounters  Media :23}   Diagnoses and all orders for this visit:    1. Primary insomnia (Primary)    2. PRITI (generalized anxiety disorder)  -     CBC & Differential; Future  -     Comprehensive Metabolic Panel; Future    3. Gastroesophageal reflux disease, unspecified whether esophagitis present    4. Subacute vaginitis  -     Ambulatory Referral to Gynecology    5. Stage 3 chronic kidney disease, unspecified whether stage 3a or 3b CKD (HCC)  -     CBC & Differential; Future  -     Comprehensive Metabolic Panel; Future       Patient seen today for follow-up  Continue current treatment for insomnia, manageable  Chronic medical problems controlled  Labs to be done prior to next office visit  No change with medication today  Following with nephrology for stage III chronic kidney disease  Patient having vaginitis symptoms for several weeks now, negative vaginosis panel and negative urine culture most recently  Referral placed to gynecology specialist  Patient would like to wait to talk to the specialist before starting any Premarin cream  She does take oral estradiol      Follow Up {Instructions Charge Capture  Follow-up Communications :23}   Return in about 3 months (around 9/30/2022) for Recheck.  Patient was  given instructions and counseling regarding her condition or for health maintenance advice. Please see specific information pulled into the AVS if appropriate.            This document has been electronically signed by Nazia Fulton MD

## 2022-07-15 ENCOUNTER — PATIENT MESSAGE (OUTPATIENT)
Dept: FAMILY MEDICINE CLINIC | Facility: CLINIC | Age: 72
End: 2022-07-15

## 2022-07-15 DIAGNOSIS — F51.01 PRIMARY INSOMNIA: ICD-10-CM

## 2022-07-18 RX ORDER — TEMAZEPAM 30 MG/1
30 CAPSULE ORAL NIGHTLY PRN
Qty: 30 CAPSULE | Refills: 0 | Status: SHIPPED | OUTPATIENT
Start: 2022-07-18 | End: 2022-08-03

## 2022-07-18 NOTE — TELEPHONE ENCOUNTER
From: Lexie Cardona  To: Nazia Fulton MD  Sent: 7/15/2022 9:19 PM CDT  Subject: refill    please send Temazapam refill to [facility]

## 2022-07-20 DIAGNOSIS — F51.01 PRIMARY INSOMNIA: ICD-10-CM

## 2022-07-20 NOTE — TELEPHONE ENCOUNTER
Lat [facility]18[facility]2022  #30, NR    Next Appt:   09/30/2022     Last OV 06/30/2022    ----- Message from Lexie Cardona sent at 7/19/2022  6:10 PM CDT -----  Regarding: Refill  temazepam refill to [facility] ?

## 2022-07-21 RX ORDER — TEMAZEPAM 30 MG/1
30 CAPSULE ORAL NIGHTLY PRN
Qty: 30 CAPSULE | Refills: 0 | OUTPATIENT
Start: 2022-07-21 | End: 2022-08-20

## 2022-08-03 DIAGNOSIS — F51.01 PRIMARY INSOMNIA: ICD-10-CM

## 2022-08-03 RX ORDER — TEMAZEPAM 30 MG/1
CAPSULE ORAL
Qty: 30 CAPSULE | Refills: 0 | Status: SHIPPED | OUTPATIENT
Start: 2022-08-18 | End: 2022-09-16 | Stop reason: SDUPTHER

## 2022-09-16 DIAGNOSIS — F51.01 PRIMARY INSOMNIA: ICD-10-CM

## 2022-09-16 NOTE — TELEPHONE ENCOUNTER
Last Rx 08/03/2022  #30, NR    Next Appt:   09/30/2022 - Nazia Fulotn MD    Last OV 06/30/2022      ----- Message from Lexie Cardona sent at 9/16/2022  3:00 PM CDT -----  Regarding: refill  please send Temazepam refill to [facility]

## 2022-09-18 RX ORDER — TEMAZEPAM 30 MG/1
30 CAPSULE ORAL NIGHTLY PRN
Qty: 30 CAPSULE | Refills: 0 | Status: SHIPPED | OUTPATIENT
Start: 2022-09-18 | End: 2022-09-30 | Stop reason: SDUPTHER

## 2022-09-26 ENCOUNTER — LAB (OUTPATIENT)
Dept: LAB | Facility: HOSPITAL | Age: 72
End: 2022-09-26

## 2022-09-26 DIAGNOSIS — N18.30 STAGE 3 CHRONIC KIDNEY DISEASE, UNSPECIFIED WHETHER STAGE 3A OR 3B CKD: ICD-10-CM

## 2022-09-26 DIAGNOSIS — F41.1 GAD (GENERALIZED ANXIETY DISORDER): ICD-10-CM

## 2022-09-26 PROCEDURE — 80053 COMPREHEN METABOLIC PANEL: CPT

## 2022-09-26 PROCEDURE — 83735 ASSAY OF MAGNESIUM: CPT

## 2022-09-26 PROCEDURE — 36415 COLL VENOUS BLD VENIPUNCTURE: CPT

## 2022-09-26 PROCEDURE — 85025 COMPLETE CBC W/AUTO DIFF WBC: CPT

## 2022-09-27 LAB
ALBUMIN SERPL-MCNC: 3.8 G/DL (ref 3.5–5.2)
ALBUMIN/GLOB SERPL: 1.8 G/DL
ALP SERPL-CCNC: 59 U/L (ref 39–117)
ALT SERPL W P-5'-P-CCNC: 31 U/L (ref 1–33)
ANION GAP SERPL CALCULATED.3IONS-SCNC: 13.4 MMOL/L (ref 5–15)
AST SERPL-CCNC: 35 U/L (ref 1–32)
BASOPHILS # BLD AUTO: 0.09 10*3/MM3 (ref 0–0.2)
BASOPHILS NFR BLD AUTO: 1.1 % (ref 0–1.5)
BILIRUB SERPL-MCNC: 0.7 MG/DL (ref 0–1.2)
BUN SERPL-MCNC: 11 MG/DL (ref 8–23)
BUN/CREAT SERPL: 10.9 (ref 7–25)
CALCIUM SPEC-SCNC: 9.4 MG/DL (ref 8.6–10.5)
CHLORIDE SERPL-SCNC: 108 MMOL/L (ref 98–107)
CO2 SERPL-SCNC: 19.6 MMOL/L (ref 22–29)
CREAT SERPL-MCNC: 1.01 MG/DL (ref 0.57–1)
DEPRECATED RDW RBC AUTO: 38.5 FL (ref 37–54)
EGFRCR SERPLBLD CKD-EPI 2021: 59.6 ML/MIN/1.73
EOSINOPHIL # BLD AUTO: 0.14 10*3/MM3 (ref 0–0.4)
EOSINOPHIL NFR BLD AUTO: 1.7 % (ref 0.3–6.2)
ERYTHROCYTE [DISTWIDTH] IN BLOOD BY AUTOMATED COUNT: 12.4 % (ref 12.3–15.4)
GLOBULIN UR ELPH-MCNC: 2.1 GM/DL
GLUCOSE SERPL-MCNC: 75 MG/DL (ref 65–99)
HCT VFR BLD AUTO: 42.2 % (ref 34–46.6)
HGB BLD-MCNC: 14.7 G/DL (ref 12–15.9)
IMM GRANULOCYTES # BLD AUTO: 0.01 10*3/MM3 (ref 0–0.05)
IMM GRANULOCYTES NFR BLD AUTO: 0.1 % (ref 0–0.5)
LYMPHOCYTES # BLD AUTO: 3.26 10*3/MM3 (ref 0.7–3.1)
LYMPHOCYTES NFR BLD AUTO: 40.2 % (ref 19.6–45.3)
MAGNESIUM SERPL-MCNC: 1.8 MG/DL (ref 1.6–2.4)
MCH RBC QN AUTO: 29.8 PG (ref 26.6–33)
MCHC RBC AUTO-ENTMCNC: 34.8 G/DL (ref 31.5–35.7)
MCV RBC AUTO: 85.6 FL (ref 79–97)
MONOCYTES # BLD AUTO: 0.66 10*3/MM3 (ref 0.1–0.9)
MONOCYTES NFR BLD AUTO: 8.1 % (ref 5–12)
NEUTROPHILS NFR BLD AUTO: 3.94 10*3/MM3 (ref 1.7–7)
NEUTROPHILS NFR BLD AUTO: 48.8 % (ref 42.7–76)
NRBC BLD AUTO-RTO: 0 /100 WBC (ref 0–0.2)
PLATELET # BLD AUTO: 245 10*3/MM3 (ref 140–450)
PMV BLD AUTO: 10 FL (ref 6–12)
POTASSIUM SERPL-SCNC: 4.5 MMOL/L (ref 3.5–5.2)
PROT SERPL-MCNC: 5.9 G/DL (ref 6–8.5)
RBC # BLD AUTO: 4.93 10*6/MM3 (ref 3.77–5.28)
SODIUM SERPL-SCNC: 141 MMOL/L (ref 136–145)
WBC NRBC COR # BLD: 8.1 10*3/MM3 (ref 3.4–10.8)

## 2022-09-30 ENCOUNTER — TELEPHONE (OUTPATIENT)
Dept: FAMILY MEDICINE CLINIC | Facility: CLINIC | Age: 72
End: 2022-09-30

## 2022-09-30 ENCOUNTER — OFFICE VISIT (OUTPATIENT)
Dept: FAMILY MEDICINE CLINIC | Facility: CLINIC | Age: 72
End: 2022-09-30

## 2022-09-30 VITALS
BODY MASS INDEX: 31.58 KG/M2 | WEIGHT: 185 LBS | SYSTOLIC BLOOD PRESSURE: 130 MMHG | HEIGHT: 64 IN | HEART RATE: 74 BPM | DIASTOLIC BLOOD PRESSURE: 70 MMHG | OXYGEN SATURATION: 98 %

## 2022-09-30 DIAGNOSIS — F41.1 GAD (GENERALIZED ANXIETY DISORDER): Primary | ICD-10-CM

## 2022-09-30 DIAGNOSIS — F51.01 PRIMARY INSOMNIA: ICD-10-CM

## 2022-09-30 DIAGNOSIS — Z79.890 HORMONE REPLACEMENT THERAPY (POSTMENOPAUSAL): ICD-10-CM

## 2022-09-30 DIAGNOSIS — E78.00 HYPERCHOLESTEROLEMIA: ICD-10-CM

## 2022-09-30 DIAGNOSIS — N18.30 STAGE 3 CHRONIC KIDNEY DISEASE, UNSPECIFIED WHETHER STAGE 3A OR 3B CKD: ICD-10-CM

## 2022-09-30 DIAGNOSIS — R30.0 DYSURIA: ICD-10-CM

## 2022-09-30 DIAGNOSIS — Z00.00 MEDICARE ANNUAL WELLNESS VISIT, SUBSEQUENT: ICD-10-CM

## 2022-09-30 DIAGNOSIS — N76.1 SUBACUTE VAGINITIS: ICD-10-CM

## 2022-09-30 LAB
BILIRUB BLD-MCNC: NEGATIVE MG/DL
CLARITY, POC: CLEAR
COLOR UR: ABNORMAL
GLUCOSE UR STRIP-MCNC: NEGATIVE MG/DL
KETONES UR QL: NEGATIVE
LEUKOCYTE EST, POC: NEGATIVE
NITRITE UR-MCNC: NEGATIVE MG/ML
PH UR: 5.5 [PH] (ref 5–8)
PROT UR STRIP-MCNC: NEGATIVE MG/DL
RBC # UR STRIP: NEGATIVE /UL
SP GR UR: 1.02 (ref 1–1.03)
UROBILINOGEN UR QL: ABNORMAL

## 2022-09-30 PROCEDURE — 1126F AMNT PAIN NOTED NONE PRSNT: CPT | Performed by: FAMILY MEDICINE

## 2022-09-30 PROCEDURE — 99214 OFFICE O/P EST MOD 30 MIN: CPT | Performed by: FAMILY MEDICINE

## 2022-09-30 PROCEDURE — 81002 URINALYSIS NONAUTO W/O SCOPE: CPT | Performed by: FAMILY MEDICINE

## 2022-09-30 PROCEDURE — 1159F MED LIST DOCD IN RCRD: CPT | Performed by: FAMILY MEDICINE

## 2022-09-30 PROCEDURE — G0439 PPPS, SUBSEQ VISIT: HCPCS | Performed by: FAMILY MEDICINE

## 2022-09-30 PROCEDURE — 1170F FXNL STATUS ASSESSED: CPT | Performed by: FAMILY MEDICINE

## 2022-09-30 RX ORDER — ESTRADIOL 2 MG/1
RING VAGINAL
COMMUNITY
Start: 2022-08-06 | End: 2023-01-06

## 2022-09-30 RX ORDER — TEMAZEPAM 30 MG/1
30 CAPSULE ORAL NIGHTLY PRN
Qty: 30 CAPSULE | Refills: 0 | Status: SHIPPED | OUTPATIENT
Start: 2022-09-30 | End: 2022-11-09 | Stop reason: SDUPTHER

## 2022-09-30 RX ORDER — ESTRADIOL 1 MG/1
1 TABLET ORAL DAILY
Qty: 90 TABLET | Refills: 1 | Status: SHIPPED | OUTPATIENT
Start: 2022-09-30 | End: 2023-01-06

## 2022-09-30 NOTE — PROGRESS NOTES
Chief Complaint  Hypertension    Subjective    History of Present Illness {CC  Problem List  Visit  Diagnosis   Encounters  Notes  Medications  Labs  Result Review Imaging  Media :23}     Lexie Cardona presents to [facility] [place] for     Chief Complaint   Patient presents with   • Hypertension      Patient seen today for follow-up hypertension.  Notes that blood pressure has been doing well.  Insomnia continues to be controlled with Restoril nightly.  Patient followed with OB/GYN at outside facility for vaginal burning.  Started on the estrogen ring.  Notes that things were going well until recently.  She pulled her ring, rinsed it and put it back in.  Symptoms improved at this time, however still having more burning than before.  Taking cholestyramine to help with hyperlipidemia.    Answers for HPI/ROS submitted by the patient on 9/23/2022  What is the primary reason for your visit?: Vaginal Discharge/Irritation  genital itching: Yes  genital lesions: No  genital odor: No  genital rash: No  missed menses: No  pelvic pain: No  vaginal bleeding: No  vaginal discharge: No  Chronicity: chronic  Onset: more than 1 month ago  Frequency: intermittently  Progression since onset: waxing and waning  Severity of pain: mild  Affected side: both  Pregnant now?: No  abdominal pain: No  anorexia: No  back pain: No  chills: No  constipation: Yes  diarrhea: No  discolored urine: No  dysuria: Yes  fever: No  flank pain: No  frequency: No  headaches: No  hematuria: No  joint pain: Yes  joint swelling: Yes  nausea: No  painful intercourse: No  rash: No  sore throat: No  urgency: No  vomiting: No  Aggravated by: nothing  Sexual activity: not sexually active  Partner with STD symptoms: no  Birth control: nothing, abstinence, hysterectomy  Menstrual history: postmenopausal        Current Outpatient Medications:   •  Alum Hydroxide-Mag Carbonate (GAVISCON PO), Take  by  "mouth As Needed. Tablets Or Liquid, As Needed, Disp: , Rfl:   •  calcium carbonate-vitamin d 600-400 MG-UNIT per tablet, Calcium 600 + D(3) 600 mg (1,500 mg)-400 unit tablet  Take by oral route., Disp: , Rfl:   •  cetirizine (zyrTEC) 10 MG tablet, Take 1 tablet by mouth Daily., Disp: 90 tablet, Rfl: 0  •  cholestyramine light (Prevalite) 4 g packet, Take 1 packet by mouth Daily., Disp: 90 packet, Rfl: 3  •  CRANBERRY PO, Take 4,200 mg by mouth Daily., Disp: , Rfl:   •  docusate sodium (COLACE) 100 MG capsule, Take  by mouth Daily As Needed., Disp: , Rfl:   •  EPINEPHrine (EPIPEN) 0.3 MG/0.3ML solution auto-injector injection, Inject 0.3 mL into the appropriate muscle as directed by prescriber See Admin Instructions. for allergic reaction, Disp: 1 each, Rfl: 5  •  estradiol (ESTRACE) 1 MG tablet, Take 1 mg by mouth Daily., Disp: , Rfl:   •  Estring 2 MG vaginal ring, PLACE 1 RING VAGINALLY ONCE AS DIRECTED, LEAVE FOR 3 MONTHS, Disp: , Rfl:   •  Flaxseed, Linseed, (FLAX SEED OIL) 1000 MG capsule, Take 1 capsule by mouth 2 (two) times a day., Disp: , Rfl:   •  hydrOXYzine pamoate (VISTARIL) 50 MG capsule, Take 2 capsules by mouth 3 (Three) Times a Day As Needed for Itching or Anxiety., Disp: 180 capsule, Rfl: 5  •  mometasone (NASONEX) 50 MCG/ACT nasal spray, 2 sprays into the nostril(s) as directed by provider Daily., Disp: 17 g, Rfl: 12  •  montelukast (SINGULAIR) 10 MG tablet, Take 1 tablet by mouth every night at bedtime., Disp: 90 tablet, Rfl: 3  •  Multiple Vitamins-Minerals (CENTRUM SILVER PO), Take 1 tablet by mouth daily., Disp: , Rfl:   •  Probiotic Product (PROBIOTIC FORMULA PO), Take 1 tablet by mouth Every Night. 10 billion cell (2 billion ea) capsule, Disp: , Rfl:   •  temazepam (RESTORIL) 30 MG capsule, Take 1 capsule by mouth At Night As Needed for Sleep., Disp: 30 capsule, Rfl: 0     Objective       Vital Signs:   /70   Pulse 74   Ht 162.6 cm (64\")   Wt 83.9 kg (185 lb)   SpO2 98%   BMI " 31.76 kg/m²     Physical Exam  Vitals reviewed.   Constitutional:       General: She is not in acute distress.     Appearance: She is well-developed.   Cardiovascular:      Rate and Rhythm: Normal rate and regular rhythm.      Heart sounds: Normal heart sounds. No murmur heard.  Pulmonary:      Effort: Pulmonary effort is normal. No respiratory distress.      Breath sounds: Normal breath sounds. No wheezing or rales.   Skin:     General: Skin is warm and dry.      Findings: No rash.   Neurological:      Mental Status: She is alert and oriented to person, place, and time.        Result Review :{ Labs  Result Review  Imaging  Med Tab  Media :23}   The following data was reviewed by: Nazia Fulton MD on 09/30/2022    Common labs    Common Labs 1/17/22 1/17/22 3/8/22 3/8/22 9/26/22 9/26/22    0737 0737 1020 1020 1259 1259   Glucose  116 (A)  103 (A)  75   BUN  15  11  11   Creatinine  1.04 (A)  1.09 (A)  1.01 (A)   eGFR Non  Am  52 (A)       Sodium  140  144  141   Potassium  3.5  4.1  4.5   Chloride  102  108 (A)  108 (A)   Calcium  9.9  9.3  9.4   Albumin  4.70  4.00  3.80   Total Bilirubin  0.9    0.7   Alkaline Phosphatase  69    59   AST (SGOT)  28    35 (A)   ALT (SGPT)  23    31   WBC 11.06 (A)    8.10    Hemoglobin 17.1 (A)    14.7    Hematocrit 49.1 (A)    42.2    Platelets 284    245    Total Cholesterol   253 (A)      Triglycerides   309 (A)      HDL Cholesterol   46      LDL Cholesterol    150 (A)      (A) Abnormal value       Comments are available for some flowsheets but are not being displayed.                Brief Urine Lab Results  (Last result in the past 365 days)      Color   Clarity   Blood   Leuk Est   Nitrite   Protein   CREAT   Urine HCG        09/30/22 1313 Straw   Clear   Negative   Negative   Negative   Negative                        Assessment and Plan {CC Problem List  Visit Diagnosis  ROS  Review (Popup)  Health Maintenance  Quality  BestPractice  Medications   SmartSets  SnapShot Encounters  Media :23}   Diagnoses and all orders for this visit:    1. PRITI (generalized anxiety disorder) (Primary)  -     CBC & Differential; Future  -     Comprehensive Metabolic Panel; Future    2. Primary insomnia  -     CBC & Differential; Future  -     Comprehensive Metabolic Panel; Future  -     temazepam (RESTORIL) 30 MG capsule; Take 1 capsule by mouth At Night As Needed for Sleep.  Dispense: 30 capsule; Refill: 0    3. Stage 3 chronic kidney disease, unspecified whether stage 3a or 3b CKD (HCC)  -     CBC & Differential; Future  -     Comprehensive Metabolic Panel; Future    4. Hypercholesterolemia  -     Lipid Panel; Future    5. Dysuria  -     POCT urinalysis dipstick, automated    6. Subacute vaginitis  -     estradiol (ESTRACE) 1 MG tablet; Take 1 tablet by mouth Daily.  Dispense: 90 tablet; Refill: 1    7. Hormone replacement therapy (postmenopausal)  -     estradiol (ESTRACE) 1 MG tablet; Take 1 tablet by mouth Daily.  Dispense: 90 tablet; Refill: 1      Patient seen today for follow-up  Hypertension controlled  Check labs as above prior to next visit  Continue Restoril for insomnia, controlled  Monitoring of kidney function on labs secondary to chronic kidney disease  Urinalysis evaluated today due to acute on chronic dysuria/burning  Urinalysis normal without any evidence of infection  Recommended that patient read on the package insert about recommendations for rinsing/changing estrogen ring  Refill for estrogen tablet provided today      Follow Up {Instructions Charge Capture  Follow-up Communications :23}   Return in about 3 months (around 12/30/2022) for Recheck.  Patient was given instructions and counseling regarding her condition or for health maintenance advice. Please see specific information pulled into the AVS if appropriate.            This document has been electronically signed by Nazia Fulton MD

## 2022-09-30 NOTE — PROGRESS NOTES
The ABCs of the Annual Wellness Visit  Subsequent Medicare Wellness Visit    Chief Complaint   Patient presents with   • Hypertension     Subjective    History of Present Illness:  Lexie Cardona is a 72 y.o. female who presents for a Subsequent Medicare Wellness Visit.    The following portions of the patient's history were reviewed and   updated as appropriate: allergies, current medications, past family history, past medical history, past social history, past surgical history and problem list.    Compared to one year ago, the patient feels her physical   health is the same.    Compared to one year ago, the patient feels her mental   health is worse.  Had extended family move in, so has had more stress.    Recent Hospitalizations:  She was not admitted to the hospital during the last year.       Current Medical Providers:  Patient Care Team:  Nazia Fulton MD as PCP - General (Family Medicine)  Aniceto Hernandez MD as Consulting Physician (Cardiology)  Kelly Martinez APRN (Family Medicine)  Sanjuana Jarquin APRN as Nurse Practitioner (Obstetrics and Gynecology)  Jason Copeland MD as Consulting Physician (Nephrology)      Outpatient Medications Prior to Visit   Medication Sig Dispense Refill   • Alum Hydroxide-Mag Carbonate (GAVISCON PO) Take  by mouth As Needed. Tablets Or Liquid, As Needed     • calcium carbonate-vitamin d 600-400 MG-UNIT per tablet Calcium 600 + D(3) 600 mg (1,500 mg)-400 unit tablet   Take by oral route.     • cetirizine (zyrTEC) 10 MG tablet Take 1 tablet by mouth Daily. 90 tablet 0   • cholestyramine light (Prevalite) 4 g packet Take 1 packet by mouth Daily. 90 packet 3   • CRANBERRY PO Take 4,200 mg by mouth Daily.     • docusate sodium (COLACE) 100 MG capsule Take  by mouth Daily As Needed.     • EPINEPHrine (EPIPEN) 0.3 MG/0.3ML solution auto-injector injection Inject 0.3 mL into the appropriate muscle as directed by prescriber See Admin Instructions. for  allergic reaction 1 each 5   • estradiol (ESTRACE) 1 MG tablet Take 1 mg by mouth Daily.     • Estring 2 MG vaginal ring PLACE 1 RING VAGINALLY ONCE AS DIRECTED, LEAVE FOR 3 MONTHS     • Flaxseed, Linseed, (FLAX SEED OIL) 1000 MG capsule Take 1 capsule by mouth 2 (two) times a day.     • hydrOXYzine pamoate (VISTARIL) 50 MG capsule Take 2 capsules by mouth 3 (Three) Times a Day As Needed for Itching or Anxiety. 180 capsule 5   • mometasone (NASONEX) 50 MCG/ACT nasal spray 2 sprays into the nostril(s) as directed by provider Daily. 17 g 12   • montelukast (SINGULAIR) 10 MG tablet Take 1 tablet by mouth every night at bedtime. 90 tablet 3   • Multiple Vitamins-Minerals (CENTRUM SILVER PO) Take 1 tablet by mouth daily.     • Probiotic Product (PROBIOTIC FORMULA PO) Take 1 tablet by mouth Every Night. 10 billion cell (2 billion ea) capsule     • temazepam (RESTORIL) 30 MG capsule Take 1 capsule by mouth At Night As Needed for Sleep. 30 capsule 0   • albuterol sulfate  (90 Base) MCG/ACT inhaler Inhale 2 puffs 4 (Four) Times a Day. 2 g 1   • conjugated estrogens (Premarin) 0.625 MG/GM vaginal cream Insert 0.5g vaginally at bedtime 2 nights per week. 30 g 6   • fluconazole (Diflucan) 150 MG tablet Take 1 tablet by mouth today and repeat in 4 days. 2 tablet 0   • gabapentin (NEURONTIN) 300 MG capsule gabapentin 300 mg capsule   TAKE 1 CAPSULE BY MOUTH EVERY MORNING AND 2 CAPSULES EVERY NIGHT.     • guaiFENesin (MUCINEX) 600 MG 12 hr tablet Mucus Relief  mg tablet, extended release   TAKE 1 TABLET AS NEEDED ORALLY EVERY 12 HRS     • loperamide (IMODIUM) 2 MG capsule Take 1 capsule by mouth 4 (Four) Times a Day As Needed for Diarrhea. 6 capsule 0   • metroNIDAZOLE (METROGEL VAGINAL) 0.75 % vaginal gel Insert 5g vaginally at bedtime x5 nights. 70 g 0   • mupirocin (BACTROBAN) 2 % nasal ointment Apply to the inside of each nostril with a cotton swab two times daily, morning and evening, for 5 days before surgery.  "10 each 6   • ondansetron ODT (ZOFRAN-ODT) 4 MG disintegrating tablet Place 1 tablet on the tongue Every 8 (Eight) Hours As Needed for Nausea or Vomiting. 10 tablet 0   • promethazine (PHENERGAN) 25 MG tablet Take 1 tablet by mouth Every 6 (Six) Hours As Needed for Nausea or Vomiting. 30 tablet 2   • promethazine-dextromethorphan (PROMETHAZINE-DM) 6.25-15 MG/5ML syrup Take 5 mL by mouth 4 (Four) Times a Day As Needed for Cough. 118 mL 0   • QUEtiapine (SEROquel) 50 MG tablet quetiapine 50 mg tablet   TAKE 1/2 TABLET BY MOUTH EVERY NIGHT. FOR INSOMNIA.     • valACYclovir (VALTREX) 1000 MG tablet Take  by mouth Daily As Needed.       No facility-administered medications prior to visit.       No opioid medication identified on active medication list. I have reviewed chart for other potential  high risk medication/s and harmful drug interactions in the elderly.          Aspirin is not on active medication list.  Aspirin use is not indicated based on review of current medical condition/s. Risk of harm outweighs potential benefits.         Patient Active Problem List   Diagnosis   • Vitamin D deficiency   • Restless legs   • Myoclonic disorder   • Insomnia   • Irritable bowel syndrome   • Hyperlipidemia   • Gastroesophageal reflux disease   • Fundic gland polyposis of stomach   • Allergic rhinitis   • Primary osteoarthritis of both hands   • Cataract   • Generalized osteoarthritis   • Osteopenia of multiple sites   • Stage 3 chronic kidney disease (HCC)   • Hormone replacement therapy (postmenopausal)           Objective    Vitals:    09/30/22 1125   BP: 130/70   Pulse: 74   SpO2: 98%   Weight: 83.9 kg (185 lb)   Height: 162.6 cm (64\")   PainSc: 0-No pain     Estimated body mass index is 31.76 kg/m² as calculated from the following:    Height as of this encounter: 162.6 cm (64\").    Weight as of this encounter: 83.9 kg (185 lb).    BMI is >= 30 and <35. (Class 1 Obesity). The following options were offered after " discussion;: exercise counseling/recommendations and nutrition counseling/recommendations      Does the patient have evidence of cognitive impairment? No            HEALTH RISK ASSESSMENT    Smoking Status:  Social History     Tobacco Use   Smoking Status Former Smoker   • Years: 30.00   • Types: Cigarettes   • Quit date:    • Years since quittin.7   Smokeless Tobacco Never Used     Alcohol Consumption:  Social History     Substance and Sexual Activity   Alcohol Use No     Fall Risk Screen:    MACIEADI Fall Risk Assessment has not been completed.    Depression Screening:  PHQ-2/PHQ-9 Depression Screening 2022   Retired PHQ-9 Total Score -   Retired Total Score -   Little Interest or Pleasure in Doing Things 0-->not at all   Feeling Down, Depressed or Hopeless 0-->not at all   PHQ-9: Brief Depression Severity Measure Score 0       Health Habits and Functional and Cognitive Screening:  Functional & Cognitive Status 2021   Do you have difficulty preparing food and eating? No   Do you have difficulty bathing yourself, getting dressed or grooming yourself? No   Do you have difficulty using the toilet? No   Do you have difficulty moving around from place to place? No   Do you have trouble with steps or getting out of a bed or a chair? No   Current Diet Well Balanced Diet   Dental Exam Up to date   Eye Exam Up to date   Exercise (times per week) 7 times per week   Current Exercises Include Walking   Current Exercise Activities Include -   Do you need help using the phone?  No   Are you deaf or do you have serious difficulty hearing?  No   Do you need help with transportation? No   Do you need help shopping? No   Do you need help preparing meals?  No   Do you need help with housework?  No   Do you need help with laundry? No   Do you need help taking your medications? No   Do you need help managing money? No   Do you ever drive or ride in a car without wearing a seat belt? No   Have you felt unusual stress,  anger or loneliness in the last month? No   Who do you live with? Spouse   If you need help, do you have trouble finding someone available to you? No   Have you been bothered in the last four weeks by sexual problems? No   Do you have difficulty concentrating, remembering or making decisions? No       Age-appropriate Screening Schedule:  Refer to the list below for future screening recommendations based on patient's age, sex and/or medical conditions. Orders for these recommended tests are listed in the plan section. The patient has been provided with a written plan.    Health Maintenance   Topic Date Due   • ZOSTER VACCINE (2 of 2) 09/30/2022 (Originally 12/27/2016)   • INFLUENZA VACCINE  03/31/2023 (Originally 8/1/2022)   • LIPID PANEL  03/08/2023   • DXA SCAN  04/27/2023   • MAMMOGRAM  07/06/2023   • TDAP/TD VACCINES (2 - Td or Tdap) 09/16/2026              Assessment & Plan   CMS Preventative Services Quick Reference  Risk Factors Identified During Encounter  Obesity/Overweight   The above risks/problems have been discussed with the patient.  Follow up actions/plans if indicated are seen below in the Assessment/Plan Section.  Pertinent information has been shared with the patient in the After Visit Summary.    Diagnoses and all orders for this visit:    1. Medicare annual wellness visit, subsequent (Primary)    Medicare wellness visit completed today  Patient not interested in recommended immunizations today, as she has had bad reactions to multiple medications/injections      Follow Up:   Return in about 3 months (around 12/30/2022) for Recheck.     An After Visit Summary and PPPS were made available to the patient.                       This document has been electronically signed by Nazia Fulton MD

## 2022-10-01 ENCOUNTER — PATIENT MESSAGE (OUTPATIENT)
Dept: OBSTETRICS AND GYNECOLOGY | Facility: CLINIC | Age: 72
End: 2022-10-01

## 2022-10-03 NOTE — TELEPHONE ENCOUNTER
From: Lexie Cardona  To: PABLO Marcos  Sent: 10/1/2022 9:16 PM CDT  Subject: Retrace vaginal ring    How often do you have to rinse vaginal ring and reinsert?

## 2022-11-08 ENCOUNTER — PATIENT MESSAGE (OUTPATIENT)
Dept: FAMILY MEDICINE CLINIC | Facility: CLINIC | Age: 72
End: 2022-11-08

## 2022-11-08 DIAGNOSIS — F51.01 PRIMARY INSOMNIA: ICD-10-CM

## 2022-11-09 RX ORDER — TEMAZEPAM 30 MG/1
30 CAPSULE ORAL NIGHTLY PRN
Qty: 30 CAPSULE | Refills: 0 | Status: SHIPPED | OUTPATIENT
Start: 2022-11-09 | End: 2022-12-15 | Stop reason: SDUPTHER

## 2022-11-09 NOTE — TELEPHONE ENCOUNTER
From: Lexie Cardona  To: Nazia Fulton MD  Sent: 11/8/2022 8:48 PM CST  Subject: refill    please send refill forTemazapam 30mg qhs to [facility] mail order-thanks

## 2022-12-15 DIAGNOSIS — F51.01 PRIMARY INSOMNIA: ICD-10-CM

## 2022-12-16 RX ORDER — EPINEPHRINE 0.3 MG/.3ML
0.3 INJECTION SUBCUTANEOUS SEE ADMIN INSTRUCTIONS
Qty: 2 EACH | Refills: 1 | Status: SHIPPED | OUTPATIENT
Start: 2022-12-16 | End: 2023-01-04 | Stop reason: SDUPTHER

## 2022-12-16 RX ORDER — TEMAZEPAM 30 MG/1
30 CAPSULE ORAL NIGHTLY PRN
Qty: 30 CAPSULE | Refills: 0 | Status: SHIPPED | OUTPATIENT
Start: 2022-12-16 | End: 2023-01-06 | Stop reason: SDUPTHER

## 2022-12-25 DIAGNOSIS — F41.1 GAD (GENERALIZED ANXIETY DISORDER): ICD-10-CM

## 2022-12-27 RX ORDER — HYDROXYZINE PAMOATE 50 MG/1
CAPSULE ORAL
Qty: 180 CAPSULE | Refills: 5 | Status: SHIPPED | OUTPATIENT
Start: 2022-12-27

## 2023-01-04 ENCOUNTER — TELEPHONE (OUTPATIENT)
Dept: FAMILY MEDICINE CLINIC | Facility: CLINIC | Age: 73
End: 2023-01-04
Payer: MEDICARE

## 2023-01-04 RX ORDER — EPINEPHRINE 0.3 MG/.3ML
0.3 INJECTION SUBCUTANEOUS SEE ADMIN INSTRUCTIONS
Qty: 2 EACH | Refills: 1 | Status: SHIPPED | OUTPATIENT
Start: 2023-01-04

## 2023-01-04 NOTE — TELEPHONE ENCOUNTER
Pt. Called stating that her Epi pens came in cloudy and she had to send them back again. Pt. Would like to know if you can send her prescription to [facility] pharmacy so she can just pick them up there.     You can call back at [phone].

## 2023-01-06 ENCOUNTER — OFFICE VISIT (OUTPATIENT)
Dept: FAMILY MEDICINE CLINIC | Facility: CLINIC | Age: 73
End: 2023-01-06
Payer: MEDICARE

## 2023-01-06 ENCOUNTER — LAB (OUTPATIENT)
Dept: LAB | Facility: HOSPITAL | Age: 73
End: 2023-01-06
Payer: MEDICARE

## 2023-01-06 VITALS
HEIGHT: 64 IN | DIASTOLIC BLOOD PRESSURE: 76 MMHG | WEIGHT: 189 LBS | BODY MASS INDEX: 32.27 KG/M2 | HEART RATE: 70 BPM | SYSTOLIC BLOOD PRESSURE: 122 MMHG | OXYGEN SATURATION: 98 %

## 2023-01-06 DIAGNOSIS — E78.00 HYPERCHOLESTEROLEMIA: ICD-10-CM

## 2023-01-06 DIAGNOSIS — F41.1 GAD (GENERALIZED ANXIETY DISORDER): ICD-10-CM

## 2023-01-06 DIAGNOSIS — F51.01 PRIMARY INSOMNIA: ICD-10-CM

## 2023-01-06 DIAGNOSIS — J01.90 ACUTE SINUSITIS, UNSPECIFIED: ICD-10-CM

## 2023-01-06 DIAGNOSIS — N18.30 STAGE 3 CHRONIC KIDNEY DISEASE, UNSPECIFIED WHETHER STAGE 3A OR 3B CKD: ICD-10-CM

## 2023-01-06 DIAGNOSIS — J01.40 ACUTE NON-RECURRENT PANSINUSITIS: ICD-10-CM

## 2023-01-06 DIAGNOSIS — R30.0 DYSURIA: ICD-10-CM

## 2023-01-06 DIAGNOSIS — F51.01 PRIMARY INSOMNIA: Primary | ICD-10-CM

## 2023-01-06 LAB
ALBUMIN SERPL-MCNC: 4.7 G/DL (ref 3.5–5.2)
ALBUMIN/GLOB SERPL: 2 G/DL
ALP SERPL-CCNC: 73 U/L (ref 39–117)
ALT SERPL W P-5'-P-CCNC: 22 U/L (ref 1–33)
ANION GAP SERPL CALCULATED.3IONS-SCNC: 10 MMOL/L (ref 5–15)
AST SERPL-CCNC: 30 U/L (ref 1–32)
BASOPHILS # BLD AUTO: 0.07 10*3/MM3 (ref 0–0.2)
BASOPHILS NFR BLD AUTO: 1 % (ref 0–1.5)
BILIRUB BLD-MCNC: NEGATIVE MG/DL
BILIRUB SERPL-MCNC: 1.1 MG/DL (ref 0–1.2)
BUN SERPL-MCNC: 17 MG/DL (ref 8–23)
BUN/CREAT SERPL: 14.9 (ref 7–25)
CALCIUM SPEC-SCNC: 9.9 MG/DL (ref 8.6–10.5)
CHLORIDE SERPL-SCNC: 106 MMOL/L (ref 98–107)
CHOLEST SERPL-MCNC: 289 MG/DL (ref 0–200)
CLARITY, POC: ABNORMAL
CO2 SERPL-SCNC: 26 MMOL/L (ref 22–29)
COLOR UR: ABNORMAL
CREAT SERPL-MCNC: 1.14 MG/DL (ref 0.57–1)
DEPRECATED RDW RBC AUTO: 39.2 FL (ref 37–54)
EGFRCR SERPLBLD CKD-EPI 2021: 51.6 ML/MIN/1.73
EOSINOPHIL # BLD AUTO: 0.16 10*3/MM3 (ref 0–0.4)
EOSINOPHIL NFR BLD AUTO: 2.2 % (ref 0.3–6.2)
ERYTHROCYTE [DISTWIDTH] IN BLOOD BY AUTOMATED COUNT: 12.9 % (ref 12.3–15.4)
GLOBULIN UR ELPH-MCNC: 2.3 GM/DL
GLUCOSE SERPL-MCNC: 93 MG/DL (ref 65–99)
GLUCOSE UR STRIP-MCNC: NEGATIVE MG/DL
HCT VFR BLD AUTO: 44.9 % (ref 34–46.6)
HDLC SERPL-MCNC: 63 MG/DL (ref 40–60)
HGB BLD-MCNC: 16 G/DL (ref 12–15.9)
IMM GRANULOCYTES # BLD AUTO: 0.01 10*3/MM3 (ref 0–0.05)
IMM GRANULOCYTES NFR BLD AUTO: 0.1 % (ref 0–0.5)
KETONES UR QL: NEGATIVE
LDLC SERPL CALC-MCNC: 206 MG/DL (ref 0–100)
LDLC/HDLC SERPL: 3.22 {RATIO}
LEUKOCYTE EST, POC: ABNORMAL
LYMPHOCYTES # BLD AUTO: 3.35 10*3/MM3 (ref 0.7–3.1)
LYMPHOCYTES NFR BLD AUTO: 46.6 % (ref 19.6–45.3)
MCH RBC QN AUTO: 30 PG (ref 26.6–33)
MCHC RBC AUTO-ENTMCNC: 35.6 G/DL (ref 31.5–35.7)
MCV RBC AUTO: 84.1 FL (ref 79–97)
MONOCYTES # BLD AUTO: 0.54 10*3/MM3 (ref 0.1–0.9)
MONOCYTES NFR BLD AUTO: 7.5 % (ref 5–12)
NEUTROPHILS NFR BLD AUTO: 3.06 10*3/MM3 (ref 1.7–7)
NEUTROPHILS NFR BLD AUTO: 42.6 % (ref 42.7–76)
NITRITE UR-MCNC: NEGATIVE MG/ML
NRBC BLD AUTO-RTO: 0 /100 WBC (ref 0–0.2)
PH UR: 5.5 [PH] (ref 5–8)
PLATELET # BLD AUTO: 277 10*3/MM3 (ref 140–450)
PMV BLD AUTO: 9.7 FL (ref 6–12)
POTASSIUM SERPL-SCNC: 4.2 MMOL/L (ref 3.5–5.2)
PROT SERPL-MCNC: 7 G/DL (ref 6–8.5)
PROT UR STRIP-MCNC: NEGATIVE MG/DL
RBC # BLD AUTO: 5.34 10*6/MM3 (ref 3.77–5.28)
RBC # UR STRIP: NEGATIVE /UL
SODIUM SERPL-SCNC: 142 MMOL/L (ref 136–145)
SP GR UR: 1.03 (ref 1–1.03)
TRIGL SERPL-MCNC: 116 MG/DL (ref 0–150)
UROBILINOGEN UR QL: NORMAL
VLDLC SERPL-MCNC: 20 MG/DL (ref 5–40)
WBC NRBC COR # BLD: 7.19 10*3/MM3 (ref 3.4–10.8)

## 2023-01-06 PROCEDURE — 81002 URINALYSIS NONAUTO W/O SCOPE: CPT | Performed by: FAMILY MEDICINE

## 2023-01-06 PROCEDURE — 80053 COMPREHEN METABOLIC PANEL: CPT

## 2023-01-06 PROCEDURE — 87086 URINE CULTURE/COLONY COUNT: CPT | Performed by: FAMILY MEDICINE

## 2023-01-06 PROCEDURE — 99214 OFFICE O/P EST MOD 30 MIN: CPT | Performed by: FAMILY MEDICINE

## 2023-01-06 PROCEDURE — 36415 COLL VENOUS BLD VENIPUNCTURE: CPT

## 2023-01-06 PROCEDURE — 85025 COMPLETE CBC W/AUTO DIFF WBC: CPT

## 2023-01-06 PROCEDURE — 80061 LIPID PANEL: CPT

## 2023-01-06 RX ORDER — DOXYCYCLINE HYCLATE 100 MG/1
100 CAPSULE ORAL 2 TIMES DAILY
Qty: 14 CAPSULE | Refills: 0 | Status: SHIPPED | OUTPATIENT
Start: 2023-01-06

## 2023-01-06 RX ORDER — DOXYCYCLINE 100 MG/1
CAPSULE ORAL
Qty: 14 CAPSULE | Refills: 0 | Status: SHIPPED | OUTPATIENT
Start: 2023-01-06

## 2023-01-06 RX ORDER — TEMAZEPAM 30 MG/1
30 CAPSULE ORAL NIGHTLY PRN
Qty: 30 CAPSULE | Refills: 0 | Status: SHIPPED | OUTPATIENT
Start: 2023-01-15 | End: 2023-02-09 | Stop reason: SDUPTHER

## 2023-01-06 NOTE — TELEPHONE ENCOUNTER
Patient called stating the doxycycline (VIBRAMYCIN) 100 MG capsule is needing to be sent to [facility]

## 2023-01-06 NOTE — PROGRESS NOTES
Chief Complaint  Anxiety and Difficulty Urinating    Subjective    History of Present Illness {CC  Problem List  Visit  Diagnosis   Encounters  Notes  Medications  Labs  Result Review Imaging  Media :23}     Lexie Cardona presents to [facility] [place] for     Chief Complaint   Patient presents with   • Difficulty Urinating   • Insomnia      Patient seen today for follow up.  Takes Restoril for insomnia.  Has been having some urinary difficulties.  Also having some nasal congestion/sinus pressure for about the last week.         Current Outpatient Medications:   •  Alum Hydroxide-Mag Carbonate (GAVISCON PO), Take  by mouth As Needed. Tablets Or Liquid, As Needed, Disp: , Rfl:   •  calcium carbonate-vitamin d 600-400 MG-UNIT per tablet, Calcium 600 + D(3) 600 mg (1,500 mg)-400 unit tablet  Take by oral route., Disp: , Rfl:   •  cetirizine (zyrTEC) 10 MG tablet, Take 1 tablet by mouth Daily., Disp: 90 tablet, Rfl: 0  •  cholestyramine light (Prevalite) 4 g packet, Take 1 packet by mouth Daily., Disp: 90 packet, Rfl: 3  •  CRANBERRY PO, Take 4,200 mg by mouth Daily., Disp: , Rfl:   •  docusate sodium (COLACE) 100 MG capsule, Take  by mouth Daily As Needed., Disp: , Rfl:   •  EPINEPHrine (EPIPEN) 0.3 MG/0.3ML solution auto-injector injection, Inject 0.3 mL into the appropriate muscle as directed by prescriber See Admin Instructions. for allergic reaction, Disp: 2 each, Rfl: 1  •  hydrOXYzine pamoate (VISTARIL) 50 MG capsule, TAKE 2 CAPSULES 3 TIMES A  DAY AS NEEDED FOR ITCHING  OR ANXIETY, Disp: 180 capsule, Rfl: 5  •  mometasone (NASONEX) 50 MCG/ACT nasal spray, 2 sprays into the nostril(s) as directed by provider Daily., Disp: 17 g, Rfl: 12  •  montelukast (SINGULAIR) 10 MG tablet, Take 1 tablet by mouth every night at bedtime., Disp: 90 tablet, Rfl: 3  •  Multiple Vitamins-Minerals (CENTRUM SILVER PO), Take 1 tablet by mouth daily., Disp: , Rfl:   •   "Probiotic Product (PROBIOTIC FORMULA PO), Take 1 tablet by mouth Every Night. 10 billion cell (2 billion ea) capsule, Disp: , Rfl:   •  temazepam (RESTORIL) 30 MG capsule, Take 1 capsule by mouth At Night As Needed for Sleep., Disp: 30 capsule, Rfl: 0     Objective       Vital Signs:   /76   Pulse 70   Ht 162.6 cm (64\")   Wt 85.7 kg (189 lb)   SpO2 98%   BMI 32.44 kg/m²     Physical Exam  Vitals reviewed.   Constitutional:       General: She is not in acute distress.     Appearance: She is well-developed.   HENT:      Nose:      Right Sinus: Maxillary sinus tenderness and frontal sinus tenderness present.      Left Sinus: Maxillary sinus tenderness and frontal sinus tenderness present.   Cardiovascular:      Rate and Rhythm: Normal rate and regular rhythm.      Heart sounds: Normal heart sounds. No murmur heard.  Pulmonary:      Effort: Pulmonary effort is normal. No respiratory distress.      Breath sounds: Normal breath sounds. No wheezing or rales.   Skin:     General: Skin is warm and dry.      Findings: No rash.   Neurological:      Mental Status: She is alert and oriented to person, place, and time.        Result Review :{ Labs  Result Review  Imaging  Med Tab  Media :23}   The following data was reviewed by: Nazia Fulton MD on 01/06/2023    Common labs    Common Labs 1/17/22 1/17/22 3/8/22 3/8/22 9/26/22 9/26/22    0737 0737 1020 1020 1259 1259   Glucose  116 (A)  103 (A)  75   BUN  15  11  11   Creatinine  1.04 (A)  1.09 (A)  1.01 (A)   eGFR Non  Am  52 (A)       Sodium  140  144  141   Potassium  3.5  4.1  4.5   Chloride  102  108 (A)  108 (A)   Calcium  9.9  9.3  9.4   Albumin  4.70  4.00  3.80   Total Bilirubin  0.9    0.7   Alkaline Phosphatase  69    59   AST (SGOT)  28    35 (A)   ALT (SGPT)  23    31   WBC 11.06 (A)    8.10    Hemoglobin 17.1 (A)    14.7    Hematocrit 49.1 (A)    42.2    Platelets 284    245    Total Cholesterol   253 (A)      Triglycerides   309 (A)    "   HDL Cholesterol   46      LDL Cholesterol    150 (A)      (A) Abnormal value       Comments are available for some flowsheets but are not being displayed.            Brief Urine Lab Results  (Last result in the past 365 days)      Color   Clarity   Blood   Leuk Est   Nitrite   Protein   CREAT   Urine HCG        01/06/23 1642 Dark Yellow   Cloudy   Negative   Trace   Negative   Negative                      Assessment and Plan {CC Problem List  Visit Diagnosis  ROS  Review (Popup)  Health Maintenance  Quality  BestPractice  Medications  SmartSets  SnapShot Encounters  Media :23}   Diagnoses and all orders for this visit:    1. Primary insomnia (Primary)  -     temazepam (RESTORIL) 30 MG capsule; Take 1 capsule by mouth At Night As Needed for Sleep.  Dispense: 30 capsule; Refill: 0    2. Dysuria  -     Urine Culture - Urine, Urine, Clean Catch  -     POCT urinalysis dipstick, manual    3. Acute non-recurrent pansinusitis  -     doxycycline (VIBRAMYCIN) 100 MG capsule; Take 1 capsule by mouth 2 (Two) Times a Day.  Dispense: 14 capsule; Refill: 0    4. PRITI (generalized anxiety disorder)    5. Hypercholesterolemia      Patient seen today for follow up   Insomnia controlled, continue Restoril  Urinalysis showed cloudy urine and trace leukocyte esterase  Urine culture sent or evaluation  Sinusitis - treat with doxycycline  Chronic medical concerns controlled  Continue current medications      Follow Up {Instructions Charge Capture  Follow-up Communications :23}   Return in about 4 months (around 5/6/2023) for Recheck.  Patient was given instructions and counseling regarding her condition or for health maintenance advice. Please see specific information pulled into the AVS if appropriate.        This document has been electronically signed by Nazia Fulton MD

## 2023-01-07 LAB — BACTERIA SPEC AEROBE CULT: NO GROWTH

## 2023-01-10 ENCOUNTER — TELEPHONE (OUTPATIENT)
Dept: FAMILY MEDICINE CLINIC | Facility: CLINIC | Age: 73
End: 2023-01-10
Payer: MEDICARE

## 2023-01-10 NOTE — TELEPHONE ENCOUNTER
Per Dr. Fulton, Ms. Cardona has been called with recent lab results & recommendations.  Continue current medications and follow-up as planned or sooner if any problems.     ----- Message from Nazia Fulton MD sent at 1/8/2023 10:17 PM CST -----  Kidney function stable.  LDL cholesterol still very elevated at 206.  Mild abnormalities on CBC that we will continue monitoring.  - ALYSA Fulton

## 2023-02-09 DIAGNOSIS — F51.01 PRIMARY INSOMNIA: ICD-10-CM

## 2023-02-10 RX ORDER — TEMAZEPAM 30 MG/1
30 CAPSULE ORAL NIGHTLY PRN
Qty: 30 CAPSULE | Refills: 0 | Status: SHIPPED | OUTPATIENT
Start: 2023-02-10 | End: 2023-03-20 | Stop reason: SDUPTHER

## 2023-03-14 ENCOUNTER — LAB (OUTPATIENT)
Dept: LAB | Facility: HOSPITAL | Age: 73
End: 2023-03-14
Payer: MEDICARE

## 2023-03-14 ENCOUNTER — TRANSCRIBE ORDERS (OUTPATIENT)
Dept: LAB | Facility: HOSPITAL | Age: 73
End: 2023-03-14
Payer: MEDICARE

## 2023-03-14 DIAGNOSIS — N18.31 CHRONIC KIDNEY DISEASE, STAGE 3A: ICD-10-CM

## 2023-03-14 DIAGNOSIS — N18.31 CHRONIC KIDNEY DISEASE, STAGE 3A: Primary | ICD-10-CM

## 2023-03-14 LAB
ALBUMIN SERPL-MCNC: 4.1 G/DL (ref 3.5–5.2)
ANION GAP SERPL CALCULATED.3IONS-SCNC: 13.9 MMOL/L (ref 5–15)
BUN SERPL-MCNC: 13 MG/DL (ref 8–23)
BUN/CREAT SERPL: 11.8 (ref 7–25)
CALCIUM SPEC-SCNC: 10 MG/DL (ref 8.6–10.5)
CHLORIDE SERPL-SCNC: 104 MMOL/L (ref 98–107)
CO2 SERPL-SCNC: 27.1 MMOL/L (ref 22–29)
CREAT SERPL-MCNC: 1.1 MG/DL (ref 0.57–1)
CREAT UR-MCNC: 170.1 MG/DL
EGFRCR SERPLBLD CKD-EPI 2021: 53.5 ML/MIN/1.73
GLUCOSE SERPL-MCNC: 113 MG/DL (ref 65–99)
PHOSPHATE SERPL-MCNC: 3.1 MG/DL (ref 2.5–4.5)
POTASSIUM SERPL-SCNC: 3.5 MMOL/L (ref 3.5–5.2)
PROT ?TM UR-MCNC: 13.7 MG/DL
PROT/CREAT UR: 80.5 MG/G CREA (ref 0–200)
SODIUM SERPL-SCNC: 145 MMOL/L (ref 136–145)

## 2023-03-14 PROCEDURE — 82570 ASSAY OF URINE CREATININE: CPT

## 2023-03-14 PROCEDURE — 84156 ASSAY OF PROTEIN URINE: CPT

## 2023-03-14 PROCEDURE — 80069 RENAL FUNCTION PANEL: CPT

## 2023-03-14 PROCEDURE — 36415 COLL VENOUS BLD VENIPUNCTURE: CPT

## 2023-03-15 DIAGNOSIS — J30.1 ALLERGIC RHINITIS DUE TO POLLEN, UNSPECIFIED SEASONALITY: ICD-10-CM

## 2023-03-16 RX ORDER — MONTELUKAST SODIUM 10 MG/1
10 TABLET ORAL
Qty: 90 TABLET | Refills: 3 | Status: SHIPPED | OUTPATIENT
Start: 2023-03-16

## 2023-03-18 ENCOUNTER — PATIENT MESSAGE (OUTPATIENT)
Dept: FAMILY MEDICINE CLINIC | Facility: CLINIC | Age: 73
End: 2023-03-18
Payer: MEDICARE

## 2023-03-18 DIAGNOSIS — F51.01 PRIMARY INSOMNIA: ICD-10-CM

## 2023-03-20 RX ORDER — TEMAZEPAM 30 MG/1
30 CAPSULE ORAL NIGHTLY PRN
Qty: 28 CAPSULE | Refills: 0 | Status: SHIPPED | OUTPATIENT
Start: 2023-03-20

## 2023-03-20 NOTE — TELEPHONE ENCOUNTER
From: Lexie Cardona  To: Nazia Fulton  Sent: 3/18/2023 5:29 PM CDT  Subject: Temazepam    Has Temezpam refill been sent to [facility]?If it has not been sent then send to [facility]-thanks!

## 2023-04-19 DIAGNOSIS — F51.01 PRIMARY INSOMNIA: ICD-10-CM

## 2023-04-19 DIAGNOSIS — J30.1 ALLERGIC RHINITIS DUE TO POLLEN, UNSPECIFIED SEASONALITY: ICD-10-CM

## 2023-04-19 RX ORDER — TEMAZEPAM 30 MG/1
30 CAPSULE ORAL NIGHTLY PRN
Qty: 28 CAPSULE | Refills: 0 | Status: CANCELLED | OUTPATIENT
Start: 2023-04-19

## 2023-04-20 RX ORDER — MOMETASONE FUROATE 50 UG/1
2 SPRAY, METERED NASAL DAILY
Qty: 51 G | Refills: 3 | Status: SHIPPED | OUTPATIENT
Start: 2023-04-20

## 2023-04-20 RX ORDER — CETIRIZINE HYDROCHLORIDE 10 MG/1
10 TABLET ORAL DAILY
Qty: 90 TABLET | Refills: 0 | Status: SHIPPED | OUTPATIENT
Start: 2023-04-20

## 2023-04-20 NOTE — TELEPHONE ENCOUNTER
Last RX 03/20/2023  #28, NR    Next Appt  With Family Medicine (Nazia Fulton MD)  05/05/2023 at 8:30 AM    Last OV 01/06/2023    Mail Order Rx have been sent    Requested Controlled to be sent to Local Pharmacy, [facility]

## 2023-04-21 RX ORDER — TEMAZEPAM 30 MG/1
30 CAPSULE ORAL NIGHTLY PRN
Qty: 28 CAPSULE | Refills: 0 | Status: SHIPPED | OUTPATIENT
Start: 2023-04-21

## 2023-05-05 ENCOUNTER — OFFICE VISIT (OUTPATIENT)
Dept: FAMILY MEDICINE CLINIC | Facility: CLINIC | Age: 73
End: 2023-05-05
Payer: MEDICARE

## 2023-05-05 VITALS
SYSTOLIC BLOOD PRESSURE: 112 MMHG | HEIGHT: 64 IN | HEART RATE: 67 BPM | DIASTOLIC BLOOD PRESSURE: 70 MMHG | WEIGHT: 183 LBS | BODY MASS INDEX: 31.24 KG/M2 | OXYGEN SATURATION: 99 %

## 2023-05-05 DIAGNOSIS — Z78.0 POSTMENOPAUSAL: ICD-10-CM

## 2023-05-05 DIAGNOSIS — J30.1 ALLERGIC RHINITIS DUE TO POLLEN, UNSPECIFIED SEASONALITY: ICD-10-CM

## 2023-05-05 DIAGNOSIS — F51.01 PRIMARY INSOMNIA: Primary | ICD-10-CM

## 2023-05-05 DIAGNOSIS — N18.30 STAGE 3 CHRONIC KIDNEY DISEASE, UNSPECIFIED WHETHER STAGE 3A OR 3B CKD: ICD-10-CM

## 2023-05-05 DIAGNOSIS — Z12.31 ENCOUNTER FOR SCREENING MAMMOGRAM FOR MALIGNANT NEOPLASM OF BREAST: ICD-10-CM

## 2023-05-05 PROCEDURE — 99214 OFFICE O/P EST MOD 30 MIN: CPT | Performed by: FAMILY MEDICINE

## 2023-05-05 PROCEDURE — 1159F MED LIST DOCD IN RCRD: CPT | Performed by: FAMILY MEDICINE

## 2023-05-05 PROCEDURE — 1160F RVW MEDS BY RX/DR IN RCRD: CPT | Performed by: FAMILY MEDICINE

## 2023-05-05 NOTE — PROGRESS NOTES
Chief Complaint  Anxiety and Insomnia    Subjective    History of Present Illness {CC  Problem List  Visit  Diagnosis   Encounters  Notes  Medications  Labs  Result Review Imaging  Media :23}     Lexie Cardona presents to [facility] [place] for     Chief Complaint   Patient presents with   • Anxiety   • Insomnia      Patient seen today for follow up.  Patient notes that she has been feeling very tired.  She is spending a lot of time taking care of her .  Restoril is ok for sleep.  Using hydroxyzine for anhistamine.  Allergies have not been well controlled - using Zyrtec, Singulair and Nasonex.         Current Outpatient Medications:   •  Alum Hydroxide-Mag Carbonate (GAVISCON PO), Take  by mouth As Needed. Tablets Or Liquid, As Needed, Disp: , Rfl:   •  calcium carbonate-vitamin d 600-400 MG-UNIT per tablet, Calcium 600 + D(3) 600 mg (1,500 mg)-400 unit tablet  Take by oral route., Disp: , Rfl:   •  cetirizine (zyrTEC) 10 MG tablet, Take 1 tablet by mouth Daily., Disp: 90 tablet, Rfl: 0  •  cholestyramine light (Prevalite) 4 g packet, Take 1 packet by mouth Daily., Disp: 90 packet, Rfl: 3  •  CRANBERRY PO, Take 4,200 mg by mouth Daily., Disp: , Rfl:   •  docusate sodium (COLACE) 100 MG capsule, Take  by mouth Daily As Needed., Disp: , Rfl:   •  doxycycline (MONODOX) 100 MG capsule, TAKE 1 CAPSULE BY MOUTH TWICE A DAY FOR 7 DAYS, Disp: 14 capsule, Rfl: 0  •  EPINEPHrine (EPIPEN) 0.3 MG/0.3ML solution auto-injector injection, Inject 0.3 mL into the appropriate muscle as directed by prescriber See Admin Instructions. for allergic reaction, Disp: 2 each, Rfl: 1  •  hydrOXYzine pamoate (VISTARIL) 50 MG capsule, TAKE 2 CAPSULES 3 TIMES A  DAY AS NEEDED FOR ITCHING  OR ANXIETY, Disp: 180 capsule, Rfl: 5  •  mometasone (NASONEX) 50 MCG/ACT nasal spray, 2 sprays into the nostril(s) as directed by provider Daily., Disp: 51 g, Rfl: 3  •  montelukast  "(SINGULAIR) 10 MG tablet, Take 1 tablet by mouth every night at bedtime., Disp: 90 tablet, Rfl: 3  •  Multiple Vitamins-Minerals (CENTRUM SILVER PO), Take 1 tablet by mouth Daily., Disp: , Rfl:   •  Probiotic Product (PROBIOTIC FORMULA PO), Take 1 tablet by mouth Every Night. 10 billion cell (2 billion ea) capsule, Disp: , Rfl:   •  temazepam (RESTORIL) 30 MG capsule, Take 1 capsule by mouth At Night As Needed for Sleep., Disp: 28 capsule, Rfl: 0     Objective       Vital Signs:   /70   Pulse 67   Ht 162.6 cm (64\")   Wt 83 kg (183 lb)   SpO2 99%   BMI 31.41 kg/m²     Physical Exam  Vitals reviewed.   Constitutional:       General: She is not in acute distress.     Appearance: She is well-developed.   Cardiovascular:      Rate and Rhythm: Normal rate and regular rhythm.      Heart sounds: Normal heart sounds. No murmur heard.  Pulmonary:      Effort: Pulmonary effort is normal. No respiratory distress.      Breath sounds: Normal breath sounds. No wheezing or rales.   Skin:     General: Skin is warm and dry.   Neurological:      Mental Status: She is alert and oriented to person, place, and time.        Result Review :{ Labs  Result Review  Imaging  Med Tab  Media :23}   The following data was reviewed by: Nazia Fulton MD on 05/05/2023    Common labs        1/6/2023    11:55 3/14/2023    12:53   Common Labs   Glucose 93   113     BUN 17   13     Creatinine 1.14   1.10     Sodium 142   145     Potassium 4.2   3.5     Chloride 106   104     Calcium 9.9   10.0     Albumin 4.7   4.1     Total Bilirubin 1.1      Alkaline Phosphatase 73      AST (SGOT) 30      ALT (SGPT) 22      WBC 7.19      Hemoglobin 16.0      Hematocrit 44.9      Platelets 277      Total Cholesterol 289      Triglycerides 116      HDL Cholesterol 63      LDL Cholesterol  206                  Assessment and Plan {CC Problem List  Visit Diagnosis  ROS  Review (Popup)  Health Maintenance  Quality  BestPractice  Medications  " SmartSets  SnapShot Encounters  Media :23}   Diagnoses and all orders for this visit:    1. Primary insomnia (Primary)    2. Allergic rhinitis due to pollen, unspecified seasonality    3. Stage 3 chronic kidney disease, unspecified whether stage 3a or 3b CKD    4. Encounter for screening mammogram for malignant neoplasm of breast  -     Mammo Screening Digital Tomosynthesis Bilateral With CAD; Future    5. Postmenopausal  -     DEXA Bone Density Axial; Future       Insomnia managed with restoril - continue  Patient has controlled substance agreement on file  Continue allergy regimen  Discussed making sure to avoid allergens as much as possible, may consider wearing mask outdoors, make sure to shower/change clothes after coming indoors  Chronic kidney disease has been stable, following with nephrology  Continue to avoid nephrotoxic medications  Due for mammogram and DEXA - ordered      Follow Up {Instructions Charge Capture  Follow-up Communications :23}   Return in about 3 months (around 8/5/2023) for Recheck.  Patient was given instructions and counseling regarding her condition or for health maintenance advice. Please see specific information pulled into the AVS if appropriate.          This document has been electronically signed by Nazia Fulton MD

## 2023-05-17 DIAGNOSIS — F51.01 PRIMARY INSOMNIA: ICD-10-CM

## 2023-05-18 NOTE — TELEPHONE ENCOUNTER
Last Rx 04/21/2023  #28, NR    Next Appt  With Family Medicine (Nazia Fulton MD)  08/11/2023 at 8:00 AM    Last OV 05/05/2023

## 2023-05-19 RX ORDER — TEMAZEPAM 30 MG/1
30 CAPSULE ORAL NIGHTLY PRN
Qty: 28 CAPSULE | Refills: 0 | Status: SHIPPED | OUTPATIENT
Start: 2023-05-19

## 2023-06-14 DIAGNOSIS — F51.01 PRIMARY INSOMNIA: ICD-10-CM

## 2023-06-14 DIAGNOSIS — F41.1 GAD (GENERALIZED ANXIETY DISORDER): ICD-10-CM

## 2023-06-14 RX ORDER — HYDROXYZINE PAMOATE 50 MG/1
100 CAPSULE ORAL 3 TIMES DAILY PRN
Qty: 180 CAPSULE | Refills: 3 | Status: SHIPPED | OUTPATIENT
Start: 2023-06-14

## 2023-06-14 RX ORDER — TEMAZEPAM 30 MG/1
30 CAPSULE ORAL NIGHTLY PRN
Qty: 28 CAPSULE | Refills: 0 | Status: SHIPPED | OUTPATIENT
Start: 2023-06-14

## 2023-06-14 NOTE — TELEPHONE ENCOUNTER
Last Rx 05/19/2023  #28, NR    UPCOMING APPTS  With Family Medicine (Nazia Fulton MD)  08/11/2023 at 8:00 AM    LAST OFFICE VISIT - THIS DEPT  5/5/2023 Nazia Fulton MD

## 2023-07-20 DIAGNOSIS — Z12.31 ENCOUNTER FOR SCREENING MAMMOGRAM FOR MALIGNANT NEOPLASM OF BREAST: ICD-10-CM

## 2023-07-21 ENCOUNTER — HOSPITAL ENCOUNTER (EMERGENCY)
Facility: HOSPITAL | Age: 73
Discharge: HOME | End: 2023-07-21
Attending: EMERGENCY MEDICINE | Admitting: EMERGENCY MEDICINE
Payer: MEDICARE

## 2023-07-21 VITALS
OXYGEN SATURATION: 98 % | HEIGHT: 64 IN | BODY MASS INDEX: 30.73 KG/M2 | DIASTOLIC BLOOD PRESSURE: 102 MMHG | SYSTOLIC BLOOD PRESSURE: 176 MMHG | TEMPERATURE: 97.9 F | RESPIRATION RATE: 18 BRPM | WEIGHT: 180 LBS | HEART RATE: 74 BPM

## 2023-07-21 DIAGNOSIS — R58 ECCHYMOSIS: Primary | ICD-10-CM

## 2023-07-21 PROCEDURE — 99282 EMERGENCY DEPT VISIT SF MDM: CPT

## 2023-07-22 NOTE — DISCHARGE INSTRUCTIONS
Expect lesion to gradually improve over the next 2-3 weeks.  Return with any new or worsening symptoms, or any concerns.

## 2023-07-22 NOTE — ED PROVIDER NOTES
Subjective   History of Present Illness  Patient presents emergency department with a left forearm spot that she is concerned about.  Patient has been looking on Pose and feels she may have a melanoma.  Patient notes the spot developed overnight approximately 4 nights ago.  It is nonpainful.  Nonpruritic.  It did not come from a mole.  Patient does have thin skin and easily bruises and has skin tears.  Multiple bandages on the left hand from recent injuries.      History provided by:  Patient    Review of Systems   Constitutional: Negative.  Negative for appetite change, chills and fever.   HENT: Negative.  Negative for congestion.    Eyes: Negative.  Negative for photophobia and visual disturbance.   Respiratory: Negative.  Negative for cough, chest tightness and shortness of breath.    Cardiovascular: Negative.  Negative for chest pain and palpitations.   Gastrointestinal: Negative.  Negative for abdominal pain, constipation, diarrhea, nausea and vomiting.   Endocrine: Negative.    Genitourinary: Negative.  Negative for decreased urine volume, dysuria, flank pain and hematuria.   Musculoskeletal: Negative.  Negative for arthralgias, back pain, myalgias, neck pain and neck stiffness.   Skin: Negative.  Negative for pallor.   Neurological: Negative.  Negative for dizziness, syncope, weakness, light-headedness, numbness and headaches.   Psychiatric/Behavioral: Negative.  Negative for confusion and suicidal ideas. The patient is not nervous/anxious.    All other systems reviewed and are negative.    Past Medical History:   Diagnosis Date    Acute respiratory failure due to COVID-19 1/29/2021    Acute respiratory failure with hypoxia 1/29/2021    Allergic rhinitis     Blastomycosis     Carpal tunnel syndrome of right wrist 7/21/2017    Chronic bronchitis     Chronic kidney disease (CKD), stage II (mild)     Minnie bullosa 2/19/2018    Added automatically from request for surgery [id number]    Diverticulitis of colon      Gastric polyp     Gastritis     Gastroesophageal reflux disease     Gastroparesis     Gout     History of colon polyps     Hypercholesterolemia     IBS (irritable bowel syndrome)     Meniere's disease     Myoclonic disorder     Ménière's disease     Nasal obstruction 2/19/2018    Added automatically from request for surgery [id number]    Nasal septal deformity 2/19/2018    Added automatically from request for surgery [id number]    Nasal turbinate hypertrophy 2/19/2018    Added automatically from request for surgery [id number]    Nasal valve collapse 2/19/2018    Added automatically from request for surgery [id number]    Osteoarthritis     Peripheral edema     Pneumonia due to COVID-19 virus 1/29/2021    Sinusitis, chronic 2/19/2018    Added automatically from request for surgery [id number]    Skin cancer     Sleep apnea     Spasm of bladder     Urinary frequency 6/7/2021       Allergies   Allergen Reactions    Augmentin [Amoxicillin-Pot Clavulanate] Anaphylaxis    Ceclor [Cefaclor] Anaphylaxis    Nsaids Anaphylaxis    Other Anaphylaxis     Cats  E[employer]S[address]  Lead   Mold  [place] Pastrani - Anaphylaxis    Penicillins Anaphylaxis    Seroquel [Quetiapine] Anxiety     Elevated heart rate and caused insomnia    Aspirin Tinnitus     tinnitis     Biaxin [Clarithromycin] Other (See Comments)     angioedema    Ciprofloxacin Other (See Comments)     Lips burning    Contrast Dye (Echo Or Unknown Ct/Mr) Hives and Itching    Cymbalta [Duloxetine Hcl] Swelling     Tongue turned red and was swollen    Demerol [Meperidine] Nausea And Vomiting    Iodine GI Intolerance     And iodide containing products    Macrobid [Nitrofurantoin Monohyd Macro] Diarrhea, Nausea And Vomiting and GI Intolerance    Requip [Ropinirole Hcl] Diarrhea, Nausea And Vomiting and GI Intolerance    Septra [Sulfamethoxazole-Trimethoprim] Nausea And Vomiting and GI Intolerance    Statins Myalgia       *muscle enzyme increase with myalgias    Zithromax [Azithromycin] Unknown - Low  Severity     States she hasn't had a reaction, but doctor said he wasn't going to give it to her    Astelin [Azelastine] Unknown - Low Severity    Nystatin GI Intolerance    Niaspan [Niacin Er] Rash    Nickel Rash       Past Surgical History:   Procedure Laterality Date    BRONCHOSCOPY      CARPAL TUNNEL RELEASE WITH CUBITAL TUNNEL RELEASE      COLONOSCOPY W/ POLYPECTOMY      CYSTOSCOPY      ENDOSCOPIC FUNCTIONAL SINUS SURGERY (FESS) Right 3/6/2018    LAPAROSCOPIC CHOLECYSTECTOMY      OVARIAN CYST REMOVAL      TOTAL ABDOMINAL HYSTERECTOMY WITH SALPINGO OOPHORECTOMY         Family History   Problem Relation Age of Onset    Diabetes Mother     Colon cancer Father 60    Breast cancer Paternal Grandmother     Colon cancer Paternal Uncle 60       Social History     Socioeconomic History    Marital status:    Tobacco Use    Smoking status: Former     Years: 30.00     Types: Cigarettes     Quit date:      Years since quittin.5    Smokeless tobacco: Never   Substance and Sexual Activity    Alcohol use: No    Drug use: No    Sexual activity: Not Currently     Comment:            Objective   Physical Exam  Musculoskeletal:      Comments: The area of involved skin appears to be a area of ecchymosis.  There is nontender.  There is slight yellowing across the borders.  This is consistent with a 4-day old bruise.        Procedures           ED Course                                           Medical Decision Making  Patient reassured.  Discussed with patient the expected timeline and healing.  Plan dermatology follow-up in 3 weeks should symptoms persist.    Problems Addressed:  Ecchymosis: acute illness or injury        Final diagnoses:   Ecchymosis       ED Disposition  ED Disposition       ED Disposition   Discharge    Condition   Stable    Comment   --               Cande Comer, APRN   [address] [facility] [address]  [phone]    In 3 weeks  If not improved for further evaluation and  care         Medication List      No changes were made to your prescriptions during this visit.            Shayne Glover MD  07/21/23 1216

## 2023-08-11 ENCOUNTER — OFFICE VISIT (OUTPATIENT)
Dept: FAMILY MEDICINE CLINIC | Facility: CLINIC | Age: 73
End: 2023-08-11
Payer: MEDICARE

## 2023-08-11 VITALS
WEIGHT: 178 LBS | OXYGEN SATURATION: 97 % | SYSTOLIC BLOOD PRESSURE: 130 MMHG | BODY MASS INDEX: 30.39 KG/M2 | HEIGHT: 64 IN | HEART RATE: 69 BPM | DIASTOLIC BLOOD PRESSURE: 80 MMHG

## 2023-08-11 DIAGNOSIS — J30.1 ALLERGIC RHINITIS DUE TO POLLEN, UNSPECIFIED SEASONALITY: ICD-10-CM

## 2023-08-11 DIAGNOSIS — F51.01 PRIMARY INSOMNIA: Primary | ICD-10-CM

## 2023-08-11 DIAGNOSIS — F41.1 GAD (GENERALIZED ANXIETY DISORDER): ICD-10-CM

## 2023-08-11 DIAGNOSIS — N18.30 STAGE 3 CHRONIC KIDNEY DISEASE, UNSPECIFIED WHETHER STAGE 3A OR 3B CKD: ICD-10-CM

## 2023-08-11 PROCEDURE — 1159F MED LIST DOCD IN RCRD: CPT | Performed by: FAMILY MEDICINE

## 2023-08-11 PROCEDURE — 99214 OFFICE O/P EST MOD 30 MIN: CPT | Performed by: FAMILY MEDICINE

## 2023-08-11 PROCEDURE — 1160F RVW MEDS BY RX/DR IN RCRD: CPT | Performed by: FAMILY MEDICINE

## 2023-08-11 NOTE — PROGRESS NOTES
Chief Complaint  Insomnia    Subjective    History of Present Illness {  Problem List  Visit  Diagnosis   Encounters  Notes  Medications  Labs  Result Review Imaging  Media :23}     Lexie Cardona presents to [facility] for     Chief Complaint   Patient presents with    Insomnia      Patient seen today for follow up.  Chronic medical concerns include generalized anxiety disorder, hyperlipidemia, insomnia and allergies.  Having some continued problems with sleep.  Restoril does help some, but still not getting enough hours of sleep each night.       Current Outpatient Medications:     Alum Hydroxide-Mag Carbonate (GAVISCON PO), Take  by mouth As Needed. Tablets Or Liquid, As Needed, Disp: , Rfl:     calcium carbonate-vitamin d 600-400 MG-UNIT per tablet, Calcium 600 + D(3) 600 mg (1,500 mg)-400 unit tablet  Take by oral route., Disp: , Rfl:     cholestyramine light (Prevalite) 4 g packet, Take 1 packet by mouth Daily., Disp: 90 packet, Rfl: 3    CRANBERRY PO, Take 4,200 mg by mouth Daily., Disp: , Rfl:     docusate sodium (COLACE) 100 MG capsule, Take  by mouth Daily As Needed., Disp: , Rfl:     EPINEPHrine (EPIPEN) 0.3 MG/0.3ML solution auto-injector injection, Inject 0.3 mL into the appropriate muscle as directed by prescriber See Admin Instructions. for allergic reaction, Disp: 2 each, Rfl: 1    hydrOXYzine pamoate (VISTARIL) 50 MG capsule, Take 2 capsules by mouth 3 (Three) Times a Day As Needed for Itching., Disp: 180 capsule, Rfl: 3    mometasone (NASONEX) 50 MCG/ACT nasal spray, 2 sprays into the nostril(s) as directed by provider Daily., Disp: 51 g, Rfl: 3    montelukast (SINGULAIR) 10 MG tablet, Take 1 tablet by mouth every night at bedtime., Disp: 90 tablet, Rfl: 3    Multiple Vitamins-Minerals (CENTRUM SILVER PO), Take 1 tablet by mouth Daily., Disp: , Rfl:     Probiotic Product (PROBIOTIC FORMULA PO), Take 1 tablet by mouth Every  "Night. 10 billion cell (2 billion ea) capsule, Disp: , Rfl:     temazepam (RESTORIL) 30 MG capsule, Take 1 capsule by mouth At Night As Needed for Sleep., Disp: 28 capsule, Rfl: 0     Objective       Vital Signs:   /80   Pulse 69   Ht 162.6 cm (64\")   Wt 80.7 kg (178 lb)   SpO2 97%   BMI 30.55 kg/mý     Physical Exam  Vitals reviewed.   Constitutional:       General: She is not in acute distress.     Appearance: She is well-developed.   Cardiovascular:      Rate and Rhythm: Normal rate and regular rhythm.      Heart sounds: Normal heart sounds. No murmur heard.  Pulmonary:      Effort: Pulmonary effort is normal. No respiratory distress.      Breath sounds: Normal breath sounds. No wheezing or rales.   Skin:     General: Skin is warm and dry.   Neurological:      Mental Status: She is alert and oriented to person, place, and time.      Result Review :{ Labs  Result Review  Imaging  Med Tab  Media :23}   The following data was reviewed by: Nazia Fulton MD on 08/11/2023    Common labs          1/6/2023    11:55 3/14/2023    12:53   Common Labs   Glucose 93  113    BUN 17  13    Creatinine 1.14  1.10    Sodium 142  145    Potassium 4.2  3.5    Chloride 106  104    Calcium 9.9  10.0    Albumin 4.7  4.1    Total Bilirubin 1.1     Alkaline Phosphatase 73     AST (SGOT) 30     ALT (SGPT) 22     WBC 7.19     Hemoglobin 16.0     Hematocrit 44.9     Platelets 277     Total Cholesterol 289     Triglycerides 116     HDL Cholesterol 63     LDL Cholesterol  206                 Assessment and Plan {CC Problem List  Visit Diagnosis  ROS  Review (Popup)  Health Maintenance  Quality  BestPractice  Medications  SmartSets  SnapShot Encounters  Media :23}   Diagnoses and all orders for this visit:    1. Primary insomnia (Primary)    2. PRITI (generalized anxiety disorder)    3. Stage 3 chronic kidney disease, unspecified whether stage 3a or 3b CKD  -     Comprehensive Metabolic Panel; Future  -     CBC & " Differential; Future    4. Allergic rhinitis due to pollen, unspecified seasonality         Patient seen today for follow up  Insomnia still not well controlled  Continue Restoril nightly  Recommend low dose melatonin along with this medication  Generalized anxiety, continue hydroxyzine  Check CMP for monitoring stage 3 chronic kidney disease  Allergic rhinitis - continue Singulair and nasonex along with antihistamine      Follow Up {Instructions Charge Capture  Follow-up Communications :23}   Return in about 12 weeks (around 11/3/2023) for Recheck.  Patient was given instructions and counseling regarding her condition or for health maintenance advice. Please see specific information pulled into the AVS if appropriate.            This document has been electronically signed by Nazia Fulton MD

## 2023-08-13 DIAGNOSIS — F51.01 PRIMARY INSOMNIA: ICD-10-CM

## 2023-08-13 DIAGNOSIS — F41.1 GAD (GENERALIZED ANXIETY DISORDER): ICD-10-CM

## 2023-08-13 RX ORDER — TEMAZEPAM 30 MG/1
30 CAPSULE ORAL NIGHTLY PRN
Qty: 28 CAPSULE | Refills: 0 | Status: CANCELLED | OUTPATIENT
Start: 2023-08-13

## 2023-08-14 DIAGNOSIS — F51.01 PRIMARY INSOMNIA: ICD-10-CM

## 2023-08-14 RX ORDER — TEMAZEPAM 30 MG/1
30 CAPSULE ORAL NIGHTLY PRN
Qty: 28 CAPSULE | Refills: 0 | Status: SHIPPED | OUTPATIENT
Start: 2023-08-14

## 2023-08-14 RX ORDER — HYDROXYZINE PAMOATE 50 MG/1
100 CAPSULE ORAL 3 TIMES DAILY PRN
Qty: 180 CAPSULE | Refills: 3 | Status: SHIPPED | OUTPATIENT
Start: 2023-08-14

## 2023-08-14 NOTE — TELEPHONE ENCOUNTER
Last Rx 07/13/2023  #28, NR    UPCOMING APPTS  With Family Medicine (Nazia Fulton MD)  11/15/2023 at 8:00 AM  LAST OFFICE VISIT - THIS DEPT  8/11/2023 Nazia Fulton MD

## 2023-09-08 DIAGNOSIS — F51.01 PRIMARY INSOMNIA: ICD-10-CM

## 2023-09-08 RX ORDER — TEMAZEPAM 30 MG/1
30 CAPSULE ORAL NIGHTLY PRN
Qty: 28 CAPSULE | Refills: 0 | Status: SHIPPED | OUTPATIENT
Start: 2023-09-08

## 2023-09-13 DIAGNOSIS — F41.1 GAD (GENERALIZED ANXIETY DISORDER): ICD-10-CM

## 2023-09-13 DIAGNOSIS — J30.1 ALLERGIC RHINITIS DUE TO POLLEN, UNSPECIFIED SEASONALITY: ICD-10-CM

## 2023-09-14 RX ORDER — MONTELUKAST SODIUM 10 MG/1
10 TABLET ORAL
Qty: 90 TABLET | Refills: 3 | Status: SHIPPED | OUTPATIENT
Start: 2023-09-14

## 2023-09-14 RX ORDER — HYDROXYZINE PAMOATE 50 MG/1
100 CAPSULE ORAL 3 TIMES DAILY PRN
Qty: 180 CAPSULE | Refills: 3 | Status: SHIPPED | OUTPATIENT
Start: 2023-09-14

## 2023-09-15 NOTE — PROGRESS NOTES
Assessment completed per SGNA guidelines    Subjective   Lexie Cardona is a 69 y.o. female.     History of Present Illness seen regular provider's absence.  Patient states unable to get her sleeping medicine namely Restoril from pharmacy due to back order.  Long-standing history of sleep disorder probably related more generalized anxiety disorder and depressive disorder.  History is noted and reviewed.  Medicines are reviewed.    The following portions of the patient's history were reviewed and updated as appropriate: allergies, current medications, past family history, past medical history, past social history, past surgical history and problem list.    Review of Systems   Constitutional: Negative for activity change, appetite change, fatigue and unexpected weight change.   HENT: Negative for trouble swallowing and voice change.    Eyes: Negative for redness and visual disturbance.   Respiratory: Negative for cough and wheezing.    Cardiovascular: Negative for chest pain and palpitations.   Gastrointestinal: Negative for abdominal pain, constipation, diarrhea, nausea and vomiting.   Genitourinary: Negative for urgency.   Musculoskeletal: Negative for joint swelling.   Neurological: Negative for syncope and headaches.   Hematological: Negative for adenopathy.   Psychiatric/Behavioral: Positive for sleep disturbance. The patient is nervous/anxious.        Objective   Physical Exam   Constitutional: She appears well-developed.   HENT:   Head: Normocephalic.   Eyes: Pupils are equal, round, and reactive to light.   Neck: Normal range of motion.   Cardiovascular: Normal rate.   Pulmonary/Chest: Effort normal.   Psychiatric: Her speech is normal and behavior is normal. Thought content normal. Her mood appears anxious.       Assessment/Plan   Lexie was seen today for insomnia.    Diagnoses and all orders for this visit:    Primary insomnia  -     triazolam (HALCION) 0.25 MG tablet; Take 1 tablet by mouth At Night As Needed for Sleep.    PRITI (generalized  anxiety disorder)       Muscle multifactorial causes of insomnia counseled mainly in a lot of psychiatric overlay.  Until regular provider returns can switch to above-mentioned Halcion for the next few days otherwise defer to her regular doctor

## 2023-09-19 ENCOUNTER — TRANSCRIBE ORDERS (OUTPATIENT)
Dept: LAB | Facility: HOSPITAL | Age: 73
End: 2023-09-19
Payer: MEDICARE

## 2023-09-19 DIAGNOSIS — N18.31 CHRONIC KIDNEY DISEASE (CKD) STAGE G3A/A1, MODERATELY DECREASED GLOMERULAR FILTRATION RATE (GFR) BETWEEN 45-59 ML/MIN/1.73 SQUARE METER AND ALBUMINURIA CREATININE RATIO LESS THAN 30 MG/G (CMS/H*: Primary | ICD-10-CM

## (undated) DEVICE — CONTAINER,SPECIMEN,OR STERILE,4OZ: Brand: MEDLINE

## (undated) DEVICE — GLV SURG TRIUMPH LT PF LTX 8 STRL

## (undated) DEVICE — GLV SURG TRIUMPH LT PF LTX 7.5 STRL

## (undated) DEVICE — GLV SURG SENSICARE GREEN W/ALOE PF LF 8 STRL

## (undated) DEVICE — GOWN,AURORA,NOREINF,RAGLAN,XL,STERILE: Brand: MEDLINE

## (undated) DEVICE — CODMAN® SURGICAL PATTIES 1/2" X 3" (1.27CM X 7.62CM): Brand: CODMAN®

## (undated) DEVICE — GLV SURG TRIUMPH LT PF LTX 6.5 STRL

## (undated) DEVICE — COAGULATOR SXN HNDSWITCH 10F16IN

## (undated) DEVICE — SOL IRRIG NACL 1000ML

## (undated) DEVICE — DEFOGGER!" ANTI FOG KIT: Brand: DEROYAL

## (undated) DEVICE — SUT PDS 4-0 RB-1 Z304H

## (undated) DEVICE — STERILE POLYISOPRENE POWDER-FREE SURGICAL GLOVES WITH EMOLLIENT COATING: Brand: PROTEXIS

## (undated) DEVICE — SUT PLAIN 1 4/0 1828H

## (undated) DEVICE — TBG DECLOG DIEGO ELITE MULTIDEBRIDER ST

## (undated) DEVICE — Device

## (undated) DEVICE — TP SXN YANKR BLB TIP W/TBG 10F LF STRL

## (undated) DEVICE — GLV SURG SENSICARE GREEN W/ALOE PF LF 6 STRL

## (undated) DEVICE — NDL SPINE 25G 4 11/16 BLU

## (undated) DEVICE — BLD BIPOL DIEGO SMR STR STD TYPE A 2MM

## (undated) DEVICE — PK ENT LF 60

## (undated) DEVICE — TRY IRR